# Patient Record
Sex: MALE | Race: OTHER | HISPANIC OR LATINO
[De-identification: names, ages, dates, MRNs, and addresses within clinical notes are randomized per-mention and may not be internally consistent; named-entity substitution may affect disease eponyms.]

---

## 2024-01-30 ENCOUNTER — APPOINTMENT (OUTPATIENT)
Dept: GASTROENTEROLOGY | Facility: CLINIC | Age: 74
End: 2024-01-30
Payer: MEDICARE

## 2024-01-30 VITALS
WEIGHT: 252 LBS | SYSTOLIC BLOOD PRESSURE: 120 MMHG | HEIGHT: 68 IN | RESPIRATION RATE: 14 BRPM | DIASTOLIC BLOOD PRESSURE: 69 MMHG | OXYGEN SATURATION: 94 % | HEART RATE: 74 BPM | BODY MASS INDEX: 38.19 KG/M2 | TEMPERATURE: 97.6 F

## 2024-01-30 DIAGNOSIS — B19.20 UNSPECIFIED VIRAL HEPATITIS C W/OUT HEPATIC COMA: ICD-10-CM

## 2024-01-30 DIAGNOSIS — R74.01 ELEVATION OF LEVELS OF LIVER TRANSAMINASE LEVELS: ICD-10-CM

## 2024-01-30 DIAGNOSIS — F17.200 NICOTINE DEPENDENCE, UNSPECIFIED, UNCOMPLICATED: ICD-10-CM

## 2024-01-30 DIAGNOSIS — Z78.9 OTHER SPECIFIED HEALTH STATUS: ICD-10-CM

## 2024-01-30 DIAGNOSIS — F19.20 OTHER PSYCHOACTIVE SUBSTANCE DEPENDENCE, UNCOMPLICATED: ICD-10-CM

## 2024-01-30 PROBLEM — Z00.00 ENCOUNTER FOR PREVENTIVE HEALTH EXAMINATION: Status: ACTIVE | Noted: 2024-01-30

## 2024-01-30 PROCEDURE — 99204 OFFICE O/P NEW MOD 45 MIN: CPT

## 2024-01-30 RX ORDER — METHADONE HYDROCHLORIDE 5 MG/1
TABLET ORAL
Refills: 0 | Status: ACTIVE | COMMUNITY

## 2024-01-30 NOTE — PHYSICAL EXAM
[General Appearance - Alert] : alert [General Appearance - In No Acute Distress] : in no acute distress [Sclera] : the sclera and conjunctiva were normal [Outer Ear] : the ears and nose were normal in appearance [Oropharynx] : the oropharynx was normal [Neck Appearance] : the appearance of the neck was normal [Edema] : there was no peripheral edema [Bowel Sounds] : normal bowel sounds [Abdomen Soft] : soft [Abdomen Tenderness] : non-tender [Abdomen Mass (___ Cm)] : no abdominal mass palpated [Abnormal Walk] : normal gait [Skin Color & Pigmentation] : normal skin color and pigmentation [Skin Turgor] : normal skin turgor [] : no rash [No Focal Deficits] : no focal deficits [Oriented To Time, Place, And Person] : oriented to person, place, and time [Impaired Insight] : insight and judgment were intact [Affect] : the affect was normal [Scleral Icterus] : No Scleral Icterus [Spider Angioma] : No spider angioma(s) were observed [Abdominal  Ascites] : no ascites [Asterixis] : no asterixis observed [Jaundice] : No jaundice [Palmar Erythema] : no Palmar Erythema [Depression] : no depression [Hallucinations] : ~T no ~M hallucinations

## 2024-01-30 NOTE — HISTORY OF PRESENT ILLNESS
[de-identified] : DAMON CHRISTIANSON is a 73 year old male with a PMH of Hepatitis C tx w/Epclusa completed 11/2023 and Heroine Use (last used 30 yrs ago).   He presents today for evaluation of elevated LFTs and hepatitis C. Pt is poor historian and we do not have records available to review. PCP referral states history of HCV, on Epclusa and Fibrosure F4 results and elevated CEA. 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 He reports completing Epclusa for HCV tx in 11/2023. SVR status unknown. No recent labs or abdominal imaging available to review. CT A/P from 2016 showed a normal liver.    Denies abdominal pain or distension, jaundice, hematemesis, hematochezia, dark urine, confusion or unintentional weight loss. Denies family history of liver disease. Denies significant alcohol consumption.

## 2024-01-30 NOTE — ASSESSMENT
[FreeTextEntry1] : DAMON CHRISTIANSON is a 73 year old male with a PMH of Hepatitis C tx w/Epclusa completed 11/2023 and Heroine Use (last used 30 yrs ago).  Elevated LFTs Labs ordered to rule out competing etiologies of liver disease Ultrasound Abdomen ordered to further evaluate  Hepatitis C etiology likely IVDU, last used 30 yrs ago completed Epclusa 11/2023 SVR labs ordered  Elevated CEA repeat levels ordered abdominal U/S ordered f/u w/GI  Follow up w/hepatology in 4-6 weeks

## 2024-02-28 ENCOUNTER — APPOINTMENT (OUTPATIENT)
Dept: GASTROENTEROLOGY | Facility: CLINIC | Age: 74
End: 2024-02-28

## 2024-03-12 ENCOUNTER — APPOINTMENT (OUTPATIENT)
Dept: GASTROENTEROLOGY | Facility: CLINIC | Age: 74
End: 2024-03-12

## 2024-08-21 ENCOUNTER — OFFICE (OUTPATIENT)
Dept: URBAN - METROPOLITAN AREA CLINIC 102 | Facility: CLINIC | Age: 74
Setting detail: OPHTHALMOLOGY
End: 2024-08-21
Payer: MEDICARE

## 2024-08-21 DIAGNOSIS — H16.223: ICD-10-CM

## 2024-08-21 DIAGNOSIS — H25.13: ICD-10-CM

## 2024-08-21 DIAGNOSIS — H02.834: ICD-10-CM

## 2024-08-21 DIAGNOSIS — H11.152: ICD-10-CM

## 2024-08-21 DIAGNOSIS — H02.831: ICD-10-CM

## 2024-08-21 DIAGNOSIS — H11.041: ICD-10-CM

## 2024-08-21 DIAGNOSIS — H52.4: ICD-10-CM

## 2024-08-21 PROBLEM — H52.7 REFRACTIVE ERROR: Status: ACTIVE | Noted: 2024-08-21

## 2024-08-21 PROCEDURE — 92020 GONIOSCOPY: CPT | Performed by: OPHTHALMOLOGY

## 2024-08-21 PROCEDURE — 92015 DETERMINE REFRACTIVE STATE: CPT | Performed by: OPHTHALMOLOGY

## 2024-08-21 PROCEDURE — 92002 INTRM OPH EXAM NEW PATIENT: CPT | Performed by: OPHTHALMOLOGY

## 2024-08-21 ASSESSMENT — LID POSITION - DERMATOCHALASIS
OS_DERMATOCHALASIS: LUL 3+
OD_DERMATOCHALASIS: RUL 3+

## 2024-08-21 ASSESSMENT — CONFRONTATIONAL VISUAL FIELD TEST (CVF)
OD_FINDINGS: FULL
OS_FINDINGS: FULL

## 2024-09-13 ENCOUNTER — APPOINTMENT (OUTPATIENT)
Dept: PULMONOLOGY | Facility: CLINIC | Age: 74
End: 2024-09-13

## 2024-11-12 ENCOUNTER — APPOINTMENT (OUTPATIENT)
Dept: GASTROENTEROLOGY | Facility: CLINIC | Age: 74
End: 2024-11-12

## 2025-01-01 ENCOUNTER — APPOINTMENT (OUTPATIENT)
Age: 75
End: 2025-01-01

## 2025-01-01 ENCOUNTER — OUTPATIENT (OUTPATIENT)
Dept: OUTPATIENT SERVICES | Facility: HOSPITAL | Age: 75
LOS: 1 days | Discharge: HOME | End: 2025-01-01

## 2025-01-01 ENCOUNTER — APPOINTMENT (OUTPATIENT)
Dept: GASTROENTEROLOGY | Facility: CLINIC | Age: 75
End: 2025-01-01

## 2025-01-01 ENCOUNTER — EMERGENCY (EMERGENCY)
Facility: HOSPITAL | Age: 75
LOS: 1 days | Discharge: HOME | End: 2025-01-01
Attending: STUDENT IN AN ORGANIZED HEALTH CARE EDUCATION/TRAINING PROGRAM
Payer: MEDICARE

## 2025-01-01 ENCOUNTER — APPOINTMENT (OUTPATIENT)
Dept: HEMATOLOGY ONCOLOGY | Facility: CLINIC | Age: 75
End: 2025-01-01

## 2025-01-01 ENCOUNTER — INPATIENT (INPATIENT)
Facility: HOSPITAL | Age: 75
LOS: 9 days | DRG: 443 | End: 2025-02-21
Attending: STUDENT IN AN ORGANIZED HEALTH CARE EDUCATION/TRAINING PROGRAM | Admitting: STUDENT IN AN ORGANIZED HEALTH CARE EDUCATION/TRAINING PROGRAM
Payer: MEDICARE

## 2025-01-01 VITALS — RESPIRATION RATE: 26 BRPM

## 2025-01-01 VITALS
OXYGEN SATURATION: 98 % | DIASTOLIC BLOOD PRESSURE: 76 MMHG | TEMPERATURE: 98 F | HEART RATE: 86 BPM | RESPIRATION RATE: 18 BRPM | SYSTOLIC BLOOD PRESSURE: 132 MMHG

## 2025-01-01 VITALS
OXYGEN SATURATION: 95 % | TEMPERATURE: 98 F | HEART RATE: 82 BPM | DIASTOLIC BLOOD PRESSURE: 71 MMHG | RESPIRATION RATE: 18 BRPM | HEIGHT: 68 IN | SYSTOLIC BLOOD PRESSURE: 145 MMHG | WEIGHT: 240.08 LBS

## 2025-01-01 VITALS
HEART RATE: 105 BPM | OXYGEN SATURATION: 92 % | HEIGHT: 67 IN | TEMPERATURE: 95 F | RESPIRATION RATE: 20 BRPM | SYSTOLIC BLOOD PRESSURE: 95 MMHG | DIASTOLIC BLOOD PRESSURE: 53 MMHG

## 2025-01-01 DIAGNOSIS — F11.90 OPIOID USE, UNSPECIFIED, UNCOMPLICATED: ICD-10-CM

## 2025-01-01 DIAGNOSIS — G93.49 OTHER ENCEPHALOPATHY: ICD-10-CM

## 2025-01-01 DIAGNOSIS — K76.82 HEPATIC ENCEPHALOPATHY: ICD-10-CM

## 2025-01-01 DIAGNOSIS — Z51.5 ENCOUNTER FOR PALLIATIVE CARE: ICD-10-CM

## 2025-01-01 DIAGNOSIS — K74.60 UNSPECIFIED CIRRHOSIS OF LIVER: ICD-10-CM

## 2025-01-01 DIAGNOSIS — N17.9 ACUTE KIDNEY FAILURE, UNSPECIFIED: ICD-10-CM

## 2025-01-01 DIAGNOSIS — R79.9 ABNORMAL FINDING OF BLOOD CHEMISTRY, UNSPECIFIED: ICD-10-CM

## 2025-01-01 LAB
ACANTHOCYTES BLD QL SMEAR: ABNORMAL
ALBUMIN FLD-MCNC: 0.6 G/DL — SIGNIFICANT CHANGE UP
ALBUMIN FLD-MCNC: 0.9 G/DL — SIGNIFICANT CHANGE UP
ALBUMIN SERPL ELPH-MCNC: 2.5 G/DL — LOW (ref 3.3–5.2)
ALBUMIN SERPL ELPH-MCNC: 2.6 G/DL — LOW (ref 3.3–5.2)
ALBUMIN SERPL ELPH-MCNC: 2.6 G/DL — LOW (ref 3.3–5.2)
ALBUMIN SERPL ELPH-MCNC: 2.8 G/DL — LOW (ref 3.3–5.2)
ALBUMIN SERPL ELPH-MCNC: 2.9 G/DL — LOW (ref 3.3–5.2)
ALBUMIN SERPL ELPH-MCNC: 3.1 G/DL — LOW (ref 3.3–5.2)
ALBUMIN SERPL ELPH-MCNC: 3.3 G/DL — SIGNIFICANT CHANGE UP (ref 3.3–5.2)
ALBUMIN SERPL ELPH-MCNC: 3.3 G/DL — SIGNIFICANT CHANGE UP (ref 3.3–5.2)
ALBUMIN SERPL ELPH-MCNC: 3.4 G/DL — SIGNIFICANT CHANGE UP (ref 3.3–5.2)
ALBUMIN SERPL ELPH-MCNC: 3.4 G/DL — SIGNIFICANT CHANGE UP (ref 3.3–5.2)
ALBUMIN SERPL ELPH-MCNC: 3.5 G/DL — SIGNIFICANT CHANGE UP (ref 3.3–5.2)
ALBUMIN SERPL ELPH-MCNC: 3.6 G/DL — SIGNIFICANT CHANGE UP (ref 3.3–5.2)
ALP SERPL-CCNC: 145 U/L — HIGH (ref 40–120)
ALP SERPL-CCNC: 145 U/L — HIGH (ref 40–120)
ALP SERPL-CCNC: 147 U/L — HIGH (ref 40–120)
ALP SERPL-CCNC: 148 U/L — HIGH (ref 40–120)
ALP SERPL-CCNC: 153 U/L — HIGH (ref 40–120)
ALP SERPL-CCNC: 154 U/L — HIGH (ref 40–120)
ALP SERPL-CCNC: 155 U/L — HIGH (ref 40–120)
ALP SERPL-CCNC: 157 U/L — HIGH (ref 40–120)
ALP SERPL-CCNC: 169 U/L — HIGH (ref 40–120)
ALP SERPL-CCNC: 183 U/L — HIGH (ref 40–120)
ALP SERPL-CCNC: 224 U/L — HIGH (ref 40–120)
ALP SERPL-CCNC: 225 U/L — HIGH (ref 40–120)
ALT FLD-CCNC: 23 U/L — SIGNIFICANT CHANGE UP
ALT FLD-CCNC: 35 U/L — SIGNIFICANT CHANGE UP
ALT FLD-CCNC: 35 U/L — SIGNIFICANT CHANGE UP
ALT FLD-CCNC: 36 U/L — SIGNIFICANT CHANGE UP
ALT FLD-CCNC: 37 U/L — SIGNIFICANT CHANGE UP
ALT FLD-CCNC: 37 U/L — SIGNIFICANT CHANGE UP
ALT FLD-CCNC: 40 U/L — SIGNIFICANT CHANGE UP
ALT FLD-CCNC: 42 U/L — HIGH
ALT FLD-CCNC: 46 U/L — HIGH
ALT FLD-CCNC: 48 U/L — HIGH
ALT FLD-CCNC: 58 U/L — HIGH
ALT FLD-CCNC: 61 U/L — HIGH
AMMONIA BLD-MCNC: 103 UMOL/L — HIGH (ref 11–55)
AMMONIA BLD-MCNC: 122 UMOL/L — HIGH (ref 11–55)
AMMONIA BLD-MCNC: 73 UMOL/L — HIGH (ref 11–55)
AMMONIA BLD-MCNC: 93 UMOL/L — HIGH (ref 11–55)
AMPHET UR-MCNC: NEGATIVE — SIGNIFICANT CHANGE UP
ANION GAP SERPL CALC-SCNC: 14 MMOL/L — SIGNIFICANT CHANGE UP (ref 5–17)
ANION GAP SERPL CALC-SCNC: 15 MMOL/L — SIGNIFICANT CHANGE UP (ref 5–17)
ANION GAP SERPL CALC-SCNC: 17 MMOL/L — SIGNIFICANT CHANGE UP (ref 5–17)
ANION GAP SERPL CALC-SCNC: 18 MMOL/L — HIGH (ref 5–17)
ANION GAP SERPL CALC-SCNC: 19 MMOL/L — HIGH (ref 5–17)
ANISOCYTOSIS BLD QL: ABNORMAL
ANISOCYTOSIS BLD QL: SLIGHT — SIGNIFICANT CHANGE UP
ANISOCYTOSIS BLD QL: SLIGHT — SIGNIFICANT CHANGE UP
APPEARANCE UR: CLEAR — SIGNIFICANT CHANGE UP
APTT BLD: 41.7 SEC — HIGH (ref 24.5–35.6)
APTT BLD: 44.3 SEC — HIGH (ref 24.5–35.6)
APTT BLD: 47.5 SEC — HIGH (ref 24.5–35.6)
APTT BLD: 48.7 SEC — HIGH (ref 24.5–35.6)
APTT BLD: 48.9 SEC — HIGH (ref 24.5–35.6)
APTT BLD: 48.9 SEC — HIGH (ref 24.5–35.6)
AST SERPL-CCNC: 100 U/L — HIGH
AST SERPL-CCNC: 115 U/L — HIGH
AST SERPL-CCNC: 117 U/L — HIGH
AST SERPL-CCNC: 133 U/L — HIGH
AST SERPL-CCNC: 142 U/L — HIGH
AST SERPL-CCNC: 79 U/L — HIGH
AST SERPL-CCNC: 93 U/L — HIGH
AST SERPL-CCNC: 95 U/L — HIGH
AST SERPL-CCNC: 98 U/L — HIGH
AST SERPL-CCNC: 98 U/L — HIGH
B PERT IGG+IGM PNL SER: ABNORMAL
BARBITURATES UR SCN-MCNC: NEGATIVE — SIGNIFICANT CHANGE UP
BASE EXCESS BLDA CALC-SCNC: -5 MMOL/L — LOW (ref -2–3)
BASE EXCESS BLDA CALC-SCNC: -8.2 MMOL/L — LOW (ref -2–3)
BASE EXCESS BLDV CALC-SCNC: -5 MMOL/L — LOW (ref -2–3)
BASOPHILS # BLD AUTO: 0 K/UL — SIGNIFICANT CHANGE UP (ref 0–0.2)
BASOPHILS # BLD AUTO: 0.01 K/UL — SIGNIFICANT CHANGE UP (ref 0–0.2)
BASOPHILS # BLD AUTO: 0.04 K/UL — SIGNIFICANT CHANGE UP (ref 0–0.2)
BASOPHILS # BLD MANUAL: 0 K/UL — SIGNIFICANT CHANGE UP (ref 0–0.2)
BASOPHILS # BLD MANUAL: 0.07 K/UL — SIGNIFICANT CHANGE UP (ref 0–0.2)
BASOPHILS NFR BLD AUTO: 0 % — SIGNIFICANT CHANGE UP (ref 0–2)
BASOPHILS NFR BLD AUTO: 0.1 % — SIGNIFICANT CHANGE UP (ref 0–2)
BASOPHILS NFR BLD AUTO: 0.4 % — SIGNIFICANT CHANGE UP (ref 0–2)
BASOPHILS NFR BLD MANUAL: 0 % — SIGNIFICANT CHANGE UP (ref 0–2)
BASOPHILS NFR BLD MANUAL: 0.9 % — SIGNIFICANT CHANGE UP (ref 0–2)
BENZODIAZ UR-MCNC: NEGATIVE — SIGNIFICANT CHANGE UP
BILIRUB DIRECT SERPL-MCNC: 2 MG/DL — HIGH (ref 0–0.3)
BILIRUB DIRECT SERPL-MCNC: 2.2 MG/DL — HIGH (ref 0–0.3)
BILIRUB DIRECT SERPL-MCNC: 2.2 MG/DL — HIGH (ref 0–0.3)
BILIRUB DIRECT SERPL-MCNC: 2.8 MG/DL — HIGH (ref 0–0.3)
BILIRUB DIRECT SERPL-MCNC: 2.8 MG/DL — HIGH (ref 0–0.3)
BILIRUB INDIRECT FLD-MCNC: 0.9 MG/DL — SIGNIFICANT CHANGE UP (ref 0.2–1)
BILIRUB INDIRECT FLD-MCNC: 1.1 MG/DL — HIGH (ref 0.2–1)
BILIRUB INDIRECT FLD-MCNC: 1.2 MG/DL — HIGH (ref 0.2–1)
BILIRUB INDIRECT FLD-MCNC: 1.3 MG/DL — HIGH (ref 0.2–1)
BILIRUB INDIRECT FLD-MCNC: 1.9 MG/DL — HIGH (ref 0.2–1)
BILIRUB SERPL-MCNC: 1.4 MG/DL — SIGNIFICANT CHANGE UP (ref 0.4–2)
BILIRUB SERPL-MCNC: 3.1 MG/DL — HIGH (ref 0.4–2)
BILIRUB SERPL-MCNC: 3.2 MG/DL — HIGH (ref 0.4–2)
BILIRUB SERPL-MCNC: 3.9 MG/DL — HIGH (ref 0.4–2)
BILIRUB SERPL-MCNC: 4.1 MG/DL — HIGH (ref 0.4–2)
BILIRUB SERPL-MCNC: 4.1 MG/DL — HIGH (ref 0.4–2)
BILIRUB SERPL-MCNC: 5 MG/DL — HIGH (ref 0.4–2)
BILIRUB SERPL-MCNC: 5.6 MG/DL — HIGH (ref 0.4–2)
BILIRUB SERPL-MCNC: 5.9 MG/DL — HIGH (ref 0.4–2)
BILIRUB UR-MCNC: ABNORMAL
BLOOD GAS COMMENTS ARTERIAL: SIGNIFICANT CHANGE UP
BLOOD GAS COMMENTS ARTERIAL: SIGNIFICANT CHANGE UP
BUN SERPL-MCNC: 35.1 MG/DL — HIGH (ref 8–20)
BUN SERPL-MCNC: 42.3 MG/DL — HIGH (ref 8–20)
BUN SERPL-MCNC: 44.9 MG/DL — HIGH (ref 8–20)
BUN SERPL-MCNC: 46.8 MG/DL — HIGH (ref 8–20)
BUN SERPL-MCNC: 54 MG/DL — HIGH (ref 8–20)
BUN SERPL-MCNC: 73.2 MG/DL — HIGH (ref 8–20)
BUN SERPL-MCNC: 76.9 MG/DL — HIGH (ref 8–20)
BUN SERPL-MCNC: 78 MG/DL — HIGH (ref 8–20)
BUN SERPL-MCNC: 81.9 MG/DL — HIGH (ref 8–20)
BUN SERPL-MCNC: 85.6 MG/DL — HIGH (ref 8–20)
BUN SERPL-MCNC: 90.9 MG/DL — HIGH (ref 8–20)
BUN SERPL-MCNC: 95.9 MG/DL — HIGH (ref 8–20)
BURR CELLS BLD QL SMEAR: ABNORMAL
BURR CELLS BLD QL SMEAR: SLIGHT — SIGNIFICANT CHANGE UP
BURR CELLS BLD QL SMEAR: SLIGHT — SIGNIFICANT CHANGE UP
CALCIUM SERPL-MCNC: 10 MG/DL — SIGNIFICANT CHANGE UP (ref 8.4–10.5)
CALCIUM SERPL-MCNC: 8.8 MG/DL — SIGNIFICANT CHANGE UP (ref 8.4–10.5)
CALCIUM SERPL-MCNC: 8.9 MG/DL — SIGNIFICANT CHANGE UP (ref 8.4–10.5)
CALCIUM SERPL-MCNC: 9.1 MG/DL — SIGNIFICANT CHANGE UP (ref 8.4–10.5)
CALCIUM SERPL-MCNC: 9.2 MG/DL — SIGNIFICANT CHANGE UP (ref 8.4–10.5)
CALCIUM SERPL-MCNC: 9.5 MG/DL — SIGNIFICANT CHANGE UP (ref 8.4–10.5)
CALCIUM SERPL-MCNC: 9.6 MG/DL — SIGNIFICANT CHANGE UP (ref 8.4–10.5)
CHLORIDE SERPL-SCNC: 100 MMOL/L — SIGNIFICANT CHANGE UP (ref 96–108)
CHLORIDE SERPL-SCNC: 101 MMOL/L — SIGNIFICANT CHANGE UP (ref 96–108)
CHLORIDE SERPL-SCNC: 104 MMOL/L — SIGNIFICANT CHANGE UP (ref 96–108)
CHLORIDE SERPL-SCNC: 104 MMOL/L — SIGNIFICANT CHANGE UP (ref 96–108)
CHLORIDE SERPL-SCNC: 105 MMOL/L — SIGNIFICANT CHANGE UP (ref 96–108)
CHLORIDE SERPL-SCNC: 105 MMOL/L — SIGNIFICANT CHANGE UP (ref 96–108)
CHLORIDE SERPL-SCNC: 106 MMOL/L — SIGNIFICANT CHANGE UP (ref 96–108)
CHLORIDE SERPL-SCNC: 107 MMOL/L — SIGNIFICANT CHANGE UP (ref 96–108)
CHLORIDE SERPL-SCNC: 110 MMOL/L — HIGH (ref 96–108)
CHLORIDE SERPL-SCNC: 94 MMOL/L — LOW (ref 96–108)
CHLORIDE SERPL-SCNC: 96 MMOL/L — SIGNIFICANT CHANGE UP (ref 96–108)
CHLORIDE SERPL-SCNC: 97 MMOL/L — SIGNIFICANT CHANGE UP (ref 96–108)
CO2 SERPL-SCNC: 19 MMOL/L — LOW (ref 22–29)
CO2 SERPL-SCNC: 20 MMOL/L — LOW (ref 22–29)
CO2 SERPL-SCNC: 23 MMOL/L — SIGNIFICANT CHANGE UP (ref 22–29)
CO2 SERPL-SCNC: 23 MMOL/L — SIGNIFICANT CHANGE UP (ref 22–29)
CO2 SERPL-SCNC: 24 MMOL/L — SIGNIFICANT CHANGE UP (ref 22–29)
CO2 SERPL-SCNC: 25 MMOL/L — SIGNIFICANT CHANGE UP (ref 22–29)
COCAINE METAB.OTHER UR-MCNC: NEGATIVE — SIGNIFICANT CHANGE UP
COLOR FLD: ABNORMAL
COLOR SPEC: SIGNIFICANT CHANGE UP
CREAT ?TM UR-MCNC: 222 MG/DL — SIGNIFICANT CHANGE UP
CREAT SERPL-MCNC: 1.28 MG/DL — SIGNIFICANT CHANGE UP (ref 0.5–1.3)
CREAT SERPL-MCNC: 3.36 MG/DL — HIGH (ref 0.5–1.3)
CREAT SERPL-MCNC: 3.54 MG/DL — HIGH (ref 0.5–1.3)
CREAT SERPL-MCNC: 3.98 MG/DL — HIGH (ref 0.5–1.3)
CREAT SERPL-MCNC: 4.01 MG/DL — HIGH (ref 0.5–1.3)
CREAT SERPL-MCNC: 4.61 MG/DL — HIGH (ref 0.5–1.3)
CREAT SERPL-MCNC: 4.89 MG/DL — HIGH (ref 0.5–1.3)
CREAT SERPL-MCNC: 4.92 MG/DL — HIGH (ref 0.5–1.3)
CREAT SERPL-MCNC: 5.03 MG/DL — HIGH (ref 0.5–1.3)
CREAT SERPL-MCNC: 5.3 MG/DL — HIGH (ref 0.5–1.3)
CREAT SERPL-MCNC: 5.33 MG/DL — HIGH (ref 0.5–1.3)
CREAT SERPL-MCNC: 5.76 MG/DL — HIGH (ref 0.5–1.3)
CULTURE RESULTS: SIGNIFICANT CHANGE UP
DACRYOCYTES BLD QL SMEAR: ABNORMAL
DACRYOCYTES BLD QL SMEAR: SLIGHT — SIGNIFICANT CHANGE UP
DIFF PNL FLD: NEGATIVE — SIGNIFICANT CHANGE UP
EGFR: 10 ML/MIN/1.73M2 — LOW
EGFR: 11 ML/MIN/1.73M2 — LOW
EGFR: 12 ML/MIN/1.73M2 — LOW
EGFR: 12 ML/MIN/1.73M2 — LOW
EGFR: 13 ML/MIN/1.73M2 — LOW
EGFR: 15 ML/MIN/1.73M2 — LOW
EGFR: 15 ML/MIN/1.73M2 — LOW
EGFR: 17 ML/MIN/1.73M2 — LOW
EGFR: 18 ML/MIN/1.73M2 — LOW
EGFR: 59 ML/MIN/1.73M2 — LOW
ELLIPTOCYTES BLD QL SMEAR: SLIGHT — SIGNIFICANT CHANGE UP
EOSINOPHIL # BLD AUTO: 0 K/UL — SIGNIFICANT CHANGE UP (ref 0–0.5)
EOSINOPHIL # BLD AUTO: 0.01 K/UL — SIGNIFICANT CHANGE UP (ref 0–0.5)
EOSINOPHIL # BLD AUTO: 0.01 K/UL — SIGNIFICANT CHANGE UP (ref 0–0.5)
EOSINOPHIL # BLD AUTO: 0.02 K/UL — SIGNIFICANT CHANGE UP (ref 0–0.5)
EOSINOPHIL # BLD AUTO: 0.04 K/UL — SIGNIFICANT CHANGE UP (ref 0–0.5)
EOSINOPHIL # BLD AUTO: 0.06 K/UL — SIGNIFICANT CHANGE UP (ref 0–0.5)
EOSINOPHIL # BLD MANUAL: 0 K/UL — SIGNIFICANT CHANGE UP (ref 0–0.5)
EOSINOPHIL # BLD MANUAL: 0.08 K/UL — SIGNIFICANT CHANGE UP (ref 0–0.5)
EOSINOPHIL # FLD: 1 % — SIGNIFICANT CHANGE UP
EOSINOPHIL NFR BLD AUTO: 0 % — SIGNIFICANT CHANGE UP (ref 0–6)
EOSINOPHIL NFR BLD AUTO: 0.1 % — SIGNIFICANT CHANGE UP (ref 0–6)
EOSINOPHIL NFR BLD AUTO: 0.2 % — SIGNIFICANT CHANGE UP (ref 0–6)
EOSINOPHIL NFR BLD AUTO: 0.3 % — SIGNIFICANT CHANGE UP (ref 0–6)
EOSINOPHIL NFR BLD AUTO: 0.3 % — SIGNIFICANT CHANGE UP (ref 0–6)
EOSINOPHIL NFR BLD AUTO: 0.5 % — SIGNIFICANT CHANGE UP (ref 0–6)
EOSINOPHIL NFR BLD AUTO: 0.7 % — SIGNIFICANT CHANGE UP (ref 0–6)
EOSINOPHIL NFR BLD MANUAL: 0 % — SIGNIFICANT CHANGE UP (ref 0–6)
EOSINOPHIL NFR BLD MANUAL: 0.9 % — SIGNIFICANT CHANGE UP (ref 0–6)
FENTANYL UR QL SCN: NEGATIVE — SIGNIFICANT CHANGE UP
FLUAV AG NPH QL: SIGNIFICANT CHANGE UP
FLUBV AG NPH QL: SIGNIFICANT CHANGE UP
GAS PNL BLDA: SIGNIFICANT CHANGE UP
GIANT PLATELETS BLD QL SMEAR: PRESENT
GIANT PLATELETS BLD QL SMEAR: PRESENT
GLUCOSE BLDC GLUCOMTR-MCNC: 101 MG/DL — HIGH (ref 70–99)
GLUCOSE BLDC GLUCOMTR-MCNC: 102 MG/DL — HIGH (ref 70–99)
GLUCOSE BLDC GLUCOMTR-MCNC: 106 MG/DL — HIGH (ref 70–99)
GLUCOSE BLDC GLUCOMTR-MCNC: 109 MG/DL — HIGH (ref 70–99)
GLUCOSE BLDC GLUCOMTR-MCNC: 114 MG/DL — HIGH (ref 70–99)
GLUCOSE BLDC GLUCOMTR-MCNC: 116 MG/DL — HIGH (ref 70–99)
GLUCOSE BLDC GLUCOMTR-MCNC: 121 MG/DL — HIGH (ref 70–99)
GLUCOSE BLDC GLUCOMTR-MCNC: 125 MG/DL — HIGH (ref 70–99)
GLUCOSE BLDC GLUCOMTR-MCNC: 131 MG/DL — HIGH (ref 70–99)
GLUCOSE BLDC GLUCOMTR-MCNC: 142 MG/DL — HIGH (ref 70–99)
GLUCOSE BLDC GLUCOMTR-MCNC: 144 MG/DL — HIGH (ref 70–99)
GLUCOSE BLDC GLUCOMTR-MCNC: 146 MG/DL — HIGH (ref 70–99)
GLUCOSE BLDC GLUCOMTR-MCNC: 195 MG/DL — HIGH (ref 70–99)
GLUCOSE BLDC GLUCOMTR-MCNC: 57 MG/DL — LOW (ref 70–99)
GLUCOSE BLDC GLUCOMTR-MCNC: 64 MG/DL — LOW (ref 70–99)
GLUCOSE BLDC GLUCOMTR-MCNC: 71 MG/DL — SIGNIFICANT CHANGE UP (ref 70–99)
GLUCOSE BLDC GLUCOMTR-MCNC: 73 MG/DL — SIGNIFICANT CHANGE UP (ref 70–99)
GLUCOSE BLDC GLUCOMTR-MCNC: 75 MG/DL — SIGNIFICANT CHANGE UP (ref 70–99)
GLUCOSE BLDC GLUCOMTR-MCNC: 78 MG/DL — SIGNIFICANT CHANGE UP (ref 70–99)
GLUCOSE BLDC GLUCOMTR-MCNC: 85 MG/DL — SIGNIFICANT CHANGE UP (ref 70–99)
GLUCOSE BLDC GLUCOMTR-MCNC: 88 MG/DL — SIGNIFICANT CHANGE UP (ref 70–99)
GLUCOSE BLDC GLUCOMTR-MCNC: 89 MG/DL — SIGNIFICANT CHANGE UP (ref 70–99)
GLUCOSE BLDC GLUCOMTR-MCNC: 92 MG/DL — SIGNIFICANT CHANGE UP (ref 70–99)
GLUCOSE BLDC GLUCOMTR-MCNC: 93 MG/DL — SIGNIFICANT CHANGE UP (ref 70–99)
GLUCOSE BLDC GLUCOMTR-MCNC: 96 MG/DL — SIGNIFICANT CHANGE UP (ref 70–99)
GLUCOSE BLDC GLUCOMTR-MCNC: 97 MG/DL — SIGNIFICANT CHANGE UP (ref 70–99)
GLUCOSE BLDC GLUCOMTR-MCNC: 97 MG/DL — SIGNIFICANT CHANGE UP (ref 70–99)
GLUCOSE BLDC GLUCOMTR-MCNC: 99 MG/DL — SIGNIFICANT CHANGE UP (ref 70–99)
GLUCOSE FLD-MCNC: 83 MG/DL — SIGNIFICANT CHANGE UP
GLUCOSE FLD-MCNC: 85 MG/DL — SIGNIFICANT CHANGE UP
GLUCOSE SERPL-MCNC: 101 MG/DL — HIGH (ref 70–99)
GLUCOSE SERPL-MCNC: 103 MG/DL — HIGH (ref 70–99)
GLUCOSE SERPL-MCNC: 106 MG/DL — HIGH (ref 70–99)
GLUCOSE SERPL-MCNC: 123 MG/DL — HIGH (ref 70–99)
GLUCOSE SERPL-MCNC: 71 MG/DL — SIGNIFICANT CHANGE UP (ref 70–99)
GLUCOSE SERPL-MCNC: 73 MG/DL — SIGNIFICANT CHANGE UP (ref 70–99)
GLUCOSE SERPL-MCNC: 73 MG/DL — SIGNIFICANT CHANGE UP (ref 70–99)
GLUCOSE SERPL-MCNC: 77 MG/DL — SIGNIFICANT CHANGE UP (ref 70–99)
GLUCOSE SERPL-MCNC: 91 MG/DL — SIGNIFICANT CHANGE UP (ref 70–99)
GLUCOSE SERPL-MCNC: 93 MG/DL — SIGNIFICANT CHANGE UP (ref 70–99)
GLUCOSE SERPL-MCNC: 96 MG/DL — SIGNIFICANT CHANGE UP (ref 70–99)
GLUCOSE SERPL-MCNC: 97 MG/DL — SIGNIFICANT CHANGE UP (ref 70–99)
GLUCOSE UR QL: NEGATIVE MG/DL — SIGNIFICANT CHANGE UP
GRAM STN FLD: SIGNIFICANT CHANGE UP
GRAM STN FLD: SIGNIFICANT CHANGE UP
HBV CORE AB SER-ACNC: REACTIVE
HBV SURFACE AB SER-ACNC: REACTIVE — SIGNIFICANT CHANGE UP
HBV SURFACE AG SER-ACNC: SIGNIFICANT CHANGE UP
HCO3 BLDA-SCNC: 20 MMOL/L — LOW (ref 21–28)
HCO3 BLDA-SCNC: 21 MMOL/L — SIGNIFICANT CHANGE UP (ref 21–28)
HCO3 BLDV-SCNC: 21 MMOL/L — LOW (ref 22–29)
HCT VFR BLD CALC: 31.2 % — LOW (ref 39–50)
HCT VFR BLD CALC: 31.5 % — LOW (ref 39–50)
HCT VFR BLD CALC: 33.1 % — LOW (ref 39–50)
HCT VFR BLD CALC: 33.4 % — LOW (ref 39–50)
HCT VFR BLD CALC: 33.6 % — LOW (ref 39–50)
HCT VFR BLD CALC: 35.1 % — LOW (ref 39–50)
HCT VFR BLD CALC: 36.9 % — LOW (ref 39–50)
HCT VFR BLD CALC: 37 % — LOW (ref 39–50)
HCT VFR BLD CALC: 37.2 % — LOW (ref 39–50)
HCT VFR BLD CALC: 37.2 % — LOW (ref 39–50)
HCT VFR BLD CALC: 37.6 % — LOW (ref 39–50)
HCT VFR BLD CALC: 37.7 % — LOW (ref 39–50)
HCT VFR BLD CALC: 39.7 % — SIGNIFICANT CHANGE UP (ref 39–50)
HGB BLD-MCNC: 10.5 G/DL — LOW (ref 13–17)
HGB BLD-MCNC: 10.6 G/DL — LOW (ref 13–17)
HGB BLD-MCNC: 10.9 G/DL — LOW (ref 13–17)
HGB BLD-MCNC: 11 G/DL — LOW (ref 13–17)
HGB BLD-MCNC: 11 G/DL — LOW (ref 13–17)
HGB BLD-MCNC: 11.3 G/DL — LOW (ref 13–17)
HGB BLD-MCNC: 11.6 G/DL — LOW (ref 13–17)
HGB BLD-MCNC: 11.8 G/DL — LOW (ref 13–17)
HGB BLD-MCNC: 11.9 G/DL — LOW (ref 13–17)
HGB BLD-MCNC: 12 G/DL — LOW (ref 13–17)
HGB BLD-MCNC: 12.4 G/DL — LOW (ref 13–17)
HGB BLD-MCNC: 12.4 G/DL — LOW (ref 13–17)
HGB BLD-MCNC: 12.9 G/DL — LOW (ref 13–17)
HIV 1 & 2 AB SERPL IA.RAPID: SIGNIFICANT CHANGE UP
HIV 1 & 2 AB SERPL IA.RAPID: SIGNIFICANT CHANGE UP
HOROWITZ INDEX BLDA+IHG-RTO: 100 — SIGNIFICANT CHANGE UP
HOROWITZ INDEX BLDA+IHG-RTO: 30 — SIGNIFICANT CHANGE UP
HYPOCHROMIA BLD QL: SLIGHT — SIGNIFICANT CHANGE UP
HYPOCHROMIA BLD QL: SLIGHT — SIGNIFICANT CHANGE UP
IMM GRANULOCYTES # BLD AUTO: 0.03 K/UL — SIGNIFICANT CHANGE UP (ref 0–0.07)
IMM GRANULOCYTES # BLD AUTO: 0.03 K/UL — SIGNIFICANT CHANGE UP (ref 0–0.07)
IMM GRANULOCYTES # BLD AUTO: 0.04 K/UL — SIGNIFICANT CHANGE UP (ref 0–0.07)
IMM GRANULOCYTES # BLD AUTO: 0.04 K/UL — SIGNIFICANT CHANGE UP (ref 0–0.07)
IMM GRANULOCYTES # BLD AUTO: 0.05 K/UL — SIGNIFICANT CHANGE UP (ref 0–0.07)
IMM GRANULOCYTES # BLD AUTO: 0.05 K/UL — SIGNIFICANT CHANGE UP (ref 0–0.07)
IMM GRANULOCYTES # BLD AUTO: 0.06 K/UL — SIGNIFICANT CHANGE UP (ref 0–0.07)
IMM GRANULOCYTES # BLD AUTO: 0.07 K/UL — SIGNIFICANT CHANGE UP (ref 0–0.07)
IMM GRANULOCYTES # BLD AUTO: 0.07 K/UL — SIGNIFICANT CHANGE UP (ref 0–0.07)
IMM GRANULOCYTES NFR BLD AUTO: 0.4 % — SIGNIFICANT CHANGE UP (ref 0–0.9)
IMM GRANULOCYTES NFR BLD AUTO: 0.4 % — SIGNIFICANT CHANGE UP (ref 0–0.9)
IMM GRANULOCYTES NFR BLD AUTO: 0.5 % — SIGNIFICANT CHANGE UP (ref 0–0.9)
IMM GRANULOCYTES NFR BLD AUTO: 0.5 % — SIGNIFICANT CHANGE UP (ref 0–0.9)
IMM GRANULOCYTES NFR BLD AUTO: 0.6 % — SIGNIFICANT CHANGE UP (ref 0–0.9)
IMM GRANULOCYTES NFR BLD AUTO: 0.7 % — SIGNIFICANT CHANGE UP (ref 0–0.9)
IMMATURE PLATELET FRACTION #: 1.4 K/UL — LOW (ref 3.9–12.5)
IMMATURE PLATELET FRACTION #: 1.5 K/UL — LOW (ref 3.9–12.5)
IMMATURE PLATELET FRACTION #: 1.5 K/UL — LOW (ref 3.9–12.5)
IMMATURE PLATELET FRACTION #: 1.8 K/UL — LOW (ref 3.9–12.5)
IMMATURE PLATELET FRACTION #: 1.8 K/UL — LOW (ref 3.9–12.5)
IMMATURE PLATELET FRACTION #: 1.9 K/UL — LOW (ref 3.9–12.5)
IMMATURE PLATELET FRACTION #: 2.2 K/UL — LOW (ref 3.9–12.5)
IMMATURE PLATELET FRACTION #: 2.3 K/UL — LOW (ref 3.9–12.5)
IMMATURE PLATELET FRACTION #: 2.4 K/UL — LOW (ref 3.9–12.5)
IMMATURE PLATELET FRACTION #: 2.7 K/UL — LOW (ref 3.9–12.5)
IMMATURE PLATELET FRACTION #: 3 K/UL — LOW (ref 3.9–12.5)
IMMATURE PLATELET FRACTION #: 3.6 K/UL — LOW (ref 3.9–12.5)
IMMATURE PLATELET FRACTION %: 2.4 % — SIGNIFICANT CHANGE UP (ref 1.6–7.1)
IMMATURE PLATELET FRACTION %: 2.8 % — SIGNIFICANT CHANGE UP (ref 1.6–7.1)
IMMATURE PLATELET FRACTION %: 3 % — SIGNIFICANT CHANGE UP (ref 1.6–7.1)
IMMATURE PLATELET FRACTION %: 3.1 % — SIGNIFICANT CHANGE UP (ref 1.6–7.1)
IMMATURE PLATELET FRACTION %: 3.5 % — SIGNIFICANT CHANGE UP (ref 1.6–7.1)
IMMATURE PLATELET FRACTION %: 3.7 % — SIGNIFICANT CHANGE UP (ref 1.6–7.1)
IMMATURE PLATELET FRACTION %: 4 % — SIGNIFICANT CHANGE UP (ref 1.6–7.1)
IMMATURE PLATELET FRACTION %: 4.6 % — SIGNIFICANT CHANGE UP (ref 1.6–7.1)
IMMATURE PLATELET FRACTION %: 4.7 % — SIGNIFICANT CHANGE UP (ref 1.6–7.1)
IMMATURE PLATELET FRACTION %: 4.8 % — SIGNIFICANT CHANGE UP (ref 1.6–7.1)
IMMATURE PLATELET FRACTION %: 6.3 % — SIGNIFICANT CHANGE UP (ref 1.6–7.1)
IMMATURE PLATELET FRACTION %: 6.5 % — SIGNIFICANT CHANGE UP (ref 1.6–7.1)
INR BLD: 1.84 RATIO — HIGH (ref 0.85–1.16)
INR BLD: 1.89 RATIO — HIGH (ref 0.85–1.16)
INR BLD: 1.94 RATIO — HIGH (ref 0.85–1.16)
INR BLD: 1.94 RATIO — HIGH (ref 0.85–1.16)
INR BLD: 1.95 RATIO — HIGH (ref 0.85–1.16)
INR BLD: 2.08 RATIO — HIGH (ref 0.85–1.16)
INR BLD: 2.15 RATIO — HIGH (ref 0.85–1.16)
INR BLD: 2.22 RATIO — HIGH (ref 0.85–1.16)
INR BLD: 2.31 RATIO — HIGH (ref 0.85–1.16)
KETONES UR-MCNC: ABNORMAL MG/DL
LACTATE BLDV-MCNC: 2 MMOL/L — SIGNIFICANT CHANGE UP (ref 0.5–2)
LACTATE BLDV-MCNC: 3.8 MMOL/L — HIGH (ref 0.5–2)
LACTATE SERPL-SCNC: 2.7 MMOL/L — HIGH (ref 0.5–2)
LACTATE SERPL-SCNC: 2.8 MMOL/L — HIGH (ref 0.5–2)
LACTATE SERPL-SCNC: 2.8 MMOL/L — HIGH (ref 0.5–2)
LACTATE SERPL-SCNC: 3.1 MMOL/L — HIGH (ref 0.5–2)
LDH SERPL L TO P-CCNC: 113 U/L — SIGNIFICANT CHANGE UP
LDH SERPL L TO P-CCNC: 115 U/L — SIGNIFICANT CHANGE UP
LEUKOCYTE ESTERASE UR-ACNC: NEGATIVE — SIGNIFICANT CHANGE UP
LIDOCAIN IGE QN: 22 U/L — SIGNIFICANT CHANGE UP (ref 22–51)
LIDOCAIN IGE QN: 23 U/L — SIGNIFICANT CHANGE UP (ref 22–51)
LYMPHOCYTES # BLD AUTO: 0.7 K/UL — LOW (ref 1–3.3)
LYMPHOCYTES # BLD AUTO: 0.78 K/UL — LOW (ref 1–3.3)
LYMPHOCYTES # BLD AUTO: 0.81 K/UL — LOW (ref 1–3.3)
LYMPHOCYTES # BLD AUTO: 0.88 K/UL — LOW (ref 1–3.3)
LYMPHOCYTES # BLD AUTO: 0.91 K/UL — LOW (ref 1–3.3)
LYMPHOCYTES # BLD AUTO: 0.98 K/UL — LOW (ref 1–3.3)
LYMPHOCYTES # BLD AUTO: 1 K/UL — SIGNIFICANT CHANGE UP (ref 1–3.3)
LYMPHOCYTES # BLD AUTO: 1.14 K/UL — SIGNIFICANT CHANGE UP (ref 1–3.3)
LYMPHOCYTES # BLD AUTO: 1.28 K/UL — SIGNIFICANT CHANGE UP (ref 1–3.3)
LYMPHOCYTES # BLD AUTO: 1.3 K/UL — SIGNIFICANT CHANGE UP (ref 1–3.3)
LYMPHOCYTES # BLD AUTO: 14.3 % — SIGNIFICANT CHANGE UP (ref 13–44)
LYMPHOCYTES # BLD MANUAL: 0 K/UL — LOW (ref 1–3.3)
LYMPHOCYTES # BLD MANUAL: 0.11 K/UL — LOW (ref 1–3.3)
LYMPHOCYTES # BLD MANUAL: 0.15 K/UL — LOW (ref 1–3.3)
LYMPHOCYTES # BLD MANUAL: 0.45 K/UL — LOW (ref 1–3.3)
LYMPHOCYTES # BLD MANUAL: 0.47 K/UL — LOW (ref 1–3.3)
LYMPHOCYTES # BLD MANUAL: 0.72 K/UL — LOW (ref 1–3.3)
LYMPHOCYTES # FLD: 49 % — SIGNIFICANT CHANGE UP
LYMPHOCYTES NFR BLD AUTO: 10.8 % — LOW (ref 13–44)
LYMPHOCYTES NFR BLD AUTO: 11 % — LOW (ref 13–44)
LYMPHOCYTES NFR BLD AUTO: 11 % — LOW (ref 13–44)
LYMPHOCYTES NFR BLD AUTO: 11.1 % — LOW (ref 13–44)
LYMPHOCYTES NFR BLD AUTO: 11.4 % — LOW (ref 13–44)
LYMPHOCYTES NFR BLD AUTO: 12.4 % — LOW (ref 13–44)
LYMPHOCYTES NFR BLD AUTO: 12.4 % — LOW (ref 13–44)
LYMPHOCYTES NFR BLD AUTO: 8.3 % — LOW (ref 13–44)
LYMPHOCYTES NFR BLD AUTO: 8.5 % — LOW (ref 13–44)
LYMPHOCYTES NFR BLD MANUAL: 0 % — LOW (ref 13–44)
LYMPHOCYTES NFR BLD MANUAL: 0.9 % — LOW (ref 13–44)
LYMPHOCYTES NFR BLD MANUAL: 1.7 % — LOW (ref 13–44)
LYMPHOCYTES NFR BLD MANUAL: 6 % — LOW (ref 13–44)
LYMPHOCYTES NFR BLD MANUAL: 6.1 % — LOW (ref 13–44)
LYMPHOCYTES NFR BLD MANUAL: 7.1 % — LOW (ref 13–44)
MACROCYTES BLD QL: ABNORMAL
MACROCYTES BLD QL: SLIGHT — SIGNIFICANT CHANGE UP
MACROCYTES BLD QL: SLIGHT — SIGNIFICANT CHANGE UP
MAGNESIUM SERPL-MCNC: 1.9 MG/DL — SIGNIFICANT CHANGE UP (ref 1.6–2.6)
MAGNESIUM SERPL-MCNC: 1.9 MG/DL — SIGNIFICANT CHANGE UP (ref 1.6–2.6)
MAGNESIUM SERPL-MCNC: 2.1 MG/DL — SIGNIFICANT CHANGE UP (ref 1.8–2.6)
MAGNESIUM SERPL-MCNC: 2.3 MG/DL — SIGNIFICANT CHANGE UP (ref 1.8–2.6)
MAGNESIUM SERPL-MCNC: 2.4 MG/DL — SIGNIFICANT CHANGE UP (ref 1.6–2.6)
MAGNESIUM SERPL-MCNC: 2.4 MG/DL — SIGNIFICANT CHANGE UP (ref 1.8–2.6)
MAGNESIUM SERPL-MCNC: 2.7 MG/DL — HIGH (ref 1.6–2.6)
MANUAL NEUTROPHIL BANDS #: 0.17 K/UL — SIGNIFICANT CHANGE UP (ref 0–0.84)
MANUAL NEUTROPHIL BANDS #: 0.41 K/UL — SIGNIFICANT CHANGE UP (ref 0–0.84)
MANUAL NRBC #: 0.08 K/UL — HIGH (ref 0–0)
MANUAL NRBC #: 0.12 K/UL — HIGH (ref 0–0)
MANUAL REACTIVE LYMPHOCYTES #: 0.07 K/UL — SIGNIFICANT CHANGE UP (ref 0–0.63)
MANUAL REACTIVE LYMPHOCYTES #: 0.08 K/UL — SIGNIFICANT CHANGE UP (ref 0–0.63)
MANUAL REACTIVE LYMPHOCYTES #: 0.11 K/UL — SIGNIFICANT CHANGE UP (ref 0–0.63)
MANUAL REACTIVE LYMPHOCYTES #: 0.28 K/UL — SIGNIFICANT CHANGE UP (ref 0–0.63)
MANUAL SMEAR VERIFICATION: SIGNIFICANT CHANGE UP
MCHC RBC-ENTMCNC: 27.8 PG — SIGNIFICANT CHANGE UP (ref 27–34)
MCHC RBC-ENTMCNC: 28.2 PG — SIGNIFICANT CHANGE UP (ref 27–34)
MCHC RBC-ENTMCNC: 28.3 PG — SIGNIFICANT CHANGE UP (ref 27–34)
MCHC RBC-ENTMCNC: 28.5 PG — SIGNIFICANT CHANGE UP (ref 27–34)
MCHC RBC-ENTMCNC: 28.5 PG — SIGNIFICANT CHANGE UP (ref 27–34)
MCHC RBC-ENTMCNC: 28.8 PG — SIGNIFICANT CHANGE UP (ref 27–34)
MCHC RBC-ENTMCNC: 28.8 PG — SIGNIFICANT CHANGE UP (ref 27–34)
MCHC RBC-ENTMCNC: 29 PG — SIGNIFICANT CHANGE UP (ref 27–34)
MCHC RBC-ENTMCNC: 29 PG — SIGNIFICANT CHANGE UP (ref 27–34)
MCHC RBC-ENTMCNC: 29.1 PG — SIGNIFICANT CHANGE UP (ref 27–34)
MCHC RBC-ENTMCNC: 29.5 PG — SIGNIFICANT CHANGE UP (ref 27–34)
MCHC RBC-ENTMCNC: 31.2 G/DL — LOW (ref 32–36)
MCHC RBC-ENTMCNC: 31.2 G/DL — LOW (ref 32–36)
MCHC RBC-ENTMCNC: 32 G/DL — SIGNIFICANT CHANGE UP (ref 32–36)
MCHC RBC-ENTMCNC: 32.2 G/DL — SIGNIFICANT CHANGE UP (ref 32–36)
MCHC RBC-ENTMCNC: 32.2 G/DL — SIGNIFICANT CHANGE UP (ref 32–36)
MCHC RBC-ENTMCNC: 32.3 G/DL — SIGNIFICANT CHANGE UP (ref 32–36)
MCHC RBC-ENTMCNC: 32.6 G/DL — SIGNIFICANT CHANGE UP (ref 32–36)
MCHC RBC-ENTMCNC: 32.7 G/DL — SIGNIFICANT CHANGE UP (ref 32–36)
MCHC RBC-ENTMCNC: 33 G/DL — SIGNIFICANT CHANGE UP (ref 32–36)
MCHC RBC-ENTMCNC: 33.2 G/DL — SIGNIFICANT CHANGE UP (ref 32–36)
MCHC RBC-ENTMCNC: 33.3 G/DL — SIGNIFICANT CHANGE UP (ref 32–36)
MCHC RBC-ENTMCNC: 34 G/DL — SIGNIFICANT CHANGE UP (ref 32–36)
MCHC RBC-ENTMCNC: 34.2 G/DL — SIGNIFICANT CHANGE UP (ref 32–36)
MCV RBC AUTO: 84.9 FL — SIGNIFICANT CHANGE UP (ref 80–100)
MCV RBC AUTO: 85.7 FL — SIGNIFICANT CHANGE UP (ref 80–100)
MCV RBC AUTO: 85.8 FL — SIGNIFICANT CHANGE UP (ref 80–100)
MCV RBC AUTO: 86.6 FL — SIGNIFICANT CHANGE UP (ref 80–100)
MCV RBC AUTO: 87 FL — SIGNIFICANT CHANGE UP (ref 80–100)
MCV RBC AUTO: 87 FL — SIGNIFICANT CHANGE UP (ref 80–100)
MCV RBC AUTO: 88.4 FL — SIGNIFICANT CHANGE UP (ref 80–100)
MCV RBC AUTO: 88.4 FL — SIGNIFICANT CHANGE UP (ref 80–100)
MCV RBC AUTO: 89.2 FL — SIGNIFICANT CHANGE UP (ref 80–100)
MCV RBC AUTO: 90 FL — SIGNIFICANT CHANGE UP (ref 80–100)
MCV RBC AUTO: 90.2 FL — SIGNIFICANT CHANGE UP (ref 80–100)
MCV RBC AUTO: 90.9 FL — SIGNIFICANT CHANGE UP (ref 80–100)
MCV RBC AUTO: 91.4 FL — SIGNIFICANT CHANGE UP (ref 80–100)
MELD SCORE WITH DIALYSIS: 32 POINTS — SIGNIFICANT CHANGE UP
MELD SCORE WITHOUT DIALYSIS: 32 POINTS — SIGNIFICANT CHANGE UP
MESOTHL CELL # FLD: 24 % — SIGNIFICANT CHANGE UP
METHADONE UR-MCNC: POSITIVE
MICROCYTES BLD QL: SLIGHT — SIGNIFICANT CHANGE UP
MICROCYTES BLD QL: SLIGHT — SIGNIFICANT CHANGE UP
MONOCYTES # BLD AUTO: 0.27 K/UL — SIGNIFICANT CHANGE UP (ref 0–0.9)
MONOCYTES # BLD AUTO: 0.4 K/UL — SIGNIFICANT CHANGE UP (ref 0–0.9)
MONOCYTES # BLD AUTO: 0.43 K/UL — SIGNIFICANT CHANGE UP (ref 0–0.9)
MONOCYTES # BLD AUTO: 0.45 K/UL — SIGNIFICANT CHANGE UP (ref 0–0.9)
MONOCYTES # BLD AUTO: 0.48 K/UL — SIGNIFICANT CHANGE UP (ref 0–0.9)
MONOCYTES # BLD AUTO: 0.5 K/UL — SIGNIFICANT CHANGE UP (ref 0–0.9)
MONOCYTES # BLD AUTO: 0.51 K/UL — SIGNIFICANT CHANGE UP (ref 0–0.9)
MONOCYTES # BLD AUTO: 0.53 K/UL — SIGNIFICANT CHANGE UP (ref 0–0.9)
MONOCYTES # BLD AUTO: 0.54 K/UL — SIGNIFICANT CHANGE UP (ref 0–0.9)
MONOCYTES # BLD AUTO: 0.88 K/UL — SIGNIFICANT CHANGE UP (ref 0–0.9)
MONOCYTES # BLD MANUAL: 0.08 K/UL — SIGNIFICANT CHANGE UP (ref 0–0.9)
MONOCYTES # BLD MANUAL: 0.11 K/UL — SIGNIFICANT CHANGE UP (ref 0–0.9)
MONOCYTES # BLD MANUAL: 0.13 K/UL — SIGNIFICANT CHANGE UP (ref 0–0.9)
MONOCYTES # BLD MANUAL: 0.2 K/UL — SIGNIFICANT CHANGE UP (ref 0–0.9)
MONOCYTES # BLD MANUAL: 0.28 K/UL — SIGNIFICANT CHANGE UP (ref 0–0.9)
MONOCYTES # BLD MANUAL: 0.49 K/UL — SIGNIFICANT CHANGE UP (ref 0–0.9)
MONOCYTES NFR BLD AUTO: 2.5 % — SIGNIFICANT CHANGE UP (ref 2–14)
MONOCYTES NFR BLD AUTO: 3.6 % — SIGNIFICANT CHANGE UP (ref 2–14)
MONOCYTES NFR BLD AUTO: 3.7 % — SIGNIFICANT CHANGE UP (ref 2–14)
MONOCYTES NFR BLD AUTO: 5.9 % — SIGNIFICANT CHANGE UP (ref 2–14)
MONOCYTES NFR BLD AUTO: 5.9 % — SIGNIFICANT CHANGE UP (ref 2–14)
MONOCYTES NFR BLD AUTO: 6.2 % — SIGNIFICANT CHANGE UP (ref 2–14)
MONOCYTES NFR BLD AUTO: 6.3 % — SIGNIFICANT CHANGE UP (ref 2–14)
MONOCYTES NFR BLD AUTO: 6.8 % — SIGNIFICANT CHANGE UP (ref 2–14)
MONOCYTES NFR BLD AUTO: 8.1 % — SIGNIFICANT CHANGE UP (ref 2–14)
MONOCYTES NFR BLD AUTO: 9.8 % — SIGNIFICANT CHANGE UP (ref 2–14)
MONOCYTES NFR BLD MANUAL: 0.9 % — LOW (ref 2–14)
MONOCYTES NFR BLD MANUAL: 0.9 % — LOW (ref 2–14)
MONOCYTES NFR BLD MANUAL: 1.7 % — LOW (ref 2–14)
MONOCYTES NFR BLD MANUAL: 1.7 % — LOW (ref 2–14)
MONOCYTES NFR BLD MANUAL: 4.4 % — SIGNIFICANT CHANGE UP (ref 2–14)
MONOCYTES NFR BLD MANUAL: 5.3 % — SIGNIFICANT CHANGE UP (ref 2–14)
MONOS+MACROS # FLD: 15 % — SIGNIFICANT CHANGE UP
MRSA PCR RESULT.: SIGNIFICANT CHANGE UP
NEUTROPHILS # BLD AUTO: 10.23 K/UL — HIGH (ref 1.8–7.4)
NEUTROPHILS # BLD AUTO: 10.32 K/UL — HIGH (ref 1.8–7.4)
NEUTROPHILS # BLD AUTO: 4.96 K/UL — SIGNIFICANT CHANGE UP (ref 1.8–7.4)
NEUTROPHILS # BLD AUTO: 5.21 K/UL — SIGNIFICANT CHANGE UP (ref 1.8–7.4)
NEUTROPHILS # BLD AUTO: 6 K/UL — SIGNIFICANT CHANGE UP (ref 1.8–7.4)
NEUTROPHILS # BLD AUTO: 6.26 K/UL — SIGNIFICANT CHANGE UP (ref 1.8–7.4)
NEUTROPHILS # BLD AUTO: 6.66 K/UL — SIGNIFICANT CHANGE UP (ref 1.8–7.4)
NEUTROPHILS # BLD AUTO: 7.37 K/UL — SIGNIFICANT CHANGE UP (ref 1.8–7.4)
NEUTROPHILS # BLD AUTO: 7.49 K/UL — HIGH (ref 1.8–7.4)
NEUTROPHILS # BLD AUTO: 9.46 K/UL — HIGH (ref 1.8–7.4)
NEUTROPHILS # BLD MANUAL: 11.09 K/UL — HIGH (ref 1.8–7.4)
NEUTROPHILS # BLD MANUAL: 11.14 K/UL — HIGH (ref 1.8–7.4)
NEUTROPHILS # BLD MANUAL: 5.3 K/UL — SIGNIFICANT CHANGE UP (ref 1.8–7.4)
NEUTROPHILS # BLD MANUAL: 6.9 K/UL — SIGNIFICANT CHANGE UP (ref 1.8–7.4)
NEUTROPHILS # BLD MANUAL: 8.57 K/UL — HIGH (ref 1.8–7.4)
NEUTROPHILS # BLD MANUAL: 8.58 K/UL — HIGH (ref 1.8–7.4)
NEUTROPHILS #: 126 CELLS/UL — SIGNIFICANT CHANGE UP
NEUTROPHILS %.: 34 % — HIGH
NEUTROPHILS NFR BLD AUTO: 74.2 % — SIGNIFICANT CHANGE UP (ref 43–77)
NEUTROPHILS NFR BLD AUTO: 78.7 % — HIGH (ref 43–77)
NEUTROPHILS NFR BLD AUTO: 81.1 % — HIGH (ref 43–77)
NEUTROPHILS NFR BLD AUTO: 81.1 % — HIGH (ref 43–77)
NEUTROPHILS NFR BLD AUTO: 81.3 % — HIGH (ref 43–77)
NEUTROPHILS NFR BLD AUTO: 82 % — HIGH (ref 43–77)
NEUTROPHILS NFR BLD AUTO: 82.3 % — HIGH (ref 43–77)
NEUTROPHILS NFR BLD AUTO: 84.6 % — HIGH (ref 43–77)
NEUTROPHILS NFR BLD AUTO: 87.4 % — HIGH (ref 43–77)
NEUTROPHILS NFR BLD AUTO: 88.1 % — HIGH (ref 43–77)
NEUTROPHILS NFR BLD MANUAL: 84.1 % — HIGH (ref 43–77)
NEUTROPHILS NFR BLD MANUAL: 90.4 % — HIGH (ref 43–77)
NEUTROPHILS NFR BLD MANUAL: 92.2 % — HIGH (ref 43–77)
NEUTROPHILS NFR BLD MANUAL: 92.9 % — HIGH (ref 43–77)
NEUTROPHILS NFR BLD MANUAL: 93.9 % — HIGH (ref 43–77)
NEUTROPHILS NFR BLD MANUAL: 95.6 % — HIGH (ref 43–77)
NEUTS BAND # BLD: 1.8 % — SIGNIFICANT CHANGE UP (ref 0–8)
NEUTS BAND # BLD: 3.5 % — SIGNIFICANT CHANGE UP (ref 0–8)
NEUTS BAND NFR BLD: 1.8 % — SIGNIFICANT CHANGE UP (ref 0–8)
NEUTS BAND NFR BLD: 3.5 % — SIGNIFICANT CHANGE UP (ref 0–8)
NITRITE UR-MCNC: NEGATIVE — SIGNIFICANT CHANGE UP
NRBC # BLD AUTO: 0 K/UL — SIGNIFICANT CHANGE UP (ref 0–0)
NRBC # BLD AUTO: 0.02 K/UL — HIGH (ref 0–0)
NRBC # BLD AUTO: 0.02 K/UL — HIGH (ref 0–0)
NRBC # BLD AUTO: 0.03 K/UL — HIGH (ref 0–0)
NRBC # BLD: 1 /100 WBCS — HIGH (ref 0–0)
NRBC # BLD: 1 /100 WBCS — HIGH (ref 0–0)
NRBC # FLD: 0 K/UL — SIGNIFICANT CHANGE UP (ref 0–0)
NRBC # FLD: 0.02 K/UL — HIGH (ref 0–0)
NRBC # FLD: 0.02 K/UL — HIGH (ref 0–0)
NRBC # FLD: 0.03 K/UL — HIGH (ref 0–0)
NRBC BLD AUTO-RTO: 0 /100 WBCS — SIGNIFICANT CHANGE UP (ref 0–0)
NRBC BLD-RTO: 1 /100 WBCS — HIGH (ref 0–0)
NRBC BLD-RTO: 1 /100 WBCS — HIGH (ref 0–0)
OPIATES UR-MCNC: NEGATIVE — SIGNIFICANT CHANGE UP
OSMOLALITY UR: 369 MOSM/KG — SIGNIFICANT CHANGE UP (ref 300–1000)
OVALOCYTES BLD QL SMEAR: ABNORMAL
OVALOCYTES BLD QL SMEAR: ABNORMAL
OVALOCYTES BLD QL SMEAR: SLIGHT — SIGNIFICANT CHANGE UP
OVALOCYTES BLD QL SMEAR: SLIGHT — SIGNIFICANT CHANGE UP
PCO2 BLDA: 34 MMHG — LOW (ref 35–48)
PCO2 BLDA: 65 MMHG — HIGH (ref 35–48)
PCO2 BLDV: 41 MMHG — LOW (ref 42–55)
PCP SPEC-MCNC: SIGNIFICANT CHANGE UP
PCP UR-MCNC: NEGATIVE — SIGNIFICANT CHANGE UP
PH BLDA: 7.12 — CRITICAL LOW (ref 7.35–7.45)
PH BLDA: 7.38 — SIGNIFICANT CHANGE UP (ref 7.35–7.45)
PH BLDV: 7.32 — SIGNIFICANT CHANGE UP (ref 7.32–7.43)
PH UR: 5.5 — SIGNIFICANT CHANGE UP (ref 5–8)
PHOSPHATE SERPL-MCNC: 3.5 MG/DL — SIGNIFICANT CHANGE UP (ref 2.4–4.7)
PHOSPHATE SERPL-MCNC: 4.3 MG/DL — SIGNIFICANT CHANGE UP (ref 2.4–4.7)
PHOSPHATE SERPL-MCNC: 5.4 MG/DL — HIGH (ref 2.4–4.7)
PHOSPHATE SERPL-MCNC: 6.5 MG/DL — HIGH (ref 2.4–4.7)
PHOSPHATE SERPL-MCNC: 7 MG/DL — HIGH (ref 2.4–4.7)
PLAT MORPH BLD: NORMAL — SIGNIFICANT CHANGE UP
PLATELET # BLD AUTO: 223 K/UL — SIGNIFICANT CHANGE UP (ref 150–400)
PLATELET # BLD AUTO: 32 K/UL — LOW (ref 150–400)
PLATELET # BLD AUTO: 38 K/UL — LOW (ref 150–400)
PLATELET # BLD AUTO: 39 K/UL — LOW (ref 150–400)
PLATELET # BLD AUTO: 41 K/UL — LOW (ref 150–400)
PLATELET # BLD AUTO: 45 K/UL — LOW (ref 150–400)
PLATELET # BLD AUTO: 59 K/UL — LOW (ref 150–400)
PLATELET # BLD AUTO: 61 K/UL — LOW (ref 150–400)
PLATELET # BLD AUTO: 62 K/UL — LOW (ref 150–400)
PLATELET # BLD AUTO: 65 K/UL — LOW (ref 150–400)
PLATELET # BLD AUTO: 67 K/UL — LOW (ref 150–400)
PLATELET # BLD AUTO: 76 K/UL — LOW (ref 150–400)
PLATELET # BLD AUTO: 78 K/UL — LOW (ref 150–400)
PLATELET COUNT - ESTIMATE: ABNORMAL
PLATELET COUNT - ESTIMATE: ABNORMAL
PMV BLD: 10.5 FL — SIGNIFICANT CHANGE UP (ref 7–13)
PMV BLD: 10.5 FL — SIGNIFICANT CHANGE UP (ref 7–13)
PMV BLD: 10.8 FL — SIGNIFICANT CHANGE UP (ref 7–13)
PMV BLD: 10.9 FL — SIGNIFICANT CHANGE UP (ref 7–13)
PMV BLD: 10.9 FL — SIGNIFICANT CHANGE UP (ref 7–13)
PMV BLD: 11 FL — SIGNIFICANT CHANGE UP (ref 7–13)
PMV BLD: 11.1 FL — SIGNIFICANT CHANGE UP (ref 7–13)
PMV BLD: 11.3 FL — SIGNIFICANT CHANGE UP (ref 7–13)
PMV BLD: 11.5 FL — SIGNIFICANT CHANGE UP (ref 7–13)
PMV BLD: 9.8 FL — SIGNIFICANT CHANGE UP (ref 7–13)
PMV BLD: SIGNIFICANT CHANGE UP (ref 7–13)
PMV BLD: SIGNIFICANT CHANGE UP (ref 7–13)
PO2 BLDA: 60 MMHG — LOW (ref 83–108)
PO2 BLDA: 88 MMHG — SIGNIFICANT CHANGE UP (ref 83–108)
PO2 BLDV: 72 MMHG — HIGH (ref 25–45)
POIKILOCYTOSIS BLD QL AUTO: ABNORMAL
POIKILOCYTOSIS BLD QL AUTO: SLIGHT — SIGNIFICANT CHANGE UP
POLYCHROMASIA BLD QL SMEAR: ABNORMAL
POLYCHROMASIA BLD QL SMEAR: ABNORMAL
POLYCHROMASIA BLD QL SMEAR: SLIGHT — SIGNIFICANT CHANGE UP
POTASSIUM SERPL-MCNC: 3.1 MMOL/L — LOW (ref 3.5–5.3)
POTASSIUM SERPL-MCNC: 3.4 MMOL/L — LOW (ref 3.5–5.3)
POTASSIUM SERPL-MCNC: 3.4 MMOL/L — LOW (ref 3.5–5.3)
POTASSIUM SERPL-MCNC: 3.6 MMOL/L — SIGNIFICANT CHANGE UP (ref 3.5–5.3)
POTASSIUM SERPL-MCNC: 3.7 MMOL/L — SIGNIFICANT CHANGE UP (ref 3.5–5.3)
POTASSIUM SERPL-MCNC: 3.7 MMOL/L — SIGNIFICANT CHANGE UP (ref 3.5–5.3)
POTASSIUM SERPL-MCNC: 3.8 MMOL/L — SIGNIFICANT CHANGE UP (ref 3.5–5.3)
POTASSIUM SERPL-MCNC: 3.9 MMOL/L — SIGNIFICANT CHANGE UP (ref 3.5–5.3)
POTASSIUM SERPL-MCNC: 4 MMOL/L — SIGNIFICANT CHANGE UP (ref 3.5–5.3)
POTASSIUM SERPL-MCNC: 4 MMOL/L — SIGNIFICANT CHANGE UP (ref 3.5–5.3)
POTASSIUM SERPL-MCNC: 4.3 MMOL/L — SIGNIFICANT CHANGE UP (ref 3.5–5.3)
POTASSIUM SERPL-MCNC: 4.4 MMOL/L — SIGNIFICANT CHANGE UP (ref 3.5–5.3)
POTASSIUM SERPL-SCNC: 3.1 MMOL/L — LOW (ref 3.5–5.3)
POTASSIUM SERPL-SCNC: 3.4 MMOL/L — LOW (ref 3.5–5.3)
POTASSIUM SERPL-SCNC: 3.4 MMOL/L — LOW (ref 3.5–5.3)
POTASSIUM SERPL-SCNC: 3.6 MMOL/L — SIGNIFICANT CHANGE UP (ref 3.5–5.3)
POTASSIUM SERPL-SCNC: 3.7 MMOL/L — SIGNIFICANT CHANGE UP (ref 3.5–5.3)
POTASSIUM SERPL-SCNC: 3.7 MMOL/L — SIGNIFICANT CHANGE UP (ref 3.5–5.3)
POTASSIUM SERPL-SCNC: 3.8 MMOL/L — SIGNIFICANT CHANGE UP (ref 3.5–5.3)
POTASSIUM SERPL-SCNC: 3.9 MMOL/L — SIGNIFICANT CHANGE UP (ref 3.5–5.3)
POTASSIUM SERPL-SCNC: 4 MMOL/L — SIGNIFICANT CHANGE UP (ref 3.5–5.3)
POTASSIUM SERPL-SCNC: 4 MMOL/L — SIGNIFICANT CHANGE UP (ref 3.5–5.3)
POTASSIUM SERPL-SCNC: 4.3 MMOL/L — SIGNIFICANT CHANGE UP (ref 3.5–5.3)
POTASSIUM SERPL-SCNC: 4.4 MMOL/L — SIGNIFICANT CHANGE UP (ref 3.5–5.3)
POTASSIUM UR-SCNC: 19 MMOL/L — SIGNIFICANT CHANGE UP
PROCALCITONIN SERPL-MCNC: 0.91 NG/ML — HIGH (ref 0.02–0.1)
PROT ?TM UR-MCNC: 33 MG/DL — HIGH (ref 0–12)
PROT FLD-MCNC: 1.2 G/DL — SIGNIFICANT CHANGE UP
PROT FLD-MCNC: 1.5 G/DL — SIGNIFICANT CHANGE UP
PROT SERPL-MCNC: 5.9 G/DL — LOW (ref 6.6–8.7)
PROT SERPL-MCNC: 6.1 G/DL — LOW (ref 6.6–8.7)
PROT SERPL-MCNC: 6.2 G/DL — LOW (ref 6.6–8.7)
PROT SERPL-MCNC: 6.3 G/DL — LOW (ref 6.6–8.7)
PROT SERPL-MCNC: 6.5 G/DL — LOW (ref 6.6–8.7)
PROT SERPL-MCNC: 6.5 G/DL — LOW (ref 6.6–8.7)
PROT SERPL-MCNC: 6.6 G/DL — SIGNIFICANT CHANGE UP (ref 6.6–8.7)
PROT SERPL-MCNC: 6.8 G/DL — SIGNIFICANT CHANGE UP (ref 6.6–8.7)
PROT SERPL-MCNC: 6.8 G/DL — SIGNIFICANT CHANGE UP (ref 6.6–8.7)
PROT SERPL-MCNC: 7.7 G/DL — SIGNIFICANT CHANGE UP (ref 6.6–8.7)
PROT UR-MCNC: SIGNIFICANT CHANGE UP MG/DL
PROT/CREAT UR-RTO: 0.1 RATIO — SIGNIFICANT CHANGE UP
PROTHROM AB SERPL-ACNC: 21.2 SEC — HIGH (ref 9.9–13.4)
PROTHROM AB SERPL-ACNC: 21.8 SEC — HIGH (ref 9.9–13.4)
PROTHROM AB SERPL-ACNC: 22.5 SEC — HIGH (ref 9.9–13.4)
PROTHROM AB SERPL-ACNC: 23.3 SEC — HIGH (ref 9.9–13.4)
PROTHROM AB SERPL-ACNC: 24.8 SEC — HIGH (ref 9.9–13.4)
PROTHROM AB SERPL-ACNC: 24.9 SEC — HIGH (ref 9.9–13.4)
PROTHROM AB SERPL-ACNC: 25.9 SEC — HIGH (ref 9.9–13.4)
RAPID RVP RESULT: SIGNIFICANT CHANGE UP
RBC # BLD: 3.62 M/UL — LOW (ref 4.2–5.8)
RBC # BLD: 3.64 M/UL — LOW (ref 4.2–5.8)
RBC # BLD: 3.78 M/UL — LOW (ref 4.2–5.8)
RBC # BLD: 3.82 M/UL — LOW (ref 4.2–5.8)
RBC # BLD: 3.86 M/UL — LOW (ref 4.2–5.8)
RBC # BLD: 3.97 M/UL — LOW (ref 4.2–5.8)
RBC # BLD: 4.06 M/UL — LOW (ref 4.2–5.8)
RBC # BLD: 4.07 M/UL — LOW (ref 4.2–5.8)
RBC # BLD: 4.11 M/UL — LOW (ref 4.2–5.8)
RBC # BLD: 4.17 M/UL — LOW (ref 4.2–5.8)
RBC # BLD: 4.38 M/UL — SIGNIFICANT CHANGE UP (ref 4.2–5.8)
RBC # BLD: 4.4 M/UL — SIGNIFICANT CHANGE UP (ref 4.2–5.8)
RBC # BLD: 4.44 M/UL — SIGNIFICANT CHANGE UP (ref 4.2–5.8)
RBC # FLD: 18.8 % — HIGH (ref 10.3–14.5)
RBC # FLD: 22.8 % — HIGH (ref 10.3–14.5)
RBC # FLD: 23 % — HIGH (ref 10.3–14.5)
RBC # FLD: 23.3 % — HIGH (ref 10.3–14.5)
RBC # FLD: 23.4 % — HIGH (ref 10.3–14.5)
RBC # FLD: 23.4 % — HIGH (ref 10.3–14.5)
RBC # FLD: 23.6 % — HIGH (ref 10.3–14.5)
RBC # FLD: 23.9 % — HIGH (ref 10.3–14.5)
RBC # FLD: 23.9 % — HIGH (ref 10.3–14.5)
RBC # FLD: 24.7 % — HIGH (ref 10.3–14.5)
RBC # FLD: 24.7 % — HIGH (ref 10.3–14.5)
RBC # FLD: 24.9 % — HIGH (ref 10.3–14.5)
RBC # FLD: 25.1 % — HIGH (ref 10.3–14.5)
RBC BLD AUTO: ABNORMAL
RCV VOL RI: 7000 CELLS/UL — HIGH
RSV RNA NPH QL NAA+NON-PROBE: SIGNIFICANT CHANGE UP
S AUREUS DNA NOSE QL NAA+PROBE: SIGNIFICANT CHANGE UP
SAO2 % BLDA: 94.5 % — SIGNIFICANT CHANGE UP (ref 94–98)
SAO2 % BLDA: 94.8 % — SIGNIFICANT CHANGE UP (ref 94–98)
SAO2 % BLDV: 94.7 % — SIGNIFICANT CHANGE UP
SARS-COV-2 RNA SPEC QL NAA+PROBE: SIGNIFICANT CHANGE UP
SARS-COV-2 RNA SPEC QL NAA+PROBE: SIGNIFICANT CHANGE UP
SCHISTOCYTES BLD QL AUTO: SLIGHT — SIGNIFICANT CHANGE UP
SCHISTOCYTES BLD QL AUTO: SLIGHT — SIGNIFICANT CHANGE UP
SMUDGE CELLS # BLD: PRESENT
SMUDGE CELLS # BLD: PRESENT
SODIUM SERPL-SCNC: 131 MMOL/L — LOW (ref 135–145)
SODIUM SERPL-SCNC: 132 MMOL/L — LOW (ref 135–145)
SODIUM SERPL-SCNC: 134 MMOL/L — LOW (ref 135–145)
SODIUM SERPL-SCNC: 138 MMOL/L — SIGNIFICANT CHANGE UP (ref 135–145)
SODIUM SERPL-SCNC: 139 MMOL/L — SIGNIFICANT CHANGE UP (ref 135–145)
SODIUM SERPL-SCNC: 141 MMOL/L — SIGNIFICANT CHANGE UP (ref 135–145)
SODIUM SERPL-SCNC: 144 MMOL/L — SIGNIFICANT CHANGE UP (ref 135–145)
SODIUM SERPL-SCNC: 145 MMOL/L — SIGNIFICANT CHANGE UP (ref 135–145)
SODIUM SERPL-SCNC: 145 MMOL/L — SIGNIFICANT CHANGE UP (ref 135–145)
SODIUM SERPL-SCNC: 147 MMOL/L — HIGH (ref 135–145)
SODIUM SERPL-SCNC: 147 MMOL/L — HIGH (ref 135–145)
SODIUM SERPL-SCNC: 152 MMOL/L — HIGH (ref 135–145)
SODIUM UR-SCNC: <30 MMOL/L — SIGNIFICANT CHANGE UP
SP GR SPEC: >1.03 — HIGH (ref 1–1.03)
SPECIMEN SOURCE: SIGNIFICANT CHANGE UP
SPHEROCYTES BLD QL SMEAR: SLIGHT — SIGNIFICANT CHANGE UP
TARGETS BLD QL SMEAR: ABNORMAL
TARGETS BLD QL SMEAR: SLIGHT — SIGNIFICANT CHANGE UP
THC UR QL: NEGATIVE — SIGNIFICANT CHANGE UP
TOTAL NUCLEATED CELL COUNT: 446 CELLS/UL — SIGNIFICANT CHANGE UP
TROPONIN T, HIGH SENSITIVITY RESULT: 122 NG/L — HIGH (ref 0–51)
TROPONIN T, HIGH SENSITIVITY RESULT: 141 NG/L — HIGH (ref 0–51)
TUBE TYPE: SIGNIFICANT CHANGE UP
UROBILINOGEN FLD QL: 1 MG/DL — SIGNIFICANT CHANGE UP (ref 0.2–1)
UUN UR-MCNC: 480 MG/DL — SIGNIFICANT CHANGE UP
VARIANT LYMPHS # BLD: 0.9 % — SIGNIFICANT CHANGE UP (ref 0–6)
VARIANT LYMPHS # BLD: 4.4 % — SIGNIFICANT CHANGE UP (ref 0–6)
VARIANT LYMPHS NFR BLD MANUAL: 0.9 % — SIGNIFICANT CHANGE UP (ref 0–6)
VARIANT LYMPHS NFR BLD MANUAL: 4.4 % — SIGNIFICANT CHANGE UP (ref 0–6)
WBC # BLD: 10.33 K/UL — SIGNIFICANT CHANGE UP (ref 3.8–10.5)
WBC # BLD: 10.73 K/UL — HIGH (ref 3.8–10.5)
WBC # BLD: 11.81 K/UL — HIGH (ref 3.8–10.5)
WBC # BLD: 12.08 K/UL — HIGH (ref 3.8–10.5)
WBC # BLD: 6.3 K/UL — SIGNIFICANT CHANGE UP (ref 3.8–10.5)
WBC # BLD: 6.35 K/UL — SIGNIFICANT CHANGE UP (ref 3.8–10.5)
WBC # BLD: 6.9 K/UL — SIGNIFICANT CHANGE UP (ref 3.8–10.5)
WBC # BLD: 7.39 K/UL — SIGNIFICANT CHANGE UP (ref 3.8–10.5)
WBC # BLD: 7.56 K/UL — SIGNIFICANT CHANGE UP (ref 3.8–10.5)
WBC # BLD: 7.63 K/UL — SIGNIFICANT CHANGE UP (ref 3.8–10.5)
WBC # BLD: 8.97 K/UL — SIGNIFICANT CHANGE UP (ref 3.8–10.5)
WBC # BLD: 8.97 K/UL — SIGNIFICANT CHANGE UP (ref 3.8–10.5)
WBC # BLD: 9.23 K/UL — SIGNIFICANT CHANGE UP (ref 3.8–10.5)
WBC # FLD AUTO: 10.33 K/UL — SIGNIFICANT CHANGE UP (ref 3.8–10.5)
WBC # FLD AUTO: 10.73 K/UL — HIGH (ref 3.8–10.5)
WBC # FLD AUTO: 11.81 K/UL — HIGH (ref 3.8–10.5)
WBC # FLD AUTO: 12.08 K/UL — HIGH (ref 3.8–10.5)
WBC # FLD AUTO: 6.3 K/UL — SIGNIFICANT CHANGE UP (ref 3.8–10.5)
WBC # FLD AUTO: 6.35 K/UL — SIGNIFICANT CHANGE UP (ref 3.8–10.5)
WBC # FLD AUTO: 6.9 K/UL — SIGNIFICANT CHANGE UP (ref 3.8–10.5)
WBC # FLD AUTO: 7.39 K/UL — SIGNIFICANT CHANGE UP (ref 3.8–10.5)
WBC # FLD AUTO: 7.56 K/UL — SIGNIFICANT CHANGE UP (ref 3.8–10.5)
WBC # FLD AUTO: 7.63 K/UL — SIGNIFICANT CHANGE UP (ref 3.8–10.5)
WBC # FLD AUTO: 8.97 K/UL — SIGNIFICANT CHANGE UP (ref 3.8–10.5)
WBC # FLD AUTO: 8.97 K/UL — SIGNIFICANT CHANGE UP (ref 3.8–10.5)
WBC # FLD AUTO: 9.23 K/UL — SIGNIFICANT CHANGE UP (ref 3.8–10.5)
WBC COUNT.: 371 CELLS/UL — SIGNIFICANT CHANGE UP

## 2025-01-01 PROCEDURE — 87640 STAPH A DNA AMP PROBE: CPT

## 2025-01-01 PROCEDURE — 49083 ABD PARACENTESIS W/IMAGING: CPT

## 2025-01-01 PROCEDURE — 82140 ASSAY OF AMMONIA: CPT

## 2025-01-01 PROCEDURE — 86704 HEP B CORE ANTIBODY TOTAL: CPT

## 2025-01-01 PROCEDURE — 93010 ELECTROCARDIOGRAM REPORT: CPT

## 2025-01-01 PROCEDURE — 99233 SBSQ HOSP IP/OBS HIGH 50: CPT

## 2025-01-01 PROCEDURE — 99232 SBSQ HOSP IP/OBS MODERATE 35: CPT

## 2025-01-01 PROCEDURE — 85025 COMPLETE CBC W/AUTO DIFF WBC: CPT

## 2025-01-01 PROCEDURE — 71045 X-RAY EXAM CHEST 1 VIEW: CPT | Mod: 26

## 2025-01-01 PROCEDURE — 76705 ECHO EXAM OF ABDOMEN: CPT | Mod: 26

## 2025-01-01 PROCEDURE — 99285 EMERGENCY DEPT VISIT HI MDM: CPT

## 2025-01-01 PROCEDURE — 87015 SPECIMEN INFECT AGNT CONCNTJ: CPT

## 2025-01-01 PROCEDURE — 82570 ASSAY OF URINE CREATININE: CPT

## 2025-01-01 PROCEDURE — 87040 BLOOD CULTURE FOR BACTERIA: CPT

## 2025-01-01 PROCEDURE — 74177 CT ABD & PELVIS W/CONTRAST: CPT | Mod: 26

## 2025-01-01 PROCEDURE — 84100 ASSAY OF PHOSPHORUS: CPT

## 2025-01-01 PROCEDURE — 86703 HIV-1/HIV-2 1 RESULT ANTBDY: CPT

## 2025-01-01 PROCEDURE — 85027 COMPLETE CBC AUTOMATED: CPT

## 2025-01-01 PROCEDURE — 84300 ASSAY OF URINE SODIUM: CPT

## 2025-01-01 PROCEDURE — 94640 AIRWAY INHALATION TREATMENT: CPT

## 2025-01-01 PROCEDURE — G0316 PROLONG INPT EVAL ADD15 M: CPT

## 2025-01-01 PROCEDURE — 83935 ASSAY OF URINE OSMOLALITY: CPT

## 2025-01-01 PROCEDURE — 93970 EXTREMITY STUDY: CPT | Mod: 26

## 2025-01-01 PROCEDURE — 83735 ASSAY OF MAGNESIUM: CPT

## 2025-01-01 PROCEDURE — 99223 1ST HOSP IP/OBS HIGH 75: CPT

## 2025-01-01 PROCEDURE — 87075 CULTR BACTERIA EXCEPT BLOOD: CPT

## 2025-01-01 PROCEDURE — 99261: CPT

## 2025-01-01 PROCEDURE — 84133 ASSAY OF URINE POTASSIUM: CPT

## 2025-01-01 PROCEDURE — 87641 MR-STAPH DNA AMP PROBE: CPT

## 2025-01-01 PROCEDURE — 71045 X-RAY EXAM CHEST 1 VIEW: CPT

## 2025-01-01 PROCEDURE — 90935 HEMODIALYSIS ONE EVALUATION: CPT

## 2025-01-01 PROCEDURE — 87086 URINE CULTURE/COLONY COUNT: CPT

## 2025-01-01 PROCEDURE — 81003 URINALYSIS AUTO W/O SCOPE: CPT

## 2025-01-01 PROCEDURE — 85014 HEMATOCRIT: CPT

## 2025-01-01 PROCEDURE — 85730 THROMBOPLASTIN TIME PARTIAL: CPT

## 2025-01-01 PROCEDURE — 86077 PHYS BLOOD BANK SERV XMATCH: CPT

## 2025-01-01 PROCEDURE — 84484 ASSAY OF TROPONIN QUANT: CPT

## 2025-01-01 PROCEDURE — 0225U NFCT DS DNA&RNA 21 SARSCOV2: CPT

## 2025-01-01 PROCEDURE — 85018 HEMOGLOBIN: CPT

## 2025-01-01 PROCEDURE — 74177 CT ABD & PELVIS W/CONTRAST: CPT | Mod: MC

## 2025-01-01 PROCEDURE — 82042 OTHER SOURCE ALBUMIN QUAN EA: CPT

## 2025-01-01 PROCEDURE — 99497 ADVNCD CARE PLAN 30 MIN: CPT | Mod: 25

## 2025-01-01 PROCEDURE — 83690 ASSAY OF LIPASE: CPT

## 2025-01-01 PROCEDURE — 93005 ELECTROCARDIOGRAM TRACING: CPT

## 2025-01-01 PROCEDURE — 80048 BASIC METABOLIC PNL TOTAL CA: CPT

## 2025-01-01 PROCEDURE — 82330 ASSAY OF CALCIUM: CPT

## 2025-01-01 PROCEDURE — 87340 HEPATITIS B SURFACE AG IA: CPT

## 2025-01-01 PROCEDURE — 76705 ECHO EXAM OF ABDOMEN: CPT

## 2025-01-01 PROCEDURE — 87070 CULTURE OTHR SPECIMN AEROBIC: CPT

## 2025-01-01 PROCEDURE — 84145 PROCALCITONIN (PCT): CPT

## 2025-01-01 PROCEDURE — 80053 COMPREHEN METABOLIC PANEL: CPT

## 2025-01-01 PROCEDURE — 92526 ORAL FUNCTION THERAPY: CPT

## 2025-01-01 PROCEDURE — C1729: CPT

## 2025-01-01 PROCEDURE — 82803 BLOOD GASES ANY COMBINATION: CPT

## 2025-01-01 PROCEDURE — 84540 ASSAY OF URINE/UREA-N: CPT

## 2025-01-01 PROCEDURE — 80307 DRUG TEST PRSMV CHEM ANLYZR: CPT

## 2025-01-01 PROCEDURE — 82435 ASSAY OF BLOOD CHLORIDE: CPT

## 2025-01-01 PROCEDURE — 84156 ASSAY OF PROTEIN URINE: CPT

## 2025-01-01 PROCEDURE — 87205 SMEAR GRAM STAIN: CPT

## 2025-01-01 PROCEDURE — 94660 CPAP INITIATION&MGMT: CPT

## 2025-01-01 PROCEDURE — 83605 ASSAY OF LACTIC ACID: CPT

## 2025-01-01 PROCEDURE — 49082 ABD PARACENTESIS: CPT

## 2025-01-01 PROCEDURE — 84295 ASSAY OF SERUM SODIUM: CPT

## 2025-01-01 PROCEDURE — 87637 SARSCOV2&INF A&B&RSV AMP PRB: CPT

## 2025-01-01 PROCEDURE — 86706 HEP B SURFACE ANTIBODY: CPT

## 2025-01-01 PROCEDURE — 82962 GLUCOSE BLOOD TEST: CPT

## 2025-01-01 PROCEDURE — 93970 EXTREMITY STUDY: CPT

## 2025-01-01 PROCEDURE — 80076 HEPATIC FUNCTION PANEL: CPT

## 2025-01-01 PROCEDURE — 90937 HEMODIALYSIS REPEATED EVAL: CPT

## 2025-01-01 PROCEDURE — T1013: CPT

## 2025-01-01 PROCEDURE — 89051 BODY FLUID CELL COUNT: CPT

## 2025-01-01 PROCEDURE — 82947 ASSAY GLUCOSE BLOOD QUANT: CPT

## 2025-01-01 PROCEDURE — 83615 LACTATE (LD) (LDH) ENZYME: CPT

## 2025-01-01 PROCEDURE — 99285 EMERGENCY DEPT VISIT HI MDM: CPT | Mod: 25

## 2025-01-01 PROCEDURE — 82248 BILIRUBIN DIRECT: CPT

## 2025-01-01 PROCEDURE — 94760 N-INVAS EAR/PLS OXIMETRY 1: CPT

## 2025-01-01 PROCEDURE — 84157 ASSAY OF PROTEIN OTHER: CPT

## 2025-01-01 PROCEDURE — 85610 PROTHROMBIN TIME: CPT

## 2025-01-01 PROCEDURE — 36415 COLL VENOUS BLD VENIPUNCTURE: CPT

## 2025-01-01 PROCEDURE — P9047: CPT

## 2025-01-01 PROCEDURE — 84132 ASSAY OF SERUM POTASSIUM: CPT

## 2025-01-01 PROCEDURE — 99231 SBSQ HOSP IP/OBS SF/LOW 25: CPT

## 2025-01-01 PROCEDURE — 82945 GLUCOSE OTHER FLUID: CPT

## 2025-01-01 RX ORDER — LORAZEPAM 4 MG/ML
0.5 VIAL (ML) INJECTION EVERY 6 HOURS
Refills: 0 | Status: DISCONTINUED | OUTPATIENT
Start: 2025-01-01 | End: 2025-01-01

## 2025-01-01 RX ORDER — ACETAMINOPHEN 500 MG/5ML
650 LIQUID (ML) ORAL EVERY 6 HOURS
Refills: 0 | Status: DISCONTINUED | OUTPATIENT
Start: 2025-01-01 | End: 2025-01-01

## 2025-01-01 RX ORDER — LORAZEPAM 4 MG/ML
1 VIAL (ML) INJECTION
Refills: 0 | Status: DISCONTINUED | OUTPATIENT
Start: 2025-01-01 | End: 2025-01-01

## 2025-01-01 RX ORDER — DEXTROSE 50 % IN WATER 50 %
12.5 SYRINGE (ML) INTRAVENOUS ONCE
Refills: 0 | Status: COMPLETED | OUTPATIENT
Start: 2025-01-01 | End: 2025-01-01

## 2025-01-01 RX ORDER — HYDROMORPHONE/SOD CHLOR,ISO/PF 2 MG/10 ML
4 SYRINGE (ML) INJECTION
Refills: 0 | Status: DISCONTINUED | OUTPATIENT
Start: 2025-01-01 | End: 2025-01-01

## 2025-01-01 RX ORDER — ONDANSETRON HCL/PF 4 MG/2 ML
4 VIAL (ML) INJECTION EVERY 8 HOURS
Refills: 0 | Status: DISCONTINUED | OUTPATIENT
Start: 2025-01-01 | End: 2025-01-01

## 2025-01-01 RX ORDER — HYDROMORPHONE/SOD CHLOR,ISO/PF 2 MG/10 ML
1 SYRINGE (ML) INJECTION ONCE
Refills: 0 | Status: DISCONTINUED | OUTPATIENT
Start: 2025-01-01 | End: 2025-01-01

## 2025-01-01 RX ORDER — CEFTRIAXONE 500 MG/1
2000 INJECTION, POWDER, FOR SOLUTION INTRAMUSCULAR; INTRAVENOUS ONCE
Refills: 0 | Status: COMPLETED | OUTPATIENT
Start: 2025-01-01 | End: 2025-01-01

## 2025-01-01 RX ORDER — HYDROMORPHONE/SOD CHLOR,ISO/PF 2 MG/10 ML
2 SYRINGE (ML) INJECTION
Qty: 100 | Refills: 0 | Status: DISCONTINUED | OUTPATIENT
Start: 2025-01-01 | End: 2025-01-01

## 2025-01-01 RX ORDER — IPRATROPIUM BROMIDE AND ALBUTEROL SULFATE .5; 2.5 MG/3ML; MG/3ML
3 SOLUTION RESPIRATORY (INHALATION) EVERY 4 HOURS
Refills: 0 | Status: DISCONTINUED | OUTPATIENT
Start: 2025-01-01 | End: 2025-01-01

## 2025-01-01 RX ORDER — LORAZEPAM 4 MG/ML
1 VIAL (ML) INJECTION EVERY 4 HOURS
Refills: 0 | Status: DISCONTINUED | OUTPATIENT
Start: 2025-01-01 | End: 2025-01-01

## 2025-01-01 RX ORDER — SODIUM CHLORIDE 9 MG/ML
1000 INJECTION, SOLUTION INTRAMUSCULAR; INTRAVENOUS; SUBCUTANEOUS ONCE
Refills: 0 | Status: COMPLETED | OUTPATIENT
Start: 2025-01-01 | End: 2025-01-01

## 2025-01-01 RX ORDER — HYDROMORPHONE/SOD CHLOR,ISO/PF 2 MG/10 ML
4 SYRINGE (ML) INJECTION
Qty: 100 | Refills: 0 | Status: DISCONTINUED | OUTPATIENT
Start: 2025-01-01 | End: 2025-01-01

## 2025-01-01 RX ORDER — HYDROMORPHONE/SOD CHLOR,ISO/PF 2 MG/10 ML
0.2 SYRINGE (ML) INJECTION EVERY 6 HOURS
Refills: 0 | Status: DISCONTINUED | OUTPATIENT
Start: 2025-01-01 | End: 2025-01-01

## 2025-01-01 RX ORDER — DEXTROSE 50 % IN WATER 50 %
25 SYRINGE (ML) INTRAVENOUS ONCE
Refills: 0 | Status: COMPLETED | OUTPATIENT
Start: 2025-01-01 | End: 2025-01-01

## 2025-01-01 RX ORDER — LORAZEPAM 4 MG/ML
2 VIAL (ML) INJECTION ONCE
Refills: 0 | Status: DISCONTINUED | OUTPATIENT
Start: 2025-01-01 | End: 2025-01-01

## 2025-01-01 RX ORDER — ONDANSETRON 4 MG/1
4 TABLET ORAL ONCE
Refills: 0 | Status: ACTIVE | OUTPATIENT
Start: 2025-01-01

## 2025-01-01 RX ORDER — LORAZEPAM 4 MG/ML
2 VIAL (ML) INJECTION
Refills: 0 | Status: DISCONTINUED | OUTPATIENT
Start: 2025-01-01 | End: 2025-01-01

## 2025-01-01 RX ORDER — MORPHINE SULFATE 15 MG
4 TABLET, EXTENDED RELEASE ORAL ONCE
Refills: 0 | Status: DISCONTINUED | OUTPATIENT
Start: 2025-01-01 | End: 2025-01-01

## 2025-01-01 RX ORDER — ALBUMIN (HUMAN) 12.5 G/50ML
50 INJECTION, SOLUTION INTRAVENOUS EVERY 6 HOURS
Refills: 0 | Status: COMPLETED | OUTPATIENT
Start: 2025-01-01 | End: 2025-01-01

## 2025-01-01 RX ORDER — PIPERACILLIN-TAZO-DEXTROSE,ISO 3.375G/5
3.38 IV SOLUTION, PIGGYBACK PREMIX FROZEN(ML) INTRAVENOUS ONCE
Refills: 0 | Status: COMPLETED | OUTPATIENT
Start: 2025-01-01 | End: 2025-01-01

## 2025-01-01 RX ORDER — METHADONE HCL 10 MG
40 TABLET ORAL DAILY
Refills: 0 | Status: DISCONTINUED | OUTPATIENT
Start: 2025-01-01 | End: 2025-01-01

## 2025-01-01 RX ORDER — ALBUMIN (HUMAN) 12.5 G/50ML
100 INJECTION, SOLUTION INTRAVENOUS EVERY 12 HOURS
Refills: 0 | Status: DISCONTINUED | OUTPATIENT
Start: 2025-01-01 | End: 2025-01-01

## 2025-01-01 RX ORDER — OCTREOTIDE ACETATE 500 UG/ML
50 INJECTION, SOLUTION INTRAVENOUS; SUBCUTANEOUS EVERY 8 HOURS
Refills: 0 | Status: DISCONTINUED | OUTPATIENT
Start: 2025-01-01 | End: 2025-01-01

## 2025-01-01 RX ORDER — ALBUMIN (HUMAN) 12.5 G/50ML
50 INJECTION, SOLUTION INTRAVENOUS EVERY 6 HOURS
Refills: 0 | Status: DISCONTINUED | OUTPATIENT
Start: 2025-01-01 | End: 2025-01-01

## 2025-01-01 RX ORDER — LACTULOSE 10 G/15ML
200 SOLUTION ORAL
Refills: 0 | Status: DISCONTINUED | OUTPATIENT
Start: 2025-01-01 | End: 2025-01-01

## 2025-01-01 RX ORDER — BUMETANIDE 1 MG/1
1 TABLET ORAL ONCE
Refills: 0 | Status: COMPLETED | OUTPATIENT
Start: 2025-01-01 | End: 2025-01-01

## 2025-01-01 RX ORDER — HYDROMORPHONE/SOD CHLOR,ISO/PF 2 MG/10 ML
0.5 SYRINGE (ML) INJECTION ONCE
Refills: 0 | Status: DISCONTINUED | OUTPATIENT
Start: 2025-01-01 | End: 2025-01-01

## 2025-01-01 RX ORDER — IPRATROPIUM BROMIDE AND ALBUTEROL SULFATE .5; 2.5 MG/3ML; MG/3ML
3 SOLUTION RESPIRATORY (INHALATION) EVERY 6 HOURS
Refills: 0 | Status: DISCONTINUED | OUTPATIENT
Start: 2025-01-01 | End: 2025-01-01

## 2025-01-01 RX ORDER — GLYCOPYRROLATE 0.2 MG/ML
0.2 INJECTION INTRAMUSCULAR; INTRAVENOUS ONCE
Refills: 0 | Status: COMPLETED | OUTPATIENT
Start: 2025-01-01 | End: 2025-01-01

## 2025-01-01 RX ORDER — CARVEDILOL 3.12 MG/1
3.12 TABLET, FILM COATED ORAL EVERY 12 HOURS
Refills: 0 | Status: DISCONTINUED | OUTPATIENT
Start: 2025-01-01 | End: 2025-01-01

## 2025-01-01 RX ORDER — IPRATROPIUM BROMIDE AND ALBUTEROL SULFATE .5; 2.5 MG/3ML; MG/3ML
3 SOLUTION RESPIRATORY (INHALATION) ONCE
Refills: 0 | Status: COMPLETED | OUTPATIENT
Start: 2025-01-01 | End: 2025-01-01

## 2025-01-01 RX ORDER — B1/B2/B3/B5/B6/B12/VIT C/FOLIC 500-0.5 MG
1 TABLET ORAL DAILY
Refills: 0 | Status: DISCONTINUED | OUTPATIENT
Start: 2025-01-01 | End: 2025-01-01

## 2025-01-01 RX ORDER — LORAZEPAM 4 MG/ML
1 VIAL (ML) INJECTION ONCE
Refills: 0 | Status: DISCONTINUED | OUTPATIENT
Start: 2025-01-01 | End: 2025-01-01

## 2025-01-01 RX ORDER — HYDROMORPHONE/SOD CHLOR,ISO/PF 2 MG/10 ML
0.5 SYRINGE (ML) INJECTION EVERY 8 HOURS
Refills: 0 | Status: DISCONTINUED | OUTPATIENT
Start: 2025-01-01 | End: 2025-01-01

## 2025-01-01 RX ORDER — LACTULOSE 10 G/15ML
20 SOLUTION ORAL THREE TIMES A DAY
Refills: 0 | Status: DISCONTINUED | OUTPATIENT
Start: 2025-01-01 | End: 2025-01-01

## 2025-01-01 RX ORDER — HYDROMORPHONE/SOD CHLOR,ISO/PF 2 MG/10 ML
1 SYRINGE (ML) INJECTION
Refills: 0 | Status: DISCONTINUED | OUTPATIENT
Start: 2025-01-01 | End: 2025-01-01

## 2025-01-01 RX ORDER — HYDROMORPHONE/SOD CHLOR,ISO/PF 2 MG/10 ML
8 SYRINGE (ML) INJECTION
Refills: 0 | Status: DISCONTINUED | OUTPATIENT
Start: 2025-01-01 | End: 2025-01-01

## 2025-01-01 RX ORDER — HYDROMORPHONE/SOD CHLOR,ISO/PF 2 MG/10 ML
5 SYRINGE (ML) INJECTION
Qty: 100 | Refills: 0 | Status: DISCONTINUED | OUTPATIENT
Start: 2025-01-01 | End: 2025-01-01

## 2025-01-01 RX ORDER — CEFTRIAXONE 500 MG/1
2000 INJECTION, POWDER, FOR SOLUTION INTRAMUSCULAR; INTRAVENOUS EVERY 24 HOURS
Refills: 0 | Status: DISCONTINUED | OUTPATIENT
Start: 2025-01-01 | End: 2025-01-01

## 2025-01-01 RX ORDER — MAGNESIUM, ALUMINUM HYDROXIDE 200-200 MG
30 TABLET,CHEWABLE ORAL EVERY 4 HOURS
Refills: 0 | Status: DISCONTINUED | OUTPATIENT
Start: 2025-01-01 | End: 2025-01-01

## 2025-01-01 RX ORDER — LORAZEPAM 4 MG/ML
2 VIAL (ML) INJECTION EVERY 4 HOURS
Refills: 0 | Status: DISCONTINUED | OUTPATIENT
Start: 2025-01-01 | End: 2025-01-01

## 2025-01-01 RX ORDER — NALOXONE HYDROCHLORIDE 0.4 MG/ML
0.4 INJECTION, SOLUTION INTRAMUSCULAR; INTRAVENOUS; SUBCUTANEOUS ONCE
Refills: 0 | Status: DISCONTINUED | OUTPATIENT
Start: 2025-01-01 | End: 2025-01-01

## 2025-01-01 RX ORDER — MIDODRINE HYDROCHLORIDE 5 MG/1
10 TABLET ORAL EVERY 8 HOURS
Refills: 0 | Status: DISCONTINUED | OUTPATIENT
Start: 2025-01-01 | End: 2025-01-01

## 2025-01-01 RX ORDER — HYDROMORPHONE/SOD CHLOR,ISO/PF 2 MG/10 ML
6 SYRINGE (ML) INJECTION
Refills: 0 | Status: DISCONTINUED | OUTPATIENT
Start: 2025-01-01 | End: 2025-01-01

## 2025-01-01 RX ORDER — SODIUM CHLORIDE 9 G/1000ML
1000 INJECTION, SOLUTION INTRAVENOUS
Refills: 0 | Status: COMPLETED | OUTPATIENT
Start: 2025-01-01 | End: 2025-01-01

## 2025-01-01 RX ORDER — BUMETANIDE 1 MG/1
2 TABLET ORAL EVERY 12 HOURS
Refills: 0 | Status: DISCONTINUED | OUTPATIENT
Start: 2025-01-01 | End: 2025-01-01

## 2025-01-01 RX ORDER — MELATONIN 5 MG
3 TABLET ORAL AT BEDTIME
Refills: 0 | Status: DISCONTINUED | OUTPATIENT
Start: 2025-01-01 | End: 2025-01-01

## 2025-01-01 RX ORDER — SODIUM CHLORIDE 9 G/1000ML
1000 INJECTION, SOLUTION INTRAVENOUS ONCE
Refills: 0 | Status: COMPLETED | OUTPATIENT
Start: 2025-01-01 | End: 2025-01-01

## 2025-01-01 RX ORDER — GLYCOPYRROLATE 0.2 MG/ML
0.4 INJECTION INTRAMUSCULAR; INTRAVENOUS EVERY 6 HOURS
Refills: 0 | Status: DISCONTINUED | OUTPATIENT
Start: 2025-01-01 | End: 2025-01-01

## 2025-01-01 RX ORDER — OCTREOTIDE ACETATE 500 UG/ML
100 INJECTION, SOLUTION INTRAVENOUS; SUBCUTANEOUS EVERY 8 HOURS
Refills: 0 | Status: DISCONTINUED | OUTPATIENT
Start: 2025-01-01 | End: 2025-01-01

## 2025-01-01 RX ORDER — FOLIC ACID 1 MG/1
1 TABLET ORAL DAILY
Refills: 0 | Status: DISCONTINUED | OUTPATIENT
Start: 2025-01-01 | End: 2025-01-01

## 2025-01-01 RX ORDER — MIDODRINE HYDROCHLORIDE 5 MG/1
5 TABLET ORAL EVERY 8 HOURS
Refills: 0 | Status: DISCONTINUED | OUTPATIENT
Start: 2025-01-01 | End: 2025-01-01

## 2025-01-01 RX ORDER — HYDROMORPHONE/SOD CHLOR,ISO/PF 2 MG/10 ML
0.5 SYRINGE (ML) INJECTION EVERY 6 HOURS
Refills: 0 | Status: DISCONTINUED | OUTPATIENT
Start: 2025-01-01 | End: 2025-01-01

## 2025-01-01 RX ORDER — HYDROMORPHONE/SOD CHLOR,ISO/PF 2 MG/10 ML
3 SYRINGE (ML) INJECTION
Qty: 100 | Refills: 0 | Status: DISCONTINUED | OUTPATIENT
Start: 2025-01-01 | End: 2025-01-01

## 2025-01-01 RX ORDER — GLYCOPYRROLATE 0.2 MG/ML
0.2 INJECTION INTRAMUSCULAR; INTRAVENOUS EVERY 4 HOURS
Refills: 0 | Status: DISCONTINUED | OUTPATIENT
Start: 2025-01-01 | End: 2025-01-01

## 2025-01-01 RX ORDER — HYDROMORPHONE/SOD CHLOR,ISO/PF 2 MG/10 ML
2 SYRINGE (ML) INJECTION
Refills: 0 | Status: DISCONTINUED | OUTPATIENT
Start: 2025-01-01 | End: 2025-01-01

## 2025-01-01 RX ORDER — HYDROMORPHONE/SOD CHLOR,ISO/PF 2 MG/10 ML
1 SYRINGE (ML) INJECTION
Qty: 100 | Refills: 0 | Status: DISCONTINUED | OUTPATIENT
Start: 2025-01-01 | End: 2025-01-01

## 2025-01-01 RX ORDER — CEFTRIAXONE 500 MG/1
2000 INJECTION, POWDER, FOR SOLUTION INTRAMUSCULAR; INTRAVENOUS EVERY 24 HOURS
Refills: 0 | Status: COMPLETED | OUTPATIENT
Start: 2025-01-01 | End: 2025-01-01

## 2025-01-01 RX ORDER — CEFTRIAXONE 500 MG/1
INJECTION, POWDER, FOR SOLUTION INTRAMUSCULAR; INTRAVENOUS
Refills: 0 | Status: DISCONTINUED | OUTPATIENT
Start: 2025-01-01 | End: 2025-01-01

## 2025-01-01 RX ORDER — LACTULOSE 10 G/15ML
30 SOLUTION ORAL ONCE
Refills: 0 | Status: COMPLETED | OUTPATIENT
Start: 2025-01-01 | End: 2025-01-01

## 2025-01-01 RX ADMIN — OCTREOTIDE ACETATE 100 MICROGRAM(S): 500 INJECTION, SOLUTION INTRAVENOUS; SUBCUTANEOUS at 13:04

## 2025-01-01 RX ADMIN — IPRATROPIUM BROMIDE AND ALBUTEROL SULFATE 3 MILLILITER(S): .5; 2.5 SOLUTION RESPIRATORY (INHALATION) at 08:06

## 2025-01-01 RX ADMIN — CEFTRIAXONE 2000 MILLIGRAM(S): 500 INJECTION, POWDER, FOR SOLUTION INTRAMUSCULAR; INTRAVENOUS at 22:58

## 2025-01-01 RX ADMIN — MIDODRINE HYDROCHLORIDE 5 MILLIGRAM(S): 5 TABLET ORAL at 23:10

## 2025-01-01 RX ADMIN — Medication 4 MILLIGRAM(S): at 12:13

## 2025-01-01 RX ADMIN — ALBUMIN (HUMAN) 50 MILLILITER(S): 12.5 INJECTION, SOLUTION INTRAVENOUS at 06:01

## 2025-01-01 RX ADMIN — Medication 100 MILLIEQUIVALENT(S): at 16:11

## 2025-01-01 RX ADMIN — ALBUMIN (HUMAN) 50 MILLILITER(S): 12.5 INJECTION, SOLUTION INTRAVENOUS at 05:52

## 2025-01-01 RX ADMIN — SODIUM CHLORIDE 1000 MILLILITER(S): 9 INJECTION, SOLUTION INTRAVENOUS at 22:53

## 2025-01-01 RX ADMIN — Medication 2 MG/HR: at 11:19

## 2025-01-01 RX ADMIN — ALBUMIN (HUMAN) 50 MILLILITER(S): 12.5 INJECTION, SOLUTION INTRAVENOUS at 00:41

## 2025-01-01 RX ADMIN — Medication 1 MILLIGRAM(S): at 09:42

## 2025-01-01 RX ADMIN — CEFTRIAXONE 2000 MILLIGRAM(S): 500 INJECTION, POWDER, FOR SOLUTION INTRAMUSCULAR; INTRAVENOUS at 21:18

## 2025-01-01 RX ADMIN — Medication 0.2 MILLIGRAM(S): at 18:26

## 2025-01-01 RX ADMIN — SODIUM CHLORIDE 1000 MILLILITER(S): 9 INJECTION, SOLUTION INTRAVENOUS at 21:53

## 2025-01-01 RX ADMIN — BUMETANIDE 2 MILLIGRAM(S): 1 TABLET ORAL at 18:37

## 2025-01-01 RX ADMIN — Medication 0.2 MILLIGRAM(S): at 18:41

## 2025-01-01 RX ADMIN — Medication 2 MILLIGRAM(S): at 05:00

## 2025-01-01 RX ADMIN — Medication 0.5 MILLIGRAM(S): at 06:49

## 2025-01-01 RX ADMIN — IPRATROPIUM BROMIDE AND ALBUTEROL SULFATE 3 MILLILITER(S): .5; 2.5 SOLUTION RESPIRATORY (INHALATION) at 21:05

## 2025-01-01 RX ADMIN — OCTREOTIDE ACETATE 50 MICROGRAM(S): 500 INJECTION, SOLUTION INTRAVENOUS; SUBCUTANEOUS at 21:18

## 2025-01-01 RX ADMIN — LACTULOSE 200 GRAM(S): 10 SOLUTION ORAL at 18:33

## 2025-01-01 RX ADMIN — CEFTRIAXONE 2000 MILLIGRAM(S): 500 INJECTION, POWDER, FOR SOLUTION INTRAMUSCULAR; INTRAVENOUS at 23:00

## 2025-01-01 RX ADMIN — Medication 1 MILLIGRAM(S): at 23:24

## 2025-01-01 RX ADMIN — CEFTRIAXONE 2000 MILLIGRAM(S): 500 INJECTION, POWDER, FOR SOLUTION INTRAMUSCULAR; INTRAVENOUS at 21:06

## 2025-01-01 RX ADMIN — Medication 100 MILLIGRAM(S): at 14:40

## 2025-01-01 RX ADMIN — Medication 2 MG/HR: at 12:19

## 2025-01-01 RX ADMIN — OCTREOTIDE ACETATE 50 MICROGRAM(S): 500 INJECTION, SOLUTION INTRAVENOUS; SUBCUTANEOUS at 21:41

## 2025-01-01 RX ADMIN — Medication 100 MILLIGRAM(S): at 14:29

## 2025-01-01 RX ADMIN — Medication 1 APPLICATION(S): at 05:25

## 2025-01-01 RX ADMIN — ALBUMIN (HUMAN) 50 MILLILITER(S): 12.5 INJECTION, SOLUTION INTRAVENOUS at 04:12

## 2025-01-01 RX ADMIN — Medication 40 MILLIGRAM(S): at 13:28

## 2025-01-01 RX ADMIN — IPRATROPIUM BROMIDE AND ALBUTEROL SULFATE 3 MILLILITER(S): .5; 2.5 SOLUTION RESPIRATORY (INHALATION) at 03:47

## 2025-01-01 RX ADMIN — Medication 0.5 MILLIGRAM(S): at 15:19

## 2025-01-01 RX ADMIN — BUMETANIDE 2 MILLIGRAM(S): 1 TABLET ORAL at 06:55

## 2025-01-01 RX ADMIN — OCTREOTIDE ACETATE 100 MICROGRAM(S): 500 INJECTION, SOLUTION INTRAVENOUS; SUBCUTANEOUS at 05:24

## 2025-01-01 RX ADMIN — Medication 1 APPLICATION(S): at 05:58

## 2025-01-01 RX ADMIN — Medication 40 MILLIGRAM(S): at 13:09

## 2025-01-01 RX ADMIN — OCTREOTIDE ACETATE 50 MICROGRAM(S): 500 INJECTION, SOLUTION INTRAVENOUS; SUBCUTANEOUS at 05:59

## 2025-01-01 RX ADMIN — Medication 4 MILLIGRAM(S): at 13:53

## 2025-01-01 RX ADMIN — BUMETANIDE 2 MILLIGRAM(S): 1 TABLET ORAL at 05:59

## 2025-01-01 RX ADMIN — OCTREOTIDE ACETATE 50 MICROGRAM(S): 500 INJECTION, SOLUTION INTRAVENOUS; SUBCUTANEOUS at 14:03

## 2025-01-01 RX ADMIN — Medication 1 APPLICATION(S): at 05:19

## 2025-01-01 RX ADMIN — Medication 0.5 MILLIGRAM(S): at 15:51

## 2025-01-01 RX ADMIN — MIDODRINE HYDROCHLORIDE 10 MILLIGRAM(S): 5 TABLET ORAL at 14:29

## 2025-01-01 RX ADMIN — OCTREOTIDE ACETATE 100 MICROGRAM(S): 500 INJECTION, SOLUTION INTRAVENOUS; SUBCUTANEOUS at 14:53

## 2025-01-01 RX ADMIN — Medication 0.5 MILLIGRAM(S): at 23:05

## 2025-01-01 RX ADMIN — OCTREOTIDE ACETATE 100 MICROGRAM(S): 500 INJECTION, SOLUTION INTRAVENOUS; SUBCUTANEOUS at 15:19

## 2025-01-01 RX ADMIN — LACTULOSE 200 GRAM(S): 10 SOLUTION ORAL at 23:25

## 2025-01-01 RX ADMIN — Medication 2 MILLIGRAM(S): at 11:38

## 2025-01-01 RX ADMIN — Medication 3 MILLIGRAM(S): at 21:04

## 2025-01-01 RX ADMIN — Medication 0.5 MILLIGRAM(S): at 08:52

## 2025-01-01 RX ADMIN — Medication 40 MILLIGRAM(S): at 14:03

## 2025-01-01 RX ADMIN — Medication 1 MILLIGRAM(S): at 12:00

## 2025-01-01 RX ADMIN — Medication 0.5 MILLIGRAM(S): at 15:36

## 2025-01-01 RX ADMIN — LACTULOSE 20 GRAM(S): 10 SOLUTION ORAL at 13:45

## 2025-01-01 RX ADMIN — ALBUMIN (HUMAN) 50 MILLILITER(S): 12.5 INJECTION, SOLUTION INTRAVENOUS at 05:24

## 2025-01-01 RX ADMIN — Medication 2 MILLIGRAM(S): at 15:11

## 2025-01-01 RX ADMIN — Medication 2 MILLIGRAM(S): at 13:15

## 2025-01-01 RX ADMIN — Medication 0.2 MILLIGRAM(S): at 10:02

## 2025-01-01 RX ADMIN — Medication 100 MILLIEQUIVALENT(S): at 10:07

## 2025-01-01 RX ADMIN — FOLIC ACID 1 MILLIGRAM(S): 1 TABLET ORAL at 14:40

## 2025-01-01 RX ADMIN — ALBUMIN (HUMAN) 50 MILLILITER(S): 12.5 INJECTION, SOLUTION INTRAVENOUS at 14:17

## 2025-01-01 RX ADMIN — Medication 1 APPLICATION(S): at 06:54

## 2025-01-01 RX ADMIN — Medication 2 MILLIGRAM(S): at 21:41

## 2025-01-01 RX ADMIN — Medication 25 GRAM(S): at 18:47

## 2025-01-01 RX ADMIN — Medication 5 MG/HR: at 10:33

## 2025-01-01 RX ADMIN — Medication 12.5 MILLILITER(S): at 18:26

## 2025-01-01 RX ADMIN — FOLIC ACID 1 MILLIGRAM(S): 1 TABLET ORAL at 14:30

## 2025-01-01 RX ADMIN — BUMETANIDE 2 MILLIGRAM(S): 1 TABLET ORAL at 05:26

## 2025-01-01 RX ADMIN — OCTREOTIDE ACETATE 100 MICROGRAM(S): 500 INJECTION, SOLUTION INTRAVENOUS; SUBCUTANEOUS at 23:30

## 2025-01-01 RX ADMIN — Medication 0.5 MILLIGRAM(S): at 14:53

## 2025-01-01 RX ADMIN — BUMETANIDE 2 MILLIGRAM(S): 1 TABLET ORAL at 17:15

## 2025-01-01 RX ADMIN — Medication 0.5 MILLIGRAM(S): at 22:27

## 2025-01-01 RX ADMIN — Medication 1 MILLIGRAM(S): at 21:10

## 2025-01-01 RX ADMIN — IPRATROPIUM BROMIDE AND ALBUTEROL SULFATE 3 MILLILITER(S): .5; 2.5 SOLUTION RESPIRATORY (INHALATION) at 19:33

## 2025-01-01 RX ADMIN — Medication 1 APPLICATION(S): at 05:23

## 2025-01-01 RX ADMIN — Medication 0.5 MILLIGRAM(S): at 12:57

## 2025-01-01 RX ADMIN — Medication 1 TABLET(S): at 14:40

## 2025-01-01 RX ADMIN — OCTREOTIDE ACETATE 100 MICROGRAM(S): 500 INJECTION, SOLUTION INTRAVENOUS; SUBCUTANEOUS at 23:25

## 2025-01-01 RX ADMIN — OCTREOTIDE ACETATE 100 MICROGRAM(S): 500 INJECTION, SOLUTION INTRAVENOUS; SUBCUTANEOUS at 22:00

## 2025-01-01 RX ADMIN — CEFTRIAXONE 2000 MILLIGRAM(S): 500 INJECTION, POWDER, FOR SOLUTION INTRAMUSCULAR; INTRAVENOUS at 00:13

## 2025-01-01 RX ADMIN — Medication 0.5 MILLIGRAM(S): at 21:42

## 2025-01-01 RX ADMIN — LACTULOSE 30 GRAM(S): 10 SOLUTION ORAL at 22:58

## 2025-01-01 RX ADMIN — Medication 6 MILLIGRAM(S): at 15:25

## 2025-01-01 RX ADMIN — LACTULOSE 200 GRAM(S): 10 SOLUTION ORAL at 23:51

## 2025-01-01 RX ADMIN — OCTREOTIDE ACETATE 50 MICROGRAM(S): 500 INJECTION, SOLUTION INTRAVENOUS; SUBCUTANEOUS at 05:23

## 2025-01-01 RX ADMIN — Medication 40 MILLIGRAM(S): at 12:30

## 2025-01-01 RX ADMIN — Medication 1 TABLET(S): at 14:30

## 2025-01-01 RX ADMIN — IPRATROPIUM BROMIDE AND ALBUTEROL SULFATE 3 MILLILITER(S): .5; 2.5 SOLUTION RESPIRATORY (INHALATION) at 18:02

## 2025-01-01 RX ADMIN — IPRATROPIUM BROMIDE AND ALBUTEROL SULFATE 3 MILLILITER(S): .5; 2.5 SOLUTION RESPIRATORY (INHALATION) at 15:20

## 2025-01-01 RX ADMIN — Medication 100 MILLIEQUIVALENT(S): at 13:08

## 2025-01-01 RX ADMIN — ALBUMIN (HUMAN) 50 MILLILITER(S): 12.5 INJECTION, SOLUTION INTRAVENOUS at 17:37

## 2025-01-01 RX ADMIN — LACTULOSE 200 GRAM(S): 10 SOLUTION ORAL at 13:22

## 2025-01-01 RX ADMIN — MIDODRINE HYDROCHLORIDE 5 MILLIGRAM(S): 5 TABLET ORAL at 19:02

## 2025-01-01 RX ADMIN — Medication 100 MILLIEQUIVALENT(S): at 11:41

## 2025-01-01 RX ADMIN — Medication 0.5 MILLIGRAM(S): at 05:05

## 2025-01-01 RX ADMIN — IPRATROPIUM BROMIDE AND ALBUTEROL SULFATE 3 MILLILITER(S): .5; 2.5 SOLUTION RESPIRATORY (INHALATION) at 00:00

## 2025-01-01 RX ADMIN — Medication 100 MILLIEQUIVALENT(S): at 17:35

## 2025-01-01 RX ADMIN — Medication 0.2 MILLIGRAM(S): at 11:25

## 2025-01-01 RX ADMIN — BUMETANIDE 2 MILLIGRAM(S): 1 TABLET ORAL at 17:36

## 2025-01-01 RX ADMIN — CEFTRIAXONE 2000 MILLIGRAM(S): 500 INJECTION, POWDER, FOR SOLUTION INTRAMUSCULAR; INTRAVENOUS at 16:10

## 2025-01-01 RX ADMIN — LACTULOSE 200 GRAM(S): 10 SOLUTION ORAL at 05:25

## 2025-01-01 RX ADMIN — ALBUMIN (HUMAN) 50 MILLILITER(S): 12.5 INJECTION, SOLUTION INTRAVENOUS at 06:12

## 2025-01-01 RX ADMIN — Medication 1 MILLIGRAM(S): at 23:11

## 2025-01-01 RX ADMIN — Medication 1 MILLIGRAM(S): at 11:00

## 2025-01-01 RX ADMIN — Medication 0.5 MILLIGRAM(S): at 22:05

## 2025-01-01 RX ADMIN — LACTULOSE 200 GRAM(S): 10 SOLUTION ORAL at 23:27

## 2025-01-01 RX ADMIN — Medication 40 MILLIEQUIVALENT(S): at 10:07

## 2025-01-01 RX ADMIN — Medication 4 MILLIGRAM(S): at 17:30

## 2025-01-01 RX ADMIN — Medication 0.5 MILLIGRAM(S): at 10:16

## 2025-01-01 RX ADMIN — Medication 1 MILLIGRAM(S): at 22:11

## 2025-01-01 RX ADMIN — IPRATROPIUM BROMIDE AND ALBUTEROL SULFATE 3 MILLILITER(S): .5; 2.5 SOLUTION RESPIRATORY (INHALATION) at 10:42

## 2025-01-01 RX ADMIN — GLYCOPYRROLATE 0.2 MILLIGRAM(S): 0.2 INJECTION INTRAMUSCULAR; INTRAVENOUS at 08:30

## 2025-01-01 RX ADMIN — Medication 1 MILLIGRAM(S): at 12:45

## 2025-01-01 RX ADMIN — SODIUM CHLORIDE 45 MILLILITER(S): 9 INJECTION, SOLUTION INTRAVENOUS at 23:26

## 2025-01-01 RX ADMIN — Medication 0.5 MILLIGRAM(S): at 22:42

## 2025-01-01 RX ADMIN — Medication 1000 MILLILITER(S): at 14:35

## 2025-01-01 RX ADMIN — MIDODRINE HYDROCHLORIDE 5 MILLIGRAM(S): 5 TABLET ORAL at 23:25

## 2025-01-01 RX ADMIN — CEFTRIAXONE 2000 MILLIGRAM(S): 500 INJECTION, POWDER, FOR SOLUTION INTRAMUSCULAR; INTRAVENOUS at 23:30

## 2025-01-01 RX ADMIN — OCTREOTIDE ACETATE 100 MICROGRAM(S): 500 INJECTION, SOLUTION INTRAVENOUS; SUBCUTANEOUS at 14:40

## 2025-01-01 RX ADMIN — MIDODRINE HYDROCHLORIDE 10 MILLIGRAM(S): 5 TABLET ORAL at 21:04

## 2025-01-01 RX ADMIN — IPRATROPIUM BROMIDE AND ALBUTEROL SULFATE 3 MILLILITER(S): .5; 2.5 SOLUTION RESPIRATORY (INHALATION) at 13:57

## 2025-01-01 RX ADMIN — OCTREOTIDE ACETATE 100 MICROGRAM(S): 500 INJECTION, SOLUTION INTRAVENOUS; SUBCUTANEOUS at 06:11

## 2025-01-01 RX ADMIN — Medication 100 MILLIGRAM(S): at 11:17

## 2025-01-01 RX ADMIN — BUMETANIDE 2 MILLIGRAM(S): 1 TABLET ORAL at 05:23

## 2025-01-01 RX ADMIN — BUMETANIDE 2 MILLIGRAM(S): 1 TABLET ORAL at 17:09

## 2025-01-01 RX ADMIN — Medication 40 MILLIGRAM(S): at 11:17

## 2025-01-01 RX ADMIN — LACTULOSE 200 GRAM(S): 10 SOLUTION ORAL at 05:20

## 2025-01-01 RX ADMIN — ALBUMIN (HUMAN) 50 MILLILITER(S): 12.5 INJECTION, SOLUTION INTRAVENOUS at 14:04

## 2025-01-01 RX ADMIN — CEFTRIAXONE 2000 MILLIGRAM(S): 500 INJECTION, POWDER, FOR SOLUTION INTRAMUSCULAR; INTRAVENOUS at 23:25

## 2025-01-01 RX ADMIN — OCTREOTIDE ACETATE 50 MICROGRAM(S): 500 INJECTION, SOLUTION INTRAVENOUS; SUBCUTANEOUS at 14:16

## 2025-01-01 RX ADMIN — Medication 8 MILLIGRAM(S): at 10:54

## 2025-01-01 RX ADMIN — Medication 0.5 MILLIGRAM(S): at 17:46

## 2025-01-01 RX ADMIN — Medication 0.5 MILLIGRAM(S): at 04:04

## 2025-01-01 RX ADMIN — ALBUMIN (HUMAN) 50 MILLILITER(S): 12.5 INJECTION, SOLUTION INTRAVENOUS at 17:13

## 2025-01-01 RX ADMIN — Medication 1 MILLIGRAM(S): at 22:24

## 2025-01-01 RX ADMIN — OCTREOTIDE ACETATE 50 MICROGRAM(S): 500 INJECTION, SOLUTION INTRAVENOUS; SUBCUTANEOUS at 21:21

## 2025-01-01 RX ADMIN — Medication 4 MILLIGRAM(S): at 14:38

## 2025-01-01 RX ADMIN — OCTREOTIDE ACETATE 50 MICROGRAM(S): 500 INJECTION, SOLUTION INTRAVENOUS; SUBCUTANEOUS at 23:00

## 2025-01-01 RX ADMIN — LACTULOSE 200 GRAM(S): 10 SOLUTION ORAL at 06:11

## 2025-01-01 RX ADMIN — MIDODRINE HYDROCHLORIDE 5 MILLIGRAM(S): 5 TABLET ORAL at 14:40

## 2025-01-01 RX ADMIN — OCTREOTIDE ACETATE 100 MICROGRAM(S): 500 INJECTION, SOLUTION INTRAVENOUS; SUBCUTANEOUS at 22:52

## 2025-01-01 RX ADMIN — Medication 2 MILLIGRAM(S): at 01:36

## 2025-01-01 RX ADMIN — ALBUMIN (HUMAN) 50 MILLILITER(S): 12.5 INJECTION, SOLUTION INTRAVENOUS at 20:31

## 2025-01-01 RX ADMIN — IPRATROPIUM BROMIDE AND ALBUTEROL SULFATE 3 MILLILITER(S): .5; 2.5 SOLUTION RESPIRATORY (INHALATION) at 06:33

## 2025-01-01 RX ADMIN — MIDODRINE HYDROCHLORIDE 5 MILLIGRAM(S): 5 TABLET ORAL at 05:25

## 2025-01-01 RX ADMIN — OCTREOTIDE ACETATE 50 MICROGRAM(S): 500 INJECTION, SOLUTION INTRAVENOUS; SUBCUTANEOUS at 06:55

## 2025-01-01 RX ADMIN — MIDODRINE HYDROCHLORIDE 5 MILLIGRAM(S): 5 TABLET ORAL at 06:11

## 2025-01-01 RX ADMIN — Medication 1 MILLIGRAM(S): at 20:10

## 2025-01-01 RX ADMIN — LACTULOSE 20 GRAM(S): 10 SOLUTION ORAL at 05:55

## 2025-01-01 RX ADMIN — OCTREOTIDE ACETATE 100 MICROGRAM(S): 500 INJECTION, SOLUTION INTRAVENOUS; SUBCUTANEOUS at 05:58

## 2025-01-01 RX ADMIN — Medication 40 MILLIGRAM(S): at 14:40

## 2025-01-01 RX ADMIN — OCTREOTIDE ACETATE 100 MICROGRAM(S): 500 INJECTION, SOLUTION INTRAVENOUS; SUBCUTANEOUS at 06:02

## 2025-01-01 RX ADMIN — SODIUM CHLORIDE 1000 MILLILITER(S): 9 INJECTION, SOLUTION INTRAMUSCULAR; INTRAVENOUS; SUBCUTANEOUS at 13:53

## 2025-01-01 RX ADMIN — ALBUMIN (HUMAN) 50 MILLILITER(S): 12.5 INJECTION, SOLUTION INTRAVENOUS at 17:59

## 2025-01-01 RX ADMIN — Medication 1 MILLIGRAM(S): at 16:05

## 2025-01-01 RX ADMIN — Medication 1 TABLET(S): at 11:17

## 2025-01-01 RX ADMIN — Medication 200 GRAM(S): at 00:21

## 2025-01-01 RX ADMIN — ALBUMIN (HUMAN) 50 MILLILITER(S): 12.5 INJECTION, SOLUTION INTRAVENOUS at 18:01

## 2025-01-01 RX ADMIN — CARVEDILOL 3.12 MILLIGRAM(S): 3.12 TABLET, FILM COATED ORAL at 05:54

## 2025-01-01 RX ADMIN — Medication 40 MILLIGRAM(S): at 13:49

## 2025-01-01 RX ADMIN — Medication 3 MG/HR: at 13:40

## 2025-01-01 RX ADMIN — Medication 5 MG/HR: at 15:25

## 2025-01-01 RX ADMIN — LACTULOSE 200 GRAM(S): 10 SOLUTION ORAL at 05:22

## 2025-01-01 RX ADMIN — Medication 1 MILLIGRAM(S): at 17:05

## 2025-01-01 RX ADMIN — BUMETANIDE 1 MILLIGRAM(S): 1 TABLET ORAL at 00:40

## 2025-01-01 RX ADMIN — Medication 0.5 MILLIGRAM(S): at 09:52

## 2025-01-01 RX ADMIN — Medication 4 MG/HR: at 14:22

## 2025-01-01 RX ADMIN — Medication 0.5 MILLIGRAM(S): at 15:53

## 2025-01-01 RX ADMIN — ALBUMIN (HUMAN) 50 MILLILITER(S): 12.5 INJECTION, SOLUTION INTRAVENOUS at 09:57

## 2025-01-01 RX ADMIN — IPRATROPIUM BROMIDE AND ALBUTEROL SULFATE 3 MILLILITER(S): .5; 2.5 SOLUTION RESPIRATORY (INHALATION) at 00:15

## 2025-01-01 RX ADMIN — LACTULOSE 200 GRAM(S): 10 SOLUTION ORAL at 18:38

## 2025-01-01 RX ADMIN — Medication 40 MILLIGRAM(S): at 13:04

## 2025-01-01 RX ADMIN — Medication 650 MILLIGRAM(S): at 06:43

## 2025-01-01 RX ADMIN — FOLIC ACID 1 MILLIGRAM(S): 1 TABLET ORAL at 11:17

## 2025-01-01 RX ADMIN — Medication 1 MILLIGRAM(S): at 09:54

## 2025-01-01 RX ADMIN — OCTREOTIDE ACETATE 100 MICROGRAM(S): 500 INJECTION, SOLUTION INTRAVENOUS; SUBCUTANEOUS at 13:49

## 2025-01-01 RX ADMIN — MIDODRINE HYDROCHLORIDE 5 MILLIGRAM(S): 5 TABLET ORAL at 22:00

## 2025-01-01 RX ADMIN — ALBUMIN (HUMAN) 50 MILLILITER(S): 12.5 INJECTION, SOLUTION INTRAVENOUS at 23:56

## 2025-01-01 RX ADMIN — Medication 1 MILLIGRAM(S): at 10:54

## 2025-01-01 RX ADMIN — Medication 2 MILLIGRAM(S): at 09:36

## 2025-01-01 RX ADMIN — Medication 2 MILLIGRAM(S): at 12:38

## 2025-01-01 RX ADMIN — CEFTRIAXONE 2000 MILLIGRAM(S): 500 INJECTION, POWDER, FOR SOLUTION INTRAMUSCULAR; INTRAVENOUS at 23:51

## 2025-01-01 RX ADMIN — ALBUMIN (HUMAN) 50 MILLILITER(S): 12.5 INJECTION, SOLUTION INTRAVENOUS at 14:55

## 2025-01-01 RX ADMIN — ALBUMIN (HUMAN) 50 MILLILITER(S): 12.5 INJECTION, SOLUTION INTRAVENOUS at 05:19

## 2025-01-01 RX ADMIN — OCTREOTIDE ACETATE 50 MICROGRAM(S): 500 INJECTION, SOLUTION INTRAVENOUS; SUBCUTANEOUS at 05:20

## 2025-01-01 NOTE — ED ADULT NURSE NOTE - OBJECTIVE STATEMENT
75 YO male [id number] to the Ed with c/o LUQ abdominal pain that started yesterday radiating across his abdomen associated with nausea. Denies fever, chills, vomiting, diarrhea, SOB, chest pain , palpitations, urinary symptoms or dizziness. blood work sent, awaiting CT scan, pain medications given  as per orders.

## 2025-01-01 NOTE — ED PROVIDER NOTE - PATIENT PORTAL LINK FT
You can access the FollowMyHealth Patient Portal offered by [facility] by registering at the following website: [email or web address]. By joining [facility] FollowMyHealth portal, you will also be able to view your health information using other applications (apps) compatible with our system.

## 2025-01-01 NOTE — ED ADULT NURSE NOTE - NSFALLUNIVINTERV_ED_ALL_ED
Bed/Stretcher in lowest position, wheels locked, appropriate side rails in place/Call bell, personal items and telephone in reach/Instruct patient to call for assistance before getting out of bed/chair/stretcher/Non-slip footwear applied when patient is off stretcher/[place] to call system/Physically safe environment - no spills, clutter or unnecessary equipment/Purposeful proactive rounding/Room/bathroom lighting operational, light cord in reach

## 2025-01-01 NOTE — ED PROVIDER NOTE - CLINICAL SUMMARY MEDICAL DECISION MAKING FREE TEXT BOX
labs and imaging reviewed. Pt feeling much better. pt no longer tender. CT with evidence of cirrhosis. Pt does have a hx of HCV and was unaware of any cirrhosis. there is small ascites but no longer tender. no fever. no leukocytosis. lactate mildly elevated but attributable to his cirrhotic liver disease.  Pt informed of results including concern for HCC and cirrhosis. pt instructed to f/up with gi. instructed to return for worsening pain, vomiting, fever, or any other concerns.  Pt given a copy of all results and instructed to f/up with pcp regarding any abnormal results. labs and imaging reviewed. Pt feeling much better. pt no longer tender. CT with evidence of cirrhosis. Pt does have a hx of HCV and was unaware of any cirrhosis. there is small ascites but no longer tender. no fever. no leukocytosis. lactate mildly elevated but attributable to his cirrhotic liver disease.  Pt informed of results including concern for HCC and cirrhosis. pt instructed to f/up with gi. instructed to return for worsening pain, vomiting, fever, or any other concerns.  Pt given a copy of all results and instructed to f/up with pcp regarding any abnormal results.  lactate cleared with one liter NS.

## 2025-01-01 NOTE — ED ADULT NURSE REASSESSMENT NOTE - NS ED NURSE REASSESS COMMENT FT1
priority CT called on pt as per MD Alonzo. Pt in no apparent distress @ this time. Pt made aware of plan of care and verbalized understanding.

## 2025-01-01 NOTE — ED PROVIDER NOTE - NS ED ROS FT
No fever/chills   No photophobia/eye pain/changes in visio,   No ear pain/sore throat/dysphagia   No chest pain/palpitations  No SOB/cough/wheeze/stridor   + abdominal pain, No N/V/D  No dysuria/frequency/discharge   No neck/back pain,   No rash  No changes in neurological status/function. Unknown if ever smoked

## 2025-01-01 NOTE — ED PROVIDER NOTE - PHYSICAL EXAMINATION
Constitutional - well-developed.   Head - NCAT. Airway patent.   Eyes - PERRL.   CV - RRR. no murmur. no edema.   Pulm - CTAB.   Abd - soft, LLQ, LUQ and RUQ ttp. no rebound. no guarding.   Neuro - A&Ox3. strength 5/5 x4. sensation intact x4. normal gait.   Skin - No rash. .  MSK - normal ROM.

## 2025-01-01 NOTE — ED PROVIDER NOTE - OBJECTIVE STATEMENT
Pt is a 73 yo M co abd pain.  Pt states that yesterday he started with L-sided abd pain. PT states that the pain has been constant and radiates across his mid-abdomen. Pt states that the pain was worse today and is an 8/10. no fever/chills. no n/v. no diarrhea. no cp. no sob. no other complaints.

## 2025-01-01 NOTE — ED PROVIDER NOTE - CARE PROVIDER_API CALL
Shaylee Burgess  Transplant Hepatology  [address], [address]  [place] [address]  Phone: [phone]  Fax: [phone]  Follow Up Time: 1-3 Days

## 2025-01-02 LAB
ALLERGY+IMMUNOLOGY DIAG STUDY NOTE: SIGNIFICANT CHANGE UP
HCV AB S/CO SERPL IA: 10.86 S/CO — HIGH (ref 0–0.99)
HCV AB SERPL-IMP: REACTIVE

## 2025-01-02 PROCEDURE — 80053 COMPREHEN METABOLIC PANEL: CPT

## 2025-01-02 PROCEDURE — 86870 RBC ANTIBODY IDENTIFICATION: CPT

## 2025-01-02 PROCEDURE — 83605 ASSAY OF LACTIC ACID: CPT

## 2025-01-02 PROCEDURE — 74177 CT ABD & PELVIS W/CONTRAST: CPT | Mod: MC

## 2025-01-02 PROCEDURE — 86803 HEPATITIS C AB TEST: CPT

## 2025-01-02 PROCEDURE — 86900 BLOOD TYPING SEROLOGIC ABO: CPT

## 2025-01-02 PROCEDURE — 71045 X-RAY EXAM CHEST 1 VIEW: CPT

## 2025-01-02 PROCEDURE — 86703 HIV-1/HIV-2 1 RESULT ANTBDY: CPT

## 2025-01-02 PROCEDURE — 86880 COOMBS TEST DIRECT: CPT

## 2025-01-02 PROCEDURE — 87521 HEPATITIS C PROBE&RVRS TRNSC: CPT

## 2025-01-02 PROCEDURE — 85025 COMPLETE CBC W/AUTO DIFF WBC: CPT

## 2025-01-02 PROCEDURE — 96374 THER/PROPH/DIAG INJ IV PUSH: CPT | Mod: XU

## 2025-01-02 PROCEDURE — 83690 ASSAY OF LIPASE: CPT

## 2025-01-02 PROCEDURE — 86905 BLOOD TYPING RBC ANTIGENS: CPT

## 2025-01-02 PROCEDURE — 86850 RBC ANTIBODY SCREEN: CPT

## 2025-01-02 PROCEDURE — 86860 RBC ANTIBODY ELUTION: CPT

## 2025-01-02 PROCEDURE — 99284 EMERGENCY DEPT VISIT MOD MDM: CPT | Mod: 25

## 2025-01-02 PROCEDURE — 36415 COLL VENOUS BLD VENIPUNCTURE: CPT

## 2025-01-02 PROCEDURE — 86901 BLOOD TYPING SEROLOGIC RH(D): CPT

## 2025-01-11 ENCOUNTER — INPATIENT (INPATIENT)
Facility: HOSPITAL | Age: 75
LOS: 12 days | Discharge: HOME | DRG: 189 | End: 2025-01-24
Attending: STUDENT IN AN ORGANIZED HEALTH CARE EDUCATION/TRAINING PROGRAM | Admitting: INTERNAL MEDICINE
Payer: MEDICARE

## 2025-01-11 VITALS
OXYGEN SATURATION: 87 % | DIASTOLIC BLOOD PRESSURE: 97 MMHG | HEIGHT: 68 IN | TEMPERATURE: 98 F | RESPIRATION RATE: 18 BRPM | SYSTOLIC BLOOD PRESSURE: 175 MMHG | HEART RATE: 90 BPM

## 2025-01-11 DIAGNOSIS — J96.01 ACUTE RESPIRATORY FAILURE WITH HYPOXIA: ICD-10-CM

## 2025-01-11 LAB
ALBUMIN SERPL ELPH-MCNC: 2.6 G/DL — LOW (ref 3.3–5.2)
ALBUMIN SERPL ELPH-MCNC: 2.9 G/DL — LOW (ref 3.3–5.2)
ALP SERPL-CCNC: 153 U/L — HIGH (ref 40–120)
ALP SERPL-CCNC: 158 U/L — HIGH (ref 40–120)
ALT FLD-CCNC: 27 U/L — SIGNIFICANT CHANGE UP
ALT FLD-CCNC: 30 U/L — SIGNIFICANT CHANGE UP
ANION GAP SERPL CALC-SCNC: 13 MMOL/L — SIGNIFICANT CHANGE UP (ref 5–17)
ANION GAP SERPL CALC-SCNC: 16 MMOL/L — SIGNIFICANT CHANGE UP (ref 5–17)
ANISOCYTOSIS BLD QL: SLIGHT — SIGNIFICANT CHANGE UP
APPEARANCE UR: CLEAR — SIGNIFICANT CHANGE UP
APTT BLD: 35 SEC — SIGNIFICANT CHANGE UP (ref 24.5–35.6)
AST SERPL-CCNC: 81 U/L — HIGH
AST SERPL-CCNC: 96 U/L — HIGH
B PERT IGG+IGM PNL SER: ABNORMAL
BACTERIA # UR AUTO: NEGATIVE /HPF — SIGNIFICANT CHANGE UP
BASE EXCESS BLDA CALC-SCNC: -11.2 MMOL/L — LOW (ref -2–3)
BASE EXCESS BLDV CALC-SCNC: 0.7 MMOL/L — SIGNIFICANT CHANGE UP (ref -2–3)
BASOPHILS # BLD AUTO: 0 K/UL — SIGNIFICANT CHANGE UP (ref 0–0.2)
BASOPHILS # BLD AUTO: 0.03 K/UL — SIGNIFICANT CHANGE UP (ref 0–0.2)
BASOPHILS NFR BLD AUTO: 0 % — SIGNIFICANT CHANGE UP (ref 0–2)
BASOPHILS NFR BLD AUTO: 0.2 % — SIGNIFICANT CHANGE UP (ref 0–2)
BILIRUB SERPL-MCNC: 2.1 MG/DL — HIGH (ref 0.4–2)
BILIRUB SERPL-MCNC: 2.2 MG/DL — HIGH (ref 0.4–2)
BILIRUB UR-MCNC: NEGATIVE — SIGNIFICANT CHANGE UP
BLD GP AB SCN SERPL QL: SIGNIFICANT CHANGE UP
BLOOD GAS COMMENTS ARTERIAL: SIGNIFICANT CHANGE UP
BUN SERPL-MCNC: 27.2 MG/DL — HIGH (ref 8–20)
BUN SERPL-MCNC: 27.7 MG/DL — HIGH (ref 8–20)
CALCIUM SERPL-MCNC: 8.5 MG/DL — SIGNIFICANT CHANGE UP (ref 8.4–10.5)
CALCIUM SERPL-MCNC: 8.6 MG/DL — SIGNIFICANT CHANGE UP (ref 8.4–10.5)
CAST: 4 /LPF — SIGNIFICANT CHANGE UP (ref 0–4)
CHLORIDE SERPL-SCNC: 102 MMOL/L — SIGNIFICANT CHANGE UP (ref 96–108)
CHLORIDE SERPL-SCNC: 104 MMOL/L — SIGNIFICANT CHANGE UP (ref 96–108)
CO2 SERPL-SCNC: 21 MMOL/L — LOW (ref 22–29)
CO2 SERPL-SCNC: 23 MMOL/L — SIGNIFICANT CHANGE UP (ref 22–29)
COLOR FLD: ABNORMAL
COLOR SPEC: YELLOW — SIGNIFICANT CHANGE UP
CREAT SERPL-MCNC: 1.1 MG/DL — SIGNIFICANT CHANGE UP (ref 0.5–1.3)
CREAT SERPL-MCNC: 1.36 MG/DL — HIGH (ref 0.5–1.3)
DAT C3-SP REAG RBC QL: SIGNIFICANT CHANGE UP
DAT IGG-SP REAG RBC-IMP: ABNORMAL
DIFF PNL FLD: ABNORMAL
DIR ANTIGLOB POLYSPECIFIC INTERPRETATION: ABNORMAL
EGFR: 55 ML/MIN/1.73M2 — LOW
EGFR: 70 ML/MIN/1.73M2 — SIGNIFICANT CHANGE UP
ELLIPTOCYTES BLD QL SMEAR: SLIGHT — SIGNIFICANT CHANGE UP
EOSINOPHIL # BLD AUTO: 0 K/UL — SIGNIFICANT CHANGE UP (ref 0–0.5)
EOSINOPHIL # BLD AUTO: 0.02 K/UL — SIGNIFICANT CHANGE UP (ref 0–0.5)
EOSINOPHIL NFR BLD AUTO: 0 % — SIGNIFICANT CHANGE UP (ref 0–6)
EOSINOPHIL NFR BLD AUTO: 0.1 % — SIGNIFICANT CHANGE UP (ref 0–6)
FLUID INTAKE SUBSTANCE CLASS: SIGNIFICANT CHANGE UP
GAS PNL BLDV: SIGNIFICANT CHANGE UP
GIANT PLATELETS BLD QL SMEAR: PRESENT — SIGNIFICANT CHANGE UP
GLUCOSE BLDC GLUCOMTR-MCNC: 112 MG/DL — HIGH (ref 70–99)
GLUCOSE SERPL-MCNC: 103 MG/DL — HIGH (ref 70–99)
GLUCOSE SERPL-MCNC: 112 MG/DL — HIGH (ref 70–99)
GLUCOSE UR QL: NEGATIVE MG/DL — SIGNIFICANT CHANGE UP
HCO3 BLDA-SCNC: 17 MMOL/L — LOW (ref 21–28)
HCO3 BLDV-SCNC: 26 MMOL/L — SIGNIFICANT CHANGE UP (ref 22–29)
HCT VFR BLD CALC: 38.7 % — LOW (ref 39–50)
HCT VFR BLD CALC: 40.7 % — SIGNIFICANT CHANGE UP (ref 39–50)
HGB BLD-MCNC: 12.2 G/DL — LOW (ref 13–17)
HGB BLD-MCNC: 12.7 G/DL — LOW (ref 13–17)
HOROWITZ INDEX BLDA+IHG-RTO: SIGNIFICANT CHANGE UP
IMM GRANULOCYTES NFR BLD AUTO: 1 % — HIGH (ref 0–0.9)
INR BLD: 1.67 RATIO — HIGH (ref 0.85–1.16)
KETONES UR-MCNC: NEGATIVE MG/DL — SIGNIFICANT CHANGE UP
LACTATE BLDV-MCNC: 4.9 MMOL/L — CRITICAL HIGH (ref 0.5–2)
LACTATE SERPL-SCNC: 4.6 MMOL/L — CRITICAL HIGH (ref 0.5–2)
LACTATE SERPL-SCNC: 4.8 MMOL/L — CRITICAL HIGH (ref 0.5–2)
LEUKOCYTE ESTERASE UR-ACNC: NEGATIVE — SIGNIFICANT CHANGE UP
LIDOCAIN IGE QN: 19 U/L — LOW (ref 22–51)
LYMPHOCYTES # BLD AUTO: 0.55 K/UL — LOW (ref 1–3.3)
LYMPHOCYTES # BLD AUTO: 1.12 K/UL — SIGNIFICANT CHANGE UP (ref 1–3.3)
LYMPHOCYTES # BLD AUTO: 4.4 % — LOW (ref 13–44)
LYMPHOCYTES # BLD AUTO: 8.1 % — LOW (ref 13–44)
LYMPHOCYTES # FLD: 31 % — SIGNIFICANT CHANGE UP
MACROCYTES BLD QL: SLIGHT — SIGNIFICANT CHANGE UP
MAGNESIUM SERPL-MCNC: 2.2 MG/DL — SIGNIFICANT CHANGE UP (ref 1.8–2.6)
MANUAL SMEAR VERIFICATION: SIGNIFICANT CHANGE UP
MCHC RBC-ENTMCNC: 28.3 PG — SIGNIFICANT CHANGE UP (ref 27–34)
MCHC RBC-ENTMCNC: 28.9 PG — SIGNIFICANT CHANGE UP (ref 27–34)
MCHC RBC-ENTMCNC: 31.2 G/DL — LOW (ref 32–36)
MCHC RBC-ENTMCNC: 31.5 G/DL — LOW (ref 32–36)
MCV RBC AUTO: 90.8 FL — SIGNIFICANT CHANGE UP (ref 80–100)
MCV RBC AUTO: 91.7 FL — SIGNIFICANT CHANGE UP (ref 80–100)
MONOCYTES # BLD AUTO: 0.23 K/UL — SIGNIFICANT CHANGE UP (ref 0–0.9)
MONOCYTES # BLD AUTO: 1.14 K/UL — HIGH (ref 0–0.9)
MONOCYTES NFR BLD AUTO: 1.8 % — LOW (ref 2–14)
MONOCYTES NFR BLD AUTO: 8.2 % — SIGNIFICANT CHANGE UP (ref 2–14)
MONOS+MACROS # FLD: 37 % — SIGNIFICANT CHANGE UP
MRSA PCR RESULT.: SIGNIFICANT CHANGE UP
NEUTROPHILS # BLD AUTO: 11.43 K/UL — HIGH (ref 1.8–7.4)
NEUTROPHILS # BLD AUTO: 11.49 K/UL — HIGH (ref 1.8–7.4)
NEUTROPHILS NFR BLD AUTO: 82.4 % — HIGH (ref 43–77)
NEUTROPHILS NFR BLD AUTO: 90.3 % — HIGH (ref 43–77)
NEUTROPHILS-BODY FLUID: 32 % — SIGNIFICANT CHANGE UP
NEUTS BAND # BLD: 0.9 % — SIGNIFICANT CHANGE UP (ref 0–8)
NEUTS BAND NFR BLD: 0.9 % — SIGNIFICANT CHANGE UP (ref 0–8)
NITRITE UR-MCNC: NEGATIVE — SIGNIFICANT CHANGE UP
NRBC # BLD: 1 /100 WBCS — HIGH (ref 0–0)
NRBC BLD-RTO: 1 /100 WBCS — HIGH (ref 0–0)
OVALOCYTES BLD QL SMEAR: SLIGHT — SIGNIFICANT CHANGE UP
PCO2 BLDA: 43 MMHG — SIGNIFICANT CHANGE UP (ref 35–48)
PCO2 BLDV: 45 MMHG — SIGNIFICANT CHANGE UP (ref 42–55)
PH BLDA: 7.2 — CRITICAL LOW (ref 7.35–7.45)
PH BLDV: 7.37 — SIGNIFICANT CHANGE UP (ref 7.32–7.43)
PH FLD: 8 — SIGNIFICANT CHANGE UP
PH UR: 6 — SIGNIFICANT CHANGE UP (ref 5–8)
PHOSPHATE SERPL-MCNC: 3.7 MG/DL — SIGNIFICANT CHANGE UP (ref 2.4–4.7)
PLAT MORPH BLD: NORMAL — SIGNIFICANT CHANGE UP
PLATELET # BLD AUTO: 167 K/UL — SIGNIFICANT CHANGE UP (ref 150–400)
PLATELET # BLD AUTO: 173 K/UL — SIGNIFICANT CHANGE UP (ref 150–400)
PO2 BLDA: 142 MMHG — HIGH (ref 83–108)
PO2 BLDV: 142 MMHG — HIGH (ref 25–45)
POIKILOCYTOSIS BLD QL AUTO: SLIGHT — SIGNIFICANT CHANGE UP
POLYCHROMASIA BLD QL SMEAR: SLIGHT — SIGNIFICANT CHANGE UP
POTASSIUM SERPL-MCNC: 3.7 MMOL/L — SIGNIFICANT CHANGE UP (ref 3.5–5.3)
POTASSIUM SERPL-MCNC: 3.7 MMOL/L — SIGNIFICANT CHANGE UP (ref 3.5–5.3)
POTASSIUM SERPL-SCNC: 3.7 MMOL/L — SIGNIFICANT CHANGE UP (ref 3.5–5.3)
POTASSIUM SERPL-SCNC: 3.7 MMOL/L — SIGNIFICANT CHANGE UP (ref 3.5–5.3)
PROT SERPL-MCNC: 6.9 G/DL — SIGNIFICANT CHANGE UP (ref 6.6–8.7)
PROT SERPL-MCNC: 7.5 G/DL — SIGNIFICANT CHANGE UP (ref 6.6–8.7)
PROT UR-MCNC: NEGATIVE MG/DL — SIGNIFICANT CHANGE UP
PROTHROM AB SERPL-ACNC: 19.3 SEC — HIGH (ref 9.9–13.4)
RBC # BLD: 4.22 M/UL — SIGNIFICANT CHANGE UP (ref 4.2–5.8)
RBC # BLD: 4.48 M/UL — SIGNIFICANT CHANGE UP (ref 4.2–5.8)
RBC # FLD: 20.1 % — HIGH (ref 10.3–14.5)
RBC # FLD: 20.5 % — HIGH (ref 10.3–14.5)
RBC BLD AUTO: ABNORMAL
RBC CASTS # UR COMP ASSIST: 8 /HPF — HIGH (ref 0–4)
RCV VOL RI: SIGNIFICANT CHANGE UP CELLS/UL
S AUREUS DNA NOSE QL NAA+PROBE: SIGNIFICANT CHANGE UP
SAO2 % BLDA: 99.5 % — HIGH (ref 94–98)
SAO2 % BLDV: 100 % — SIGNIFICANT CHANGE UP
SODIUM SERPL-SCNC: 139 MMOL/L — SIGNIFICANT CHANGE UP (ref 135–145)
SODIUM SERPL-SCNC: 140 MMOL/L — SIGNIFICANT CHANGE UP (ref 135–145)
SP GR SPEC: >1.03 — HIGH (ref 1–1.03)
SQUAMOUS # UR AUTO: 1 /HPF — SIGNIFICANT CHANGE UP (ref 0–5)
TOTAL NUCLEATED CELL COUNT, BODY FLUID: 2601 CELLS/UL — SIGNIFICANT CHANGE UP
TUBE TYPE: SIGNIFICANT CHANGE UP
UROBILINOGEN FLD QL: 1 MG/DL — SIGNIFICANT CHANGE UP (ref 0.2–1)
VARIANT LYMPHS # BLD: 2.6 % — SIGNIFICANT CHANGE UP (ref 0–6)
VARIANT LYMPHS NFR BLD MANUAL: 2.6 % — SIGNIFICANT CHANGE UP (ref 0–6)
WBC # BLD: 12.6 K/UL — HIGH (ref 3.8–10.5)
WBC # BLD: 13.88 K/UL — HIGH (ref 3.8–10.5)
WBC # FLD AUTO: 12.6 K/UL — HIGH (ref 3.8–10.5)
WBC # FLD AUTO: 13.88 K/UL — HIGH (ref 3.8–10.5)
WBC COUNT.: 2256 CELLS/UL — SIGNIFICANT CHANGE UP
WBC UR QL: 6 /HPF — HIGH (ref 0–5)

## 2025-01-11 PROCEDURE — 71045 X-RAY EXAM CHEST 1 VIEW: CPT | Mod: 26

## 2025-01-11 PROCEDURE — 32551 INSERTION OF CHEST TUBE: CPT | Mod: GC,LT

## 2025-01-11 PROCEDURE — 36556 INSERT NON-TUNNEL CV CATH: CPT | Mod: GC

## 2025-01-11 PROCEDURE — 71045 X-RAY EXAM CHEST 1 VIEW: CPT | Mod: 26,77

## 2025-01-11 PROCEDURE — 93010 ELECTROCARDIOGRAM REPORT: CPT | Mod: 76

## 2025-01-11 PROCEDURE — 88341 IMHCHEM/IMCYTCHM EA ADD ANTB: CPT | Mod: 26

## 2025-01-11 PROCEDURE — 74177 CT ABD & PELVIS W/CONTRAST: CPT | Mod: 26

## 2025-01-11 PROCEDURE — 71275 CT ANGIOGRAPHY CHEST: CPT | Mod: 26

## 2025-01-11 PROCEDURE — 88342 IMHCHEM/IMCYTCHM 1ST ANTB: CPT | Mod: 26

## 2025-01-11 PROCEDURE — 31500 INSERT EMERGENCY AIRWAY: CPT

## 2025-01-11 PROCEDURE — 70450 CT HEAD/BRAIN W/O DYE: CPT | Mod: 26

## 2025-01-11 PROCEDURE — 88305 TISSUE EXAM BY PATHOLOGIST: CPT | Mod: 26

## 2025-01-11 PROCEDURE — 99291 CRITICAL CARE FIRST HOUR: CPT | Mod: 25

## 2025-01-11 PROCEDURE — 88112 CYTOPATH CELL ENHANCE TECH: CPT | Mod: 26

## 2025-01-11 PROCEDURE — 99291 CRITICAL CARE FIRST HOUR: CPT | Mod: GC,25

## 2025-01-11 RX ORDER — PANTOPRAZOLE 20 MG/1
40 TABLET, DELAYED RELEASE ORAL DAILY
Refills: 0 | Status: DISCONTINUED | OUTPATIENT
Start: 2025-01-11 | End: 2025-01-24

## 2025-01-11 RX ORDER — POTASSIUM CHLORIDE 750 MG/1
20 TABLET, EXTENDED RELEASE ORAL ONCE
Refills: 0 | Status: COMPLETED | OUTPATIENT
Start: 2025-01-11 | End: 2025-01-11

## 2025-01-11 RX ORDER — PROPOFOL 10 MG/ML
50 INJECTION, EMULSION INTRAVENOUS ONCE
Refills: 0 | Status: COMPLETED | OUTPATIENT
Start: 2025-01-11 | End: 2025-01-11

## 2025-01-11 RX ORDER — ALBUMIN HUMAN 50 G/1000ML
500 SOLUTION INTRAVENOUS ONCE
Refills: 0 | Status: COMPLETED | OUTPATIENT
Start: 2025-01-11 | End: 2025-01-11

## 2025-01-11 RX ORDER — PANTOPRAZOLE 20 MG/1
40 TABLET, DELAYED RELEASE ORAL DAILY
Refills: 0 | Status: DISCONTINUED | OUTPATIENT
Start: 2025-01-11 | End: 2025-01-11

## 2025-01-11 RX ORDER — IPRATROPIUM BROMIDE AND ALBUTEROL SULFATE .5; 2.5 MG/3ML; MG/3ML
3 SOLUTION RESPIRATORY (INHALATION)
Refills: 0 | Status: DISCONTINUED | OUTPATIENT
Start: 2025-01-11 | End: 2025-01-14

## 2025-01-11 RX ORDER — ATORVASTATIN CALCIUM 80 MG/1
40 TABLET, FILM COATED ORAL AT BEDTIME
Refills: 0 | Status: DISCONTINUED | OUTPATIENT
Start: 2025-01-11 | End: 2025-01-11

## 2025-01-11 RX ORDER — ANTISEPTIC SURGICAL SCRUB 0.04 MG/ML
1 SOLUTION TOPICAL
Refills: 0 | Status: DISCONTINUED | OUTPATIENT
Start: 2025-01-11 | End: 2025-01-14

## 2025-01-11 RX ORDER — NOREPINEPHRINE BITARTRATE 1 MG/ML
0.05 INJECTION, SOLUTION, CONCENTRATE INTRAVENOUS
Qty: 8 | Refills: 0 | Status: DISCONTINUED | OUTPATIENT
Start: 2025-01-11 | End: 2025-01-13

## 2025-01-11 RX ORDER — SODIUM CHLORIDE 9 G/ML
1500 INJECTION, SOLUTION INTRAVENOUS ONCE
Refills: 0 | Status: COMPLETED | OUTPATIENT
Start: 2025-01-11 | End: 2025-01-11

## 2025-01-11 RX ORDER — ATORVASTATIN CALCIUM 80 MG/1
40 TABLET, FILM COATED ORAL AT BEDTIME
Refills: 0 | Status: DISCONTINUED | OUTPATIENT
Start: 2025-01-11 | End: 2025-01-14

## 2025-01-11 RX ORDER — SODIUM CHLORIDE 9 G/ML
1000 INJECTION, SOLUTION INTRAVENOUS ONCE
Refills: 0 | Status: DISCONTINUED | OUTPATIENT
Start: 2025-01-11 | End: 2025-01-11

## 2025-01-11 RX ORDER — PROPOFOL 10 MG/ML
30 INJECTION, EMULSION INTRAVENOUS
Qty: 1000 | Refills: 0 | Status: DISCONTINUED | OUTPATIENT
Start: 2025-01-11 | End: 2025-01-13

## 2025-01-11 RX ORDER — ANTISEPTIC SURGICAL SCRUB 0.04 MG/ML
15 SOLUTION TOPICAL EVERY 12 HOURS
Refills: 0 | Status: DISCONTINUED | OUTPATIENT
Start: 2025-01-11 | End: 2025-01-12

## 2025-01-11 RX ORDER — PIPERACILLIN SODIUM AND TAZOBACTAM SODIUM 2; 250 G/50ML; MG/50ML
3.38 INJECTION, POWDER, FOR SOLUTION INTRAVENOUS ONCE
Refills: 0 | Status: COMPLETED | OUTPATIENT
Start: 2025-01-11 | End: 2025-01-11

## 2025-01-11 RX ORDER — PIPERACILLIN SODIUM AND TAZOBACTAM SODIUM 2; 250 G/50ML; MG/50ML
3.38 INJECTION, POWDER, FOR SOLUTION INTRAVENOUS EVERY 8 HOURS
Refills: 0 | Status: COMPLETED | OUTPATIENT
Start: 2025-01-11 | End: 2025-01-18

## 2025-01-11 RX ORDER — FENTANYL CITRATE 50 UG/ML
100 INJECTION INTRAMUSCULAR; INTRAVENOUS ONCE
Refills: 0 | Status: DISCONTINUED | OUTPATIENT
Start: 2025-01-11 | End: 2025-01-11

## 2025-01-11 RX ADMIN — IPRATROPIUM BROMIDE AND ALBUTEROL SULFATE 3 MILLILITER(S): .5; 2.5 SOLUTION RESPIRATORY (INHALATION) at 12:50

## 2025-01-11 RX ADMIN — ATORVASTATIN CALCIUM 40 MILLIGRAM(S): 80 TABLET, FILM COATED ORAL at 21:25

## 2025-01-11 RX ADMIN — PROPOFOL 22.5 MICROGRAM(S)/KG/MIN: 10 INJECTION, EMULSION INTRAVENOUS at 15:10

## 2025-01-11 RX ADMIN — IPRATROPIUM BROMIDE AND ALBUTEROL SULFATE 3 MILLILITER(S): .5; 2.5 SOLUTION RESPIRATORY (INHALATION) at 13:05

## 2025-01-11 RX ADMIN — SODIUM CHLORIDE 1500 MILLILITER(S): 9 INJECTION, SOLUTION INTRAVENOUS at 18:30

## 2025-01-11 RX ADMIN — PIPERACILLIN SODIUM AND TAZOBACTAM SODIUM 25 GRAM(S): 2; 250 INJECTION, POWDER, FOR SOLUTION INTRAVENOUS at 21:26

## 2025-01-11 RX ADMIN — ANTISEPTIC SURGICAL SCRUB 1 APPLICATION(S): 0.04 SOLUTION TOPICAL at 18:52

## 2025-01-11 RX ADMIN — POTASSIUM CHLORIDE 20 MILLIEQUIVALENT(S): 750 TABLET, EXTENDED RELEASE ORAL at 21:25

## 2025-01-11 RX ADMIN — PROPOFOL 50 MILLIGRAM(S): 10 INJECTION, EMULSION INTRAVENOUS at 14:50

## 2025-01-11 RX ADMIN — PROPOFOL 22.5 MICROGRAM(S)/KG/MIN: 10 INJECTION, EMULSION INTRAVENOUS at 21:44

## 2025-01-11 RX ADMIN — PROPOFOL 22.5 MICROGRAM(S)/KG/MIN: 10 INJECTION, EMULSION INTRAVENOUS at 18:57

## 2025-01-11 RX ADMIN — NOREPINEPHRINE BITARTRATE 11.3 MICROGRAM(S)/KG/MIN: 1 INJECTION, SOLUTION, CONCENTRATE INTRAVENOUS at 15:09

## 2025-01-11 RX ADMIN — PANTOPRAZOLE 40 MILLIGRAM(S): 20 TABLET, DELAYED RELEASE ORAL at 21:46

## 2025-01-11 RX ADMIN — ANTISEPTIC SURGICAL SCRUB 15 MILLILITER(S): 0.04 SOLUTION TOPICAL at 20:03

## 2025-01-11 RX ADMIN — Medication 40 MILLIGRAM(S): at 13:15

## 2025-01-11 RX ADMIN — PIPERACILLIN SODIUM AND TAZOBACTAM SODIUM 200 GRAM(S): 2; 250 INJECTION, POWDER, FOR SOLUTION INTRAVENOUS at 18:43

## 2025-01-11 RX ADMIN — ALBUMIN HUMAN 250 MILLILITER(S): 50 SOLUTION INTRAVENOUS at 22:21

## 2025-01-11 RX ADMIN — FENTANYL CITRATE 100 MICROGRAM(S): 50 INJECTION INTRAMUSCULAR; INTRAVENOUS at 14:26

## 2025-01-11 NOTE — H&P ADULT - NSHPLABSRESULTS_GEN_ALL_CORE
12.7   12.60 )-----------( 173      ( 11 Jan 2025 13:00 )             40.7     01-11    140  |  104  |  27.2[H]  ----------------------------<  103[H]  3.7   |  23.0  |  1.10    Ca    8.6      11 Jan 2025 13:00    TPro  7.5  /  Alb  2.9[L]  /  TBili  2.2[H]  /  DBili  x   /  AST  81[H]  /  ALT  27  /  AlkPhos  158[H]  01-11    LIVER FUNCTIONS - ( 11 Jan 2025 13:00 )  Alb: 2.9 g/dL / Pro: 7.5 g/dL / ALK PHOS: 158 U/L / ALT: 27 U/L / AST: 81 U/L / GGT: x           PT/INR - ( 11 Jan 2025 13:00 )   PT: 19.3 sec;   INR: 1.67 ratio         PTT - ( 11 Jan 2025 13:00 )  PTT:35.0 sec

## 2025-01-11 NOTE — PATIENT PROFILE ADULT - VISION (WITH CORRECTIVE LENSES IF THE PATIENT USUALLY WEARS THEM):
EVARISTO pt intubated EVARISTO pt intubated/Partially impaired: cannot see medication labels or newsprint, but can see obstacles in path, and the surrounding layout; can count fingers at arm's length

## 2025-01-11 NOTE — PATIENT PROFILE ADULT - NSPROGENSOURCEINFO_GEN_A_NUR
unable to respond Cimetidine Counseling:  I discussed with the patient the risks of Cimetidine including but not limited to gynecomastia, headache, diarrhea, nausea, drowsiness, arrhythmias, pancreatitis, skin rashes, psychosis, bone marrow suppression and kidney toxicity.

## 2025-01-11 NOTE — ED ADULT NURSE NOTE - NSFALLHARMRISKINTERV_ED_ALL_ED

## 2025-01-11 NOTE — PATIENT PROFILE ADULT - NSPROPTRIGHTNOTIFY_GEN_A_NUR
Patient notified of echo results and Dr. Rosa's recommendation.      information could not be obtained

## 2025-01-11 NOTE — ED PROCEDURE NOTE - PROCEDURE ADDITIONAL DETAILS
saline pushed through subclavian central line, bubbles visible in RV on US. VBG CXR saline pushed through subclavian central line, agitated saline bubbles visible in RV on US. VBG CXR

## 2025-01-11 NOTE — ED PROVIDER NOTE - PROGRESS NOTE DETAILS
I, Viraj Moreland MD, personally saw the patient with the resident, and completed the key components of the history and physical exam. I then discussed the management plan with the resident.  Patient with a past medical history of hepatitis C is presenting with worsening abdominal pain as well as shortness of breath.  Patient states his symptoms have not gotten any better since discharge last week.  Has developed dark stool during this time as well.  No fevers.  No cough.  No reported chest pain.  States that he does smoke cigarettes.  On exam here he has a distended abdomen that he feels is similar to last week though ED RN who had him last time feels that it might be larger.  He is hypoxic here, placed on nasal cannula oxygen.  Abdomen is distended and tender.  He does have bilateral lower extremity edema.  Lung sounds are coarse bilaterally with some wheezing present.  Concern for worsening volume overload state in the setting of his underlying liver disease.  With tachycardia will evaluate for PE.  Possibility of worsening ascites as well contributing to the symptoms.  No fever to suggest SBP.  Will plan on lab work, imaging, medications and reassessment.  ECG per my independent interpretation shows a rate of 77, normal sinus rhythm with a normal axis, no ST segment elevation consistent with ischemia. WW: Pt noted to become unresponsive, hypoxic on monitor, brought to critical care, no palpable pulses felt. CPR initiated. 2 rounds of cpr performed with 2 epi given, successfully intubated during 2nd round of CPR. ROSC obtained, fentanyl and propofol started for sedation as pt was biting tube. MICU consulted, will see pt WW: Pt noted to become unresponsive, hypoxic on monitor, brought to critical care, no palpable pulses felt. CPR initiated. 2 rounds of cpr performed with 1 epi given, successfully intubated during 2nd round of CPR. ROSC obtained, fentanyl and propofol started for sedation as pt was biting tube. MICU consulted, will see pt I went to reassess this patient who appeared to be in respiratory distress, stated that he was having trouble breathing.  Supplemental oxygen was over his chin and he was slumped in the bed.  Patient moved to critical care area immediately afterwards.  Patient found to be in cardiac arrest, ACLS initiated, 1 mg of epinephrine given.  Patient subsequently intubated and then achieve return of spontaneous circulation.  Postintubation x-ray confirmed tube placement.  Patient sedated with propofol as well as given fentanyl on postintubation state.  Prior to initiation of propofol, patient was biting at the tube and attempting to pull away the tube.  Will plan to obtain CT scan imaging and admit to ICU.  Viraj Moreland MD.

## 2025-01-11 NOTE — H&P ADULT - HISTORY OF PRESENT ILLNESS
74-year-old male patient with a history of cirrhosis secondary to hepatitis C, daily smoker presents the ED complaining of abdominal pain, abdominal distention, difficulty breathing.  Seen in the ED 10 days ago secondary to abdominal pain, abdominal distention, melena. 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  Patient was discharged home, states that he was feeling somewhat better until a few days ago when he began to experience worsening abdominal pain, abdominal ascension.  Patient reports increased shortness of breath and dyspnea as well over the past few days.  Denies any prior history of COPD but is a daily smoker.  Patient noted to be hypoxic to 88% on room air in the ED, placed on 4 L nasal cannula with improvement to 94%. On reassessment this patient who appeared to be in respiratory distress, stated that he was having trouble breathing.  Supplemental oxygen was over his chin and he was slumped in the bed.  Patient moved to critical care area immediately afterwards.  Patient found to be in cardiac arrest, ACLS initiated, 1 mg of epinephrine given.  Patient subsequently intubated and then achieve return of spontaneous circulation.  Postintubation x-ray confirmed tube placement.  Patient sedated with propofol as well as given fentanyl on postintubation state.  Prior to initiation of propofol, patient was biting at the tube and attempting to pull away the tube.  Will plan to obtain CT scan imaging and admit to ICU.

## 2025-01-11 NOTE — ED ADULT NURSE REASSESSMENT NOTE - NS ED NURSE REASSESS COMMENT FT1
pt brought to CC from main ED in cardiac arrest, ROSC obtained, see code sheet information. pt intubated, in NAD @ this time. awaiting transport to ICU.

## 2025-01-11 NOTE — ED PROVIDER NOTE - OBJECTIVE STATEMENT
74-year-old male patient with a history of cirrhosis secondary to hepatitis C, daily smoker presents the ED complaining of abdominal pain, abdominal distention, difficulty breathing.  Seen in the ED 10 days ago secondary to abdominal pain, abdominal distention, melena. 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  Patient was discharged home, states that he was feeling somewhat better until a few days ago when he began to experience worsening abdominal pain, abdominal ascension.  Patient reports increased shortness of breath and dyspnea as well over the past few days.  Denies any prior history of COPD but is a daily smoker.  Patient noted to be hypoxic to 88% on room air in the ED, placed on 4 L nasal cannula with improvement to 94%.  No prior need of oxygen

## 2025-01-11 NOTE — H&P ADULT - ATTENDING COMMENTS
73 yo male with hep C, cirrhosis, HCC ?, presented to the ED with dyspnea and a left sided pleural effusion.  While in the ED patient suffered a brief cardiac arrest, possibly due to hypoxemia, ROSC obtained quickly after intubation.  Patient reportedly reaching for ET tube after ROSC achieved.  CT chest shows left sided consolidation/ pneumonia, pleural effusion.  Will treat for pneumonia, will need to drain pleural effusion and also tap ascites, although seems to have minimal ascites on CT abdomen.    Attempted to reach family but no answer. 75 yo male with hep C, cirrhosis, HCC ?, presented to the ED with dyspnea and a left sided pleural effusion.  While in the ED patient suffered a brief cardiac arrest, possibly due to hypoxemia, ROSC obtained quickly after intubation.  Patient reportedly reaching for ET tube after ROSC achieved.  CT chest shows left sided consolidation/ pneumonia, pleural effusion.  Will treat for pneumonia with severe sepsis/septic shock, will need to drain pleural effusion and also tap ascites, although seems to have minimal ascites on CT abdomen.    Attempted to reach family but no answer.

## 2025-01-11 NOTE — PATIENT PROFILE ADULT - FUNCTIONAL SCREEN CURRENT LEVEL: COMMUNICATION, MLM
EVARISTO pt intubated EVARISTO pt intubated/2 = difficulty understanding (not related to language barrier)

## 2025-01-11 NOTE — PROCEDURE NOTE - NSSITEPREP_SKIN_A_CORE
chlorhexidine/povidone iodine (if allergic to chlorhexidine)/povidone-iodine ( under 2 weeks of age or 1500 grams)/Adherence to aseptic technique: hand hygiene prior to donning barriers (gown, gloves), don cap and mask, sterile drape over patient

## 2025-01-11 NOTE — PATIENT PROFILE ADULT - NSPROPTRIGHTSUPPORTPERSON_GEN_A_NUR
Huddled with Reception, they will contact pt and set pt up with appointment    Stephanie Prieto RN   [facility]         information could not be obtained

## 2025-01-11 NOTE — H&P ADULT - NSHPPHYSICALEXAM_GEN_ALL_CORE
General: patient is intubated on mechanical ventilation   HEENT:  dry oral mucous membranes  Eyes:  bilateral scleral icterus  Cardiac: Regular rate and regular rhythm. +S1/S2.   Resp:  tachypneic, speaking in short sentences, coarse breath sounds bilaterally with mild expiratory wheezing  Abd:  abdomen soft, distended, positive fluid wave.  No guarding or rebound  MSK: Spine midline and non-tender. No CVAT.  Skin:  skin is jaundice  Neuro: Unresponsive on sedation and mechanical ventilation

## 2025-01-11 NOTE — ED ADULT NURSE REASSESSMENT NOTE - NS ED NURSE REASSESS COMMENT FT1
Patient A&Ox3 resting in bed, nebulizer treatment ongoing, Furosemide given as per orders. Patient continues to be on $L oxygen via nebulizer mask and on cardia monitor in NSR, awaiting Ct scan. Hand off report given at [id number] to break covering KEN LAWRENCE

## 2025-01-11 NOTE — ED PROVIDER NOTE - CLINICAL SUMMARY MEDICAL DECISION MAKING FREE TEXT BOX
74-year-old male patient presents the ED complaining of abdominal pain, shortness of breath, worsening abdominal distention with melena since being seen in the ED 10 days ago.  Noted to be hypoxic on room air in the ED, placed on 4 L nasal cannula with mild improvement.  On exam, patient ill-appearing, mild respiratory distress, abdomen distended, no evidence of peritonitis.  Bedside ultrasound shows bilateral B-lines, fluid at the base of the lungs, significant ascites.  Difficult to obtain cardiac view secondary to patient's habitus/possible anasarca.  given hypoxia, history of possible liver cancer, CT angio PE study ordered.  Labs pending, breathing treatment given to improve breathing, 40 mg of Lasix for possible fluid retention.  Will reassess, placed on cardiac monitor and pulse oximetry 74-year-old male patient presents the ED complaining of abdominal pain, shortness of breath, worsening abdominal distention with melena since being seen in the ED 10 days ago.  Noted to be hypoxic on room air in the ED, placed on 4 L nasal cannula with mild improvement.  On exam, patient ill-appearing, mild respiratory distress, abdomen distended, no evidence of peritonitis.  Bedside ultrasound shows bilateral B-lines, fluid at the base of the lungs, significant ascites.  Difficult to obtain cardiac view secondary to patient's habitus/possible anasarca.  given hypoxia, history of possible liver cancer, CT angio PE study ordered.  Labs pending, breathing treatment given to improve breathing, 40 mg of Lasix for possible fluid retention.  Will reassess, placed on cardiac monitor and pulse oximetry    WW: See progress note, accepted for admission by MICU for further management.  Patient on ventilator, sedated, hemodynamically stable at this time.  Pending CTA

## 2025-01-11 NOTE — ED ADULT NURSE NOTE - OBJECTIVE STATEMENT
73 YO male presented to the Ed with c/o worsening SOB and abdominal pain. patient was seen in the ED on January 1st for bloody stools and SOB and was discharged. Patient has a known H/o liver cirrhosis and liver masses. Patient has labored breathing placed on 4l oxygen via NC. Abdomen grossly distended and taut.. C/o abdominal pain. Denies nausea, vomiting or urinary symptoms. MD Levy at bedside, patient placed on cardiac monitor in NSR,  nebulisers treatment started, blood work sent.

## 2025-01-11 NOTE — ED ADULT TRIAGE NOTE - CHIEF COMPLAINT QUOTE
C/O LLQ abdominal pain that radiates to RLQ  and black stool since January 1st.  Seen in ED on Jan 1st and discharged.  Pt states he abdomen is distended and his symptoms never resolved. Oxygen saturation 87% on room air.  pt is not oxygen dependent.  Placed on NC at 4lpm.  Oxygen saturation 94%

## 2025-01-11 NOTE — H&P ADULT - ASSESSMENT
74-year-old male patient with a history of cirrhosis secondary to hepatitis C with a hx of HCC presented to the ED with SOB and abdominal distension complicated by likely hypoxic cardiac arrest, ROSC achieved after 2 rounds of CPR.       Neuro:  - unresponsive on sedation and mechanical ventilation   - Sedation: Propofol     CV:  - Hemodynamically unstable requiring vasopressor support   - Maintain MAP Goal >65  - Levo for vasopressor support    Pulm:  - Mechanical ventilation     Heme:  - DVT ppx:   - CBC stable     ID:  - Afebrile, WBC WNL  - Abx: monitor off abx  - Monitor fever and WBC curve     Renal:  - IVF  - Lobo, cont to monitor strict I/Os  - Replete lytes as appropriate     Endo:  - Sliding scale + Accucheck   - Maintaining goal glucose <180 with ISS  - Continue to monitor FS      PT/OT/SLP:  Lines/Drains/Access:    Dispo:    CODE STATUS:   74-year-old male patient with a history of cirrhosis secondary to hepatitis C with a hx of HCC presented to the ED with SOB and abdominal distension complicated by likely hypoxic cardiac arrest, ROSC achieved after 2 rounds of CPR.       Neuro:  - unresponsive on sedation and mechanical ventilation   - Sedation: Propofol   - Q4 neuro check   - Pending CT head     CV:  - Hemodynamically unstable requiring vasopressor support   - Maintain MAP Goal >65  - Levo for vasopressor support  - Atorvastatin 40mg qd     Pulm:  - Mechanical ventilation  - Pending CTA chest     GI:   - Protonix 40mg IV qd  - Pending CT a/p    Heme:  - DVT ppx: SCD's   - Pending CT head for any further AC  - CBC stable     ID:  - Afebrile, WBC WNL  - Abx: monitor off abx  - Monitor fever and WBC curve     Renal:  - Lobo, cont to monitor strict I/Os  - Replete lytes as appropriate     Endo:  - Maintaining goal glucose <180   - Continue to monitor FS      Dispo: acute     CODE STATUS: Full code

## 2025-01-11 NOTE — ED PROVIDER NOTE - PHYSICAL EXAMINATION
Const: Pt is  ill-appearing, nonencephalopathic  HEENT:  dry oral mucous membranes  Eyes:  bilateral scleral icterus  Cardiac: Regular rate and regular rhythm. +S1/S2.   Resp:  tachypneic, speaking in short sentences, coarse breath sounds bilaterally with mild expiratory wheezing  Abd:  abdomen soft, distended, positive fluid wave.  No guarding or rebound  MSK: Spine midline and non-tender. No CVAT.  Skin:  skin is jaundice  Neuro: Moves all extremities symmetrically. No motor or sensory deficits.  no flapping asterixis

## 2025-01-11 NOTE — ED PROCEDURE NOTE - CPROC ED COMPLICATIONS1
no pt was in hypoxic cardiac arrest, O2 sats improved status post intubation but were initially in 40s and came up to 90s once intubated

## 2025-01-12 LAB
A1C WITH ESTIMATED AVERAGE GLUCOSE RESULT: 5.4 % — SIGNIFICANT CHANGE UP (ref 4–5.6)
ALBUMIN FLD-MCNC: 1.1 G/DL — SIGNIFICANT CHANGE UP
ALBUMIN SERPL ELPH-MCNC: 2.7 G/DL — LOW (ref 3.3–5.2)
ALBUMIN SERPL ELPH-MCNC: 2.8 G/DL — LOW (ref 3.3–5.2)
ALP SERPL-CCNC: 127 U/L — HIGH (ref 40–120)
ALP SERPL-CCNC: 131 U/L — HIGH (ref 40–120)
ALT FLD-CCNC: 33 U/L — SIGNIFICANT CHANGE UP
ALT FLD-CCNC: 34 U/L — SIGNIFICANT CHANGE UP
AMMONIA BLD-MCNC: 61 UMOL/L — HIGH (ref 11–55)
ANION GAP SERPL CALC-SCNC: 12 MMOL/L — SIGNIFICANT CHANGE UP (ref 5–17)
ANION GAP SERPL CALC-SCNC: 13 MMOL/L — SIGNIFICANT CHANGE UP (ref 5–17)
AST SERPL-CCNC: 114 U/L — HIGH
AST SERPL-CCNC: 124 U/L — HIGH
BASOPHILS # BLD AUTO: 0.02 K/UL — SIGNIFICANT CHANGE UP (ref 0–0.2)
BASOPHILS # BLD AUTO: 0.03 K/UL — SIGNIFICANT CHANGE UP (ref 0–0.2)
BASOPHILS NFR BLD AUTO: 0.2 % — SIGNIFICANT CHANGE UP (ref 0–2)
BASOPHILS NFR BLD AUTO: 0.3 % — SIGNIFICANT CHANGE UP (ref 0–2)
BILIRUB SERPL-MCNC: 2 MG/DL — SIGNIFICANT CHANGE UP (ref 0.4–2)
BILIRUB SERPL-MCNC: 2.1 MG/DL — HIGH (ref 0.4–2)
BUN SERPL-MCNC: 27.7 MG/DL — HIGH (ref 8–20)
BUN SERPL-MCNC: 28.6 MG/DL — HIGH (ref 8–20)
CALCIUM SERPL-MCNC: 8 MG/DL — LOW (ref 8.4–10.5)
CALCIUM SERPL-MCNC: 8.2 MG/DL — LOW (ref 8.4–10.5)
CHLORIDE SERPL-SCNC: 104 MMOL/L — SIGNIFICANT CHANGE UP (ref 96–108)
CHLORIDE SERPL-SCNC: 104 MMOL/L — SIGNIFICANT CHANGE UP (ref 96–108)
CO2 SERPL-SCNC: 23 MMOL/L — SIGNIFICANT CHANGE UP (ref 22–29)
CO2 SERPL-SCNC: 23 MMOL/L — SIGNIFICANT CHANGE UP (ref 22–29)
CREAT SERPL-MCNC: 1.28 MG/DL — SIGNIFICANT CHANGE UP (ref 0.5–1.3)
CREAT SERPL-MCNC: 1.3 MG/DL — SIGNIFICANT CHANGE UP (ref 0.5–1.3)
EGFR: 58 ML/MIN/1.73M2 — LOW
EGFR: 59 ML/MIN/1.73M2 — LOW
EOSINOPHIL # BLD AUTO: 0.08 K/UL — SIGNIFICANT CHANGE UP (ref 0–0.5)
EOSINOPHIL # BLD AUTO: 0.11 K/UL — SIGNIFICANT CHANGE UP (ref 0–0.5)
EOSINOPHIL NFR BLD AUTO: 0.7 % — SIGNIFICANT CHANGE UP (ref 0–6)
EOSINOPHIL NFR BLD AUTO: 1 % — SIGNIFICANT CHANGE UP (ref 0–6)
ESTIMATED AVERAGE GLUCOSE: 108 MG/DL — SIGNIFICANT CHANGE UP (ref 68–114)
GAS PNL BLDA: SIGNIFICANT CHANGE UP
GLUCOSE BLDC GLUCOMTR-MCNC: 88 MG/DL — SIGNIFICANT CHANGE UP (ref 70–99)
GLUCOSE BLDC GLUCOMTR-MCNC: 93 MG/DL — SIGNIFICANT CHANGE UP (ref 70–99)
GLUCOSE FLD-MCNC: 126 MG/DL — SIGNIFICANT CHANGE UP
GLUCOSE SERPL-MCNC: 106 MG/DL — HIGH (ref 70–99)
GLUCOSE SERPL-MCNC: 90 MG/DL — SIGNIFICANT CHANGE UP (ref 70–99)
GRAM STN FLD: SIGNIFICANT CHANGE UP
HCT VFR BLD CALC: 34.8 % — LOW (ref 39–50)
HCT VFR BLD CALC: 35 % — LOW (ref 39–50)
HGB BLD-MCNC: 10.8 G/DL — LOW (ref 13–17)
HGB BLD-MCNC: 11.1 G/DL — LOW (ref 13–17)
IMM GRANULOCYTES NFR BLD AUTO: 0.5 % — SIGNIFICANT CHANGE UP (ref 0–0.9)
IMM GRANULOCYTES NFR BLD AUTO: 0.5 % — SIGNIFICANT CHANGE UP (ref 0–0.9)
INR BLD: 1.98 RATIO — HIGH (ref 0.85–1.16)
INR BLD: 1.99 RATIO — HIGH (ref 0.85–1.16)
LACTATE SERPL-SCNC: 3.1 MMOL/L — HIGH (ref 0.5–2)
LDH SERPL L TO P-CCNC: 175 U/L — SIGNIFICANT CHANGE UP
LYMPHOCYTES # BLD AUTO: 1.31 K/UL — SIGNIFICANT CHANGE UP (ref 1–3.3)
LYMPHOCYTES # BLD AUTO: 1.37 K/UL — SIGNIFICANT CHANGE UP (ref 1–3.3)
LYMPHOCYTES # BLD AUTO: 11.6 % — LOW (ref 13–44)
LYMPHOCYTES # BLD AUTO: 12.5 % — LOW (ref 13–44)
MAGNESIUM SERPL-MCNC: 2 MG/DL — SIGNIFICANT CHANGE UP (ref 1.6–2.6)
MAGNESIUM SERPL-MCNC: 2 MG/DL — SIGNIFICANT CHANGE UP (ref 1.6–2.6)
MCHC RBC-ENTMCNC: 28.1 PG — SIGNIFICANT CHANGE UP (ref 27–34)
MCHC RBC-ENTMCNC: 28.8 PG — SIGNIFICANT CHANGE UP (ref 27–34)
MCHC RBC-ENTMCNC: 31 G/DL — LOW (ref 32–36)
MCHC RBC-ENTMCNC: 31.7 G/DL — LOW (ref 32–36)
MCV RBC AUTO: 90.4 FL — SIGNIFICANT CHANGE UP (ref 80–100)
MCV RBC AUTO: 90.7 FL — SIGNIFICANT CHANGE UP (ref 80–100)
MELD SCORE WITH DIALYSIS: 30 POINTS — SIGNIFICANT CHANGE UP
MELD SCORE WITHOUT DIALYSIS: 19 POINTS — SIGNIFICANT CHANGE UP
MONOCYTES # BLD AUTO: 0.89 K/UL — SIGNIFICANT CHANGE UP (ref 0–0.9)
MONOCYTES # BLD AUTO: 0.93 K/UL — HIGH (ref 0–0.9)
MONOCYTES NFR BLD AUTO: 7.9 % — SIGNIFICANT CHANGE UP (ref 2–14)
MONOCYTES NFR BLD AUTO: 8.5 % — SIGNIFICANT CHANGE UP (ref 2–14)
NEUTROPHILS # BLD AUTO: 8.49 K/UL — HIGH (ref 1.8–7.4)
NEUTROPHILS # BLD AUTO: 8.91 K/UL — HIGH (ref 1.8–7.4)
NEUTROPHILS NFR BLD AUTO: 77.2 % — HIGH (ref 43–77)
NEUTROPHILS NFR BLD AUTO: 79.1 % — HIGH (ref 43–77)
PHOSPHATE SERPL-MCNC: 3.1 MG/DL — SIGNIFICANT CHANGE UP (ref 2.4–4.7)
PHOSPHATE SERPL-MCNC: 3.1 MG/DL — SIGNIFICANT CHANGE UP (ref 2.4–4.7)
PLATELET # BLD AUTO: 141 K/UL — LOW (ref 150–400)
PLATELET # BLD AUTO: 147 K/UL — LOW (ref 150–400)
POTASSIUM SERPL-MCNC: 3.6 MMOL/L — SIGNIFICANT CHANGE UP (ref 3.5–5.3)
POTASSIUM SERPL-MCNC: 3.7 MMOL/L — SIGNIFICANT CHANGE UP (ref 3.5–5.3)
POTASSIUM SERPL-SCNC: 3.6 MMOL/L — SIGNIFICANT CHANGE UP (ref 3.5–5.3)
POTASSIUM SERPL-SCNC: 3.7 MMOL/L — SIGNIFICANT CHANGE UP (ref 3.5–5.3)
PROT FLD-MCNC: 2.3 G/DL — SIGNIFICANT CHANGE UP
PROT SERPL-MCNC: 6.3 G/DL — LOW (ref 6.6–8.7)
PROT SERPL-MCNC: 6.4 G/DL — LOW (ref 6.6–8.7)
PROTHROM AB SERPL-ACNC: 22.2 SEC — HIGH (ref 9.9–13.4)
PROTHROM AB SERPL-ACNC: 22.3 SEC — HIGH (ref 9.9–13.4)
RBC # BLD: 3.85 M/UL — LOW (ref 4.2–5.8)
RBC # BLD: 3.86 M/UL — LOW (ref 4.2–5.8)
RBC # FLD: 20 % — HIGH (ref 10.3–14.5)
RBC # FLD: 20.3 % — HIGH (ref 10.3–14.5)
SODIUM SERPL-SCNC: 139 MMOL/L — SIGNIFICANT CHANGE UP (ref 135–145)
SODIUM SERPL-SCNC: 139 MMOL/L — SIGNIFICANT CHANGE UP (ref 135–145)
SPECIMEN SOURCE: SIGNIFICANT CHANGE UP
TRIGL SERPL-MCNC: 121 MG/DL — SIGNIFICANT CHANGE UP
WBC # BLD: 10.98 K/UL — HIGH (ref 3.8–10.5)
WBC # BLD: 11.27 K/UL — HIGH (ref 3.8–10.5)
WBC # FLD AUTO: 10.98 K/UL — HIGH (ref 3.8–10.5)
WBC # FLD AUTO: 11.27 K/UL — HIGH (ref 3.8–10.5)

## 2025-01-12 PROCEDURE — 99233 SBSQ HOSP IP/OBS HIGH 50: CPT

## 2025-01-12 PROCEDURE — 93306 TTE W/DOPPLER COMPLETE: CPT | Mod: 26

## 2025-01-12 PROCEDURE — 71045 X-RAY EXAM CHEST 1 VIEW: CPT | Mod: 26

## 2025-01-12 RX ORDER — POTASSIUM CHLORIDE 750 MG/1
40 TABLET, EXTENDED RELEASE ORAL ONCE
Refills: 0 | Status: COMPLETED | OUTPATIENT
Start: 2025-01-12 | End: 2025-01-12

## 2025-01-12 RX ORDER — POTASSIUM CHLORIDE 750 MG/1
40 TABLET, EXTENDED RELEASE ORAL DAILY
Refills: 0 | Status: DISCONTINUED | OUTPATIENT
Start: 2025-01-12 | End: 2025-01-15

## 2025-01-12 RX ORDER — POTASSIUM CHLORIDE 750 MG/1
40 TABLET, EXTENDED RELEASE ORAL DAILY
Refills: 0 | Status: DISCONTINUED | OUTPATIENT
Start: 2025-01-12 | End: 2025-01-12

## 2025-01-12 RX ORDER — LACTULOSE 10 G/15 ML
15 SOLUTION, ORAL ORAL
Refills: 0 | Status: DISCONTINUED | OUTPATIENT
Start: 2025-01-12 | End: 2025-01-18

## 2025-01-12 RX ORDER — HEPARIN SODIUM,PORCINE 10000/ML
5000 VIAL (ML) INJECTION EVERY 8 HOURS
Refills: 0 | Status: DISCONTINUED | OUTPATIENT
Start: 2025-01-12 | End: 2025-01-24

## 2025-01-12 RX ORDER — ANTISEPTIC SURGICAL SCRUB 0.04 MG/ML
15 SOLUTION TOPICAL EVERY 12 HOURS
Refills: 0 | Status: DISCONTINUED | OUTPATIENT
Start: 2025-01-12 | End: 2025-01-13

## 2025-01-12 RX ADMIN — PIPERACILLIN SODIUM AND TAZOBACTAM SODIUM 25 GRAM(S): 2; 250 INJECTION, POWDER, FOR SOLUTION INTRAVENOUS at 11:31

## 2025-01-12 RX ADMIN — Medication 5000 UNIT(S): at 15:21

## 2025-01-12 RX ADMIN — ANTISEPTIC SURGICAL SCRUB 15 MILLILITER(S): 0.04 SOLUTION TOPICAL at 05:46

## 2025-01-12 RX ADMIN — Medication 50 MILLIGRAM(S): at 16:55

## 2025-01-12 RX ADMIN — ANTISEPTIC SURGICAL SCRUB 15 MILLILITER(S): 0.04 SOLUTION TOPICAL at 16:54

## 2025-01-12 RX ADMIN — POTASSIUM CHLORIDE 40 MILLIEQUIVALENT(S): 750 TABLET, EXTENDED RELEASE ORAL at 06:49

## 2025-01-12 RX ADMIN — PIPERACILLIN SODIUM AND TAZOBACTAM SODIUM 25 GRAM(S): 2; 250 INJECTION, POWDER, FOR SOLUTION INTRAVENOUS at 22:55

## 2025-01-12 RX ADMIN — PROPOFOL 22.5 MICROGRAM(S)/KG/MIN: 10 INJECTION, EMULSION INTRAVENOUS at 13:39

## 2025-01-12 RX ADMIN — PROPOFOL 22.5 MICROGRAM(S)/KG/MIN: 10 INJECTION, EMULSION INTRAVENOUS at 02:12

## 2025-01-12 RX ADMIN — POTASSIUM CHLORIDE 40 MILLIEQUIVALENT(S): 750 TABLET, EXTENDED RELEASE ORAL at 11:31

## 2025-01-12 RX ADMIN — PROPOFOL 22.5 MICROGRAM(S)/KG/MIN: 10 INJECTION, EMULSION INTRAVENOUS at 07:35

## 2025-01-12 RX ADMIN — ANTISEPTIC SURGICAL SCRUB 1 APPLICATION(S): 0.04 SOLUTION TOPICAL at 05:46

## 2025-01-12 RX ADMIN — PROPOFOL 22.5 MICROGRAM(S)/KG/MIN: 10 INJECTION, EMULSION INTRAVENOUS at 22:37

## 2025-01-12 RX ADMIN — NOREPINEPHRINE BITARTRATE 11.3 MICROGRAM(S)/KG/MIN: 1 INJECTION, SOLUTION, CONCENTRATE INTRAVENOUS at 06:47

## 2025-01-12 RX ADMIN — ATORVASTATIN CALCIUM 40 MILLIGRAM(S): 80 TABLET, FILM COATED ORAL at 22:55

## 2025-01-12 RX ADMIN — Medication 15 GRAM(S): at 16:54

## 2025-01-12 RX ADMIN — FENTANYL CITRATE 100 MICROGRAM(S): 50 INJECTION INTRAMUSCULAR; INTRAVENOUS at 20:22

## 2025-01-12 RX ADMIN — Medication 60 MILLIGRAM(S): at 11:33

## 2025-01-12 RX ADMIN — PIPERACILLIN SODIUM AND TAZOBACTAM SODIUM 25 GRAM(S): 2; 250 INJECTION, POWDER, FOR SOLUTION INTRAVENOUS at 05:45

## 2025-01-12 RX ADMIN — Medication 15 GRAM(S): at 06:42

## 2025-01-12 RX ADMIN — Medication 5000 UNIT(S): at 22:55

## 2025-01-12 RX ADMIN — PROPOFOL 22.5 MICROGRAM(S)/KG/MIN: 10 INJECTION, EMULSION INTRAVENOUS at 16:55

## 2025-01-12 RX ADMIN — PANTOPRAZOLE 40 MILLIGRAM(S): 20 TABLET, DELAYED RELEASE ORAL at 11:31

## 2025-01-12 NOTE — PROGRESS NOTE ADULT - SUBJECTIVE AND OBJECTIVE BOX
Patient is a 74y old  Male who presents with a chief complaint of Cardiac Arrest (11 Jan 2025 16:38)      BRIEF HOSPITAL COURSE:   74M PMH HCV with cirrhosis, smoker, recently in ED for melena who presented 1/11/25 with hypoxic respiratory failure, had brief cardiac arrest x1 round and intubated, noted on CT with liver mass possible HCC, L portal vein tumor thrombus, L-sided PNA with pleural effusion s/p PTC placement (1/11), course c/b hypotension, sepsis.       PAST MEDICAL & SURGICAL HISTORY:  Hepatitis C virus infection      No significant past surgical history            Medications:  piperacillin/tazobactam IVPB.. 3.375 Gram(s) IV Intermittent every 8 hours  rifAXIMin 550 milliGRAM(s) Oral two times a day    norepinephrine Infusion 0.05 MICROgram(s)/kG/Min IV Continuous <Continuous>    albuterol/ipratropium for Nebulization 3 milliLiter(s) Nebulizer every 20 minutes    propofol Infusion 30 MICROgram(s)/kG/Min IV Continuous <Continuous>        lactulose Syrup 15 Gram(s) Oral two times a day  pantoprazole  Injectable 40 milliGRAM(s) IV Push daily      atorvastatin 40 milliGRAM(s) Oral at bedtime    potassium chloride   Powder 40 milliEquivalent(s) Oral daily      chlorhexidine 0.12% Liquid 15 milliLiter(s) Oral Mucosa every 12 hours  chlorhexidine 2% Cloths 1 Application(s) Topical <User Schedule>        Mode: AC/ CMV (Assist Control/ Continuous Mandatory Ventilation),BV  RR (machine): 26  TV (machine): 450  FiO2: 70  PEEP: 8  ITime: 0.8  MAP: 13  PIP: 22      ICU Vital Signs Last 24 Hrs  T(C): 35.5 (12 Jan 2025 01:00), Max: 36.8 (11 Jan 2025 11:33)  T(F): 95.9 (12 Jan 2025 01:00), Max: 98.3 (11 Jan 2025 11:33)  HR: 59 (12 Jan 2025 01:00) (48 - 109)  BP: 104/64 (12 Jan 2025 01:00) (72/56 - 175/97)  BP(mean): 77 (12 Jan 2025 01:00) (68 - 128)  ABP: --  ABP(mean): --  RR: 26 (12 Jan 2025 01:00) (17 - 26)  SpO2: 94% (12 Jan 2025 01:00) (87% - 99%)    O2 Parameters below as of 12 Jan 2025 00:00  Patient On (Oxygen Delivery Method): ventilator    O2 Concentration (%): 70        ABG - ( 11 Jan 2025 14:32 )  pH, Arterial: 7.200 pH, Blood: x     /  pCO2: 43    /  pO2: 142   / HCO3: 17    / Base Excess: -11.2 /  SaO2: 99.5                I&O's Detail    11 Jan 2025 07:01  -  12 Jan 2025 03:09  --------------------------------------------------------  IN:    Albumin 5%  - 500 mL: 500 mL    Enteral Tube Flush: 50 mL    IV PiggyBack: 350 mL    Lactated Ringers Bolus: 1500 mL    Norepinephrine: 147.3 mL    Propofol: 157.6 mL  Total IN: 2704.9 mL    OUT:    Chest Tube (mL): 880 mL    Indwelling Catheter - Urethral (mL): 500 mL  Total OUT: 1380 mL    Total NET: 1324.9 mL            LABS:                        11.1   11.27 )-----------( 147      ( 12 Jan 2025 00:10 )             35.0     01-12    139  |  104  |  27.7[H]  ----------------------------<  106[H]  3.6   |  23.0  |  1.30    Ca    8.0[L]      12 Jan 2025 00:10  Phos  3.1     01-12  Mg     2.0     01-12    TPro  6.3[L]  /  Alb  2.8[L]  /  TBili  2.0  /  DBili  x   /  AST  114[H]  /  ALT  33  /  AlkPhos  127[H]  01-12          CAPILLARY BLOOD GLUCOSE      POCT Blood Glucose.: 112 mg/dL (11 Jan 2025 18:13)    PT/INR - ( 12 Jan 2025 00:10 )   PT: 22.2 sec;   INR: 1.98 ratio         PTT - ( 11 Jan 2025 13:00 )  PTT:35.0 sec  Urinalysis Basic - ( 12 Jan 2025 00:10 )    Color: x / Appearance: x / SG: x / pH: x  Gluc: 106 mg/dL / Ketone: x  / Bili: x / Urobili: x   Blood: x / Protein: x / Nitrite: x   Leuk Esterase: x / RBC: x / WBC x   Sq Epi: x / Non Sq Epi: x / Bacteria: x      CULTURES:      Physical Examination:  GENERAL: In NAD   HEENT: NC/AT  PULM: B/L chest rise on ventilator  CVS: +S1, S2  NEURO: SEdated    DEVICES:     RADIOLOGY:   < from: CT Angio Chest PE Protocol w/ IV Cont (01.11.25 @ 17:38) >  IMPRESSION:  No pulmonary embolism.    Moderate left pleuraleffusion, increased compared with January 01, 2025.    Airspace opacities in both upper lobes, right side greater than left,   raising the possibility of pneumonia.    Cirrhosis and portal hypertension.    Large ill-defined hepatic mass again noted and suspicious for   infiltrating hepatocellular carcinoma.    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    Moderate ascites, increased in volume.    Malpositioned nasogastric tube, tip in the mid esophagus.    Enlarged portacaval lymph node nodes.        --- End of Report ---            MADISON KRUEGER MD; Attending Radiologist  This document has been electronically signed. Jan 11 2025  7:21PM    < end of copied text >

## 2025-01-12 NOTE — PROGRESS NOTE ADULT - ASSESSMENT
74M PMH HCV with cirrhosis, smoker, recently in ED for melena who presented 1/11/25 with hypoxic respiratory failure, had brief cardiac arrest x1 round and intubated, noted on CT with liver mass possible HCC, L portal vein tumor thrombus, L-sided PNA with pleural effusion s/p PTC placement (1/11), course c/b hypotension, sepsis.     Impression/Plan:    Brief cardiac arrest s/p intubation  Respiratory failure with hypoxia  - Monitor mental status  - Daily SAT/SBT  - Monitor Paw/PPlat  - F/u TTE    L-sided pleural effusion  S/p L-sided PTC placement  - F/u fluid studies  - Fluid sent for cytopathology as well  - Empiric Zosyn  - Monitor for fevers  - Lactate downtrending    Shock state - distributive  - C/w Levophed - wean as tolerated    Liver mass  Possible L portal vein tumor thrombus  - Trend liver enzymes    Diet - Starting tube feeds  PPx - Lovenox 40mg; PPI IV  Lines/Tubes - PIV  Skin - Will monitor for skin breakdown. Serial repositioning as per ICU protocol.  Code Status - Full code  Dispo - C/w care in ICU      Danny Wen M.D.  , Pulmonary & Critical Care Medicine  [facility]  Pulmonary and Sleep Medicine at [place]  [address]  [place], [place] [address]  T: [phone]  F: [phone]

## 2025-01-12 NOTE — CHART NOTE - NSCHARTNOTEFT_GEN_A_CORE
Patient admitted to ICU for :    NEURO/PSYCH:  - A/O x  - Optimize sleep/wake cycle  - Delirium precautions   - lactate downtrending    CARDIOVASCULAR:  - Maintain MAP >65 for adequate organ perfusion.   - wean levo  as tolerated    PULM:  - Maintain Spo2 >92%  - Enforce ISP  - AC 16/450/8/60%  L-sided pleural effusion  S/p L-sided PTC placement  - F/u fluid studies  - Fluid sent for cytopathology as well    GI:  - Bowel Regimen    - c/w TF  Liver mass  Possible L portal vein tumor thrombus  - Trend liver enzymes    HEME:  - DVT prophylaxis: Lovenox transitioned to SQH    ENDO:  - Goal euglycemia     /RENAL:  - trend BUN/Cr, electrolytes, acid-base balance, monitor hourly UOP  - Maintain K >4, Phos >3, Mag > 2  - initiated Lasix 60mg daily   - initiated aldactone 50mg daily    ID:  - Empiric Zosyn  - Monitor for fevers  - Lactate downtrending    DISPO:    Plan discussed with MICU team/attending

## 2025-01-12 NOTE — PROVIDER CONTACT NOTE (CRITICAL VALUE NOTIFICATION) - NS PROVIDER READ BACK
Duane Romero was seen 10/10/2023 for an [place] follow-up. She brings her mini john with her today to have paired with her devices. The mini john needed to be charged therefore we plugged it in and it had enough battery to pair it successfully to her original processors at the end of the visit. She did well with practice. She has been wearing her back up processors and would like to go back to her original processors. Her left backup processor is making FB intermittently. She is not able to pin point what is causing it, but we both agree it must be environmental. At this time, we decided not to make adjustments for this since she is wanting to wear her other original processors anyway. We pulled her processors off of her cell phone and had to remove [employer] Smart radha and add it back in order to get the processors to pair to the radha.     She noticed on the way out that the original processor's volume is lower than her back ups and may [facility].   
yes

## 2025-01-13 DIAGNOSIS — J90 PLEURAL EFFUSION, NOT ELSEWHERE CLASSIFIED: ICD-10-CM

## 2025-01-13 LAB
ALBUMIN SERPL ELPH-MCNC: 2.6 G/DL — LOW (ref 3.3–5.2)
ALBUMIN SERPL ELPH-MCNC: 2.7 G/DL — LOW (ref 3.3–5.2)
ALBUMIN SERPL ELPH-MCNC: 2.7 G/DL — LOW (ref 3.3–5.2)
ALP SERPL-CCNC: 129 U/L — HIGH (ref 40–120)
ALP SERPL-CCNC: 146 U/L — HIGH (ref 40–120)
ALP SERPL-CCNC: 146 U/L — HIGH (ref 40–120)
ALT FLD-CCNC: 59 U/L — HIGH
ALT FLD-CCNC: 62 U/L — HIGH
ALT FLD-CCNC: 62 U/L — HIGH
AMMONIA BLD-MCNC: 63 UMOL/L — HIGH (ref 11–55)
ANION GAP SERPL CALC-SCNC: 12 MMOL/L — SIGNIFICANT CHANGE UP (ref 5–17)
ANION GAP SERPL CALC-SCNC: 12 MMOL/L — SIGNIFICANT CHANGE UP (ref 5–17)
ANION GAP SERPL CALC-SCNC: 13 MMOL/L — SIGNIFICANT CHANGE UP (ref 5–17)
APTT BLD: 35.7 SEC — HIGH (ref 24.5–35.6)
AST SERPL-CCNC: 183 U/L — HIGH
AST SERPL-CCNC: 211 U/L — HIGH
AST SERPL-CCNC: 228 U/L — HIGH
BASOPHILS # BLD AUTO: 0.01 K/UL — SIGNIFICANT CHANGE UP (ref 0–0.2)
BASOPHILS # BLD AUTO: 0.02 K/UL — SIGNIFICANT CHANGE UP (ref 0–0.2)
BASOPHILS # BLD AUTO: 0.04 K/UL — SIGNIFICANT CHANGE UP (ref 0–0.2)
BASOPHILS NFR BLD AUTO: 0.1 % — SIGNIFICANT CHANGE UP (ref 0–2)
BASOPHILS NFR BLD AUTO: 0.2 % — SIGNIFICANT CHANGE UP (ref 0–2)
BASOPHILS NFR BLD AUTO: 0.3 % — SIGNIFICANT CHANGE UP (ref 0–2)
BILIRUB SERPL-MCNC: 1.8 MG/DL — SIGNIFICANT CHANGE UP (ref 0.4–2)
BILIRUB SERPL-MCNC: 2 MG/DL — SIGNIFICANT CHANGE UP (ref 0.4–2)
BILIRUB SERPL-MCNC: 2.1 MG/DL — HIGH (ref 0.4–2)
BUN SERPL-MCNC: 33.3 MG/DL — HIGH (ref 8–20)
BUN SERPL-MCNC: 36.3 MG/DL — HIGH (ref 8–20)
BUN SERPL-MCNC: 42.5 MG/DL — HIGH (ref 8–20)
CALCIUM SERPL-MCNC: 8.1 MG/DL — LOW (ref 8.4–10.5)
CALCIUM SERPL-MCNC: 8.3 MG/DL — LOW (ref 8.4–10.5)
CALCIUM SERPL-MCNC: 8.5 MG/DL — SIGNIFICANT CHANGE UP (ref 8.4–10.5)
CHLORIDE SERPL-SCNC: 107 MMOL/L — SIGNIFICANT CHANGE UP (ref 96–108)
CHLORIDE SERPL-SCNC: 108 MMOL/L — SIGNIFICANT CHANGE UP (ref 96–108)
CHLORIDE SERPL-SCNC: 110 MMOL/L — HIGH (ref 96–108)
CO2 SERPL-SCNC: 22 MMOL/L — SIGNIFICANT CHANGE UP (ref 22–29)
CO2 SERPL-SCNC: 23 MMOL/L — SIGNIFICANT CHANGE UP (ref 22–29)
CO2 SERPL-SCNC: 24 MMOL/L — SIGNIFICANT CHANGE UP (ref 22–29)
CREAT SERPL-MCNC: 1.62 MG/DL — HIGH (ref 0.5–1.3)
CREAT SERPL-MCNC: 1.86 MG/DL — HIGH (ref 0.5–1.3)
CREAT SERPL-MCNC: 1.87 MG/DL — HIGH (ref 0.5–1.3)
EGFR: 37 ML/MIN/1.73M2 — LOW
EGFR: 38 ML/MIN/1.73M2 — LOW
EGFR: 44 ML/MIN/1.73M2 — LOW
EOSINOPHIL # BLD AUTO: 0 K/UL — SIGNIFICANT CHANGE UP (ref 0–0.5)
EOSINOPHIL # BLD AUTO: 0 K/UL — SIGNIFICANT CHANGE UP (ref 0–0.5)
EOSINOPHIL # BLD AUTO: 0.09 K/UL — SIGNIFICANT CHANGE UP (ref 0–0.5)
EOSINOPHIL NFR BLD AUTO: 0 % — SIGNIFICANT CHANGE UP (ref 0–6)
EOSINOPHIL NFR BLD AUTO: 0 % — SIGNIFICANT CHANGE UP (ref 0–6)
EOSINOPHIL NFR BLD AUTO: 0.8 % — SIGNIFICANT CHANGE UP (ref 0–6)
GAS PNL BLDA: SIGNIFICANT CHANGE UP
GLUCOSE SERPL-MCNC: 110 MG/DL — HIGH (ref 70–99)
GLUCOSE SERPL-MCNC: 117 MG/DL — HIGH (ref 70–99)
GLUCOSE SERPL-MCNC: 128 MG/DL — HIGH (ref 70–99)
HCT VFR BLD CALC: 35.1 % — LOW (ref 39–50)
HCT VFR BLD CALC: 35.8 % — LOW (ref 39–50)
HCT VFR BLD CALC: 37.2 % — LOW (ref 39–50)
HGB BLD-MCNC: 10.9 G/DL — LOW (ref 13–17)
HGB BLD-MCNC: 11.2 G/DL — LOW (ref 13–17)
HGB BLD-MCNC: 11.4 G/DL — LOW (ref 13–17)
IMM GRANULOCYTES NFR BLD AUTO: 0.6 % — SIGNIFICANT CHANGE UP (ref 0–0.9)
IMM GRANULOCYTES NFR BLD AUTO: 0.6 % — SIGNIFICANT CHANGE UP (ref 0–0.9)
IMM GRANULOCYTES NFR BLD AUTO: 0.8 % — SIGNIFICANT CHANGE UP (ref 0–0.9)
INR BLD: 1.61 RATIO — HIGH (ref 0.85–1.16)
LYMPHOCYTES # BLD AUTO: 0.58 K/UL — LOW (ref 1–3.3)
LYMPHOCYTES # BLD AUTO: 0.68 K/UL — LOW (ref 1–3.3)
LYMPHOCYTES # BLD AUTO: 1.25 K/UL — SIGNIFICANT CHANGE UP (ref 1–3.3)
LYMPHOCYTES # BLD AUTO: 10.5 % — LOW (ref 13–44)
LYMPHOCYTES # BLD AUTO: 5.1 % — LOW (ref 13–44)
LYMPHOCYTES # BLD AUTO: 6.7 % — LOW (ref 13–44)
MAGNESIUM SERPL-MCNC: 2.2 MG/DL — SIGNIFICANT CHANGE UP (ref 1.6–2.6)
MAGNESIUM SERPL-MCNC: 2.2 MG/DL — SIGNIFICANT CHANGE UP (ref 1.6–2.6)
MAGNESIUM SERPL-MCNC: 2.5 MG/DL — SIGNIFICANT CHANGE UP (ref 1.6–2.6)
MCHC RBC-ENTMCNC: 27.5 PG — SIGNIFICANT CHANGE UP (ref 27–34)
MCHC RBC-ENTMCNC: 28.1 PG — SIGNIFICANT CHANGE UP (ref 27–34)
MCHC RBC-ENTMCNC: 28.3 PG — SIGNIFICANT CHANGE UP (ref 27–34)
MCHC RBC-ENTMCNC: 30.6 G/DL — LOW (ref 32–36)
MCHC RBC-ENTMCNC: 31.1 G/DL — LOW (ref 32–36)
MCHC RBC-ENTMCNC: 31.3 G/DL — LOW (ref 32–36)
MCV RBC AUTO: 89.6 FL — SIGNIFICANT CHANGE UP (ref 80–100)
MCV RBC AUTO: 89.7 FL — SIGNIFICANT CHANGE UP (ref 80–100)
MCV RBC AUTO: 91.2 FL — SIGNIFICANT CHANGE UP (ref 80–100)
MONOCYTES # BLD AUTO: 0.38 K/UL — SIGNIFICANT CHANGE UP (ref 0–0.9)
MONOCYTES # BLD AUTO: 0.39 K/UL — SIGNIFICANT CHANGE UP (ref 0–0.9)
MONOCYTES # BLD AUTO: 0.95 K/UL — HIGH (ref 0–0.9)
MONOCYTES NFR BLD AUTO: 3.3 % — SIGNIFICANT CHANGE UP (ref 2–14)
MONOCYTES NFR BLD AUTO: 3.8 % — SIGNIFICANT CHANGE UP (ref 2–14)
MONOCYTES NFR BLD AUTO: 8 % — SIGNIFICANT CHANGE UP (ref 2–14)
NEUTROPHILS # BLD AUTO: 10.36 K/UL — HIGH (ref 1.8–7.4)
NEUTROPHILS # BLD AUTO: 9.08 K/UL — HIGH (ref 1.8–7.4)
NEUTROPHILS # BLD AUTO: 9.45 K/UL — HIGH (ref 1.8–7.4)
NEUTROPHILS NFR BLD AUTO: 79.6 % — HIGH (ref 43–77)
NEUTROPHILS NFR BLD AUTO: 88.8 % — HIGH (ref 43–77)
NEUTROPHILS NFR BLD AUTO: 90.8 % — HIGH (ref 43–77)
PHOSPHATE SERPL-MCNC: 3 MG/DL — SIGNIFICANT CHANGE UP (ref 2.4–4.7)
PHOSPHATE SERPL-MCNC: 4 MG/DL — SIGNIFICANT CHANGE UP (ref 2.4–4.7)
PHOSPHATE SERPL-MCNC: 4.4 MG/DL — SIGNIFICANT CHANGE UP (ref 2.4–4.7)
PLATELET # BLD AUTO: 103 K/UL — LOW (ref 150–400)
PLATELET # BLD AUTO: 114 K/UL — LOW (ref 150–400)
PLATELET # BLD AUTO: 135 K/UL — LOW (ref 150–400)
POTASSIUM SERPL-MCNC: 4.1 MMOL/L — SIGNIFICANT CHANGE UP (ref 3.5–5.3)
POTASSIUM SERPL-MCNC: 4.4 MMOL/L — SIGNIFICANT CHANGE UP (ref 3.5–5.3)
POTASSIUM SERPL-MCNC: 4.9 MMOL/L — SIGNIFICANT CHANGE UP (ref 3.5–5.3)
POTASSIUM SERPL-SCNC: 4.1 MMOL/L — SIGNIFICANT CHANGE UP (ref 3.5–5.3)
POTASSIUM SERPL-SCNC: 4.4 MMOL/L — SIGNIFICANT CHANGE UP (ref 3.5–5.3)
POTASSIUM SERPL-SCNC: 4.9 MMOL/L — SIGNIFICANT CHANGE UP (ref 3.5–5.3)
PROT SERPL-MCNC: 6.2 G/DL — LOW (ref 6.6–8.7)
PROT SERPL-MCNC: 6.3 G/DL — LOW (ref 6.6–8.7)
PROT SERPL-MCNC: 6.4 G/DL — LOW (ref 6.6–8.7)
PROTHROM AB SERPL-ACNC: 18.6 SEC — HIGH (ref 9.9–13.4)
RBC # BLD: 3.85 M/UL — LOW (ref 4.2–5.8)
RBC # BLD: 3.99 M/UL — LOW (ref 4.2–5.8)
RBC # BLD: 4.15 M/UL — LOW (ref 4.2–5.8)
RBC # FLD: 20.7 % — HIGH (ref 10.3–14.5)
RBC # FLD: 20.8 % — HIGH (ref 10.3–14.5)
RBC # FLD: 20.8 % — HIGH (ref 10.3–14.5)
SODIUM SERPL-SCNC: 142 MMOL/L — SIGNIFICANT CHANGE UP (ref 135–145)
SODIUM SERPL-SCNC: 143 MMOL/L — SIGNIFICANT CHANGE UP (ref 135–145)
SODIUM SERPL-SCNC: 146 MMOL/L — HIGH (ref 135–145)
WBC # BLD: 10.22 K/UL — SIGNIFICANT CHANGE UP (ref 3.8–10.5)
WBC # BLD: 11.41 K/UL — HIGH (ref 3.8–10.5)
WBC # BLD: 11.87 K/UL — HIGH (ref 3.8–10.5)
WBC # FLD AUTO: 10.22 K/UL — SIGNIFICANT CHANGE UP (ref 3.8–10.5)
WBC # FLD AUTO: 11.41 K/UL — HIGH (ref 3.8–10.5)
WBC # FLD AUTO: 11.87 K/UL — HIGH (ref 3.8–10.5)

## 2025-01-13 PROCEDURE — 99291 CRITICAL CARE FIRST HOUR: CPT | Mod: GC

## 2025-01-13 PROCEDURE — 99221 1ST HOSP IP/OBS SF/LOW 40: CPT

## 2025-01-13 RX ORDER — ALBUMIN HUMAN 50 G/1000ML
50 SOLUTION INTRAVENOUS EVERY 8 HOURS
Refills: 0 | Status: COMPLETED | OUTPATIENT
Start: 2025-01-13 | End: 2025-01-13

## 2025-01-13 RX ORDER — DEXAMETHASONE SODIUM PHOSPHATE 4 MG/ML
4 INJECTION, SOLUTION INTRA-ARTICULAR; INTRALESIONAL; INTRAMUSCULAR; INTRAVENOUS; SOFT TISSUE EVERY 6 HOURS
Refills: 0 | Status: DISCONTINUED | OUTPATIENT
Start: 2025-01-13 | End: 2025-01-14

## 2025-01-13 RX ORDER — BUMETANIDE 2 MG/1
1 TABLET ORAL EVERY 8 HOURS
Refills: 0 | Status: DISCONTINUED | OUTPATIENT
Start: 2025-01-13 | End: 2025-01-14

## 2025-01-13 RX ORDER — ACETYLCYSTEINE 200 MG/ML
4 VIAL (ML) MISCELLANEOUS EVERY 6 HOURS
Refills: 0 | Status: DISCONTINUED | OUTPATIENT
Start: 2025-01-13 | End: 2025-01-15

## 2025-01-13 RX ORDER — ALBUMIN HUMAN 50 G/1000ML
50 SOLUTION INTRAVENOUS EVERY 8 HOURS
Refills: 0 | Status: DISCONTINUED | OUTPATIENT
Start: 2025-01-13 | End: 2025-01-13

## 2025-01-13 RX ORDER — DEXMEDETOMIDINE HYDROCHLORIDE 4 UG/ML
0.2 INJECTION, SOLUTION INTRAVENOUS
Qty: 400 | Refills: 0 | Status: DISCONTINUED | OUTPATIENT
Start: 2025-01-13 | End: 2025-01-14

## 2025-01-13 RX ORDER — IPRATROPIUM BROMIDE AND ALBUTEROL SULFATE .5; 2.5 MG/3ML; MG/3ML
3 SOLUTION RESPIRATORY (INHALATION) EVERY 6 HOURS
Refills: 0 | Status: DISCONTINUED | OUTPATIENT
Start: 2025-01-13 | End: 2025-01-24

## 2025-01-13 RX ADMIN — ALBUMIN HUMAN 50 MILLILITER(S): 50 SOLUTION INTRAVENOUS at 14:14

## 2025-01-13 RX ADMIN — DEXAMETHASONE SODIUM PHOSPHATE 4 MILLIGRAM(S): 4 INJECTION, SOLUTION INTRA-ARTICULAR; INTRALESIONAL; INTRAMUSCULAR; INTRAVENOUS; SOFT TISSUE at 23:01

## 2025-01-13 RX ADMIN — POTASSIUM CHLORIDE 40 MILLIEQUIVALENT(S): 750 TABLET, EXTENDED RELEASE ORAL at 11:32

## 2025-01-13 RX ADMIN — ANTISEPTIC SURGICAL SCRUB 15 MILLILITER(S): 0.04 SOLUTION TOPICAL at 05:09

## 2025-01-13 RX ADMIN — DEXMEDETOMIDINE HYDROCHLORIDE 5.47 MICROGRAM(S)/KG/HR: 4 INJECTION, SOLUTION INTRAVENOUS at 07:09

## 2025-01-13 RX ADMIN — DEXMEDETOMIDINE HYDROCHLORIDE 5.47 MICROGRAM(S)/KG/HR: 4 INJECTION, SOLUTION INTRAVENOUS at 11:33

## 2025-01-13 RX ADMIN — ANTISEPTIC SURGICAL SCRUB 1 APPLICATION(S): 0.04 SOLUTION TOPICAL at 05:10

## 2025-01-13 RX ADMIN — Medication 4 MILLILITER(S): at 15:28

## 2025-01-13 RX ADMIN — Medication 0.5 MILLILITER(S): at 07:10

## 2025-01-13 RX ADMIN — PROPOFOL 22.5 MICROGRAM(S)/KG/MIN: 10 INJECTION, EMULSION INTRAVENOUS at 03:51

## 2025-01-13 RX ADMIN — BUMETANIDE 1 MILLIGRAM(S): 2 TABLET ORAL at 22:36

## 2025-01-13 RX ADMIN — Medication 15 GRAM(S): at 17:41

## 2025-01-13 RX ADMIN — ALBUMIN HUMAN 50 MILLILITER(S): 50 SOLUTION INTRAVENOUS at 11:33

## 2025-01-13 RX ADMIN — PIPERACILLIN SODIUM AND TAZOBACTAM SODIUM 25 GRAM(S): 2; 250 INJECTION, POWDER, FOR SOLUTION INTRAVENOUS at 05:09

## 2025-01-13 RX ADMIN — PIPERACILLIN SODIUM AND TAZOBACTAM SODIUM 25 GRAM(S): 2; 250 INJECTION, POWDER, FOR SOLUTION INTRAVENOUS at 22:36

## 2025-01-13 RX ADMIN — IPRATROPIUM BROMIDE AND ALBUTEROL SULFATE 3 MILLILITER(S): .5; 2.5 SOLUTION RESPIRATORY (INHALATION) at 15:28

## 2025-01-13 RX ADMIN — Medication 60 MILLIGRAM(S): at 05:08

## 2025-01-13 RX ADMIN — BUMETANIDE 1 MILLIGRAM(S): 2 TABLET ORAL at 14:13

## 2025-01-13 RX ADMIN — ATORVASTATIN CALCIUM 40 MILLIGRAM(S): 80 TABLET, FILM COATED ORAL at 22:36

## 2025-01-13 RX ADMIN — DEXAMETHASONE SODIUM PHOSPHATE 4 MILLIGRAM(S): 4 INJECTION, SOLUTION INTRA-ARTICULAR; INTRALESIONAL; INTRAMUSCULAR; INTRAVENOUS; SOFT TISSUE at 07:10

## 2025-01-13 RX ADMIN — Medication 50 MILLIGRAM(S): at 05:10

## 2025-01-13 RX ADMIN — Medication 15 GRAM(S): at 05:09

## 2025-01-13 RX ADMIN — Medication 5000 UNIT(S): at 22:37

## 2025-01-13 RX ADMIN — ALBUMIN HUMAN 50 MILLILITER(S): 50 SOLUTION INTRAVENOUS at 22:37

## 2025-01-13 RX ADMIN — IPRATROPIUM BROMIDE AND ALBUTEROL SULFATE 3 MILLILITER(S): .5; 2.5 SOLUTION RESPIRATORY (INHALATION) at 21:33

## 2025-01-13 RX ADMIN — PIPERACILLIN SODIUM AND TAZOBACTAM SODIUM 25 GRAM(S): 2; 250 INJECTION, POWDER, FOR SOLUTION INTRAVENOUS at 14:12

## 2025-01-13 RX ADMIN — Medication 5000 UNIT(S): at 14:12

## 2025-01-13 RX ADMIN — Medication 4 MILLILITER(S): at 21:33

## 2025-01-13 RX ADMIN — Medication 5000 UNIT(S): at 05:10

## 2025-01-13 RX ADMIN — DEXAMETHASONE SODIUM PHOSPHATE 4 MILLIGRAM(S): 4 INJECTION, SOLUTION INTRA-ARTICULAR; INTRALESIONAL; INTRAMUSCULAR; INTRAVENOUS; SOFT TISSUE at 11:40

## 2025-01-13 RX ADMIN — PANTOPRAZOLE 40 MILLIGRAM(S): 20 TABLET, DELAYED RELEASE ORAL at 11:32

## 2025-01-13 RX ADMIN — DEXAMETHASONE SODIUM PHOSPHATE 4 MILLIGRAM(S): 4 INJECTION, SOLUTION INTRA-ARTICULAR; INTRALESIONAL; INTRAMUSCULAR; INTRAVENOUS; SOFT TISSUE at 17:42

## 2025-01-13 NOTE — AIRWAY REMOVAL NOTE  ADULT & PEDS - ARTIFICAL AIRWAY REMOVAL COMMENTS
Patient tolerated SBT, did well with a follow up gas confirming acid base adequate status and hypoxia resolved Pa02 69 on 40% peep of 6 mental status good. Patient extubated bilateral breath sounds, stridor did develop racemic epi given right away NP Palmira estes aware of patients declining respiratory status. Orders given for Hiflow and racemic epi

## 2025-01-13 NOTE — PROGRESS NOTE ADULT - ASSESSMENT
74y Male w/ PMH of - being managed in MICU for    #    NEURO/PSYCH  -A&Ox  -Current sedation  -Q4 neuro checks    CARDIOVASCULAR  -Hemodynamics  -Vasopressors  -Continue to monitor hemodynamics and titrate vasoactive support to MAP goal > 65    PULM  -Pt currently intubated  -Vent settings  -ABG    GI  -Diet  -PPI ppx    HEME  -DVT ppx  -CBC counts    ENDO  -Insulin    RENAL  -Received - for repletion today  -Replete electrolytes PRN    ID  -ABx  -Cx  -Fevers  -WBC    CODE STATUS: Full Code.  MOBILITY:  NUTRITION: 74y Male w/ PMH of - being managed in MICU for    # Brief episode of cardiac arrest in ED, intubated. CT with liver mass possible HCC,  L-sided PNA with pleural effusion s/p PTC placement by MICU. Extubated 1/13 morning.     NEURO/PSYCH  -A&Ox  -Current sedation  -Q4 neuro checks    CARDIOVASCULAR  -Hemodynamics  -Vasopressors  -Continue to monitor hemodynamics and titrate vasoactive support to MAP goal > 65    PULM  -Pt currently intubated  -Vent settings  -ABG    GI  -Diet  -PPI ppx    HEME  -DVT ppx  -CBC counts    ENDO  -Insulin    RENAL  -Received - for repletion today  -Replete electrolytes PRN    ID  -ABx  -Cx  -Fevers  -WBC    CODE STATUS: Full Code.  MOBILITY:  NUTRITION: 74y Male w/ PMH of - being managed in MICU for    # Brief episode of cardiac arrest in ED, intubated. CT with liver mass possible HCC,  L-sided PNA with pleural effusion s/p PTC placement by MICU. Extubated 1/13 morning.       Brief cardiac arrest s/p Extubation today  Respiratory failure with hypoxia  - Monitor mental status  - Chest PT   - Duoneb Q6  - Mucomyst Q6  - Bumex 1 mg Q8    L-sided pleural effusion  S/p L-sided PTC placement  - F/u fluid studies  - Zosyn  - Monitor for fevers  - CT surg consulted    Shock state - distributive  - Wean Levo    Liver mass  Possible L portal vein tumor thrombus  - Trend liver enzymes    Diet - Starting tube feeds  PPx - Lovenox 40mg; PPI IV  Lines/Tubes - PIV  Skin - Will monitor for skin breakdown. Serial repositioning as per ICU protocol.  Code Status - Full code  Dispo - C/w care in ICU

## 2025-01-13 NOTE — DIETITIAN INITIAL EVALUATION ADULT - PERTINENT MEDS FT
MEDICATIONS  (STANDING):  acetylcysteine 10%  Inhalation 4 milliLiter(s) Inhalation every 6 hours  albumin human 25% IVPB 50 milliLiter(s) IV Intermittent every 8 hours  albuterol/ipratropium for Nebulization 3 milliLiter(s) Nebulizer every 6 hours  atorvastatin 40 milliGRAM(s) Oral at bedtime  buMETAnide Injectable 1 milliGRAM(s) IV Push every 8 hours  chlorhexidine 2% Cloths 1 Application(s) Topical <User Schedule>  dexMEDEtomidine Infusion 0.2 MICROgram(s)/kG/Hr (5.47 mL/Hr) IV Continuous <Continuous>  heparin   Injectable 5000 Unit(s) SubCutaneous every 8 hours  lactulose Syrup 15 Gram(s) Oral two times a day  pantoprazole  Injectable 40 milliGRAM(s) IV Push daily  piperacillin/tazobactam IVPB.. 3.375 Gram(s) IV Intermittent every 8 hours  potassium chloride   Powder 40 milliEquivalent(s) Oral daily  rifAXIMin 550 milliGRAM(s) Oral two times a day  spironolactone 50 milliGRAM(s) Oral daily

## 2025-01-13 NOTE — DIETITIAN INITIAL EVALUATION ADULT - PERTINENT LABORATORY DATA
01-13    142  |  107  |  33.3[H]  ----------------------------<  128[H]  4.1   |  23.0  |  1.62[H]    Ca    8.1[L]      13 Jan 2025 04:59  Phos  3.0     01-13  Mg     2.2     01-13    TPro  6.2[L]  /  Alb  2.6[L]  /  TBili  1.8  /  DBili  x   /  AST  228[H]  /  ALT  62[H]  /  AlkPhos  146[H]  01-13  POCT Blood Glucose.: 93 mg/dL (01-12-25 @ 16:53)  A1C with Estimated Average Glucose Result: 5.4 % (01-12-25 @ 03:05)

## 2025-01-13 NOTE — PROGRESS NOTE ADULT - SUBJECTIVE AND OBJECTIVE BOX
INTERVAL HPI/OVERNIGHT EVENTS:     PHYSICAL EXAM:          RADIOLOGY personally reviewed:   GOALS OF CARE DISCUSSION WITH PATIENT/FAMILY/PROXY:   CRITICAL CARE TIME SPENT:   --------------------------------------------------------------------------------------------------------------------------------------------------------------  On Admission  01-11-25 (2d)  HPI:  74-year-old male patient with a history of cirrhosis secondary to hepatitis C, daily smoker presents the ED complaining of abdominal pain, abdominal distention, difficulty breathing.  Seen in the ED 10 days ago secondary to abdominal pain, abdominal distention, melena. 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  Patient was discharged home, states that he was feeling somewhat better until a few days ago when he began to experience worsening abdominal pain, abdominal ascension.  Patient reports increased shortness of breath and dyspnea as well over the past few days.  Denies any prior history of COPD but is a daily smoker.  Patient noted to be hypoxic to 88% on room air in the ED, placed on 4 L nasal cannula with improvement to 94%. On reassessment this patient who appeared to be in respiratory distress, stated that he was having trouble breathing.  Supplemental oxygen was over his chin and he was slumped in the bed.  Patient moved to critical care area immediately afterwards.  Patient found to be in cardiac arrest, ACLS initiated, 1 mg of epinephrine given.  Patient subsequently intubated and then achieve return of spontaneous circulation.  Postintubation x-ray confirmed tube placement.  Patient sedated with propofol as well as given fentanyl on postintubation state.  Prior to initiation of propofol, patient was biting at the tube and attempting to pull away the tube.  Will plan to obtain CT scan imaging and admit to ICU. (11 Jan 2025 16:38)    PAST MEDICAL & SURGICAL HISTORY:  Hepatitis C virus infection      No significant past surgical history          Antimicrobial:  piperacillin/tazobactam IVPB.. 3.375 Gram(s) IV Intermittent every 8 hours  rifAXIMin 550 milliGRAM(s) Oral two times a day    Cardiovascular:  furosemide   Injectable 60 milliGRAM(s) IV Push daily  spironolactone 50 milliGRAM(s) Oral daily    Pulmonary:  albuterol/ipratropium for Nebulization 3 milliLiter(s) Nebulizer every 20 minutes    Hematalogic:  heparin   Injectable 5000 Unit(s) SubCutaneous every 8 hours    Other:  atorvastatin 40 milliGRAM(s) Oral at bedtime  chlorhexidine 2% Cloths 1 Application(s) Topical <User Schedule>  dexAMETHasone  Injectable 4 milliGRAM(s) IV Push every 6 hours  dexMEDEtomidine Infusion 0.2 MICROgram(s)/kG/Hr IV Continuous <Continuous>  lactulose Syrup 15 Gram(s) Oral two times a day  pantoprazole  Injectable 40 milliGRAM(s) IV Push daily  potassium chloride   Powder 40 milliEquivalent(s) Oral daily      Drug Dosing Weight  Height (cm): 170.2 (11 Jan 2025 17:55)  Weight (kg): 109.3 (11 Jan 2025 17:55)  BMI (kg/m2): 37.7 (11 Jan 2025 17:55)  BSA (m2): 2.19 (11 Jan 2025 17:55)    T(C): 37 (01-13-25 @ 07:35), Max: 37 (01-13-25 @ 07:30)  HR: 100 (01-13-25 @ 07:35)  BP: 154/84 (01-13-25 @ 07:35)  BP(mean): 103 (01-13-25 @ 07:35)  ABP: --  ABP(mean): --  RR: 22 (01-13-25 @ 07:35)  SpO2: 91% (01-13-25 @ 07:35)    ABG - ( 13 Jan 2025 04:00 )  pH, Arterial: 7.390 pH, Blood: x     /  pCO2: 40    /  pO2: 69    / HCO3: 24    / Base Excess: -0.8  /  SaO2: 95.1                  01-12 @ 07:01  -  01-13 @ 07:00  --------------------------------------------------------  IN: 840.9 mL / OUT: 495 mL / NET: 345.9 mL        Mode: PS (Pressure Support)/ Spontaneous  FiO2: 40  PEEP: 6  PS: 12  MAP: 10  PIP: 16        LABS:  CBC Full  -  ( 13 Jan 2025 04:59 )  WBC Count : 11.87 K/uL  RBC Count : 4.15 M/uL  Hemoglobin : 11.4 g/dL  Hematocrit : 37.2 %  Platelet Count - Automated : 135 K/uL  Mean Cell Volume : 89.6 fl  Mean Cell Hemoglobin : 27.5 pg  Mean Cell Hemoglobin Concentration : 30.6 g/dL  Auto Neutrophil # : 9.45 K/uL  Auto Lymphocyte # : 1.25 K/uL  Auto Monocyte # : 0.95 K/uL  Auto Eosinophil # : 0.09 K/uL  Auto Basophil # : 0.04 K/uL  Auto Neutrophil % : 79.6 %  Auto Lymphocyte % : 10.5 %  Auto Monocyte % : 8.0 %  Auto Eosinophil % : 0.8 %  Auto Basophil % : 0.3 %    01-13    142  |  107  |  33.3[H]  ----------------------------<  128[H]  4.1   |  23.0  |  1.62[H]    Ca    8.1[L]      13 Jan 2025 04:59  Phos  3.0     01-13  Mg     2.2     01-13    TPro  6.2[L]  /  Alb  2.6[L]  /  TBili  1.8  /  DBili  x   /  AST  228[H]  /  ALT  62[H]  /  AlkPhos  146[H]  01-13    PT/INR - ( 12 Jan 2025 03:05 )   PT: 22.3 sec;   INR: 1.99 ratio         PTT - ( 11 Jan 2025 13:00 )  PTT:35.0 sec  Urinalysis Basic - ( 13 Jan 2025 04:59 )    Color: x / Appearance: x / SG: x / pH: x  Gluc: 128 mg/dL / Ketone: x  / Bili: x / Urobili: x   Blood: x / Protein: x / Nitrite: x   Leuk Esterase: x / RBC: x / WBC x   Sq Epi: x / Non Sq Epi: x / Bacteria: x      Culture Results:   No growth to date. (01-11 @ 21:05)  Culture Results:   No growth at 24 hours (01-11 @ 18:25)  Culture Results:   No growth at 24 hours (01-11 @ 18:21)   INTERVAL HPI/OVERNIGHT EVENTS:     PHYSICAL EXAM:    HEENT: NC/AT. Moist mucous membranes.  Eyes: No scleral icterus. EOMI.  Neck:. Soft and supple. Full ROM without pain.  Cardiac: Regular rate and rhythm. +S1/S2. No murmurs. Peripheral pulses 2+ and symmetric. No LE edema.  Resp: +Wheeze bilaterally, no stridor  Abd: Soft, non-tender, +distended. Normal bowel sounds in all 4 quadrants. No guarding or rebound.  Back: Spine midline and non-tender. No CVAT.  Neuro: no gross deficits, moving all extremities, normal speech  Skin: No rashes, abrasions or lacerations.        RADIOLOGY personally reviewed:   GOALS OF CARE DISCUSSION WITH PATIENT/FAMILY/PROXY:   CRITICAL CARE TIME SPENT:   --------------------------------------------------------------------------------------------------------------------------------------------------------------  On Admission  01-11-25 (2d)  HPI:  74-year-old male patient with a history of cirrhosis secondary to hepatitis C, daily smoker presents the ED complaining of abdominal pain, abdominal distention, difficulty breathing.  Seen in the ED 10 days ago secondary to abdominal pain, abdominal distention, melena. 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  Patient was discharged home, states that he was feeling somewhat better until a few days ago when he began to experience worsening abdominal pain, abdominal ascension.  Patient reports increased shortness of breath and dyspnea as well over the past few days.  Denies any prior history of COPD but is a daily smoker.  Patient noted to be hypoxic to 88% on room air in the ED, placed on 4 L nasal cannula with improvement to 94%. On reassessment this patient who appeared to be in respiratory distress, stated that he was having trouble breathing.  Supplemental oxygen was over his chin and he was slumped in the bed.  Patient moved to critical care area immediately afterwards.  Patient found to be in cardiac arrest, ACLS initiated, 1 mg of epinephrine given.  Patient subsequently intubated and then achieve return of spontaneous circulation.  Postintubation x-ray confirmed tube placement.  Patient sedated with propofol as well as given fentanyl on postintubation state.  Prior to initiation of propofol, patient was biting at the tube and attempting to pull away the tube.  Will plan to obtain CT scan imaging and admit to ICU. (11 Jan 2025 16:38)    PAST MEDICAL & SURGICAL HISTORY:  Hepatitis C virus infection      No significant past surgical history          Antimicrobial:  piperacillin/tazobactam IVPB.. 3.375 Gram(s) IV Intermittent every 8 hours  rifAXIMin 550 milliGRAM(s) Oral two times a day    Cardiovascular:  furosemide   Injectable 60 milliGRAM(s) IV Push daily  spironolactone 50 milliGRAM(s) Oral daily    Pulmonary:  albuterol/ipratropium for Nebulization 3 milliLiter(s) Nebulizer every 20 minutes    Hematalogic:  heparin   Injectable 5000 Unit(s) SubCutaneous every 8 hours    Other:  atorvastatin 40 milliGRAM(s) Oral at bedtime  chlorhexidine 2% Cloths 1 Application(s) Topical <User Schedule>  dexAMETHasone  Injectable 4 milliGRAM(s) IV Push every 6 hours  dexMEDEtomidine Infusion 0.2 MICROgram(s)/kG/Hr IV Continuous <Continuous>  lactulose Syrup 15 Gram(s) Oral two times a day  pantoprazole  Injectable 40 milliGRAM(s) IV Push daily  potassium chloride   Powder 40 milliEquivalent(s) Oral daily      Drug Dosing Weight  Height (cm): 170.2 (11 Jan 2025 17:55)  Weight (kg): 109.3 (11 Jan 2025 17:55)  BMI (kg/m2): 37.7 (11 Jan 2025 17:55)  BSA (m2): 2.19 (11 Jan 2025 17:55)    T(C): 37 (01-13-25 @ 07:35), Max: 37 (01-13-25 @ 07:30)  HR: 100 (01-13-25 @ 07:35)  BP: 154/84 (01-13-25 @ 07:35)  BP(mean): 103 (01-13-25 @ 07:35)  ABP: --  ABP(mean): --  RR: 22 (01-13-25 @ 07:35)  SpO2: 91% (01-13-25 @ 07:35)    ABG - ( 13 Jan 2025 04:00 )  pH, Arterial: 7.390 pH, Blood: x     /  pCO2: 40    /  pO2: 69    / HCO3: 24    / Base Excess: -0.8  /  SaO2: 95.1                  01-12 @ 07:01  -  01-13 @ 07:00  --------------------------------------------------------  IN: 840.9 mL / OUT: 495 mL / NET: 345.9 mL        Mode: PS (Pressure Support)/ Spontaneous  FiO2: 40  PEEP: 6  PS: 12  MAP: 10  PIP: 16        LABS:  CBC Full  -  ( 13 Jan 2025 04:59 )  WBC Count : 11.87 K/uL  RBC Count : 4.15 M/uL  Hemoglobin : 11.4 g/dL  Hematocrit : 37.2 %  Platelet Count - Automated : 135 K/uL  Mean Cell Volume : 89.6 fl  Mean Cell Hemoglobin : 27.5 pg  Mean Cell Hemoglobin Concentration : 30.6 g/dL  Auto Neutrophil # : 9.45 K/uL  Auto Lymphocyte # : 1.25 K/uL  Auto Monocyte # : 0.95 K/uL  Auto Eosinophil # : 0.09 K/uL  Auto Basophil # : 0.04 K/uL  Auto Neutrophil % : 79.6 %  Auto Lymphocyte % : 10.5 %  Auto Monocyte % : 8.0 %  Auto Eosinophil % : 0.8 %  Auto Basophil % : 0.3 %    01-13    142  |  107  |  33.3[H]  ----------------------------<  128[H]  4.1   |  23.0  |  1.62[H]    Ca    8.1[L]      13 Jan 2025 04:59  Phos  3.0     01-13  Mg     2.2     01-13    TPro  6.2[L]  /  Alb  2.6[L]  /  TBili  1.8  /  DBili  x   /  AST  228[H]  /  ALT  62[H]  /  AlkPhos  146[H]  01-13    PT/INR - ( 12 Jan 2025 03:05 )   PT: 22.3 sec;   INR: 1.99 ratio         PTT - ( 11 Jan 2025 13:00 )  PTT:35.0 sec  Urinalysis Basic - ( 13 Jan 2025 04:59 )    Color: x / Appearance: x / SG: x / pH: x  Gluc: 128 mg/dL / Ketone: x  / Bili: x / Urobili: x   Blood: x / Protein: x / Nitrite: x   Leuk Esterase: x / RBC: x / WBC x   Sq Epi: x / Non Sq Epi: x / Bacteria: x      Culture Results:   No growth to date. (01-11 @ 21:05)  Culture Results:   No growth at 24 hours (01-11 @ 18:25)  Culture Results:   No growth at 24 hours (01-11 @ 18:21)

## 2025-01-13 NOTE — CONSULT NOTE ADULT - ASSESSMENT
74yMale with h/o cirrhosis 2/2 HCV, daily smoker, who presented to [facility] c/o abdominal pain/distention, sob, previously seen for similar.  Pt hypoxic to 80s.  Episode of cardiac arrest in ED, intubated, ROSC achieved.  CT with liver mass possible HCC, L portal vein tumor thrombus, L-sided PNA with pleural effusion s/p PTC placement (1/11), course c/b hypotension, sepsis.  Pt extubated 1/13.  Thoracic Surgery consulted for chest tube management.

## 2025-01-13 NOTE — DIETITIAN INITIAL EVALUATION ADULT - ORAL INTAKE PTA/DIET HISTORY
Pt is s/p extubation this morning, remains lethargic on 02 (unable to interview at this time). Enteral feeds on hold. NPO status maintained at this time.

## 2025-01-13 NOTE — DIETITIAN INITIAL EVALUATION ADULT - OTHER INFO
Pt is a 74 year old Male with PMH HCV with cirrhosis, smoker, recently in ED for melena who presented 1/11/25 with hypoxic respiratory failure, had brief cardiac arrest x1, intubated, noted on CT with liver mass possible HCC, L portal vein tumor thrombus, L-sided PNA with pleural effusion s/p PTC placement (1/11), course c/b hypotension, sepsis.

## 2025-01-13 NOTE — DIETITIAN INITIAL EVALUATION ADULT - NSFNSGIIOFT_GEN_A_CORE
01-12-25 @ 07:01  -  01-13-25 @ 07:00  --------------------------------------------------------  OUT:  Total OUT: 0 mL    Total NET: 400 mL

## 2025-01-14 LAB
AFP-TM SERPL-MCNC: HIGH NG/ML
ALBUMIN SERPL ELPH-MCNC: 2.8 G/DL — LOW (ref 3.3–5.2)
ALP SERPL-CCNC: 127 U/L — HIGH (ref 40–120)
ALT FLD-CCNC: 58 U/L — HIGH
ANION GAP SERPL CALC-SCNC: 12 MMOL/L — SIGNIFICANT CHANGE UP (ref 5–17)
AST SERPL-CCNC: 167 U/L — HIGH
BASOPHILS # BLD AUTO: 0.01 K/UL — SIGNIFICANT CHANGE UP (ref 0–0.2)
BASOPHILS NFR BLD AUTO: 0.1 % — SIGNIFICANT CHANGE UP (ref 0–2)
BILIRUB SERPL-MCNC: 2 MG/DL — SIGNIFICANT CHANGE UP (ref 0.4–2)
BUN SERPL-MCNC: 45.7 MG/DL — HIGH (ref 8–20)
CALCIUM SERPL-MCNC: 8.6 MG/DL — SIGNIFICANT CHANGE UP (ref 8.4–10.5)
CHLORIDE SERPL-SCNC: 109 MMOL/L — HIGH (ref 96–108)
CO2 SERPL-SCNC: 24 MMOL/L — SIGNIFICANT CHANGE UP (ref 22–29)
CREAT SERPL-MCNC: 1.91 MG/DL — HIGH (ref 0.5–1.3)
EGFR: 36 ML/MIN/1.73M2 — LOW
EOSINOPHIL # BLD AUTO: 0 K/UL — SIGNIFICANT CHANGE UP (ref 0–0.5)
EOSINOPHIL NFR BLD AUTO: 0 % — SIGNIFICANT CHANGE UP (ref 0–6)
GLUCOSE SERPL-MCNC: 107 MG/DL — HIGH (ref 70–99)
HCT VFR BLD CALC: 33.7 % — LOW (ref 39–50)
HGB BLD-MCNC: 10.9 G/DL — LOW (ref 13–17)
IMM GRANULOCYTES NFR BLD AUTO: 1.6 % — HIGH (ref 0–0.9)
LYMPHOCYTES # BLD AUTO: 0.78 K/UL — LOW (ref 1–3.3)
LYMPHOCYTES # BLD AUTO: 6.7 % — LOW (ref 13–44)
MAGNESIUM SERPL-MCNC: 2.7 MG/DL — HIGH (ref 1.6–2.6)
MCHC RBC-ENTMCNC: 28.5 PG — SIGNIFICANT CHANGE UP (ref 27–34)
MCHC RBC-ENTMCNC: 32.3 G/DL — SIGNIFICANT CHANGE UP (ref 32–36)
MCV RBC AUTO: 88 FL — SIGNIFICANT CHANGE UP (ref 80–100)
MONOCYTES # BLD AUTO: 0.6 K/UL — SIGNIFICANT CHANGE UP (ref 0–0.9)
MONOCYTES NFR BLD AUTO: 5.1 % — SIGNIFICANT CHANGE UP (ref 2–14)
NEUTROPHILS # BLD AUTO: 10.09 K/UL — HIGH (ref 1.8–7.4)
NEUTROPHILS NFR BLD AUTO: 86.5 % — HIGH (ref 43–77)
PHOSPHATE SERPL-MCNC: 3.8 MG/DL — SIGNIFICANT CHANGE UP (ref 2.4–4.7)
PLATELET # BLD AUTO: 112 K/UL — LOW (ref 150–400)
POTASSIUM SERPL-MCNC: 3.9 MMOL/L — SIGNIFICANT CHANGE UP (ref 3.5–5.3)
POTASSIUM SERPL-SCNC: 3.9 MMOL/L — SIGNIFICANT CHANGE UP (ref 3.5–5.3)
PROT SERPL-MCNC: 6.4 G/DL — LOW (ref 6.6–8.7)
RBC # BLD: 3.83 M/UL — LOW (ref 4.2–5.8)
RBC # FLD: 20.4 % — HIGH (ref 10.3–14.5)
SODIUM SERPL-SCNC: 145 MMOL/L — SIGNIFICANT CHANGE UP (ref 135–145)
WBC # BLD: 11.67 K/UL — HIGH (ref 3.8–10.5)
WBC # FLD AUTO: 11.67 K/UL — HIGH (ref 3.8–10.5)

## 2025-01-14 PROCEDURE — 99231 SBSQ HOSP IP/OBS SF/LOW 25: CPT

## 2025-01-14 PROCEDURE — 99233 SBSQ HOSP IP/OBS HIGH 50: CPT

## 2025-01-14 PROCEDURE — 71045 X-RAY EXAM CHEST 1 VIEW: CPT | Mod: 26

## 2025-01-14 RX ORDER — ATORVASTATIN CALCIUM 80 MG/1
40 TABLET, FILM COATED ORAL AT BEDTIME
Refills: 0 | Status: DISCONTINUED | OUTPATIENT
Start: 2025-01-14 | End: 2025-01-24

## 2025-01-14 RX ADMIN — IPRATROPIUM BROMIDE AND ALBUTEROL SULFATE 3 MILLILITER(S): .5; 2.5 SOLUTION RESPIRATORY (INHALATION) at 15:25

## 2025-01-14 RX ADMIN — POTASSIUM CHLORIDE 40 MILLIEQUIVALENT(S): 750 TABLET, EXTENDED RELEASE ORAL at 12:25

## 2025-01-14 RX ADMIN — IPRATROPIUM BROMIDE AND ALBUTEROL SULFATE 3 MILLILITER(S): .5; 2.5 SOLUTION RESPIRATORY (INHALATION) at 20:05

## 2025-01-14 RX ADMIN — Medication 4 MILLILITER(S): at 08:31

## 2025-01-14 RX ADMIN — ATORVASTATIN CALCIUM 40 MILLIGRAM(S): 80 TABLET, FILM COATED ORAL at 21:46

## 2025-01-14 RX ADMIN — IPRATROPIUM BROMIDE AND ALBUTEROL SULFATE 3 MILLILITER(S): .5; 2.5 SOLUTION RESPIRATORY (INHALATION) at 05:31

## 2025-01-14 RX ADMIN — ANTISEPTIC SURGICAL SCRUB 1 APPLICATION(S): 0.04 SOLUTION TOPICAL at 06:00

## 2025-01-14 RX ADMIN — Medication 50 MILLIGRAM(S): at 06:06

## 2025-01-14 RX ADMIN — Medication 4 MILLILITER(S): at 05:31

## 2025-01-14 RX ADMIN — Medication 4 MILLILITER(S): at 15:25

## 2025-01-14 RX ADMIN — Medication 4 MILLILITER(S): at 20:05

## 2025-01-14 RX ADMIN — Medication 15 GRAM(S): at 17:07

## 2025-01-14 RX ADMIN — PIPERACILLIN SODIUM AND TAZOBACTAM SODIUM 25 GRAM(S): 2; 250 INJECTION, POWDER, FOR SOLUTION INTRAVENOUS at 05:59

## 2025-01-14 RX ADMIN — PIPERACILLIN SODIUM AND TAZOBACTAM SODIUM 25 GRAM(S): 2; 250 INJECTION, POWDER, FOR SOLUTION INTRAVENOUS at 17:06

## 2025-01-14 RX ADMIN — IPRATROPIUM BROMIDE AND ALBUTEROL SULFATE 3 MILLILITER(S): .5; 2.5 SOLUTION RESPIRATORY (INHALATION) at 08:31

## 2025-01-14 RX ADMIN — BUMETANIDE 1 MILLIGRAM(S): 2 TABLET ORAL at 06:00

## 2025-01-14 RX ADMIN — Medication 5000 UNIT(S): at 06:00

## 2025-01-14 RX ADMIN — Medication 5000 UNIT(S): at 21:46

## 2025-01-14 RX ADMIN — Medication 15 GRAM(S): at 05:59

## 2025-01-14 RX ADMIN — DEXAMETHASONE SODIUM PHOSPHATE 4 MILLIGRAM(S): 4 INJECTION, SOLUTION INTRA-ARTICULAR; INTRALESIONAL; INTRAMUSCULAR; INTRAVENOUS; SOFT TISSUE at 05:59

## 2025-01-14 RX ADMIN — Medication 5000 UNIT(S): at 16:14

## 2025-01-14 RX ADMIN — PANTOPRAZOLE 40 MILLIGRAM(S): 20 TABLET, DELAYED RELEASE ORAL at 12:25

## 2025-01-14 NOTE — PROGRESS NOTE ADULT - SUBJECTIVE AND OBJECTIVE BOX
DAMON TODDZQUEZ  ----------------------------------------  The patient was seen earlier at bedside. Patient with cardiac arrest and respiratory failure. Reported feeling better, denied chest pain or palpitations.        Vital Signs Last 24 Hrs  T(C): 37.1 (14 Jan 2025 11:40), Max: 37.1 (14 Jan 2025 11:40)  T(F): 98.8 (14 Jan 2025 11:40), Max: 98.8 (14 Jan 2025 11:40)  HR: 84 (14 Jan 2025 11:00) (58 - 86)  BP: 114/73 (14 Jan 2025 11:00) (77/62 - 123/68)  BP(mean): 84 (14 Jan 2025 11:00) (69 - 95)  RR: 19 (14 Jan 2025 11:00) (9 - 26)  SpO2: 91% (14 Jan 2025 11:00) (84% - 97%)    Parameters below as of 14 Jan 2025 08:33  Patient On (Oxygen Delivery Method): nasal cannula w/ humidification        CAPILLARY BLOOD GLUCOSE        PHYSICAL EXAMINATION:  ----------------------------------------      LABORATORY STUDIES:  ----------------------------------------                        10.9   11.67 )-----------( 112      ( 14 Jan 2025 04:50 )             33.7     01-14    145  |  109[H]  |  45.7[H]  ----------------------------<  107[H]  3.9   |  24.0  |  1.91[H]    Ca    8.6      14 Jan 2025 04:50  Phos  3.8     01-14  Mg     2.7     01-14    TPro  6.4[L]  /  Alb  2.8[L]  /  TBili  2.0  /  DBili  x   /  AST  167[H]  /  ALT  58[H]  /  AlkPhos  127[H]  01-14    LIVER FUNCTIONS - ( 14 Jan 2025 04:50 )  Alb: 2.8 g/dL / Pro: 6.4 g/dL / ALK PHOS: 127 U/L / ALT: 58 U/L / AST: 167 U/L / GGT: x           PT/INR - ( 13 Jan 2025 14:14 )   PT: 18.6 sec;   INR: 1.61 ratio         PTT - ( 13 Jan 2025 14:14 )  PTT:35.7 sec          Urinalysis Basic - ( 14 Jan 2025 04:50 )    Color: x / Appearance: x / SG: x / pH: x  Gluc: 107 mg/dL / Ketone: x  / Bili: x / Urobili: x   Blood: x / Protein: x / Nitrite: x   Leuk Esterase: x / RBC: x / WBC x   Sq Epi: x / Non Sq Epi: x / Bacteria: x        Culture - Body Fluid with Gram Stain (collected 11 Jan 2025 21:05)  Source: Pleural Fl  Gram Stain (12 Jan 2025 02:37):    polymorphonuclear leukocytes seen    No organisms seen    by cytocentrifuge  Preliminary Report (12 Jan 2025 21:44):    No growth to date.    Culture - Blood (collected 11 Jan 2025 18:25)  Source: .Blood BLOOD  Preliminary Report (14 Jan 2025 06:01):    No growth at 48 Hours    Culture - Blood (collected 11 Jan 2025 18:21)  Source: .Blood BLOOD  Preliminary Report (14 Jan 2025 06:01):    No growth at 48 Hours        MEDICATIONS  (STANDING):  acetylcysteine 10%  Inhalation 4 milliLiter(s) Inhalation every 6 hours  albuterol/ipratropium for Nebulization 3 milliLiter(s) Nebulizer every 6 hours  atorvastatin 40 milliGRAM(s) Oral at bedtime  buMETAnide Injectable 1 milliGRAM(s) IV Push every 8 hours  heparin   Injectable 5000 Unit(s) SubCutaneous every 8 hours  lactulose Syrup 15 Gram(s) Oral two times a day  pantoprazole  Injectable 40 milliGRAM(s) IV Push daily  piperacillin/tazobactam IVPB.. 3.375 Gram(s) IV Intermittent every 12 hours  potassium chloride   Powder 40 milliEquivalent(s) Oral daily  rifAXIMin 550 milliGRAM(s) Oral two times a day  spironolactone 50 milliGRAM(s) Oral daily    MEDICATIONS  (PRN):      ASSESSMENT / PLAN:  ----------------------------------------  74-year-old  male with history of hepatitis C virus with cirrhosis tobacco use disorder recent admission to ED with abd pain and melena which was transient. Presented on January 11 with difficulty breathing admitted to MICU with Acute hypoxic hypercapnic respiratory failure. Patient found to be in cardiac arrest, ACLS initiated, 1 mg of epinephrine given.  Patient subsequently intubated and then achieve return of spontaneous circulation. CT with liver mass possible HCC, L portal vein tumor thrombus, L-sided PNA with pleural effusion s/p PTC placement (1/11), course c/b hypotension, sepsis.  Pt extubated 1/13 with severe respiratory distress with stridor postextubation, improved s/p racemic epi DuoNeb and decadron. Currently on HF, with NG tube.     #In-hospital brief periintubation cardiac arrest  #Acute hypoxic hypercapnic respiratory failure  #Bilateral upper lobe aspiration pneumonia  #Left-sided pleural effusion  #Encephalopathy: Multifactorial hyperactive #delirium/hypoxic/possible component of hepatic  #Hepatic mass: Needs further evaluation  #Acute kidney injury with metabolic acidosis  #Transaminitis  #MARY    ====================== NEUROLOGY=====================  dexMEDEtomidine Infusion 0.2 MICROgram(s)/kG/Hr (5.47 mL/Hr) IV Continuous <Continuous>  - A&Ox3  - Dc precedex   - Optimize sleep/wake cycle  - Delirium precautions   - PT consult placed    ==================== RESPIRATORY======================  acetylcysteine 10%  Inhalation 4 milliLiter(s) Inhalation every 6 hours  albuterol/ipratropium for Nebulization 3 milliLiter(s) Nebulizer every 6 hours  albuterol/ipratropium for Nebulization 3 milliLiter(s) Nebulizer every 20 minutes  - On HF  - S/p L chest tube by CT surgery  - Chest PT   - Duoneb Q6  - Mucomyst Q6  - Bumex 1 mg Q8  - C/w Zosyn     ====================CARDIOVASCULAR==================  buMETAnide Injectable 1 milliGRAM(s) IV Push every 8 hours  spironolactone 50 milliGRAM(s) Oral daily  - Not on pressors     ===================HEMATOLOGIC/ONC ===================  heparin   Injectable 5000 Unit(s) SubCutaneous every 8 hours  - Possible HCC and L portal vein tumor thrombus  - Trend liver enzyme    ===================== RENAL ==========================  - MARY  - Lobo with good urine output  Continue monitoring urine output  Avoid Nephrotoxic agents   Adjusting medications for renal  function   Replacing electrolytes as needed with Goal K> 4, PO> 3, Mg> 2    ==================== GASTROINTESTINAL===================  lactulose Syrup 15 Gram(s) Oral two times a day  pantoprazole  Injectable 40 milliGRAM(s) IV Push daily  potassium chloride   Powder 40 milliEquivalent(s) Oral daily  - Hx of cirrhosis with distended abd  - C/w rifaximin and lactulose    Diet: NPO with tube feed  =======================    ENDOCRINE  =====================  atorvastatin 40 milliGRAM(s) Oral at bedtime  dexAMETHasone  Injectable 4 milliGRAM(s) IV Push every 6 hours    ========================INFECTIOUS DISEASE================  piperacillin/tazobactam IVPB.. 3.375 Gram(s) IV Intermittent every 8 hours  rifAXIMin 550 milliGRAM(s) Oral two times a day  - F/u chest tube cytopathology   - Zosyn as above    ____________________________________________      Patient stable for downgrade. Case discussed with Dr. Lenny Otto.  DAMON TODDZQUEZ  ----------------------------------------  The patient was seen earlier at bedside. Patient with cardiac arrest and respiratory failure. Reported feeling better, denied chest pain or palpitations.    Vital Signs Last 24 Hrs  T(C): 37.1 (14 Jan 2025 11:40), Max: 37.1 (14 Jan 2025 11:40)  T(F): 98.8 (14 Jan 2025 11:40), Max: 98.8 (14 Jan 2025 11:40)  HR: 84 (14 Jan 2025 11:00) (58 - 86)  BP: 114/73 (14 Jan 2025 11:00) (77/62 - 123/68)  BP(mean): 84 (14 Jan 2025 11:00) (69 - 95)  RR: 19 (14 Jan 2025 11:00) (9 - 26)  SpO2: 91% (14 Jan 2025 11:00) (84% - 97%)    Parameters below as of 14 Jan 2025 08:33  Patient On (Oxygen Delivery Method): nasal cannula w/ humidification    PHYSICAL EXAMINATION:  ----------------------------------------  General appearance: No acute distress, Awake, Alert  HEENT: Normocephalic, Atraumatic, Conjunctiva clear, EOMI  Neck: Supple, No JVD, No tenderness  Lungs: Breath sound equal bilaterally, No wheezes, No rales, Left sided chest tube  Cardiovascular: S1S2, Regular rhythm  Abdomen: Soft, Nontender, Nondistended, No guarding/rebound, Positive bowel sounds  Extremities: No clubbing, No cyanosis, No edema, No calf tenderness, Stasis changes  Neuro: Strength equal bilaterally, No tremors  Psychiatric: Appropriate mood, Normal affect    LABORATORY STUDIES:  ----------------------------------------             10.9   11.67 )-----------( 112      ( 14 Jan 2025 04:50 )             33.7     01-14    145  |  109[H]  |  45.7[H]  ----------------------------<  107[H]  3.9   |  24.0  |  1.91[H]    Ca    8.6      14 Jan 2025 04:50  Phos  3.8     01-14  Mg     2.7     01-14    TPro  6.4[L]  /  Alb  2.8[L]  /  TBili  2.0  /  DBili  x   /  AST  167[H]  /  ALT  58[H]  /  AlkPhos  127[H]  01-14    LIVER FUNCTIONS - ( 14 Jan 2025 04:50 )  Alb: 2.8 g/dL / Pro: 6.4 g/dL / ALK PHOS: 127 U/L / ALT: 58 U/L / AST: 167 U/L / GGT: x           PT/INR - ( 13 Jan 2025 14:14 )   PT: 18.6 sec;   INR: 1.61 ratio    PTT - ( 13 Jan 2025 14:14 )  PTT:35.7 sec    Urinalysis Basic - ( 14 Jan 2025 04:50 )  Color: x / Appearance: x / SG: x / pH: x  Gluc: 107 mg/dL / Ketone: x  / Bili: x / Urobili: x   Blood: x / Protein: x / Nitrite: x   Leuk Esterase: x / RBC: x / WBC x   Sq Epi: x / Non Sq Epi: x / Bacteria: x    Culture - Body Fluid with Gram Stain (collected 11 Jan 2025 21:05)  Source: Pleural Fl  Gram Stain (12 Jan 2025 02:37):    polymorphonuclear leukocytes seen    No organisms seen    by cytocentrifuge  Preliminary Report (12 Jan 2025 21:44):    No growth to date.    Culture - Blood (collected 11 Jan 2025 18:25)  Source: .Blood BLOOD  Preliminary Report (14 Jan 2025 06:01):    No growth at 48 Hours    Culture - Blood (collected 11 Jan 2025 18:21)  Source: .Blood BLOOD  Preliminary Report (14 Jan 2025 06:01):    No growth at 48 Hours    MEDICATIONS  (STANDING):  acetylcysteine 10%  Inhalation 4 milliLiter(s) Inhalation every 6 hours  albuterol/ipratropium for Nebulization 3 milliLiter(s) Nebulizer every 6 hours  atorvastatin 40 milliGRAM(s) Oral at bedtime  buMETAnide Injectable 1 milliGRAM(s) IV Push every 8 hours  heparin   Injectable 5000 Unit(s) SubCutaneous every 8 hours  lactulose Syrup 15 Gram(s) Oral two times a day  pantoprazole  Injectable 40 milliGRAM(s) IV Push daily  piperacillin/tazobactam IVPB.. 3.375 Gram(s) IV Intermittent every 12 hours  potassium chloride   Powder 40 milliEquivalent(s) Oral daily  rifAXIMin 550 milliGRAM(s) Oral two times a day  spironolactone 50 milliGRAM(s) Oral daily    MEDICATIONS  (PRN):      ASSESSMENT / PLAN:  ----------------------------------------  74M with a history of hepatitis C and cirrhosis who initially presented with abdominal discomfort and dyspnea found to have hypoxia requiring supplemental oxygen. He was previously evaluated in the emergency department ten days prior and noted to have a hepatic mass. The patient developed respiratory distress and was noted to have cardiac arrest. Epinephrine was administered with return of spontaneous circulation and he was intubated. The patient was admitted to the intensive care unit for management. Antibiotics were initiated for pneumonia and a left sided chest tube was insert for a pleural effusion. Vasopressor support was weaned and the patient was extubated.     Pneumonia / Acute hypoxic respiratory failure  - CT chest noted a moderate loculated left pleural effusion with associated passive atelectasis, airspace disease in the right upper lobe, and to a lesser degree the anterior left upper lobe, no pulmonary embolism  - Extubated to high flow nasal oxygen  - To taper as tolerated  - On piperacillin/tazobactam  - Blood cultures were without growth    Pleural effusion  - Chest tube inserted (1/11) with initial drainage of 1500ml of fluid  - To continue monitoring output  - Thoracic Surgery evaluation noted    Cardiac arrest  - Return of spontaneous circulation after administration of epinephrine  - Thought to be secondary to hypoxia  - Echocardiogram noted normal left ventricular systolic function with an ejection fraction of 56%  - Telemetry monitoring    Cirrhosis / Liver mass  - CT abdomen noted a large mass with ill-defined borders again, left portal vein thrombosis, likely tumor thrombus, moderate ascites, large paraesophageal varices   and gastrorenal shunt  LYMPH NODES: A few enlarged periportal lymph nodes, for example measuring   2.7 x 1.5 cm in the portacaval space (3; 53).  ABDOMINAL WALL: Ventral abdominal wall mesh in place.  BONES: No lytic or blastic bony lesion. Severe T7 wedge compression   deformity and fusion of the T6, T7 and T8 vertebral bodies. There is also   bilateral fusion of the costovertebral joints at the T6 and T7 levels.    IMPRESSION:  No pulmonary embolism.    Moderate left pleural effusion, increased compared with January 01, 2025.    Airspace opacities in both upper lobes, right side greater than left,   raising the possibility of pneumonia.    Cirrhosis and portal hypertension.    Large ill-defined hepatic mass again noted and suspicious for   infiltrating hepatocellular carcinoma.    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    Moderate ascites, increased in volume.    Malpositioned nasogastric tube, tip in the mid esophagus.    Enlarged portacaval lymph node nodes.      #In-hospital brief periintubation cardiac arrest  #Acute hypoxic hypercapnic respiratory failure  #Bilateral upper lobe aspiration pneumonia  #Left-sided pleural effusion  #Encephalopathy: Multifactorial hyperactive #delirium/hypoxic/possible component of hepatic  #Hepatic mass: Needs further evaluation  #Acute kidney injury with metabolic acidosis  #Transaminitis  #MARY    ====================== NEUROLOGY=====================  dexMEDEtomidine Infusion 0.2 MICROgram(s)/kG/Hr (5.47 mL/Hr) IV Continuous <Continuous>  - A&Ox3  - Dc precedex   - Optimize sleep/wake cycle  - Delirium precautions   - PT consult placed    ==================== RESPIRATORY======================  acetylcysteine 10%  Inhalation 4 milliLiter(s) Inhalation every 6 hours  albuterol/ipratropium for Nebulization 3 milliLiter(s) Nebulizer every 6 hours  albuterol/ipratropium for Nebulization 3 milliLiter(s) Nebulizer every 20 minutes  - On HF  - S/p L chest tube by CT surgery  - Chest PT   - Duoneb Q6  - Mucomyst Q6  - Bumex 1 mg Q8  - C/w Zosyn     ====================CARDIOVASCULAR==================  buMETAnide Injectable 1 milliGRAM(s) IV Push every 8 hours  spironolactone 50 milliGRAM(s) Oral daily  - Not on pressors     ===================HEMATOLOGIC/ONC ===================  heparin   Injectable 5000 Unit(s) SubCutaneous every 8 hours  - Possible HCC and L portal vein tumor thrombus  - Trend liver enzyme    ===================== RENAL ==========================  - MARY  - Lobo with good urine output  Continue monitoring urine output  Avoid Nephrotoxic agents   Adjusting medications for renal  function   Replacing electrolytes as needed with Goal K> 4, PO> 3, Mg> 2    ==================== GASTROINTESTINAL===================  lactulose Syrup 15 Gram(s) Oral two times a day  pantoprazole  Injectable 40 milliGRAM(s) IV Push daily  potassium chloride   Powder 40 milliEquivalent(s) Oral daily  - Hx of cirrhosis with distended abd  - C/w rifaximin and lactulose    Diet: NPO with tube feed  =======================    ENDOCRINE  =====================  atorvastatin 40 milliGRAM(s) Oral at bedtime  dexAMETHasone  Injectable 4 milliGRAM(s) IV Push every 6 hours    ========================INFECTIOUS DISEASE================  piperacillin/tazobactam IVPB.. 3.375 Gram(s) IV Intermittent every 8 hours  rifAXIMin 550 milliGRAM(s) Oral two times a day  - F/u chest tube cytopathology   - Zosyn as above    ____________________________________________      Patient stable for downgrade. Case discussed with Dr. Lenny Otto.  DAMON TODDZQUEZ  ----------------------------------------  The patient was seen earlier at bedside. Patient with cardiac arrest and respiratory failure. Reported feeling better, denied chest pain or palpitations.    Vital Signs Last 24 Hrs  T(C): 37.1 (14 Jan 2025 11:40), Max: 37.1 (14 Jan 2025 11:40)  T(F): 98.8 (14 Jan 2025 11:40), Max: 98.8 (14 Jan 2025 11:40)  HR: 84 (14 Jan 2025 11:00) (58 - 86)  BP: 114/73 (14 Jan 2025 11:00) (77/62 - 123/68)  BP(mean): 84 (14 Jan 2025 11:00) (69 - 95)  RR: 19 (14 Jan 2025 11:00) (9 - 26)  SpO2: 91% (14 Jan 2025 11:00) (84% - 97%)    Parameters below as of 14 Jan 2025 08:33  Patient On (Oxygen Delivery Method): nasal cannula w/ humidification    PHYSICAL EXAMINATION:  ----------------------------------------  General appearance: No acute distress, Awake, Alert  HEENT: Normocephalic, Atraumatic, Conjunctiva clear, EOMI  Neck: Supple, No JVD, No tenderness  Lungs: Breath sound equal bilaterally, No wheezes, No rales, Left sided chest tube  Cardiovascular: S1S2, Regular rhythm  Abdomen: Soft, Nontender, Nondistended, No guarding/rebound, Positive bowel sounds  Extremities: No clubbing, No cyanosis, No edema, No calf tenderness, Stasis changes  Neuro: Strength equal bilaterally, No tremors  Psychiatric: Appropriate mood, Normal affect    LABORATORY STUDIES:  ----------------------------------------             10.9   11.67 )-----------( 112      ( 14 Jan 2025 04:50 )             33.7     01-14    145  |  109[H]  |  45.7[H]  ----------------------------<  107[H]  3.9   |  24.0  |  1.91[H]    Ca    8.6      14 Jan 2025 04:50  Phos  3.8     01-14  Mg     2.7     01-14    TPro  6.4[L]  /  Alb  2.8[L]  /  TBili  2.0  /  DBili  x   /  AST  167[H]  /  ALT  58[H]  /  AlkPhos  127[H]  01-14    LIVER FUNCTIONS - ( 14 Jan 2025 04:50 )  Alb: 2.8 g/dL / Pro: 6.4 g/dL / ALK PHOS: 127 U/L / ALT: 58 U/L / AST: 167 U/L / GGT: x           PT/INR - ( 13 Jan 2025 14:14 )   PT: 18.6 sec;   INR: 1.61 ratio    PTT - ( 13 Jan 2025 14:14 )  PTT:35.7 sec    Urinalysis Basic - ( 14 Jan 2025 04:50 )  Color: x / Appearance: x / SG: x / pH: x  Gluc: 107 mg/dL / Ketone: x  / Bili: x / Urobili: x   Blood: x / Protein: x / Nitrite: x   Leuk Esterase: x / RBC: x / WBC x   Sq Epi: x / Non Sq Epi: x / Bacteria: x    Culture - Body Fluid with Gram Stain (collected 11 Jan 2025 21:05)  Source: Pleural Fl  Gram Stain (12 Jan 2025 02:37):    polymorphonuclear leukocytes seen    No organisms seen    by cytocentrifuge  Preliminary Report (12 Jan 2025 21:44):    No growth to date.    Culture - Blood (collected 11 Jan 2025 18:25)  Source: .Blood BLOOD  Preliminary Report (14 Jan 2025 06:01):    No growth at 48 Hours    Culture - Blood (collected 11 Jan 2025 18:21)  Source: .Blood BLOOD  Preliminary Report (14 Jan 2025 06:01):    No growth at 48 Hours    MEDICATIONS  (STANDING):  acetylcysteine 10%  Inhalation 4 milliLiter(s) Inhalation every 6 hours  albuterol/ipratropium for Nebulization 3 milliLiter(s) Nebulizer every 6 hours  atorvastatin 40 milliGRAM(s) Oral at bedtime  buMETAnide Injectable 1 milliGRAM(s) IV Push every 8 hours  heparin   Injectable 5000 Unit(s) SubCutaneous every 8 hours  lactulose Syrup 15 Gram(s) Oral two times a day  pantoprazole  Injectable 40 milliGRAM(s) IV Push daily  piperacillin/tazobactam IVPB.. 3.375 Gram(s) IV Intermittent every 12 hours  potassium chloride   Powder 40 milliEquivalent(s) Oral daily  rifAXIMin 550 milliGRAM(s) Oral two times a day  spironolactone 50 milliGRAM(s) Oral daily      ASSESSMENT / PLAN:  ----------------------------------------  74M with a history of hepatitis C and cirrhosis who initially presented with abdominal discomfort and dyspnea found to have hypoxia requiring supplemental oxygen. He was previously evaluated in the emergency department ten days prior and noted to have a hepatic mass. The patient developed respiratory distress and was noted to have cardiac arrest. Epinephrine was administered with return of spontaneous circulation and he was intubated. The patient was admitted to the intensive care unit for management. Antibiotics were initiated for pneumonia and a left sided chest tube was insert for a pleural effusion. Vasopressor support was weaned and the patient was extubated.     Pneumonia / Acute hypoxic respiratory failure  - CT chest noted a moderate loculated left pleural effusion with associated passive atelectasis, airspace disease in the right upper lobe, and to a lesser degree the anterior left upper lobe, no pulmonary embolism  - Extubated to high flow nasal oxygen  - To taper as tolerated  - On piperacillin/tazobactam  - Blood cultures were without growth    Pleural effusion  - Chest tube inserted (1/11) with initial drainage of 1500ml of fluid  - To continue monitoring output  - Thoracic Surgery evaluation noted    Cardiac arrest  - Return of spontaneous circulation after administration of epinephrine  - Thought to be secondary to hypoxia  - Echocardiogram noted normal left ventricular systolic function with an ejection fraction of 56%  - Telemetry monitoring    Cirrhosis / Ascites / Liver mass  - CT abdomen noted a large mass with ill-defined borders again, left portal vein thrombosis, likely tumor thrombus, moderate ascites, large paraesophageal varices   and gastrorenal shunt  - Suspicion of malignancy with elevated alpha fetoprotein level  - Patient reported to have been previously treated for hepatitis C with Epclusa (2023)  - On furosemide and spironolactone  - Ammonia level mildly elevated    Acute kidney injury  - For discontinuation of bumetanide, transition to furosemide  - To monitor renal function and urine output        Dispo: Anticipated eventual discharge home pending improvement in symptoms

## 2025-01-14 NOTE — PROGRESS NOTE ADULT - ASSESSMENT
74-year-old  male with history of hepatitis C virus with cirrhosis tobacco use disorder recent admission to ED with abd pain and melena which was transient. Presented on January 11 with difficulty breathing admitted to MICU with Acute hypoxic hypercapnic respiratory failure. Patient found to be in cardiac arrest, ACLS initiated, 1 mg of epinephrine given.  Patient subsequently intubated and then achieve return of spontaneous circulation.    #In-hospital brief periintubation cardiac arrest  #Acute hypoxic hypercapnic respiratory failure  #Bilateral upper lobe aspiration pneumonia  #Left-sided pleural effusion  #Encephalopathy: Multifactorial hyperactive #delirium/hypoxic/possible component of hepatic  #Hepatic mass: Needs further evaluation  #Acute kidney injury with metabolic acidosis  #Transaminitis      ====================== NEUROLOGY=====================  dexMEDEtomidine Infusion 0.2 MICROgram(s)/kG/Hr (5.47 mL/Hr) IV Continuous <Continuous>    ==================== RESPIRATORY======================  Mechanical Ventilation:    acetylcysteine 10%  Inhalation 4 milliLiter(s) Inhalation every 6 hours  albuterol/ipratropium for Nebulization 3 milliLiter(s) Nebulizer every 6 hours  albuterol/ipratropium for Nebulization 3 milliLiter(s) Nebulizer every 20 minutes    ====================CARDIOVASCULAR==================  buMETAnide Injectable 1 milliGRAM(s) IV Push every 8 hours  spironolactone 50 milliGRAM(s) Oral daily    ===================HEMATOLOGIC/ONC ===================  heparin   Injectable 5000 Unit(s) SubCutaneous every 8 hours    ===================== RENAL ==========================    Continue monitoring urine output  Avoid Nephrotoxic agents   Adjusting medications for renal  function   Replacing electrolytes as needed with Goal K> 4, PO> 3, Mg> 2  ==================== GASTROINTESTINAL===================  lactulose Syrup 15 Gram(s) Oral two times a day  pantoprazole  Injectable 40 milliGRAM(s) IV Push daily  potassium chloride   Powder 40 milliEquivalent(s) Oral daily    Diet:  =======================    ENDOCRINE  =====================  atorvastatin 40 milliGRAM(s) Oral at bedtime  dexAMETHasone  Injectable 4 milliGRAM(s) IV Push every 6 hours    ========================INFECTIOUS DISEASE================  piperacillin/tazobactam IVPB.. 3.375 Gram(s) IV Intermittent every 8 hours  rifAXIMin 550 milliGRAM(s) Oral two times a day      ____________________________________________      Patient requires continuous monitoring  Care plan discussed with ICU care team, family and consultants  Patient remained critical; I have spent 50 minutes providing non-routine care, revaluated multiple times during the day.   74-year-old  male with history of hepatitis C virus with cirrhosis tobacco use disorder recent admission to ED with abd pain and melena which was transient. Presented on January 11 with difficulty breathing admitted to MICU with Acute hypoxic hypercapnic respiratory failure. Patient found to be in cardiac arrest, ACLS initiated, 1 mg of epinephrine given.  Patient subsequently intubated and then achieve return of spontaneous circulation. CT with liver mass possible HCC, L portal vein tumor thrombus, L-sided PNA with pleural effusion s/p PTC placement (1/11), course c/b hypotension, sepsis.  Pt extubated 1/13 with severe respiratory distress with stridor postextubation, improved s/p racemic epi DuoNeb and decadron. Currently on HF, with NG tube.     #In-hospital brief periintubation cardiac arrest  #Acute hypoxic hypercapnic respiratory failure  #Bilateral upper lobe aspiration pneumonia  #Left-sided pleural effusion  #Encephalopathy: Multifactorial hyperactive #delirium/hypoxic/possible component of hepatic  #Hepatic mass: Needs further evaluation  #Acute kidney injury with metabolic acidosis  #Transaminitis  #MARY    ====================== NEUROLOGY=====================  dexMEDEtomidine Infusion 0.2 MICROgram(s)/kG/Hr (5.47 mL/Hr) IV Continuous <Continuous>  - A&Ox3  - Dc precedex   - Optimize sleep/wake cycle  - Delirium precautions     ==================== RESPIRATORY======================  acetylcysteine 10%  Inhalation 4 milliLiter(s) Inhalation every 6 hours  albuterol/ipratropium for Nebulization 3 milliLiter(s) Nebulizer every 6 hours  albuterol/ipratropium for Nebulization 3 milliLiter(s) Nebulizer every 20 minutes  - On HF  - S/p L chest tube by CT surgery  - Chest PT   - Duoneb Q6  - Mucomyst Q6  - Bumex 1 mg Q8  - C/w Zosyn     ====================CARDIOVASCULAR==================  buMETAnide Injectable 1 milliGRAM(s) IV Push every 8 hours  spironolactone 50 milliGRAM(s) Oral daily  - Not on pressors     ===================HEMATOLOGIC/ONC ===================  heparin   Injectable 5000 Unit(s) SubCutaneous every 8 hours  - Possible HCC and L portal vein tumor thrombus  - Trend liver enzyme    ===================== RENAL ==========================  - MARY  - Lobo with good urine output  Continue monitoring urine output  Avoid Nephrotoxic agents   Adjusting medications for renal  function   Replacing electrolytes as needed with Goal K> 4, PO> 3, Mg> 2  ==================== GASTROINTESTINAL===================  lactulose Syrup 15 Gram(s) Oral two times a day  pantoprazole  Injectable 40 milliGRAM(s) IV Push daily  potassium chloride   Powder 40 milliEquivalent(s) Oral daily  - Hx of cirrhosis with distended abd  - C/w rifaximin and lactulose    Diet: NPO with tube feed  =======================    ENDOCRINE  =====================  atorvastatin 40 milliGRAM(s) Oral at bedtime  dexAMETHasone  Injectable 4 milliGRAM(s) IV Push every 6 hours    ========================INFECTIOUS DISEASE================  piperacillin/tazobactam IVPB.. 3.375 Gram(s) IV Intermittent every 8 hours  rifAXIMin 550 milliGRAM(s) Oral two times a day  - F/u chest tube cytopathology   - Zosyn as above    ____________________________________________      Patient stable for downgrade. Case discussed with Dr. Lenny Otto.  74-year-old  male with history of hepatitis C virus with cirrhosis tobacco use disorder recent admission to ED with abd pain and melena which was transient. Presented on January 11 with difficulty breathing admitted to MICU with Acute hypoxic hypercapnic respiratory failure. Patient found to be in cardiac arrest, ACLS initiated, 1 mg of epinephrine given.  Patient subsequently intubated and then achieve return of spontaneous circulation. CT with liver mass possible HCC, L portal vein tumor thrombus, L-sided PNA with pleural effusion s/p PTC placement (1/11), course c/b hypotension, sepsis.  Pt extubated 1/13 with severe respiratory distress with stridor postextubation, improved s/p racemic epi DuoNeb and decadron. Currently on HF, with NG tube.     #In-hospital brief periintubation cardiac arrest  #Acute hypoxic hypercapnic respiratory failure  #Bilateral upper lobe aspiration pneumonia  #Left-sided pleural effusion  #Encephalopathy: Multifactorial hyperactive #delirium/hypoxic/possible component of hepatic  #Hepatic mass: Needs further evaluation  #Acute kidney injury with metabolic acidosis  #Transaminitis  #MARY    ====================== NEUROLOGY=====================  dexMEDEtomidine Infusion 0.2 MICROgram(s)/kG/Hr (5.47 mL/Hr) IV Continuous <Continuous>  - A&Ox3  - Dc precedex   - Optimize sleep/wake cycle  - Delirium precautions   - PT consult placed    ==================== RESPIRATORY======================  acetylcysteine 10%  Inhalation 4 milliLiter(s) Inhalation every 6 hours  albuterol/ipratropium for Nebulization 3 milliLiter(s) Nebulizer every 6 hours  albuterol/ipratropium for Nebulization 3 milliLiter(s) Nebulizer every 20 minutes  - On HF  - S/p L chest tube by CT surgery  - Chest PT   - Duoneb Q6  - Mucomyst Q6  - Bumex 1 mg Q8  - C/w Zosyn     ====================CARDIOVASCULAR==================  buMETAnide Injectable 1 milliGRAM(s) IV Push every 8 hours  spironolactone 50 milliGRAM(s) Oral daily  - Not on pressors     ===================HEMATOLOGIC/ONC ===================  heparin   Injectable 5000 Unit(s) SubCutaneous every 8 hours  - Possible HCC and L portal vein tumor thrombus  - Trend liver enzyme    ===================== RENAL ==========================  - MARY  - Lobo with good urine output  Continue monitoring urine output  Avoid Nephrotoxic agents   Adjusting medications for renal  function   Replacing electrolytes as needed with Goal K> 4, PO> 3, Mg> 2    ==================== GASTROINTESTINAL===================  lactulose Syrup 15 Gram(s) Oral two times a day  pantoprazole  Injectable 40 milliGRAM(s) IV Push daily  potassium chloride   Powder 40 milliEquivalent(s) Oral daily  - Hx of cirrhosis with distended abd  - C/w rifaximin and lactulose    Diet: NPO with tube feed  =======================    ENDOCRINE  =====================  atorvastatin 40 milliGRAM(s) Oral at bedtime  dexAMETHasone  Injectable 4 milliGRAM(s) IV Push every 6 hours    ========================INFECTIOUS DISEASE================  piperacillin/tazobactam IVPB.. 3.375 Gram(s) IV Intermittent every 8 hours  rifAXIMin 550 milliGRAM(s) Oral two times a day  - F/u chest tube cytopathology   - Zosyn as above    ____________________________________________      Patient stable for downgrade. Case discussed with Dr. Lenny Otto.

## 2025-01-14 NOTE — SWALLOW BEDSIDE ASSESSMENT ADULT - SLP GENERAL OBSERVATIONS
Pt recd a&ox3, cooperative, HFNC in place, SpO2 @ 93%, cough @ baseline, NAD, 0/10 pain scale pre/post, pt declining  and communicating in English.

## 2025-01-14 NOTE — SWALLOW BEDSIDE ASSESSMENT ADULT - SWALLOW EVAL: PATIENT/FAMILY GOALS STATEMENT
Chief Complaint   Patient presents with     Urgent Care     URI     Per patient symptoms started last evening, symptoms sore throat, body aches, fever, headaches, and post nasal drip. Patient has taken home covid test it was negative and advil        ASSESSMENT/PLAN:  Ave was seen today for urgent care and uri.    Diagnoses and all orders for this visit:    Strep throat  -     cephALEXin (KEFLEX) 500 MG capsule; Take 1 capsule (500 mg) by mouth 2 times daily for 10 days    Throat pain  -     Streptococcus A Rapid Screen w/Reflex to PCR - Clinic Collect    Rapid strep positive.  Start Keflex above due to penicillin allergy.  Tylenol and ibuprofen as needed    Frankie Soria PA-C      SUBJECTIVE:  Ave is a 58 year old female who presents to urgent care with sudden onset of sore throat, body aches, fever and postnasal drainage that started yesterday.  COVID test negative at home.    ROS: Pertinent ROS neg other than the symptoms noted above in the HPI.     OBJECTIVE:  BP (!) 147/90   Pulse 87   Temp (!) 100.6  F (38.1  C) (Tympanic)   Resp 18   Wt 93 kg (205 lb)   LMP 06/24/2012   SpO2 99%   BMI 36.90 kg/m     GENERAL: healthy, alert and no distress  EYES: Eyes grossly normal to inspection, PERRL and conjunctivae and sclerae normal  HENT: ear canals and TM's normal, nose and mouth without ulcers or lesions, oropharynx erythema, tonsils 1+ bilaterally  RESP: lungs clear to auscultation - no rales, rhonchi or wheezes  CV: regular rate and rhythm, normal S1 S2, no S3 or S4, no murmur, click or rub    DIAGNOSTICS    Results for orders placed or performed in visit on 06/13/23   Streptococcus A Rapid Screen w/Reflex to PCR - Clinic Collect     Status: Abnormal    Specimen: Throat; Swab   Result Value Ref Range    Group A Strep antigen Positive (A) Negative        Current Outpatient Medications   Medication     famotidine (PEPCID) 20 MG tablet     levothyroxine (SYNTHROID/LEVOTHROID) 75 MCG tablet      Multiple Vitamin (MULTIVITAMIN ADULT PO)     Vitamin D (Cholecalciferol) 25 MCG (1000 UT) CAPS     No current facility-administered medications for this visit.      Patient Active Problem List   Diagnosis     Lumbar foraminal stenosis     Family history of colon cancer     Hypothyroidism due to acquired atrophy of thyroid     History of colonic polyps      Past Medical History:   Diagnosis Date     Family history of colon cancer     due 2013 for      History of colonic polyps 6/6/2023     Hypothyroid      Hypothyroidism due to acquired atrophy of thyroid 10/15/2015     Lumbar foraminal stenosis 7/17/2012     Sciatic nerve disease      Past Surgical History:   Procedure Laterality Date     APPENDECTOMY  4/11/2011    Dr Lona Duncan     COLONOSCOPY  9-29-08     COLONOSCOPY N/A 8/9/2018    Procedure: COMBINED COLONOSCOPY, SINGLE OR MULTIPLE BIOPSY/POLYPECTOMY BY BIOPSY;;  Surgeon: Lc Cervantes MD;  Location: [facility]     COLONOSCOPY WITH CO2 INSUFFLATION N/A 8/9/2018    Procedure: COLONOSCOPY WITH CO2 INSUFFLATION;  COLON SCREEN/ PATTIE;  Surgeon: Lc Cervantes MD;  Location: [facility]     ENDOSCOPY UPPER, COLONOSCOPY, COMBINED  7/1/2013    Procedure: COMBINED ENDOSCOPY UPPER, COLONOSCOPY;  COLONOSCOPY SCREEN-FAMILY HX OF COLON CANCER/ EGD-UPPER ENDOSCOPY-UPPER GI SYMPTOMS/ PATTIE;  Surgeon: Lc Cervantes MD;  Location: [facility]     ESOPHAGOSCOPY, GASTROSCOPY, DUODENOSCOPY (EGD), COMBINED  7/1/2013    Procedure: COMBINED ESOPHAGOSCOPY, GASTROSCOPY, DUODENOSCOPY (EGD), BIOPSY SINGLE OR MULTIPLE;;  Surgeon: Lc Cervantes MD;  Location: [facility]     Family History   Problem Relation Age of Onset     Breast Cancer Mother      Thyroid Disease Mother      Cancer - colorectal Father      Cancer Father         brain     Diabetes Maternal Grandmother      Cardiovascular Paternal Grandmother      Thyroid Disease Sister      Social History     Tobacco Use     Smoking status: Never      Smokeless tobacco: Never   Vaping Use     Vaping status: Never Used   Substance Use Topics     Alcohol use: Yes     Comment: 5 drinks a week              The plan of care was discussed with the patient. They understand and agree with the course of treatment prescribed. A printed summary was given including instructions and medications.  The use of Dragon/Emida dictation services may have been used to construct the content in this note; any grammatical or spelling errors are non-intentional. Please contact the author of this note directly if you are in need of any clarification.      "I'm so hungry!"

## 2025-01-14 NOTE — PROGRESS NOTE ADULT - SUBJECTIVE AND OBJECTIVE BOX
INTERVAL HPI/OVERNIGHT EVENTS:    SUBJECTIVE: Patient seen and examined at bedside.     ROS: All negative except as listed above.    VITAL SIGNS:  ICU Vital Signs Last 24 Hrs  T(C): 36.7 (14 Jan 2025 06:00), Max: 37.4 (13 Jan 2025 09:30)  T(F): 98.1 (14 Jan 2025 06:00), Max: 99.3 (13 Jan 2025 09:30)  HR: 81 (14 Jan 2025 06:00) (58 - 104)  BP: 114/70 (14 Jan 2025 06:00) (77/62 - 146/87)  BP(mean): 82 (14 Jan 2025 06:00) (69 - 103)  ABP: --  ABP(mean): --  RR: 14 (14 Jan 2025 06:00) (9 - 26)  SpO2: 95% (14 Jan 2025 06:00) (87% - 97%)    O2 Parameters below as of 14 Jan 2025 05:35  Patient On (Oxygen Delivery Method): nasal cannula, high flow, 40% at 40 lpm             Plateau pressure:   P/F ratio:     01-13 @ 07:01  -  01-14 @ 07:00  --------------------------------------------------------  IN: 848.5 mL / OUT: 1360 mL / NET: -511.5 mL      CAPILLARY BLOOD GLUCOSE      POCT Blood Glucose.: 93 mg/dL (12 Jan 2025 16:53)      ECG: reviewed.    PHYSICAL EXAM:    GENERAL: NAD, lying in bed comfortably  HEAD:  Atraumatic, normocephalic  EYES: EOMI, PERRLA, conjunctiva and sclera clear  NECK: Supple, trachea midline, no JVD  HEART: Regular rate and rhythm, no murmurs, rubs, or gallops  LUNGS: Unlabored respirations.  Clear to auscultation bilaterally, no crackles, wheezing, or rhonchi  ABDOMEN: Soft, nontender, nondistended, +BS  EXTREMITIES: 2+ peripheral pulses bilaterally, cap refill<2 secs. No clubbing, cyanosis, or edema  NERVOUS SYSTEM:  A&Ox3, following commands, moving all extremities, no focal deficits   SKIN: No rashes or lesions    MEDICATIONS:  MEDICATIONS  (STANDING):  acetylcysteine 10%  Inhalation 4 milliLiter(s) Inhalation every 6 hours  albuterol/ipratropium for Nebulization 3 milliLiter(s) Nebulizer every 6 hours  albuterol/ipratropium for Nebulization 3 milliLiter(s) Nebulizer every 20 minutes  atorvastatin 40 milliGRAM(s) Oral at bedtime  buMETAnide Injectable 1 milliGRAM(s) IV Push every 8 hours  chlorhexidine 2% Cloths 1 Application(s) Topical <User Schedule>  dexAMETHasone  Injectable 4 milliGRAM(s) IV Push every 6 hours  dexMEDEtomidine Infusion 0.2 MICROgram(s)/kG/Hr (5.47 mL/Hr) IV Continuous <Continuous>  heparin   Injectable 5000 Unit(s) SubCutaneous every 8 hours  lactulose Syrup 15 Gram(s) Oral two times a day  pantoprazole  Injectable 40 milliGRAM(s) IV Push daily  piperacillin/tazobactam IVPB.. 3.375 Gram(s) IV Intermittent every 8 hours  potassium chloride   Powder 40 milliEquivalent(s) Oral daily  rifAXIMin 550 milliGRAM(s) Oral two times a day  spironolactone 50 milliGRAM(s) Oral daily    MEDICATIONS  (PRN):      ALLERGIES:  Allergies    No Known Allergies    Intolerances        LABS:                        10.9   11.67 )-----------( 112      ( 14 Jan 2025 04:50 )             33.7     01-14    145  |  109[H]  |  45.7[H]  ----------------------------<  107[H]  3.9   |  24.0  |  1.91[H]    Ca    8.6      14 Jan 2025 04:50  Phos  3.8     01-14  Mg     2.7     01-14    TPro  6.4[L]  /  Alb  2.8[L]  /  TBili  2.0  /  DBili  x   /  AST  167[H]  /  ALT  58[H]  /  AlkPhos  127[H]  01-14    PT/INR - ( 13 Jan 2025 14:14 )   PT: 18.6 sec;   INR: 1.61 ratio         PTT - ( 13 Jan 2025 14:14 )  PTT:35.7 sec  Urinalysis Basic - ( 14 Jan 2025 04:50 )    Color: x / Appearance: x / SG: x / pH: x  Gluc: 107 mg/dL / Ketone: x  / Bili: x / Urobili: x   Blood: x / Protein: x / Nitrite: x   Leuk Esterase: x / RBC: x / WBC x   Sq Epi: x / Non Sq Epi: x / Bacteria: x      ABG:  pO2, Arterial: 123 mmHg (01-13-25 @ 14:00)  pH, Arterial: 7.390 (01-13-25 @ 14:00)  pCO2, Arterial: 42 mmHg (01-13-25 @ 14:00)      vBG:    Micro:    Culture - Blood (collected 01-11-25 @ 18:25)  Source: .Blood BLOOD  Preliminary Report (01-14-25 @ 06:01):    No growth at 48 Hours    Culture - Blood (collected 01-11-25 @ 18:21)  Source: .Blood BLOOD  Preliminary Report (01-14-25 @ 06:01):    No growth at 48 Hours          RADIOLOGY & ADDITIONAL TESTS: Reviewed.   INTERVAL HPI/OVERNIGHT EVENTS:  - Extubated    SUBJECTIVE: Patient seen and examined at bedside.     ROS: All negative except as listed above.    VITAL SIGNS:  ICU Vital Signs Last 24 Hrs  T(C): 36.7 (14 Jan 2025 06:00), Max: 37.4 (13 Jan 2025 09:30)  T(F): 98.1 (14 Jan 2025 06:00), Max: 99.3 (13 Jan 2025 09:30)  HR: 81 (14 Jan 2025 06:00) (58 - 104)  BP: 114/70 (14 Jan 2025 06:00) (77/62 - 146/87)  BP(mean): 82 (14 Jan 2025 06:00) (69 - 103)  ABP: --  ABP(mean): --  RR: 14 (14 Jan 2025 06:00) (9 - 26)  SpO2: 95% (14 Jan 2025 06:00) (87% - 97%)    O2 Parameters below as of 14 Jan 2025 05:35  Patient On (Oxygen Delivery Method): nasal cannula, high flow, 40% at 40 lpm             Plateau pressure:   P/F ratio:     01-13 @ 07:01  -  01-14 @ 07:00  --------------------------------------------------------  IN: 848.5 mL / OUT: 1360 mL / NET: -511.5 mL      CAPILLARY BLOOD GLUCOSE      POCT Blood Glucose.: 93 mg/dL (12 Jan 2025 16:53)      ECG: reviewed.    PHYSICAL EXAM:    GENERAL: NAD, lying in bed comfortably  HEAD:  Atraumatic, normocephalic  EYES: EOMI, PERRLA, conjunctiva and sclera clear  NECK: Supple, trachea midline, no JVD  HEART: Regular rate and rhythm, no murmurs, rubs, or gallops  LUNGS: Unlabored respirations.  Clear to auscultation bilaterally, no crackles, wheezing, or rhonchi  ABDOMEN: Soft, nontender, nondistended, +BS  EXTREMITIES: 2+ peripheral pulses bilaterally, cap refill<2 secs. No clubbing, cyanosis, or edema  NERVOUS SYSTEM:  A&Ox3, following commands, moving all extremities, no focal deficits   SKIN: No rashes or lesions    MEDICATIONS:  MEDICATIONS  (STANDING):  acetylcysteine 10%  Inhalation 4 milliLiter(s) Inhalation every 6 hours  albuterol/ipratropium for Nebulization 3 milliLiter(s) Nebulizer every 6 hours  albuterol/ipratropium for Nebulization 3 milliLiter(s) Nebulizer every 20 minutes  atorvastatin 40 milliGRAM(s) Oral at bedtime  buMETAnide Injectable 1 milliGRAM(s) IV Push every 8 hours  chlorhexidine 2% Cloths 1 Application(s) Topical <User Schedule>  dexAMETHasone  Injectable 4 milliGRAM(s) IV Push every 6 hours  dexMEDEtomidine Infusion 0.2 MICROgram(s)/kG/Hr (5.47 mL/Hr) IV Continuous <Continuous>  heparin   Injectable 5000 Unit(s) SubCutaneous every 8 hours  lactulose Syrup 15 Gram(s) Oral two times a day  pantoprazole  Injectable 40 milliGRAM(s) IV Push daily  piperacillin/tazobactam IVPB.. 3.375 Gram(s) IV Intermittent every 8 hours  potassium chloride   Powder 40 milliEquivalent(s) Oral daily  rifAXIMin 550 milliGRAM(s) Oral two times a day  spironolactone 50 milliGRAM(s) Oral daily    MEDICATIONS  (PRN):      ALLERGIES:  Allergies    No Known Allergies    Intolerances        LABS:                        10.9   11.67 )-----------( 112      ( 14 Jan 2025 04:50 )             33.7     01-14    145  |  109[H]  |  45.7[H]  ----------------------------<  107[H]  3.9   |  24.0  |  1.91[H]    Ca    8.6      14 Jan 2025 04:50  Phos  3.8     01-14  Mg     2.7     01-14    TPro  6.4[L]  /  Alb  2.8[L]  /  TBili  2.0  /  DBili  x   /  AST  167[H]  /  ALT  58[H]  /  AlkPhos  127[H]  01-14    PT/INR - ( 13 Jan 2025 14:14 )   PT: 18.6 sec;   INR: 1.61 ratio         PTT - ( 13 Jan 2025 14:14 )  PTT:35.7 sec  Urinalysis Basic - ( 14 Jan 2025 04:50 )    Color: x / Appearance: x / SG: x / pH: x  Gluc: 107 mg/dL / Ketone: x  / Bili: x / Urobili: x   Blood: x / Protein: x / Nitrite: x   Leuk Esterase: x / RBC: x / WBC x   Sq Epi: x / Non Sq Epi: x / Bacteria: x      ABG:  pO2, Arterial: 123 mmHg (01-13-25 @ 14:00)  pH, Arterial: 7.390 (01-13-25 @ 14:00)  pCO2, Arterial: 42 mmHg (01-13-25 @ 14:00)      vBG:    Micro:    Culture - Blood (collected 01-11-25 @ 18:25)  Source: .Blood BLOOD  Preliminary Report (01-14-25 @ 06:01):    No growth at 48 Hours    Culture - Blood (collected 01-11-25 @ 18:21)  Source: .Blood BLOOD  Preliminary Report (01-14-25 @ 06:01):    No growth at 48 Hours          RADIOLOGY & ADDITIONAL TESTS: Reviewed.   INTERVAL HPI/OVERNIGHT EVENTS:  - Extubated yesterday, stridor improved s/p racemic epi and decadron  - Continue on HF  - Decadron and precedex dc  - Downgrade to step down     SUBJECTIVE: Patient seen and examined at bedside.     ROS: All negative except as listed above.    VITAL SIGNS:  ICU Vital Signs Last 24 Hrs  T(C): 36.7 (14 Jan 2025 06:00), Max: 37.4 (13 Jan 2025 09:30)  T(F): 98.1 (14 Jan 2025 06:00), Max: 99.3 (13 Jan 2025 09:30)  HR: 81 (14 Jan 2025 06:00) (58 - 104)  BP: 114/70 (14 Jan 2025 06:00) (77/62 - 146/87)  BP(mean): 82 (14 Jan 2025 06:00) (69 - 103)  ABP: --  ABP(mean): --  RR: 14 (14 Jan 2025 06:00) (9 - 26)  SpO2: 95% (14 Jan 2025 06:00) (87% - 97%)    O2 Parameters below as of 14 Jan 2025 05:35  Patient On (Oxygen Delivery Method): nasal cannula, high flow, 40% at 40 lpm             Plateau pressure:   P/F ratio:     01-13 @ 07:01  -  01-14 @ 07:00  --------------------------------------------------------  IN: 848.5 mL / OUT: 1360 mL / NET: -511.5 mL      CAPILLARY BLOOD GLUCOSE      POCT Blood Glucose.: 93 mg/dL (12 Jan 2025 16:53)      ECG: reviewed.    PHYSICAL EXAM:    HEENT: NC/AT. Moist mucous membranes.  Eyes: No scleral icterus. EOMI.  Neck:. Soft and supple. Full ROM without pain.  Cardiac: Regular rate and rhythm. +S1/S2. No murmurs. Peripheral pulses 2+ and symmetric. No LE edema.  Resp: +Wheeze bilaterally, no stridor  Abd: Soft, non-tender, +distended. Normal bowel sounds in all 4 quadrants. No guarding or rebound.  Back: Spine midline and non-tender. No CVAT.  Neuro: no gross deficits, moving all extremities, normal speech  Skin: No rashes, abrasions or lacerations.      MEDICATIONS:  MEDICATIONS  (STANDING):  acetylcysteine 10%  Inhalation 4 milliLiter(s) Inhalation every 6 hours  albuterol/ipratropium for Nebulization 3 milliLiter(s) Nebulizer every 6 hours  albuterol/ipratropium for Nebulization 3 milliLiter(s) Nebulizer every 20 minutes  atorvastatin 40 milliGRAM(s) Oral at bedtime  buMETAnide Injectable 1 milliGRAM(s) IV Push every 8 hours  chlorhexidine 2% Cloths 1 Application(s) Topical <User Schedule>  dexAMETHasone  Injectable 4 milliGRAM(s) IV Push every 6 hours  dexMEDEtomidine Infusion 0.2 MICROgram(s)/kG/Hr (5.47 mL/Hr) IV Continuous <Continuous>  heparin   Injectable 5000 Unit(s) SubCutaneous every 8 hours  lactulose Syrup 15 Gram(s) Oral two times a day  pantoprazole  Injectable 40 milliGRAM(s) IV Push daily  piperacillin/tazobactam IVPB.. 3.375 Gram(s) IV Intermittent every 8 hours  potassium chloride   Powder 40 milliEquivalent(s) Oral daily  rifAXIMin 550 milliGRAM(s) Oral two times a day  spironolactone 50 milliGRAM(s) Oral daily    MEDICATIONS  (PRN):      ALLERGIES:  Allergies    No Known Allergies    Intolerances        LABS:                        10.9   11.67 )-----------( 112      ( 14 Jan 2025 04:50 )             33.7     01-14    145  |  109[H]  |  45.7[H]  ----------------------------<  107[H]  3.9   |  24.0  |  1.91[H]    Ca    8.6      14 Jan 2025 04:50  Phos  3.8     01-14  Mg     2.7     01-14    TPro  6.4[L]  /  Alb  2.8[L]  /  TBili  2.0  /  DBili  x   /  AST  167[H]  /  ALT  58[H]  /  AlkPhos  127[H]  01-14    PT/INR - ( 13 Jan 2025 14:14 )   PT: 18.6 sec;   INR: 1.61 ratio         PTT - ( 13 Jan 2025 14:14 )  PTT:35.7 sec  Urinalysis Basic - ( 14 Jan 2025 04:50 )    Color: x / Appearance: x / SG: x / pH: x  Gluc: 107 mg/dL / Ketone: x  / Bili: x / Urobili: x   Blood: x / Protein: x / Nitrite: x   Leuk Esterase: x / RBC: x / WBC x   Sq Epi: x / Non Sq Epi: x / Bacteria: x      ABG:  pO2, Arterial: 123 mmHg (01-13-25 @ 14:00)  pH, Arterial: 7.390 (01-13-25 @ 14:00)  pCO2, Arterial: 42 mmHg (01-13-25 @ 14:00)      vBG:    Micro:    Culture - Blood (collected 01-11-25 @ 18:25)  Source: .Blood BLOOD  Preliminary Report (01-14-25 @ 06:01):    No growth at 48 Hours    Culture - Blood (collected 01-11-25 @ 18:21)  Source: .Blood BLOOD  Preliminary Report (01-14-25 @ 06:01):    No growth at 48 Hours          RADIOLOGY & ADDITIONAL TESTS: Reviewed.

## 2025-01-14 NOTE — SWALLOW BEDSIDE ASSESSMENT ADULT - NS ASR SWALLOW FINDINGS DISCUS
07/26/2024 08:13 AM    BUN 22 07/26/2024 08:13 AM    CREATININE 0.8 07/26/2024 08:13 AM    CALCIUM 9.8 07/26/2024 08:13 AM    LABGLOM 86 07/26/2024 08:13 AM    LABGLOM >60 03/23/2024 06:23 AM    GFRAA >60 11/18/2021 10:43 AM      Lab Results   Component Value Date/Time    TSH 1.310 11/28/2022 05:30 PM    T4FREE 1.1 04/15/2021 12:00 AM     Lab Results   Component Value Date/Time    LABA1C 9.2 11/18/2024 08:35 AM    GLUCOSE 339 07/26/2024 08:13 AM    MALBCR Can not be calculated 12/05/2023 09:49 AM    MALBCR  06/23/2023 09:28 AM     Lab Results   Component Value Date/Time    LABA1C 9.2 11/18/2024 08:35 AM    LABA1C 8.9 07/29/2024 08:05 AM    LABA1C 9.3 04/24/2024 10:06 AM     Lab Results   Component Value Date/Time    TRIG 68 05/30/2024 09:22 AM     05/30/2024 09:22 AM    CHOL 247 05/30/2024 09:22 AM     Lab Results   Component Value Date/Time    VITD25 41.6 12/05/2023 09:51 AM    VITD25 53 11/28/2022 05:30 PM       ASSESSMENT & RECOMMENDATIONS   Bel BARKER McFaizaas, a 67 y.o.-old female seen in for the following issues       Assessment:      Diagnosis Orders   1. Type 1 diabetes mellitus with hyperglycemia (HCC)  POCT glycosylated hemoglobin (Hb A1C)    Insulin Disposable Pump (OMNIPOD 5 [id number] INTRO GEN 5) KIT    Insulin Disposable Pump (OMNIPOD 5 [id number] PODS GEN 5) MISC    Continuous Glucose Sensor (DEXCOM G7 SENSOR) MISC    insulin aspart (NOVOLOG) 100 UNIT/ML injection vial    GLUCOSE MONITOR, 72 HOUR, PHYS INTERP      2. Insulin pump in place        3. Vitamin D deficiency        4. Multinodular goiter        5. Pure hypercholesterolemia        6. Type 1 diabetes mellitus with complication (HCC)  insulin aspart (NOVOLOG) 100 UNIT/ML injection vial                Plan:     1. Type 1 diabetes mellitus with complication (HCC)   Patient's diabetes is uncontrolled and is very brittle   Hemoglobin A1c 9.2%  will slightly adjust insulin pump settings to Basal rate 12 AM 0.1, 7 AM 0.55,2pm 0.3, 9 PM 0.1, 
Nursing/Patient

## 2025-01-14 NOTE — SWALLOW BEDSIDE ASSESSMENT ADULT - SLP PERTINENT HISTORY OF CURRENT PROBLEM
As per MD note: "74M with a history of hepatitis C and cirrhosis who initially presented with abdominal discomfort and dyspnea found to have hypoxia requiring supplemental oxygen. He was previously evaluated in the emergency department ten days prior and noted to have a hepatic mass. The patient developed respiratory distress and was noted to have cardiac arrest. Epinephrine was administered with return of spontaneous circulation and he was intubated. The patient was admitted to the intensive care unit for management. Antibiotics were initiated for pneumonia and a left sided chest tube was insert for a pleural effusion. Vasopressor support was weaned and the patient was extubated."

## 2025-01-14 NOTE — PROGRESS NOTE ADULT - ASSESSMENT
74yMale with h/o cirrhosis 2/2 HCV, daily smoker, who presented to [facility] c/o abdominal pain/distention, sob, previously seen for similar.  Pt hypoxic to 80s.  Episode of cardiac arrest in ED, intubated, ROSC achieved.  CT with liver mass possible HCC, L portal vein tumor thrombus, L-sided PNA with pleural effusion s/p PTC placement (1/11), course c/b hypotension, sepsis.  Pt extubated 1/13.  Thoracic Surgery consulted for chest tube management. 1/14 Patient downgraded from MICU.

## 2025-01-14 NOTE — PROGRESS NOTE ADULT - SUBJECTIVE AND OBJECTIVE BOX
Subjective: Patient saturating 94% on nasal cannula today. Restrained. C/O ABD pain. Chest tube still with significant drainage.     Vital Signs:  Vital Signs Last 24 Hrs  T(C): 36.7 (01-14-25 @ 15:42), Max: 37.1 (01-14-25 @ 11:40)  T(F): 98 (01-14-25 @ 15:42), Max: 98.8 (01-14-25 @ 11:40)  HR: 75 (01-14-25 @ 17:00) (58 - 86)  BP: 133/94 (01-14-25 @ 17:00) (88/62 - 133/94)  RR: 14 (01-14-25 @ 17:00) (9 - 22)  SpO2: 95% (01-14-25 @ 17:00) (84% - 98%) on (O2)  I&O's Detail    13 Jan 2025 07:01  -  14 Jan 2025 07:00  --------------------------------------------------------  IN:    Albumin 5%  - 500 mL: 200 mL    Dexmedetomidine: 68.5 mL    Enteral Tube Flush: 200 mL    IV PiggyBack: 200 mL    Vital1.5: 180 mL  Total IN: 848.5 mL    OUT:    Chest Tube (mL): 530 mL    Indwelling Catheter - Urethral (mL): 830 mL  Total OUT: 1360 mL    Total NET: -511.5 mL            General: NAD  Neurology: Awake  Eyes: EOMI, Gross vision intact  ENT: Gross hearing intact, grossly patent pharynx, no stridor  Neck: Neck supple, trachea midline, No JVD  Respiratory: B/L BS  CV: S1S2, no murmurs, rubs or gallops  Abdominal: Softly distended  Tubes: L chest tube with 530cc out to water seal no air leak     Relevant labs, radiology and Medications reviewed                        10.9   11.67 )-----------( 112      ( 14 Jan 2025 04:50 )             33.7     01-14    145  |  109[H]  |  45.7[H]  ----------------------------<  107[H]  3.9   |  24.0  |  1.91[H]    Ca    8.6      14 Jan 2025 04:50  Phos  3.8     01-14  Mg     2.7     01-14    TPro  6.4[L]  /  Alb  2.8[L]  /  TBili  2.0  /  DBili  x   /  AST  167[H]  /  ALT  58[H]  /  AlkPhos  127[H]  01-14    PT/INR - ( 13 Jan 2025 14:14 )   PT: 18.6 sec;   INR: 1.61 ratio         PTT - ( 13 Jan 2025 14:14 )  PTT:35.7 sec  MEDICATIONS  (STANDING):  acetylcysteine 10%  Inhalation 4 milliLiter(s) Inhalation every 6 hours  albuterol/ipratropium for Nebulization 3 milliLiter(s) Nebulizer every 6 hours  atorvastatin 40 milliGRAM(s) Oral at bedtime  heparin   Injectable 5000 Unit(s) SubCutaneous every 8 hours  lactulose Syrup 15 Gram(s) Oral two times a day  pantoprazole  Injectable 40 milliGRAM(s) IV Push daily  piperacillin/tazobactam IVPB.. 3.375 Gram(s) IV Intermittent every 12 hours  potassium chloride   Powder 40 milliEquivalent(s) Oral daily  rifAXIMin 550 milliGRAM(s) Oral two times a day  spironolactone 50 milliGRAM(s) Oral daily    MEDICATIONS  (PRN):        Assessment  74y Male  w/ PAST MEDICAL & SURGICAL HISTORY:  Hepatitis C virus infection      No significant past surgical history      admitted with complaints of Patient is a 74y old  Male who presents with a chief complaint of Cardiac Arrest (14 Jan 2025 12:24)

## 2025-01-15 LAB
ALBUMIN SERPL ELPH-MCNC: 3 G/DL — LOW (ref 3.3–5.2)
ALP SERPL-CCNC: 132 U/L — HIGH (ref 40–120)
ALT FLD-CCNC: 60 U/L — HIGH
ANION GAP SERPL CALC-SCNC: 15 MMOL/L — SIGNIFICANT CHANGE UP (ref 5–17)
AST SERPL-CCNC: 139 U/L — HIGH
B PERT IGG+IGM PNL SER: ABNORMAL
BILIRUB SERPL-MCNC: 1.6 MG/DL — SIGNIFICANT CHANGE UP (ref 0.4–2)
BUN SERPL-MCNC: 57.2 MG/DL — HIGH (ref 8–20)
CALCIUM SERPL-MCNC: 8.7 MG/DL — SIGNIFICANT CHANGE UP (ref 8.4–10.5)
CHLORIDE SERPL-SCNC: 108 MMOL/L — SIGNIFICANT CHANGE UP (ref 96–108)
CK MB CFR SERPL CALC: 3.7 NG/ML — SIGNIFICANT CHANGE UP (ref 0–6.7)
CK SERPL-CCNC: 232 U/L — HIGH (ref 30–200)
CO2 SERPL-SCNC: 22 MMOL/L — SIGNIFICANT CHANGE UP (ref 22–29)
COLOR FLD: ABNORMAL
CREAT SERPL-MCNC: 1.95 MG/DL — HIGH (ref 0.5–1.3)
EGFR: 35 ML/MIN/1.73M2 — LOW
GLUCOSE SERPL-MCNC: 112 MG/DL — HIGH (ref 70–99)
HCT VFR BLD CALC: 41.1 % — SIGNIFICANT CHANGE UP (ref 39–50)
HGB BLD-MCNC: 12.6 G/DL — LOW (ref 13–17)
MCHC RBC-ENTMCNC: 28 PG — SIGNIFICANT CHANGE UP (ref 27–34)
MCHC RBC-ENTMCNC: 30.7 G/DL — LOW (ref 32–36)
MCV RBC AUTO: 91.3 FL — SIGNIFICANT CHANGE UP (ref 80–100)
PLATELET # BLD AUTO: 150 K/UL — SIGNIFICANT CHANGE UP (ref 150–400)
POTASSIUM SERPL-MCNC: 4.4 MMOL/L — SIGNIFICANT CHANGE UP (ref 3.5–5.3)
POTASSIUM SERPL-SCNC: 4.4 MMOL/L — SIGNIFICANT CHANGE UP (ref 3.5–5.3)
PROT SERPL-MCNC: 7.2 G/DL — SIGNIFICANT CHANGE UP (ref 6.6–8.7)
RBC # BLD: 4.5 M/UL — SIGNIFICANT CHANGE UP (ref 4.2–5.8)
RBC # FLD: 21.2 % — HIGH (ref 10.3–14.5)
RCV VOL RI: 9000 CELLS/UL — HIGH
SODIUM SERPL-SCNC: 144 MMOL/L — SIGNIFICANT CHANGE UP (ref 135–145)
TOTAL NUCLEATED CELL COUNT: 958 CELLS/UL — SIGNIFICANT CHANGE UP
TROPONIN T, HIGH SENSITIVITY RESULT: 34 NG/L — SIGNIFICANT CHANGE UP (ref 0–51)
TUBE TYPE: SIGNIFICANT CHANGE UP
WBC # BLD: 14.95 K/UL — HIGH (ref 3.8–10.5)
WBC # FLD AUTO: 14.95 K/UL — HIGH (ref 3.8–10.5)
WBC COUNT.: 868 CELLS/UL — HIGH

## 2025-01-15 PROCEDURE — 74176 CT ABD & PELVIS W/O CONTRAST: CPT | Mod: 26

## 2025-01-15 PROCEDURE — 76705 ECHO EXAM OF ABDOMEN: CPT | Mod: 26

## 2025-01-15 PROCEDURE — 99233 SBSQ HOSP IP/OBS HIGH 50: CPT

## 2025-01-15 PROCEDURE — 71250 CT THORAX DX C-: CPT | Mod: 26

## 2025-01-15 PROCEDURE — 99223 1ST HOSP IP/OBS HIGH 75: CPT

## 2025-01-15 PROCEDURE — 93010 ELECTROCARDIOGRAM REPORT: CPT

## 2025-01-15 PROCEDURE — 49083 ABD PARACENTESIS W/IMAGING: CPT

## 2025-01-15 PROCEDURE — 99232 SBSQ HOSP IP/OBS MODERATE 35: CPT

## 2025-01-15 PROCEDURE — 71045 X-RAY EXAM CHEST 1 VIEW: CPT | Mod: 26

## 2025-01-15 PROCEDURE — 71045 X-RAY EXAM CHEST 1 VIEW: CPT | Mod: 26,77

## 2025-01-15 RX ORDER — HYDROMORPHONE HYDROCHLORIDE 4 MG/ML
0.5 INJECTION, SOLUTION INTRAMUSCULAR; INTRAVENOUS; SUBCUTANEOUS EVERY 4 HOURS
Refills: 0 | Status: DISCONTINUED | OUTPATIENT
Start: 2025-01-15 | End: 2025-01-16

## 2025-01-15 RX ORDER — TRAMADOL HYDROCHLORIDE 100 MG/1
25 TABLET, EXTENDED RELEASE ORAL EVERY 8 HOURS
Refills: 0 | Status: DISCONTINUED | OUTPATIENT
Start: 2025-01-15 | End: 2025-01-16

## 2025-01-15 RX ORDER — METHOCARBAMOL 500 MG
500 TABLET ORAL
Refills: 0 | Status: DISCONTINUED | OUTPATIENT
Start: 2025-01-15 | End: 2025-01-16

## 2025-01-15 RX ORDER — TRAMADOL HYDROCHLORIDE 100 MG/1
50 TABLET, EXTENDED RELEASE ORAL EVERY 8 HOURS
Refills: 0 | Status: DISCONTINUED | OUTPATIENT
Start: 2025-01-15 | End: 2025-01-16

## 2025-01-15 RX ORDER — HYDRALAZINE HCL 100 MG
5 TABLET ORAL EVERY 6 HOURS
Refills: 0 | Status: DISCONTINUED | OUTPATIENT
Start: 2025-01-15 | End: 2025-01-24

## 2025-01-15 RX ORDER — ACETYLCYSTEINE 200 MG/ML
4 VIAL (ML) MISCELLANEOUS EVERY 6 HOURS
Refills: 0 | Status: COMPLETED | OUTPATIENT
Start: 2025-01-15 | End: 2025-01-17

## 2025-01-15 RX ORDER — ALBUMIN HUMAN 50 G/1000ML
50 SOLUTION INTRAVENOUS EVERY 6 HOURS
Refills: 0 | Status: COMPLETED | OUTPATIENT
Start: 2025-01-15 | End: 2025-01-16

## 2025-01-15 RX ORDER — OXYCODONE HYDROCHLORIDE 30 MG/1
2.5 TABLET ORAL ONCE
Refills: 0 | Status: DISCONTINUED | OUTPATIENT
Start: 2025-01-15 | End: 2025-01-15

## 2025-01-15 RX ORDER — ONDANSETRON 4 MG/1
4 TABLET, ORALLY DISINTEGRATING ORAL EVERY 6 HOURS
Refills: 0 | Status: DISCONTINUED | OUTPATIENT
Start: 2025-01-15 | End: 2025-01-24

## 2025-01-15 RX ORDER — ACETAMINOPHEN 160 MG/5ML
650 SUSPENSION ORAL EVERY 6 HOURS
Refills: 0 | Status: DISCONTINUED | OUTPATIENT
Start: 2025-01-15 | End: 2025-01-24

## 2025-01-15 RX ORDER — ANTISEPTIC SURGICAL SCRUB 0.04 MG/ML
1 SOLUTION TOPICAL DAILY
Refills: 0 | Status: DISCONTINUED | OUTPATIENT
Start: 2025-01-15 | End: 2025-01-24

## 2025-01-15 RX ORDER — LIDOCAINE HYDROCHLORIDE 30 MG/G
1 CREAM TOPICAL DAILY
Refills: 0 | Status: DISCONTINUED | OUTPATIENT
Start: 2025-01-15 | End: 2025-01-24

## 2025-01-15 RX ADMIN — ALBUMIN HUMAN 50 MILLILITER(S): 50 SOLUTION INTRAVENOUS at 14:46

## 2025-01-15 RX ADMIN — IPRATROPIUM BROMIDE AND ALBUTEROL SULFATE 3 MILLILITER(S): .5; 2.5 SOLUTION RESPIRATORY (INHALATION) at 08:41

## 2025-01-15 RX ADMIN — OXYCODONE HYDROCHLORIDE 2.5 MILLIGRAM(S): 30 TABLET ORAL at 04:26

## 2025-01-15 RX ADMIN — TRAMADOL HYDROCHLORIDE 50 MILLIGRAM(S): 100 TABLET, EXTENDED RELEASE ORAL at 18:08

## 2025-01-15 RX ADMIN — IPRATROPIUM BROMIDE AND ALBUTEROL SULFATE 3 MILLILITER(S): .5; 2.5 SOLUTION RESPIRATORY (INHALATION) at 13:17

## 2025-01-15 RX ADMIN — Medication 4 MILLILITER(S): at 08:41

## 2025-01-15 RX ADMIN — PIPERACILLIN SODIUM AND TAZOBACTAM SODIUM 25 GRAM(S): 2; 250 INJECTION, POWDER, FOR SOLUTION INTRAVENOUS at 16:45

## 2025-01-15 RX ADMIN — Medication 15 GRAM(S): at 16:45

## 2025-01-15 RX ADMIN — ALBUMIN HUMAN 50 MILLILITER(S): 50 SOLUTION INTRAVENOUS at 17:01

## 2025-01-15 RX ADMIN — PIPERACILLIN SODIUM AND TAZOBACTAM SODIUM 25 GRAM(S): 2; 250 INJECTION, POWDER, FOR SOLUTION INTRAVENOUS at 05:07

## 2025-01-15 RX ADMIN — PANTOPRAZOLE 40 MILLIGRAM(S): 20 TABLET, DELAYED RELEASE ORAL at 12:13

## 2025-01-15 RX ADMIN — ATORVASTATIN CALCIUM 40 MILLIGRAM(S): 80 TABLET, FILM COATED ORAL at 21:37

## 2025-01-15 RX ADMIN — Medication 4 MILLILITER(S): at 03:55

## 2025-01-15 RX ADMIN — Medication 5000 UNIT(S): at 05:07

## 2025-01-15 RX ADMIN — Medication 500 MILLIGRAM(S): at 16:44

## 2025-01-15 RX ADMIN — Medication 5000 UNIT(S): at 21:39

## 2025-01-15 RX ADMIN — TRAMADOL HYDROCHLORIDE 50 MILLIGRAM(S): 100 TABLET, EXTENDED RELEASE ORAL at 19:17

## 2025-01-15 RX ADMIN — Medication 15 GRAM(S): at 05:06

## 2025-01-15 RX ADMIN — Medication 40 MILLIGRAM(S): at 15:42

## 2025-01-15 RX ADMIN — Medication 50 MILLIGRAM(S): at 05:07

## 2025-01-15 RX ADMIN — IPRATROPIUM BROMIDE AND ALBUTEROL SULFATE 3 MILLILITER(S): .5; 2.5 SOLUTION RESPIRATORY (INHALATION) at 03:54

## 2025-01-15 RX ADMIN — IPRATROPIUM BROMIDE AND ALBUTEROL SULFATE 3 MILLILITER(S): .5; 2.5 SOLUTION RESPIRATORY (INHALATION) at 20:56

## 2025-01-15 RX ADMIN — OXYCODONE HYDROCHLORIDE 2.5 MILLIGRAM(S): 30 TABLET ORAL at 05:00

## 2025-01-15 RX ADMIN — Medication 4 MILLILITER(S): at 20:58

## 2025-01-15 RX ADMIN — Medication 40 MILLIGRAM(S): at 05:07

## 2025-01-15 RX ADMIN — POTASSIUM CHLORIDE 40 MILLIEQUIVALENT(S): 750 TABLET, EXTENDED RELEASE ORAL at 12:12

## 2025-01-15 RX ADMIN — Medication 5000 UNIT(S): at 12:12

## 2025-01-15 NOTE — CHART NOTE - NSCHARTNOTEFT_GEN_A_CORE
CC: Cardiac Arrest     INTERVAL  EVENTS: Notified by Radiology PA, patient c/o left sided chest pain after paracentesis performed. Returned to floor. States pain is worse with each breath. Chest xray performed. Discussed with Radiologist, question migration of of chest tube. Urgent chest CT ordered.     Vital Signs Last 24 Hrs  T(C): 36.6 (15 Carlo 2025 14:38), Max: 36.9 (14 Jan 2025 19:54)  T(F): 97.9 (15 Carlo 2025 14:38), Max: 98.4 (14 Jan 2025 19:54)  HR: 75 (15 Carlo 2025 14:30) (67 - 85)  BP: 104/81 (15 Carlo 2025 14:30) (104/81 - 155/80)  BP(mean): 90 (15 Carlo 2025 14:30) (87 - 110)  RR: 18 (15 Carlo 2025 14:30) (12 - 20)  SpO2: 99% (15 Carlo 2025 14:30) (93% - 100%)    Parameters below as of 15 Carlo 2025 14:30  Patient On (Oxygen Delivery Method): nasal cannula  O2 Flow (L/min): 6    PE  General: Elderly male, in bed, mild distress due to pain  HEENT: EOMI. Clear conjunctivae. Edentulous  Neck: Supple.   Chest: Decrease BS, + wheeze, +Chest tube on left side.   Heart: Normal S1 & S2. RRR.   Abdomen: Distended.  Non-tender. + BS  Ext: 1+ pedal edema. No calf tenderness   Neuro: Awake and alert. No focal deficit. Speech clear.     I&O's Detail    14 Jan 2025 07:01  -  15 Carlo 2025 07:00  --------------------------------------------------------  IN:    IV PiggyBack: 200 mL  Total IN: 200 mL    OUT:    Chest Tube (mL): 370 mL    Indwelling Catheter - Urethral (mL): 120 mL    Voided (mL): 400 mL  Total OUT: 890 mL    Total NET: -690 mL      15 Carlo 2025 07:01  -  15 Carlo 2025 15:09  --------------------------------------------------------  IN:    Oral Fluid: 350 mL  Total IN: 350 mL    OUT:    Chest Tube (mL): 250 mL    Voided (mL): 400 mL  Total OUT: 650 mL    Total NET: -300 mL              RADIOLOGY & ADDITIONAL TESTS:  chest xray: pending official read.      A/P  Chest pain/possible L pigtail migration?  -Thoracic surgery notified  - Urgent CT chest- going for scan now  - Stat EKG, CE's  - Stat neb

## 2025-01-15 NOTE — CHART NOTE - NSCHARTNOTEFT_GEN_A_CORE
Called by primary team as pt c/o chest pain now s/p paracentesis. Chest tube noted to be in new position from previous imaging.  Noncontrast CT CAP with improvement of left pleural effusion, trace left PTX.  As chest tube still draining, would recommend pain control prn, chest PT, incentive spirometry.  If pain intolerable, may need to consider chest tube removal. However, high likelihood of reaccumulation of pleural effusion 2/2 cirrhosis.  Continue to monitor chest tube output, for air leak, .  May call thoracic ACP for any questions or concerns at [phone].      < from: CT Chest No Cont (01.15.25 @ 15:28) >    FINDINGS:  CHEST:  LUNGS AND LARGE AIRWAYS: Patent central airways. There has been interval   improvement in patchy and coarse groundglass opacities with underlying   cystic changes in the upper lobes. Mild linear and dependent atelectasis   in the right lower lobe.  PLEURA: There has been complete resolution of the left pleural effusion   with a left posterior basilar pleural catheter entering between the   eighth and ninth ribs laterally. There is a trace left pneumothorax.   There is a trace layering right pleural effusion.  VESSELS: Within normal limits.  HEART: Heart size is normal. No pericardial effusion.  MEDIASTINUM AND OCTAVIO: No lymphadenopathy.  CHEST WALL AND LOWER NECK: Mild subcutaneous emphysema of the left   lateral chest wall.    ABDOMEN AND PELVIS:  LIVER: Again is noted a shrunken nodular liver with prominence of the   left lobe laterally and a subtle infiltrating lesion in the right lobe   anteriorly, compatible with cirrhosis and likely infiltrating hepatoma.   Again is noted thrombosis in the left portal vein. Stable prominent moni   hepatis, portacaval, upper abdominal, and gastrohepatic ligament lymph   nodes.  BILE DUCTS: Normal caliber.  GALLBLADDER: Within normal limits.  SPLEEN: The spleen remains enlarged to 14.0 cm. Containing a splenorenal   shunt.  PANCREAS: Within normal limits.  ADRENALS: Within normal limits.  KIDNEYS/URETERS: Within normal limits.    BLADDER: Within normal limits.  REPRODUCTIVE ORGANS: The prostate is not enlarged.    BOWEL: No bowel obstruction. Appendix is normal.  PERITONEUM/RETROPERITONEUM: Mild ascites.  VESSELS: Within normal limits.  LYMPH NODES: No lymphadenopathy.  ABDOMINAL WALL: Left abdominal wall hernia repair with mesh.  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    IMPRESSION: Resolved left pleural effusion with trace left pneumothorax.   Improved bilateral coarse groundglass opacities.      < end of copied text >

## 2025-01-15 NOTE — PROGRESS NOTE ADULT - ASSESSMENT
INCOMPLETE 74M with a history of hepatitis C and cirrhosis who initially presented with abdominal discomfort and dyspnea found to have hypoxia requiring supplemental oxygen. He was previously evaluated in the emergency department ten days prior and noted to have a hepatic mass. The patient developed respiratory distress and was noted to have cardiac arrest. Epinephrine was administered with return of spontaneous circulation and he was intubated. The patient was admitted to the intensive care unit for management. Antibiotics were initiated for pneumonia and a left sided chest tube was inserted for a pleural effusion. Vasopressor support was weaned and the patient was extubated. He was downgraded to Step Down on 1/14/25.    Pneumonia (suspected gram negative rods) / Acute hypoxic respiratory failure  - CT chest noted a moderate loculated left pleural effusion with associated passive atelectasis, airspace disease in the right upper lobe, and to a lesser degree the anterior left upper lobe, no pulmonary embolism  - Blood cultures without growth so far   - Extubated to high flow nasal cannula to 4 L nasal cannula   - On piperacillin/tazobactam  - DuoNebs   - Thoracic Surgery following     Left Pleural effusion  - Chest tube inserted (1/11) with initial drainage of 1500ml of fluid  - To continue monitoring output  - Thoracic Surgery following     Cardiac arrest  - Return of spontaneous circulation after administration of epinephrine  - Thought to be secondary to hypoxia  - Echocardiogram noted normal left ventricular systolic function with an ejection fraction of 56%  - Telemetry monitoring    Cirrhosis / Ascites / Liver mass  - CT abdomen noted a large mass with ill-defined borders again, left portal vein thrombosis, likely tumor thrombus, moderate ascites, large paraesophageal varices and gastrorenal shunt  - elevated alpha fetoprotein level  - Ammonia level mildly elevated  - Patient reported to have been previously treated for hepatitis C with Epclusa (2023)  - holding furosemide and spironolactone due to renal insufficiency   - Continue Lactulose and Rifaximin   - IR consulted for paracentesis   - Onc / GI Consults     Acute kidney injury  - Hold diuretics   - To monitor renal function and urine output  - Avoid nephrotoxic medications     HLD  - Continue Lipitor   - Monitor LFTs, if worsens then hold Lipitor     DVT Prophylaxis -- Heparin SQ    Dispo: Unclear at this time. Currently active with Pleural Effusion and Ascites.

## 2025-01-15 NOTE — PROGRESS NOTE ADULT - SUBJECTIVE AND OBJECTIVE BOX
Acute respiratory failure with hypoxia    HPI:  74-year-old male patient with a history of cirrhosis secondary to hepatitis C, daily smoker presents the ED complaining of abdominal pain, abdominal distention, difficulty breathing.  Seen in the ED 10 days ago secondary to abdominal pain, abdominal distention, melena. 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  Patient was discharged home, states that he was feeling somewhat better until a few days ago when he began to experience worsening abdominal pain, abdominal ascension.  Patient reports increased shortness of breath and dyspnea as well over the past few days.  Denies any prior history of COPD but is a daily smoker.  Patient noted to be hypoxic to 88% on room air in the ED, placed on 4 L nasal cannula with improvement to 94%. On reassessment this patient who appeared to be in respiratory distress, stated that he was having trouble breathing.  Supplemental oxygen was over his chin and he was slumped in the bed.  Patient moved to critical care area immediately afterwards.  Patient found to be in cardiac arrest, ACLS initiated, 1 mg of epinephrine given.  Patient subsequently intubated and then achieve return of spontaneous circulation.  Postintubation x-ray confirmed tube placement.  Patient sedated with propofol as well as given fentanyl on postintubation state.  Prior to initiation of propofol, patient was biting at the tube and attempting to pull away the tube.  Will plan to obtain CT scan imaging and admit to ICU. (11 Jan 2025 16:38)    Interval History:  Patient was seen and examined at bedside around 11:15 am.  Complaining of abdominal distention and pain.   Denies nausea or vomiting.  Denies chest pain, palpitations or shortness of breath.    ROS:  As per interval history otherwise unremarkable.    PHYSICAL EXAM:  Vital Signs   T(C): 36.7 (15 Carlo 2025 07:40), Max: 36.9 (14 Jan 2025 19:54)  T(F): 98 (15 Carlo 2025 07:40), Max: 98.4 (14 Jan 2025 19:54)  HR: 83 (15 Carlo 2025 12:00) (67 - 85)  BP: 133/71 (15 Carlo 2025 12:00) (106/50 - 155/80)  BP(mean): 90 (15 Carlo 2025 12:00) (62 - 110)  RR: 13 (15 Carlo 2025 12:00) (12 - 20)  SpO2: 93% (15 Carlo 2025 12:00) (93% - 100%)  Parameters below as of 15 Carlo 2025 12:00  Patient On (Oxygen Delivery Method): nasal cannula  O2 Flow (L/min): 4  General: Elderly male sitting in recliner with moderate dicomfort.   HEENT: EOMI. Clear conjunctivae. Moist mucus membrane  Neck: Supple.   Chest: Good air entry. Bibasilar rales (R>L). No wheezing or rhonchi. Chest tube on left side.   Heart: Normal S1 & S2. RRR.   Abdomen: Distended. Firm. Non-tender. + BS  Ext: 1+ pedal edema. No calf tenderness   Neuro: Awake and alert. No focal deficit. Speech clear.   Skin: Warm and Dry  Psychiatry: Normal mood and affect    I&O's Summary    14 Jan 2025 07:01  -  15 Carlo 2025 07:00  --------------------------------------------------------  IN: 200 mL / OUT: 890 mL / NET: -690 mL    15 Carlo 2025 07:01  -  15 Carlo 2025 14:12  --------------------------------------------------------  IN: 0 mL / OUT: 250 mL / NET: -250 mL    LABS:                      12.6   14.95 )-----------( 150      ( 15 Carlo 2025 06:00 )             41.1     01-15    144  |  108  |  57.2[H]  ----------------------------<  112[H]  4.4   |  22.0  |  1.95[H]    Ca    8.7      15 Carlo 2025 06:00  Phos  3.8     01-14  Mg     2.7     01-14    TPro  7.2  /  Alb  3.0[L]  /  TBili  1.6  /  DBili  x   /  AST  139[H]  /  ALT  60[H]  /  AlkPhos  132[H]  01-15    PT/INR - ( 13 Jan 2025 14:14 )   PT: 18.6 sec;   INR: 1.61 ratio       PTT - ( 13 Jan 2025 14:14 )  PTT:35.7 sec  Urinalysis Basic - ( 15 Carlo 2025 06:00 )    Color: x / Appearance: x / SG: x / pH: x  Gluc: 112 mg/dL / Ketone: x  / Bili: x / Urobili: x   Blood: x / Protein: x / Nitrite: x   Leuk Esterase: x / RBC: x / WBC x   Sq Epi: x / Non Sq Epi: x / Bacteria: x    RADIOLOGY & ADDITIONAL STUDIES:  Reviewed     MEDICATIONS  (STANDING):  acetylcysteine 10%  Inhalation 4 milliLiter(s) Inhalation every 6 hours  albuterol/ipratropium for Nebulization 3 milliLiter(s) Nebulizer every 6 hours  atorvastatin 40 milliGRAM(s) Oral at bedtime  heparin   Injectable 5000 Unit(s) SubCutaneous every 8 hours  lactulose Syrup 15 Gram(s) Oral two times a day  pantoprazole  Injectable 40 milliGRAM(s) IV Push daily  piperacillin/tazobactam IVPB.. 3.375 Gram(s) IV Intermittent every 12 hours  potassium chloride   Powder 40 milliEquivalent(s) Oral daily  rifAXIMin 550 milliGRAM(s) Oral two times a day

## 2025-01-15 NOTE — PROGRESS NOTE ADULT - ASSESSMENT
74yMale with h/o cirrhosis 2/2 HCV, daily smoker, who presented to [facility] c/o abdominal pain/distention, sob, previously seen for similar.  Pt hypoxic to 80s.  Episode of cardiac arrest in ED, intubated, ROSC achieved.  CT with liver mass possible HCC, L portal vein tumor thrombus, L-sided PNA with pleural effusion s/p PTC placement (1/11), course c/b hypotension, sepsis.  Pt extubated 1/13.  Thoracic Surgery consulted for chest tube management. 1/14 Patient downgraded from MICU.

## 2025-01-15 NOTE — PROGRESS NOTE ADULT - SUBJECTIVE AND OBJECTIVE BOX
Brief summary:  74yMale seen and assessed at bedside.  Pt sitting comfortably in hospital chair in NAD, on 4L NC.  States no current physical complaints at this time.  Denies f/c, n/v, chest pain, sob, abdominal pain, d/c, urinary symptoms.  ROS negative x 10.      Overnight events: No acute events overnight.  Hospital course progressing as expected.      PAST MEDICAL & SURGICAL HISTORY:  Hepatitis C virus infection    No significant past surgical history        Medications:  acetylcysteine 10%  Inhalation 4 milliLiter(s) Inhalation every 6 hours  albuterol/ipratropium for Nebulization 3 milliLiter(s) Nebulizer every 6 hours  atorvastatin 40 milliGRAM(s) Oral at bedtime  heparin   Injectable 5000 Unit(s) SubCutaneous every 8 hours  lactulose Syrup 15 Gram(s) Oral two times a day  pantoprazole  Injectable 40 milliGRAM(s) IV Push daily  piperacillin/tazobactam IVPB.. 3.375 Gram(s) IV Intermittent every 12 hours  potassium chloride   Powder 40 milliEquivalent(s) Oral daily  rifAXIMin 550 milliGRAM(s) Oral two times a day      MEDICATIONS  (PRN):        Daily Review:    ABG - ( 13 Jan 2025 14:00 )  pH, Arterial: 7.390 pH, Blood: x     /  pCO2: 42    /  pO2: 123   / HCO3: 25    / Base Excess: 0.4   /  SaO2: 100.0                12.6   14.95 )-----------( 150      ( 15 Carlo 2025 06:00 )             41.1   01-15    144  |  108  |  57.2[H]  ----------------------------<  112[H]  4.4   |  22.0  |  1.95[H]    Ca    8.7      15 Carlo 2025 06:00  Phos  3.8     01-14  Mg     2.7     01-14    TPro  7.2  /  Alb  3.0[L]  /  TBili  1.6  /  DBili  x   /  AST  139[H]  /  ALT  60[H]  /  AlkPhos  132[H]  01-15    PT/INR - ( 13 Jan 2025 14:14 )   PT: 18.6 sec;   INR: 1.61 ratio      PTT - ( 13 Jan 2025 14:14 )  PTT:35.7 sec      T(C): 36.7 (01-15-25 @ 07:40), Max: 36.9 (01-14-25 @ 19:54)  HR: 83 (01-15-25 @ 12:00) (67 - 85)  BP: 133/71 (01-15-25 @ 12:00) (106/50 - 155/80)  RR: 13 (01-15-25 @ 12:00) (12 - 20)  SpO2: 93% (01-15-25 @ 12:00) (87% - 100%)  Wt(kg): --      I&O's Summary    14 Jan 2025 07:01  -  15 Carlo 2025 07:00  --------------------------------------------------------  IN: 200 mL / OUT: 890 mL / NET: -690 mL        Physical Exam  Neuro: A+O x 3, non-focal, speech clear and intact  Pulm: coarse breath sounds bilaterally, no wheezing or rales, on 4L NC  CV: RRR, +S1S2  Abd: soft, NT, moderate distention, normoactive bowel sounds  Ext: +DP Pulses b/l, +PT pulses, no edema  Chest tubes: left chest tube to WS, no air leak, no SQ air        CXR 1/14/25    < from: Xray Chest 1 View- PORTABLE-Routine (Xray Chest 1 View- PORTABLE-Routine in AM.) (01.14.25 @ 06:22) >  CLINICAL INFORMATION: LEFT chest tube..    FINDINGS:  CATHETERS AND TUBES: LEFT multi-sidehole pigtail catheter overlies LEFT   lower hemithorax.  LEFT subclavian catheter tip in SVC.  NG tube tip beyond GE junction.    PULMONARY:  LEFT lung clear.  Mild RIGHT pleural effusion in the posterior basilar airspace disease.  Upper zones clear...  No pneumothorax.    HEART/VASCULAR: The  heart is enlarged in transverse diameter.   BONES: The visualized osseous thorax is intact.    IMPRESSION:  LEFT PTC in place.  No pneumothorax.  Mild RIGHT effusion and/or airspace disease.  Cardiomegaly.  Catheters and tubes in place.    --- End of Report ---    < end of copied text >

## 2025-01-15 NOTE — CONSULT NOTE ADULT - ASSESSMENT
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 GI consulted for Cirrhosis.     Cirrhosis  - Etiology: HCV ( s/p treatment)  - Severity: MELD 20   CP B  - HE: controlled, continue lactulose /rifaxamin, monitor stools ( 2-3/day)  - EV: No prior bleed, outpatient EGD for screening  - Ascites/volume: tense ascites- paracentesis planned for today (therapeautic/diagnostic). Elevated CR- hold diuretics today  - PVT: tumor thrombus  - HCC: Would get CT 4 phase for Liver mass, radiology has not reported a Li-Rads in any prior imaging. AFP elevated, very likely HCC.  - OLT: Large mass, likely HCC outside of mehdi criteria  - Immuniz: TDAP q 10 years,  Influenza, yearly, COVID, pneumococcal (23), Hepatitis A/ B  - Health Maintenance: Counselled to avoid oysters/shellfish, abstain from alcohol and NSAIDs, to limit Tylenol to <2 gm/day if necessary and to minimize salt intake (2gm/day preferred).

## 2025-01-15 NOTE — CONSULT NOTE ADULT - ASSESSMENT
74-year-old  male with history of hepatitis C virus with cirrhosis tobacco who presented with abd pain and melena, Acute hypoxic hypercapnic respiratory failure/ cardiac arrest, Intubated and admitted to the MICU     CT abd :  -  Large mass with ill-defined borders measuring approximately 14.3 cm.   - Left portal vein thrombosis, likely tumor thrombus, again noted.   - Cirrhosis and portal hypertension.  - Enlarged portacaval lymph node nodes.  -  No pulmonary embolism.  - Moderate left pleural effusion, increased compared with January 01, 2025.  - Airspace opacities in both upper lobes, right side greater than left,  raising the possibility of pneumonia.    # Liver lesions HCC?   # Cirrhosis   # Pleural effusion     - [id number]   - s/p PTC placement on 1/11/25, pending cytology   - Child Uribe  - Dedicated liver imaging with triple phase CT vs MRi abd   - Gi consult     INPROGRESS 74-year-old  male with history of hepatitis C virus with cirrhosis tobacco who presented with abd pain and melena, Acute hypoxic hypercapnic respiratory failure/ cardiac arrest, Intubated and admitted to the MICU     CT abd :  -  Large mass with ill-defined borders measuring approximately 14.3 cm.   - Left portal vein thrombosis, likely tumor thrombus, again noted.   - Cirrhosis and portal hypertension.  - Enlarged portacaval lymph node nodes.  -  No pulmonary embolism.  - Moderate left pleural effusion, increased compared with January 01, 2025.  - Airspace opacities in both upper lobes, right side greater than left,  raising the possibility of pneumonia.    # Liver lesions HCC?   # Cirrhosis   # Pleural effusion     - [id number]   - s/p PTC placement on 1/11/25, pending cytology   - Child Uribe (9-10) B /C, continue to evaluate   - Dedicated liver imaging with triple phase CT   - Gi consult   - Patient will f/u yu pizarro

## 2025-01-16 LAB
ALBUMIN FLD-MCNC: 0.9 G/DL — SIGNIFICANT CHANGE UP
ALBUMIN SERPL ELPH-MCNC: 3.4 G/DL — SIGNIFICANT CHANGE UP (ref 3.3–5.2)
ALP SERPL-CCNC: 114 U/L — SIGNIFICANT CHANGE UP (ref 40–120)
ALT FLD-CCNC: 52 U/L — HIGH
AMPHET UR-MCNC: NEGATIVE — SIGNIFICANT CHANGE UP
ANION GAP SERPL CALC-SCNC: 13 MMOL/L — SIGNIFICANT CHANGE UP (ref 5–17)
AST SERPL-CCNC: 106 U/L — HIGH
BARBITURATES UR SCN-MCNC: NEGATIVE — SIGNIFICANT CHANGE UP
BASOPHILS # BLD AUTO: 0.02 K/UL — SIGNIFICANT CHANGE UP (ref 0–0.2)
BASOPHILS NFR BLD AUTO: 0.2 % — SIGNIFICANT CHANGE UP (ref 0–2)
BENZODIAZ UR-MCNC: NEGATIVE — SIGNIFICANT CHANGE UP
BILIRUB SERPL-MCNC: 1.8 MG/DL — SIGNIFICANT CHANGE UP (ref 0.4–2)
BUN SERPL-MCNC: 59.6 MG/DL — HIGH (ref 8–20)
CALCIUM SERPL-MCNC: 8.9 MG/DL — SIGNIFICANT CHANGE UP (ref 8.4–10.5)
CHLORIDE SERPL-SCNC: 108 MMOL/L — SIGNIFICANT CHANGE UP (ref 96–108)
CO2 SERPL-SCNC: 26 MMOL/L — SIGNIFICANT CHANGE UP (ref 22–29)
COCAINE METAB.OTHER UR-MCNC: NEGATIVE — SIGNIFICANT CHANGE UP
CREAT SERPL-MCNC: 2.01 MG/DL — HIGH (ref 0.5–1.3)
CULTURE RESULTS: SIGNIFICANT CHANGE UP
EGFR: 34 ML/MIN/1.73M2 — LOW
EOSINOPHIL # BLD AUTO: 0 K/UL — SIGNIFICANT CHANGE UP (ref 0–0.5)
EOSINOPHIL NFR BLD AUTO: 0 % — SIGNIFICANT CHANGE UP (ref 0–6)
FENTANYL UR QL SCN: NEGATIVE — SIGNIFICANT CHANGE UP
GLUCOSE SERPL-MCNC: 108 MG/DL — HIGH (ref 70–99)
GRAM STN FLD: SIGNIFICANT CHANGE UP
HCT VFR BLD CALC: 38.9 % — LOW (ref 39–50)
HGB BLD-MCNC: 11.7 G/DL — LOW (ref 13–17)
IMM GRANULOCYTES NFR BLD AUTO: 0.5 % — SIGNIFICANT CHANGE UP (ref 0–0.9)
LDH SERPL L TO P-CCNC: 170 U/L — SIGNIFICANT CHANGE UP
LYMPHOCYTES # BLD AUTO: 0.78 K/UL — LOW (ref 1–3.3)
LYMPHOCYTES # BLD AUTO: 7.5 % — LOW (ref 13–44)
LYMPHOCYTES # FLD: 51 % — SIGNIFICANT CHANGE UP
MAGNESIUM SERPL-MCNC: 2.6 MG/DL — SIGNIFICANT CHANGE UP (ref 1.6–2.6)
MCHC RBC-ENTMCNC: 28.1 PG — SIGNIFICANT CHANGE UP (ref 27–34)
MCHC RBC-ENTMCNC: 30.1 G/DL — LOW (ref 32–36)
MCV RBC AUTO: 93.3 FL — SIGNIFICANT CHANGE UP (ref 80–100)
MESOTHL CELL # FLD: 35 % — SIGNIFICANT CHANGE UP
METHADONE UR-MCNC: POSITIVE
MONOCYTES # BLD AUTO: 0.91 K/UL — HIGH (ref 0–0.9)
MONOCYTES NFR BLD AUTO: 8.7 % — SIGNIFICANT CHANGE UP (ref 2–14)
MONOS+MACROS # FLD: 8 % — SIGNIFICANT CHANGE UP
NEUTROPHILS # BLD AUTO: 8.66 K/UL — HIGH (ref 1.8–7.4)
NEUTROPHILS #: 356 CELLS/UL
NEUTROPHILS %.: 41 % — HIGH
NEUTROPHILS NFR BLD AUTO: 83.1 % — HIGH (ref 43–77)
OPIATES UR-MCNC: NEGATIVE — SIGNIFICANT CHANGE UP
PCP SPEC-MCNC: SIGNIFICANT CHANGE UP
PCP UR-MCNC: NEGATIVE — SIGNIFICANT CHANGE UP
PLATELET # BLD AUTO: 126 K/UL — LOW (ref 150–400)
POTASSIUM SERPL-MCNC: 4 MMOL/L — SIGNIFICANT CHANGE UP (ref 3.5–5.3)
POTASSIUM SERPL-SCNC: 4 MMOL/L — SIGNIFICANT CHANGE UP (ref 3.5–5.3)
PROT FLD-MCNC: 1.8 G/DL — SIGNIFICANT CHANGE UP
PROT SERPL-MCNC: 7.2 G/DL — SIGNIFICANT CHANGE UP (ref 6.6–8.7)
RBC # BLD: 4.17 M/UL — LOW (ref 4.2–5.8)
RBC # FLD: 21.2 % — HIGH (ref 10.3–14.5)
SODIUM SERPL-SCNC: 147 MMOL/L — HIGH (ref 135–145)
SPECIMEN SOURCE: SIGNIFICANT CHANGE UP
SPECIMEN SOURCE: SIGNIFICANT CHANGE UP
THC UR QL: NEGATIVE — SIGNIFICANT CHANGE UP
WBC # BLD: 10.42 K/UL — SIGNIFICANT CHANGE UP (ref 3.8–10.5)
WBC # FLD AUTO: 10.42 K/UL — SIGNIFICANT CHANGE UP (ref 3.8–10.5)

## 2025-01-16 PROCEDURE — 99232 SBSQ HOSP IP/OBS MODERATE 35: CPT

## 2025-01-16 PROCEDURE — 71045 X-RAY EXAM CHEST 1 VIEW: CPT | Mod: 26

## 2025-01-16 PROCEDURE — 99233 SBSQ HOSP IP/OBS HIGH 50: CPT

## 2025-01-16 RX ORDER — CARVEDILOL 6.25 MG
3.12 TABLET ORAL EVERY 12 HOURS
Refills: 0 | Status: DISCONTINUED | OUTPATIENT
Start: 2025-01-16 | End: 2025-01-24

## 2025-01-16 RX ORDER — METHADONE HYDROCHLORIDE 5 MG/5ML
40 SOLUTION ORAL DAILY
Refills: 0 | Status: DISCONTINUED | OUTPATIENT
Start: 2025-01-16 | End: 2025-01-23

## 2025-01-16 RX ORDER — FOLIC ACID 1 MG
1 TABLET ORAL DAILY
Refills: 0 | Status: DISCONTINUED | OUTPATIENT
Start: 2025-01-16 | End: 2025-01-24

## 2025-01-16 RX ORDER — THIAMINE HCL 100 MG
100 TABLET ORAL DAILY
Refills: 0 | Status: DISCONTINUED | OUTPATIENT
Start: 2025-01-16 | End: 2025-01-24

## 2025-01-16 RX ORDER — MECOBAL/LEVOMEFOLAT CA/B6 PHOS 2-3-35 MG
1 TABLET ORAL DAILY
Refills: 0 | Status: DISCONTINUED | OUTPATIENT
Start: 2025-01-16 | End: 2025-01-24

## 2025-01-16 RX ADMIN — Medication 500 MILLIGRAM(S): at 05:09

## 2025-01-16 RX ADMIN — Medication 100 MILLIGRAM(S): at 22:22

## 2025-01-16 RX ADMIN — ALBUMIN HUMAN 50 MILLILITER(S): 50 SOLUTION INTRAVENOUS at 00:15

## 2025-01-16 RX ADMIN — LIDOCAINE HYDROCHLORIDE 1 PATCH: 30 CREAM TOPICAL at 22:15

## 2025-01-16 RX ADMIN — Medication 5000 UNIT(S): at 11:29

## 2025-01-16 RX ADMIN — LIDOCAINE HYDROCHLORIDE 1 PATCH: 30 CREAM TOPICAL at 23:00

## 2025-01-16 RX ADMIN — PANTOPRAZOLE 40 MILLIGRAM(S): 20 TABLET, DELAYED RELEASE ORAL at 11:29

## 2025-01-16 RX ADMIN — Medication 3.12 MILLIGRAM(S): at 22:21

## 2025-01-16 RX ADMIN — IPRATROPIUM BROMIDE AND ALBUTEROL SULFATE 3 MILLILITER(S): .5; 2.5 SOLUTION RESPIRATORY (INHALATION) at 04:07

## 2025-01-16 RX ADMIN — IPRATROPIUM BROMIDE AND ALBUTEROL SULFATE 3 MILLILITER(S): .5; 2.5 SOLUTION RESPIRATORY (INHALATION) at 14:37

## 2025-01-16 RX ADMIN — Medication 5000 UNIT(S): at 22:21

## 2025-01-16 RX ADMIN — Medication 15 GRAM(S): at 05:09

## 2025-01-16 RX ADMIN — IPRATROPIUM BROMIDE AND ALBUTEROL SULFATE 3 MILLILITER(S): .5; 2.5 SOLUTION RESPIRATORY (INHALATION) at 08:58

## 2025-01-16 RX ADMIN — Medication 1 TABLET(S): at 22:22

## 2025-01-16 RX ADMIN — Medication 4 MILLILITER(S): at 04:06

## 2025-01-16 RX ADMIN — ANTISEPTIC SURGICAL SCRUB 1 APPLICATION(S): 0.04 SOLUTION TOPICAL at 11:29

## 2025-01-16 RX ADMIN — Medication 5000 UNIT(S): at 05:09

## 2025-01-16 RX ADMIN — Medication 4 MILLILITER(S): at 14:38

## 2025-01-16 RX ADMIN — Medication 1 MILLIGRAM(S): at 22:21

## 2025-01-16 RX ADMIN — Medication 3.12 MILLIGRAM(S): at 09:05

## 2025-01-16 RX ADMIN — LIDOCAINE HYDROCHLORIDE 1 PATCH: 30 CREAM TOPICAL at 11:30

## 2025-01-16 RX ADMIN — PIPERACILLIN SODIUM AND TAZOBACTAM SODIUM 25 GRAM(S): 2; 250 INJECTION, POWDER, FOR SOLUTION INTRAVENOUS at 05:09

## 2025-01-16 RX ADMIN — Medication 4 MILLILITER(S): at 08:58

## 2025-01-16 RX ADMIN — Medication 15 GRAM(S): at 18:37

## 2025-01-16 RX ADMIN — ATORVASTATIN CALCIUM 40 MILLIGRAM(S): 80 TABLET, FILM COATED ORAL at 22:21

## 2025-01-16 RX ADMIN — PIPERACILLIN SODIUM AND TAZOBACTAM SODIUM 25 GRAM(S): 2; 250 INJECTION, POWDER, FOR SOLUTION INTRAVENOUS at 18:37

## 2025-01-16 RX ADMIN — ALBUMIN HUMAN 50 MILLILITER(S): 50 SOLUTION INTRAVENOUS at 05:10

## 2025-01-16 RX ADMIN — METHADONE HYDROCHLORIDE 40 MILLIGRAM(S): 5 SOLUTION ORAL at 22:25

## 2025-01-16 NOTE — PROGRESS NOTE ADULT - SUBJECTIVE AND OBJECTIVE BOX
Subjective - patient seen and evaluated bedside. Sitting comfortably in bed. Denies CP, SOB, HA, dizziness, n/v/d    Review of Systems: negative x 10 systems except as noted above    Brief summary:  74yMale with LT pleural effusion s/p PTC placement    Significant/[id number] events: no acute events      PAST MEDICAL & SURGICAL HISTORY:  Hepatitis C virus infection      No significant past surgical history      No significant past surgical history            acetaminophen     Tablet .. 650 milliGRAM(s) Oral every 6 hours PRN  acetylcysteine 10%  Inhalation 4 milliLiter(s) Inhalation every 6 hours  albuterol/ipratropium for Nebulization 3 milliLiter(s) Nebulizer every 6 hours  atorvastatin 40 milliGRAM(s) Oral at bedtime  carvedilol 3.125 milliGRAM(s) Oral every 12 hours  chlorhexidine 2% Cloths 1 Application(s) Topical daily  heparin   Injectable 5000 Unit(s) SubCutaneous every 8 hours  hydrALAZINE Injectable 5 milliGRAM(s) IV Push every 6 hours PRN  HYDROmorphone  Injectable 0.5 milliGRAM(s) IV Push every 4 hours PRN  lactulose Syrup 15 Gram(s) Oral two times a day  lidocaine   4% Patch 1 Patch Transdermal daily  methocarbamol 500 milliGRAM(s) Oral two times a day  ondansetron Injectable 4 milliGRAM(s) IV Push every 6 hours PRN  pantoprazole  Injectable 40 milliGRAM(s) IV Push daily  piperacillin/tazobactam IVPB.. 3.375 Gram(s) IV Intermittent every 12 hours  rifAXIMin 550 milliGRAM(s) Oral two times a day  traMADol 25 milliGRAM(s) Oral every 8 hours PRN  traMADol 50 milliGRAM(s) Oral every 8 hours PRN  MEDICATIONS  (PRN):  acetaminophen     Tablet .. 650 milliGRAM(s) Oral every 6 hours PRN Temp greater or equal to 38C (100.4F), Mild Pain (1 - 3)  hydrALAZINE Injectable 5 milliGRAM(s) IV Push every 6 hours PRN SBP > 160  HYDROmorphone  Injectable 0.5 milliGRAM(s) IV Push every 4 hours PRN Breakthrough Pain  ondansetron Injectable 4 milliGRAM(s) IV Push every 6 hours PRN Nausea and/or Vomiting  traMADol 25 milliGRAM(s) Oral every 8 hours PRN Moderate Pain (4 - 6)  traMADol 50 milliGRAM(s) Oral every 8 hours PRN Severe Pain (7 - 10)      Daily     Daily                               11.7   10.42 )-----------( 126      ( 16 Jan 2025 06:00 )             38.9   01-16    147[H]  |  108  |  59.6[H]  ----------------------------<  108[H]  4.0   |  26.0  |  2.01[H]    Ca    8.9      16 Jan 2025 06:00  Mg     2.6     01-16    TPro  7.2  /  Alb  3.4  /  TBili  1.8  /  DBili  x   /  AST  106[H]  /  ALT  52[H]  /  AlkPhos  114  01-16    CARDIAC MARKERS ( 15 Carlo 2025 17:10 )  x     / x     / x     / x     / 3.7 ng/mL          Objective:  T(C): 36.4 (01-16-25 @ 07:47), Max: 36.6 (01-15-25 @ 14:38)  HR: 75 (01-16-25 @ 09:00) (69 - 89)  BP: 124/77 (01-16-25 @ 09:00) (104/81 - 161/85)  RR: 19 (01-16-25 @ 09:00) (10 - 21)  SpO2: 92% (01-16-25 @ 09:00) (90% - 99%)  Wt(kg): --CAPILLARY BLOOD GLUCOSE      I&O's Summary    15 Carlo 2025 07:01  -  16 Jan 2025 07:00  --------------------------------------------------------  IN: 1025 mL / OUT: 2352 mL / NET: -1327 mL        Physical Exam  General: NAD  Neuro: follows commands [email or web address]  Pulm: CTA, equal bilaterally  CV: RRR, +S1S2  Abd: soft, NT, ND, +BS  Ext: +DP Pulses b/l, no edema  Skin: Warm, dry, intact  Chest tubes: LT CT to WS draining appropriately no AL        Imaging:  < from: Xray Chest 1 View- PORTABLE-Urgent (Xray Chest 1 View- PORTABLE-Urgent .) (01.15.25 @ 14:58) >    IMPRESSION:    Left chest tube withthe tip projected over the left hemidiaphragm, no   pneumothorax or pneumoperitoneum, likely in the posterior pleural space   inferiorly. Follow-up CT demonstrates the chest tube in the posterior   pleural space inferiorly    < end of copied text >

## 2025-01-16 NOTE — PROGRESS NOTE ADULT - ASSESSMENT
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 GI consulted for Cirrhosis.    #HCV cirrhosis   US abdomen (01.15.25) Diffuse mild volume all- quadrant  abdominal ascites  CT A/P (01.15.25) Again is noted a shrunken nodular liver with prominence of the left lobe laterally and a subtle infiltrating lesion in the right lobe anteriorly, compatible with cirrhosis and likely infiltrating hepatoma. Again is noted thrombosis in the left portal vein.  S/p paracentesis (01.15.25) -600ml fluid   AFP: [id number]    - Trend renal function, LFTs, electrolytes, coags daily  - Lactulose titrated to 2-3 soft bowel movements per day, avoid diarrhea  - Rifaximin 550mg BID   - Avoid NSAIDs, hepatotoxic drugs  - MVI, thiamine and folic acid. Optimize nutrition, ensure adequate protein intake, low sodium, avoid raw shellfish. Alcohol cessation counseling. [facility] protocol.   - Tylenol 2g max per day  - Varices: EGD, can consider outpatient once medically optimized   _________________________________________________________________  Assessment and recommendations are final when note is signed by the attending physician.

## 2025-01-16 NOTE — PHYSICAL THERAPY INITIAL EVALUATION ADULT - GAIT DISTANCE, PT EVAL
at bedside - distance limited as O2 decreased to ~85%, instructed pt to sit on bed and take deep breaths, O2 increased to >90%/10 feet

## 2025-01-16 NOTE — PHYSICAL THERAPY INITIAL EVALUATION ADULT - GENERAL OBSERVATIONS, REHAB EVAL
Pt received in bed with bed alarm, chest tube, 5.5 L O2 NC, cardiac monitor, , bilateral SCD, NAD. Pt agreeable to PT, denied pain, 0/10, throughout. Vitals monitored throughout session.

## 2025-01-16 NOTE — PROGRESS NOTE ADULT - TIME BILLING
reviewing labs, notes, orders, radiographic studies, as well as counseling and coordinating care with the relevant multidisciplinary team, including with the primary and consulting providers.
Review of cahrt, discussion with patient and team

## 2025-01-16 NOTE — PHYSICAL THERAPY INITIAL EVALUATION ADULT - ADDITIONAL COMMENTS
as per pt, lives alone on 2nd floor of condo, rents out first floor, full flight of stairs inside, independent prior with no DME, does not use home O2, does not drive

## 2025-01-16 NOTE — PROGRESS NOTE ADULT - ASSESSMENT
74M with a history of hepatitis C, liver cirrhosis and opioid use (currently on Methadone) who initially presented with abdominal discomfort and dyspnea found to have hypoxia requiring supplemental oxygen. He was previously evaluated in the emergency department ten days prior and noted to have a hepatic mass. The patient developed respiratory distress and was noted to have cardiac arrest. Epinephrine was administered with return of spontaneous circulation and he was intubated. The patient was admitted to the intensive care unit for management. Antibiotics were initiated for pneumonia and a left sided chest tube was inserted for a pleural effusion. Vasopressor support was weaned and the patient was extubated. He was downgraded to Step Down on 1/14/25.    Pneumonia (suspected gram negative rods) / Acute hypoxic respiratory failure  - CT chest noted a moderate loculated left pleural effusion with associated passive atelectasis, airspace disease in the right upper lobe, and to a lesser degree the anterior left upper lobe, no pulmonary embolism  - Blood cultures without growth so far   - Extubated to high flow nasal cannula to 4 L nasal cannula   - On piperacillin/tazobactam  - DuoNebs   - Thoracic Surgery following     Left Pleural effusion  - Chest tube inserted (1/11) with initial drainage of 1500ml of fluid  - To continue monitoring output  - Thoracic Surgery following     Cardiac arrest  - Return of spontaneous circulation after administration of epinephrine  - Thought to be secondary to hypoxia  - Echocardiogram noted normal left ventricular systolic function with an ejection fraction of 56%  - Telemetry monitoring    Cirrhosis / Ascites / Liver mass  - CT abdomen noted a large mass with ill-defined borders again, left portal vein thrombosis, likely tumor thrombus, moderate ascites, large paraesophageal varices and gastrorenal shunt  - elevated alpha fetoprotein level  - Ammonia level mildly elevated  - Patient reported to have been previously treated for hepatitis C with Epclusa (2023)  - holding furosemide and spironolactone due to renal insufficiency   - Continue Lactulose and Rifaximin   - Add Coreg   - s/p IR guided paracentesis (600 ml) on 1/15   - Onc / GI Consults appreciated     Acute kidney injury  - Hold diuretics   - To monitor renal function and urine output  - Avoid nephrotoxic medications     HLD  - Continue Lipitor   - Monitor LFTs, if worsens then hold Lipitor     Opioid Use  - Currently in Methadone Program at [facility] ([phone]). His dose was decreased to 134 mg on 1/11/25. Discussed with Dr. Moody at [facility], due to liver cirrhosis and prolonged QTc, starting Methadone 40 mg daily here. Will discuss again with Dr. Moody tomorrow for further recommendations.   - D/C PRN Tramadol and Hydromorphone tomorrow if patient is not requiring any of those as he is being started on Methadone today    DVT Prophylaxis -- Heparin SQ    Dispo: TINA once stable. Currently active with Left Pleural Effusion/  74M with a history of hepatitis C, liver cirrhosis and opioid use (currently on Methadone) who initially presented with abdominal discomfort and dyspnea found to have hypoxia requiring supplemental oxygen. He was previously evaluated in the emergency department ten days prior and noted to have a hepatic mass. The patient developed respiratory distress and was noted to have cardiac arrest. Epinephrine was administered with return of spontaneous circulation and he was intubated. The patient was admitted to the intensive care unit for management. Antibiotics were initiated for pneumonia and a left sided chest tube was inserted for a pleural effusion. Vasopressor support was weaned and the patient was extubated. He was downgraded to Step Down on 1/14/25.    Pneumonia (suspected gram negative rods) / Acute hypoxic respiratory failure  - CT chest noted a moderate loculated left pleural effusion with associated passive atelectasis, airspace disease in the right upper lobe, and to a lesser degree the anterior left upper lobe, no pulmonary embolism  - Blood cultures without growth so far   - Extubated to high flow nasal cannula to 4 L nasal cannula   - On piperacillin/tazobactam  - DuoNebs   - Thoracic Surgery following     Left Pleural effusion  - Chest tube inserted (1/11) with initial drainage of 1500ml of fluid  - To continue monitoring output  - Thoracic Surgery following     Cardiac arrest  - Return of spontaneous circulation after administration of epinephrine  - Thought to be secondary to hypoxia  - Echocardiogram noted normal left ventricular systolic function with an ejection fraction of 56%  - Telemetry monitoring    Cirrhosis / Ascites / Liver mass  - CT abdomen noted a large mass with ill-defined borders again, left portal vein thrombosis, likely tumor thrombus, moderate ascites, large paraesophageal varices and gastrorenal shunt  - elevated alpha fetoprotein level  - Ammonia level mildly elevated  - Patient reported to have been previously treated for hepatitis C with Epclusa (2023)  - holding furosemide and spironolactone due to renal insufficiency   - Continue Lactulose and Rifaximin   - Add Coreg   - s/p IR guided paracentesis (600 ml) on 1/15   - Onc / GI Consults appreciated     Acute kidney injury  - Hold diuretics   - To monitor renal function and urine output  - Avoid nephrotoxic medications     HLD  - Continue Lipitor   - Monitor LFTs, if worsens then hold Lipitor     Opioid Use  - Currently in Methadone Program at [facility] ([phone]). His dose was decreased to 134 mg on 1/11/25. Discussed with Dr. Moody at [facility], due to liver cirrhosis and prolonged QTc, starting Methadone 40 mg daily here. Will discuss again with Dr. Moody tomorrow for further recommendations.   - D/C PRN Tramadol and Hydromorphone     DVT Prophylaxis -- Heparin SQ    Dispo: TINA once stable. Currently active with Left Pleural Effusion/

## 2025-01-16 NOTE — PHYSICAL THERAPY INITIAL EVALUATION ADULT - PERTINENT HX OF CURRENT PROBLEM, REHAB EVAL
74yMale with h/o cirrhosis 2/2 HCV, daily smoker, who presented to [facility] c/o abdominal pain/distention, sob, previously seen for similar.  Pt hypoxic to 80s.  Episode of cardiac arrest in ED, intubated, ROSC achieved.  CT with liver mass possible HCC, L portal vein tumor thrombus, L-sided PNA with pleural effusion s/p PTC placement (1/11), course c/b hypotension, sepsis.  Pt extubated 1/13.  Thoracic Surgery consulted for chest tube management. 1/14

## 2025-01-16 NOTE — PROGRESS NOTE ADULT - SUBJECTIVE AND OBJECTIVE BOX
Acute respiratory failure with hypoxia    HPI:  74-year-old male patient with a history of cirrhosis secondary to hepatitis C, daily smoker presents the ED complaining of abdominal pain, abdominal distention, difficulty breathing.  Seen in the ED 10 days ago secondary to abdominal pain, abdominal distention, melena. 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  Patient was discharged home, states that he was feeling somewhat better until a few days ago when he began to experience worsening abdominal pain, abdominal ascension.  Patient reports increased shortness of breath and dyspnea as well over the past few days.  Denies any prior history of COPD but is a daily smoker.  Patient noted to be hypoxic to 88% on room air in the ED, placed on 4 L nasal cannula with improvement to 94%. On reassessment this patient who appeared to be in respiratory distress, stated that he was having trouble breathing.  Supplemental oxygen was over his chin and he was slumped in the bed.  Patient moved to critical care area immediately afterwards.  Patient found to be in cardiac arrest, ACLS initiated, 1 mg of epinephrine given.  Patient subsequently intubated and then achieve return of spontaneous circulation.  Postintubation x-ray confirmed tube placement.  Patient sedated with propofol as well as given fentanyl on postintubation state.  Prior to initiation of propofol, patient was biting at the tube and attempting to pull away the tube.  Will plan to obtain CT scan imaging and admit to ICU. (11 Jan 2025 16:38)    Interval History:  Patient was seen and examined at bedside around 10 am.  Feels better today.  Abdominal pain and breathing are improving.   Denies nausea or vomiting.  Denies chest pain or palpitations.    ROS:  As per interval history otherwise unremarkable.    PHYSICAL EXAM:  Vital Signs   T(C): 36.3 (16 Jan 2025 13:16), Max: 36.6 (15 Carlo 2025 15:36)  T(F): 97.4 (16 Jan 2025 13:16), Max: 97.8 (15 Carlo 2025 15:36)  HR: 70 (16 Jan 2025 14:37) (64 - 89)  BP: 147/73 (16 Jan 2025 13:16) (114/76 - 161/85)  BP(mean): 84 (16 Jan 2025 12:00) (84 - 106)  RR: 18 (16 Jan 2025 13:16) (10 - 21)  SpO2: 98% (16 Jan 2025 13:16) (90% - 98%)  Parameters below as of 16 Jan 2025 14:37  Patient On (Oxygen Delivery Method): nasal cannula, 5L  General: Elderly male sitting in bed comfortably. No acute distress.   HEENT: EOMI. Clear conjunctivae. Moist mucus membrane  Neck: Supple.   Chest: Good air entry. Bibasilar rales. No wheezing or rhonchi. Chest tube on left side.   Heart: Normal S1 & S2. RRR.   Abdomen: Distended. Soft. Tender in RUQ. + BS  Ext: 1+ pedal edema. No calf tenderness   Neuro: Awake and alert. No focal deficit. Speech clear.   Skin: Warm and Dry  Psychiatry: Normal mood and affect    I&O's Summary    15 Carlo 2025 07:01  -  16 Jan 2025 07:00  --------------------------------------------------------  IN: 1025 mL / OUT: 2352 mL / NET: -1327 mL    16 Jan 2025 07:01  -  16 Jan 2025 15:13  --------------------------------------------------------  IN: 0 mL / OUT: 40 mL / NET: -40 mL    LABS:                        11.7   10.42 )-----------( 126      ( 16 Jan 2025 06:00 )             38.9     01-16    147[H]  |  108  |  59.6[H]  ----------------------------<  108[H]  4.0   |  26.0  |  2.01[H]    Ca    8.9      16 Jan 2025 06:00  Mg     2.6     01-16    TPro  7.2  /  Alb  3.4  /  TBili  1.8  /  DBili  x   /  AST  106[H]  /  ALT  52[H]  /  AlkPhos  114  01-16      CARDIAC MARKERS ( 15 Carlo 2025 17:10 )  x     / x     / x     / x     / 3.7 ng/mL    Blood Culture: 01-15 @ 16:05  Organism --  Gram Stain Blood -- Gram Stain   polymorphonuclear leukocytes seen  No organisms seen  by cytocentrifuge  Specimen Source Peritoneal  Culture-Blood --    01-11 @ 21:05  Organism --  Gram Stain Blood -- Gram Stain   polymorphonuclear leukocytes seen  No organisms seen  by cytocentrifuge  Specimen Source Pleural Fl  Culture-Blood --    01-11 @ 18:25  Organism --  Gram Stain Blood -- Gram Stain --  Specimen Source .Blood BLOOD  Culture-Blood --    01-11 @ 18:21  Organism --  Gram Stain Blood -- Gram Stain --  Specimen Source .Blood BLOOD  Culture-Blood --    RADIOLOGY & ADDITIONAL STUDIES:  Reviewed     MEDICATIONS  (STANDING):  acetylcysteine 10%  Inhalation 4 milliLiter(s) Inhalation every 6 hours  albuterol/ipratropium for Nebulization 3 milliLiter(s) Nebulizer every 6 hours  atorvastatin 40 milliGRAM(s) Oral at bedtime  carvedilol 3.125 milliGRAM(s) Oral every 12 hours  chlorhexidine 2% Cloths 1 Application(s) Topical daily  folic acid 1 milliGRAM(s) Oral daily  heparin   Injectable 5000 Unit(s) SubCutaneous every 8 hours  lactulose Syrup 15 Gram(s) Oral two times a day  lidocaine   4% Patch 1 Patch Transdermal daily  methadone    Tablet 40 milliGRAM(s) Oral daily  multivitamin 1 Tablet(s) Oral daily  pantoprazole  Injectable 40 milliGRAM(s) IV Push daily  piperacillin/tazobactam IVPB.. 3.375 Gram(s) IV Intermittent every 12 hours  rifAXIMin 550 milliGRAM(s) Oral two times a day  thiamine 100 milliGRAM(s) Oral daily    MEDICATIONS  (PRN):  acetaminophen     Tablet .. 650 milliGRAM(s) Oral every 6 hours PRN Temp greater or equal to 38C (100.4F), Mild Pain (1 - 3)  hydrALAZINE Injectable 5 milliGRAM(s) IV Push every 6 hours PRN SBP > 160  HYDROmorphone  Injectable 0.5 milliGRAM(s) IV Push every 4 hours PRN Breakthrough Pain  ondansetron Injectable 4 milliGRAM(s) IV Push every 6 hours PRN Nausea and/or Vomiting  traMADol 25 milliGRAM(s) Oral every 8 hours PRN Moderate Pain (4 - 6)  traMADol 50 milliGRAM(s) Oral every 8 hours PRN Severe Pain (7 - 10)

## 2025-01-16 NOTE — PROGRESS NOTE ADULT - ASSESSMENT
74yMale with h/o cirrhosis 2/2 HCV, daily smoker, who presented to [facility] c/o abdominal pain/distention, sob, previously seen for similar.  Pt hypoxic to 80s.  Episode of cardiac arrest in ED, intubated, ROSC achieved.  CT with liver mass possible HCC, L portal vein tumor thrombus, L-sided PNA with pleural effusion s/p PTC placement (1/11), course c/b hypotension, sepsis.  Pt extubated 1/13.  Thoracic Surgery consulted for chest tube management. 1/14 Patient downgraded from MICU.

## 2025-01-16 NOTE — PROGRESS NOTE ADULT - SUBJECTIVE AND OBJECTIVE BOX
Chief Complaint:  Patient is a 74y old  Male who presents with a chief complaint of Cardiac Arrest (16 Jan 2025 09:21)      HPI/ 24 hr events: Patient seen and examined at bedside. Patient reports feeling okay this morning. Tolerating diet without nausea, vomiting. Denies nausea, vomiting, abdominal pain. Vitals are overall stable.      REVIEW OF SYSTEMS:   General: Negative  HEENT: Negative  CV: Negative  Respiratory: Negative  GI: See HPI  : Negative  MSK: Negative  Hematologic: Negative  Skin: Negative    MEDICATIONS:   MEDICATIONS  (STANDING):  acetylcysteine 10%  Inhalation 4 milliLiter(s) Inhalation every 6 hours  albuterol/ipratropium for Nebulization 3 milliLiter(s) Nebulizer every 6 hours  atorvastatin 40 milliGRAM(s) Oral at bedtime  carvedilol 3.125 milliGRAM(s) Oral every 12 hours  chlorhexidine 2% Cloths 1 Application(s) Topical daily  heparin   Injectable 5000 Unit(s) SubCutaneous every 8 hours  lactulose Syrup 15 Gram(s) Oral two times a day  lidocaine   4% Patch 1 Patch Transdermal daily  methocarbamol 500 milliGRAM(s) Oral two times a day  pantoprazole  Injectable 40 milliGRAM(s) IV Push daily  piperacillin/tazobactam IVPB.. 3.375 Gram(s) IV Intermittent every 12 hours  rifAXIMin 550 milliGRAM(s) Oral two times a day    MEDICATIONS  (PRN):  acetaminophen     Tablet .. 650 milliGRAM(s) Oral every 6 hours PRN Temp greater or equal to 38C (100.4F), Mild Pain (1 - 3)  hydrALAZINE Injectable 5 milliGRAM(s) IV Push every 6 hours PRN SBP > 160  HYDROmorphone  Injectable 0.5 milliGRAM(s) IV Push every 4 hours PRN Breakthrough Pain  ondansetron Injectable 4 milliGRAM(s) IV Push every 6 hours PRN Nausea and/or Vomiting  traMADol 25 milliGRAM(s) Oral every 8 hours PRN Moderate Pain (4 - 6)  traMADol 50 milliGRAM(s) Oral every 8 hours PRN Severe Pain (7 - 10)            DIET:  Diet, Pureed:   Mildly Thick Liquids (MILDTHICKLIQS) (01-14-25 @ 14:28) [Active]          ALLERGIES:   Allergies    No Known Allergies    Intolerances        VITAL SIGNS:   Vital Signs Last 24 Hrs  T(C): 36.4 (16 Jan 2025 07:47), Max: 36.6 (15 Carlo 2025 14:38)  T(F): 97.6 (16 Jan 2025 07:47), Max: 97.9 (15 Carlo 2025 14:38)  HR: 64 (16 Jan 2025 12:00) (64 - 89)  BP: 123/67 (16 Jan 2025 12:00) (104/81 - 161/85)  BP(mean): 84 (16 Jan 2025 12:00) (84 - 106)  RR: 12 (16 Jan 2025 12:00) (10 - 21)  SpO2: 92% (16 Jan 2025 12:00) (90% - 99%)    Parameters below as of 16 Jan 2025 12:00  Patient On (Oxygen Delivery Method): nasal cannula  O2 Flow (L/min): 6    I&O's Summary    15 Carlo 2025 07:01  -  16 Jan 2025 07:00  --------------------------------------------------------  IN: 1025 mL / OUT: 2352 mL / NET: -1327 mL        PHYSICAL EXAM:   GENERAL:  No acute distress  HEENT:  NC/AT, conjunctiva clear, sclera anicteric  CHEST:  supplemental oxygen, let chest tube in place   HEART:  Regular rate  ABDOMEN:  Soft, non-tender, non-distended, normoactive bowel sounds, no rebound or guarding  EXTREMITIES: No edema  SKIN:  Warm, dry, discoloration to B/L LE extremities   NEURO:  Calm, cooperative    LABS:                        11.7   10.42 )-----------( 126      ( 16 Jan 2025 06:00 )             38.9     Hemoglobin: 11.7 g/dL (01-16-25 @ 06:00)  Hemoglobin: 12.6 g/dL (01-15-25 @ 06:00)  Hemoglobin: 10.9 g/dL (01-14-25 @ 04:50)  Hemoglobin: 10.9 g/dL (01-13-25 @ 22:55)  Hemoglobin: 11.2 g/dL (01-13-25 @ 14:14)    01-16    147[H]  |  108  |  59.6[H]  ----------------------------<  108[H]  4.0   |  26.0  |  2.01[H]    Ca    8.9      16 Jan 2025 06:00  Mg     2.6     01-16    TPro  7.2  /  Alb  3.4  /  TBili  1.8  /  DBili  x   /  AST  106[H]  /  ALT  52[H]  /  AlkPhos  114  01-16    LIVER FUNCTIONS - ( 16 Jan 2025 06:00 )  Alb: 3.4 g/dL / Pro: 7.2 g/dL / ALK PHOS: 114 U/L / ALT: 52 U/L / AST: 106 U/L / GGT: x           Culture - Body Fluid with Gram Stain (collected 15 Carlo 2025 16:05)  Source: Peritoneal  Gram Stain (16 Jan 2025 01:25):    polymorphonuclear leukocytes seen    No organisms seen    by cytocentrifuge  Albumin, Fluid: 0.9 g/dL (01-15-25 @ 16:05)  Lactate Dehydrogenase, Fluid: 170 U/L (01-15-25 @ 16:05)  Protein Total, Fluid: 1.8 g/dL (01-15-25 @ 16:05)    Body Fluid Appearance: Cloudy (01-15-25 @ 16:05)  Color - Body Fluid: Brown (01-15-25 @ 16:05)  Total RBC Count: 9000 cells/uL (01-15-25 @ 16:05)          Culture - Body Fluid with Gram Stain (collected 01-15-25 @ 16:05)  Source: Peritoneal  Gram Stain (01-16-25 @ 01:25):    polymorphonuclear leukocytes seen    No organisms seen    by cytocentrifuge          Culture - Body Fluid with Gram Stain (collected 01-15-25 @ 16:05)  Source: Peritoneal  Gram Stain (01-16-25 @ 01:25):    polymorphonuclear leukocytes seen    No organisms seen    by cytocentrifuge                  RADIOLOGY & ADDITIONAL STUDIES:      ACC: [id number] EXAM:  CT CHEST   ORDERED BY: RADHA MOORE     ACC: [id number] EXAM:  CT ABDOMEN AND PELVIS   ORDERED BY: KESHA BELLE     PROCEDURE DATE:  01/15/2025          INTERPRETATION:  CLINICAL INFORMATION: Chest pain. Left pleural effusion   with chest tube.    COMPARISON: CT pulmonary angiogram and contrast-enhanced CT of the   abdomen and pelvis January 11, 2025.    CONTRAST/COMPLICATIONS:  IV Contrast: NONE  Oral Contrast: NONE  .    PROCEDURE:  CT of the Chest, Abdomen and Pelvis was performed.  Sagittal and coronal reformats were performed.    FINDINGS:  CHEST:  LUNGS AND LARGE AIRWAYS: Patent central airways. There has been interval   improvement in patchy and coarse groundglass opacities with underlying   cystic changes in the upper lobes. Mild linear and dependent atelectasis   in the right lower lobe.  PLEURA: There has been complete resolution of the left pleural effusion   with a left posterior basilar pleural catheter entering between the   eighth and ninth ribs laterally. There is a trace left pneumothorax.   There is a trace layering right pleural effusion.  VESSELS: Within normal limits.  HEART: Heart size is normal. No pericardial effusion.  MEDIASTINUM AND OCTAVIO: No lymphadenopathy.  CHEST WALL AND LOWER NECK: Mild subcutaneous emphysema of the left   lateral chest wall.    ABDOMEN AND PELVIS:  LIVER: Again is noted a shrunken nodular liver with prominence of the   left lobe laterally and a subtle infiltrating lesion in the right lobe   anteriorly, compatible with cirrhosis and likely infiltrating hepatoma.   Again is noted thrombosis in the left portal vein. Stable prominent moni   hepatis, portacaval, upper abdominal, and gastrohepatic ligament lymph   nodes.  BILE DUCTS: Normal caliber.  GALLBLADDER: Within normal limits.  SPLEEN: The spleen remains enlarged to 14.0 cm. Containing a splenorenal   shunt.  PANCREAS: Within normal limits.  ADRENALS: Within normal limits.  KIDNEYS/URETERS: Within normal limits.    BLADDER: Within normal limits.  REPRODUCTIVE ORGANS: The prostate is not enlarged.    BOWEL: No bowel obstruction. Appendix is normal.  PERITONEUM/RETROPERITONEUM: Mild ascites.  VESSELS: Within normal limits.  LYMPH NODES: No lymphadenopathy.  ABDOMINAL WALL: Left abdominal wall hernia repair with mesh.  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    IMPRESSION: Resolved left pleural effusion with trace left pneumothorax.   Improved bilateral coarse groundglass opacities.    --- End of Report ---            VICKIE HILL MD; Attending Radiologist  This document has been electronically signed. Carlo 15 2025  3:42PM  01-15-25 @ 15:29    -- --   ACC: [id number] EXAM:  US ABDOMEN LIMITED   ORDERED BY: ALEXANDRA ROSA     PROCEDURE DATE:  01/15/2025          INTERPRETATION:  CLINICAL INFORMATION: Abdominal ascites    COMPARISON: CT 1/11/2025  TECHNIQUE: Limited four-quadrant abdominal ultrasoundperformed to assess   for ascites.    FINDINGS/  IMPRESSION:  1.  Diffuse mild volume all- quadrant  abdominal ascites, not   significantly changed from 1/11/2025.  2.  Deepest pocket : RLQ -  approx 6 cm    --- End of Report ---            MORA PISANO MD; Attending Radiologist  This document has been electronically signed. Carlo 15 2025  1:58PM --

## 2025-01-17 DIAGNOSIS — K65.2 SPONTANEOUS BACTERIAL PERITONITIS: ICD-10-CM

## 2025-01-17 LAB
ALBUMIN SERPL ELPH-MCNC: 2.9 G/DL — LOW (ref 3.3–5.2)
ALP SERPL-CCNC: 108 U/L — SIGNIFICANT CHANGE UP (ref 40–120)
ALT FLD-CCNC: 48 U/L — HIGH
ANION GAP SERPL CALC-SCNC: 13 MMOL/L — SIGNIFICANT CHANGE UP (ref 5–17)
AST SERPL-CCNC: 92 U/L — HIGH
BASOPHILS # BLD AUTO: 0.01 K/UL — SIGNIFICANT CHANGE UP (ref 0–0.2)
BASOPHILS NFR BLD AUTO: 0.1 % — SIGNIFICANT CHANGE UP (ref 0–2)
BILIRUB SERPL-MCNC: 1.8 MG/DL — SIGNIFICANT CHANGE UP (ref 0.4–2)
BUN SERPL-MCNC: 55.8 MG/DL — HIGH (ref 8–20)
CALCIUM SERPL-MCNC: 8.8 MG/DL — SIGNIFICANT CHANGE UP (ref 8.4–10.5)
CHLORIDE SERPL-SCNC: 113 MMOL/L — HIGH (ref 96–108)
CO2 SERPL-SCNC: 25 MMOL/L — SIGNIFICANT CHANGE UP (ref 22–29)
CREAT SERPL-MCNC: 1.47 MG/DL — HIGH (ref 0.5–1.3)
CULTURE RESULTS: SIGNIFICANT CHANGE UP
CULTURE RESULTS: SIGNIFICANT CHANGE UP
EGFR: 50 ML/MIN/1.73M2 — LOW
EOSINOPHIL # BLD AUTO: 0.04 K/UL — SIGNIFICANT CHANGE UP (ref 0–0.5)
EOSINOPHIL NFR BLD AUTO: 0.5 % — SIGNIFICANT CHANGE UP (ref 0–6)
GLUCOSE SERPL-MCNC: 94 MG/DL — SIGNIFICANT CHANGE UP (ref 70–99)
HCT VFR BLD CALC: 37.4 % — LOW (ref 39–50)
HGB BLD-MCNC: 11.5 G/DL — LOW (ref 13–17)
IMM GRANULOCYTES NFR BLD AUTO: 0.4 % — SIGNIFICANT CHANGE UP (ref 0–0.9)
LYMPHOCYTES # BLD AUTO: 0.77 K/UL — LOW (ref 1–3.3)
LYMPHOCYTES # BLD AUTO: 9.9 % — LOW (ref 13–44)
MAGNESIUM SERPL-MCNC: 2.6 MG/DL — SIGNIFICANT CHANGE UP (ref 1.6–2.6)
MCHC RBC-ENTMCNC: 28.2 PG — SIGNIFICANT CHANGE UP (ref 27–34)
MCHC RBC-ENTMCNC: 30.7 G/DL — LOW (ref 32–36)
MCV RBC AUTO: 91.7 FL — SIGNIFICANT CHANGE UP (ref 80–100)
MONOCYTES # BLD AUTO: 0.64 K/UL — SIGNIFICANT CHANGE UP (ref 0–0.9)
MONOCYTES NFR BLD AUTO: 8.2 % — SIGNIFICANT CHANGE UP (ref 2–14)
NEUTROPHILS # BLD AUTO: 6.28 K/UL — SIGNIFICANT CHANGE UP (ref 1.8–7.4)
NEUTROPHILS NFR BLD AUTO: 80.9 % — HIGH (ref 43–77)
PLATELET # BLD AUTO: 92 K/UL — LOW (ref 150–400)
POTASSIUM SERPL-MCNC: 4.4 MMOL/L — SIGNIFICANT CHANGE UP (ref 3.5–5.3)
POTASSIUM SERPL-SCNC: 4.4 MMOL/L — SIGNIFICANT CHANGE UP (ref 3.5–5.3)
PROT SERPL-MCNC: 6.6 G/DL — SIGNIFICANT CHANGE UP (ref 6.6–8.7)
RBC # BLD: 4.08 M/UL — LOW (ref 4.2–5.8)
RBC # FLD: 20.8 % — HIGH (ref 10.3–14.5)
SODIUM SERPL-SCNC: 150 MMOL/L — HIGH (ref 135–145)
SPECIMEN SOURCE: SIGNIFICANT CHANGE UP
SPECIMEN SOURCE: SIGNIFICANT CHANGE UP
WBC # BLD: 7.77 K/UL — SIGNIFICANT CHANGE UP (ref 3.8–10.5)
WBC # FLD AUTO: 7.77 K/UL — SIGNIFICANT CHANGE UP (ref 3.8–10.5)

## 2025-01-17 PROCEDURE — 99233 SBSQ HOSP IP/OBS HIGH 50: CPT | Mod: GC

## 2025-01-17 PROCEDURE — 71045 X-RAY EXAM CHEST 1 VIEW: CPT | Mod: 26

## 2025-01-17 PROCEDURE — 99232 SBSQ HOSP IP/OBS MODERATE 35: CPT

## 2025-01-17 PROCEDURE — 71045 X-RAY EXAM CHEST 1 VIEW: CPT | Mod: 26,77

## 2025-01-17 PROCEDURE — 99231 SBSQ HOSP IP/OBS SF/LOW 25: CPT

## 2025-01-17 PROCEDURE — 93010 ELECTROCARDIOGRAM REPORT: CPT

## 2025-01-17 RX ORDER — ALBUMIN HUMAN 50 G/1000ML
50 SOLUTION INTRAVENOUS EVERY 8 HOURS
Refills: 0 | Status: COMPLETED | OUTPATIENT
Start: 2025-01-17 | End: 2025-01-18

## 2025-01-17 RX ORDER — ALBUMIN HUMAN 50 G/1000ML
50 SOLUTION INTRAVENOUS EVERY 8 HOURS
Refills: 0 | Status: DISCONTINUED | OUTPATIENT
Start: 2025-01-17 | End: 2025-01-17

## 2025-01-17 RX ORDER — ALBUMIN HUMAN 50 G/1000ML
50 SOLUTION INTRAVENOUS ONCE
Refills: 0 | Status: COMPLETED | OUTPATIENT
Start: 2025-01-17 | End: 2025-01-17

## 2025-01-17 RX ADMIN — Medication 3.12 MILLIGRAM(S): at 21:52

## 2025-01-17 RX ADMIN — ANTISEPTIC SURGICAL SCRUB 1 APPLICATION(S): 0.04 SOLUTION TOPICAL at 13:20

## 2025-01-17 RX ADMIN — LIDOCAINE HYDROCHLORIDE 1 PATCH: 30 CREAM TOPICAL at 19:00

## 2025-01-17 RX ADMIN — Medication 4 MILLILITER(S): at 09:44

## 2025-01-17 RX ADMIN — ACETAMINOPHEN 650 MILLIGRAM(S): 160 SUSPENSION ORAL at 22:57

## 2025-01-17 RX ADMIN — LIDOCAINE HYDROCHLORIDE 1 PATCH: 30 CREAM TOPICAL at 12:57

## 2025-01-17 RX ADMIN — ALBUMIN HUMAN 50 MILLILITER(S): 50 SOLUTION INTRAVENOUS at 16:53

## 2025-01-17 RX ADMIN — PANTOPRAZOLE 40 MILLIGRAM(S): 20 TABLET, DELAYED RELEASE ORAL at 12:57

## 2025-01-17 RX ADMIN — Medication 15 GRAM(S): at 16:52

## 2025-01-17 RX ADMIN — ACETAMINOPHEN 650 MILLIGRAM(S): 160 SUSPENSION ORAL at 21:57

## 2025-01-17 RX ADMIN — Medication 4 MILLILITER(S): at 04:15

## 2025-01-17 RX ADMIN — IPRATROPIUM BROMIDE AND ALBUTEROL SULFATE 3 MILLILITER(S): .5; 2.5 SOLUTION RESPIRATORY (INHALATION) at 04:15

## 2025-01-17 RX ADMIN — Medication 3.12 MILLIGRAM(S): at 08:41

## 2025-01-17 RX ADMIN — IPRATROPIUM BROMIDE AND ALBUTEROL SULFATE 3 MILLILITER(S): .5; 2.5 SOLUTION RESPIRATORY (INHALATION) at 20:40

## 2025-01-17 RX ADMIN — METHADONE HYDROCHLORIDE 40 MILLIGRAM(S): 5 SOLUTION ORAL at 12:56

## 2025-01-17 RX ADMIN — PIPERACILLIN SODIUM AND TAZOBACTAM SODIUM 25 GRAM(S): 2; 250 INJECTION, POWDER, FOR SOLUTION INTRAVENOUS at 05:19

## 2025-01-17 RX ADMIN — Medication 5000 UNIT(S): at 05:18

## 2025-01-17 RX ADMIN — Medication 5000 UNIT(S): at 12:57

## 2025-01-17 RX ADMIN — PIPERACILLIN SODIUM AND TAZOBACTAM SODIUM 25 GRAM(S): 2; 250 INJECTION, POWDER, FOR SOLUTION INTRAVENOUS at 16:53

## 2025-01-17 RX ADMIN — Medication 1 TABLET(S): at 12:57

## 2025-01-17 RX ADMIN — Medication 15 GRAM(S): at 05:18

## 2025-01-17 RX ADMIN — ALBUMIN HUMAN 50 MILLILITER(S): 50 SOLUTION INTRAVENOUS at 10:17

## 2025-01-17 RX ADMIN — IPRATROPIUM BROMIDE AND ALBUTEROL SULFATE 3 MILLILITER(S): .5; 2.5 SOLUTION RESPIRATORY (INHALATION) at 15:47

## 2025-01-17 RX ADMIN — IPRATROPIUM BROMIDE AND ALBUTEROL SULFATE 3 MILLILITER(S): .5; 2.5 SOLUTION RESPIRATORY (INHALATION) at 09:44

## 2025-01-17 RX ADMIN — Medication 5000 UNIT(S): at 21:52

## 2025-01-17 RX ADMIN — Medication 100 MILLIGRAM(S): at 12:57

## 2025-01-17 RX ADMIN — ATORVASTATIN CALCIUM 40 MILLIGRAM(S): 80 TABLET, FILM COATED ORAL at 21:52

## 2025-01-17 RX ADMIN — Medication 1 MILLIGRAM(S): at 12:56

## 2025-01-17 NOTE — PROGRESS NOTE ADULT - SUBJECTIVE AND OBJECTIVE BOX
HPI  Patient is a 74y Male with PMHx cirrhosis 2/2 Hep C admitted for . Seen in ED 10 days PTA with similar complaints with CT showing ascites, potential HCC with left portal vein tumor thrombus.     INTERVAL EVENTS    HOSPITAL COURSE  Pt presented to ED on 1/11 with LLQ abdominal pain radiating to RLQ + black stool, /97, HR 90, RR 18, O2 87% on room air. Given 40mg Lasix for fluid retention and admitted to MICU for intubation following cardiac arrest and ACLS with 2 rounds of CPR in ED. 1mg epi given during code and was started on propofol + fentanyl post ROSC. Received 1L IVF for lactate> 4. Noted to have L PNA with pleural effusions/p PTC per CT Sx and started on Zosyn End Date 1/18. Extubated 1/13 to [facility]. Downgraded to Gen Med 1/14. 1/15, Heme Onc consulted, rec dedicated liver imaging with triple phase CT + GI consult. Paracentesis with 600cc removed. Gi rec lactulose + rifaximin + o/p EGD.     REVIEW OF SYSTEMS   General: No fatigue, decreased apatite, weight loss  HEENT: No cough, throat pain, rhinorrhea hemoptysis    Chest: No shortness of breath, chest pain  Abdomen: No abdominal pain, hematemesis   Genitourinary: No dysuria, No hematuria   Extremities: No joint pain, no change in range of motion  Skin: No rashes, ecchymoses purpura, nodules       MEDICATIONS  MEDICATIONS  (STANDING):  acetylcysteine 10%  Inhalation 4 milliLiter(s) Inhalation every 6 hours  albuterol/ipratropium for Nebulization 3 milliLiter(s) Nebulizer every 6 hours  atorvastatin 40 milliGRAM(s) Oral at bedtime  carvedilol 3.125 milliGRAM(s) Oral every 12 hours  chlorhexidine 2% Cloths 1 Application(s) Topical daily  folic acid 1 milliGRAM(s) Oral daily  heparin   Injectable 5000 Unit(s) SubCutaneous every 8 hours  lactulose Syrup 15 Gram(s) Oral two times a day  lidocaine   4% Patch 1 Patch Transdermal daily  methadone    Tablet 40 milliGRAM(s) Oral daily  multivitamin 1 Tablet(s) Oral daily  pantoprazole  Injectable 40 milliGRAM(s) IV Push daily  piperacillin/tazobactam IVPB.. 3.375 Gram(s) IV Intermittent every 12 hours  rifAXIMin 550 milliGRAM(s) Oral two times a day  thiamine 100 milliGRAM(s) Oral daily    MEDICATIONS  (PRN):  acetaminophen     Tablet .. 650 milliGRAM(s) Oral every 6 hours PRN Temp greater or equal to 38C (100.4F), Mild Pain (1 - 3)  hydrALAZINE Injectable 5 milliGRAM(s) IV Push every 6 hours PRN SBP > 160  ondansetron Injectable 4 milliGRAM(s) IV Push every 6 hours PRN Nausea and/or Vomiting      ALLERGIES  Allergies    No Known Allergies    Intolerances        PHYSICAL EXAM   Vital Signs Last 24 Hrs  T(C): 36.3 (17 Jan 2025 04:00), Max: 36.5 (17 Jan 2025 00:00)  T(F): 97.4 (17 Jan 2025 04:00), Max: 97.7 (17 Jan 2025 00:00)  HR: 60 (17 Jan 2025 04:00) (60 - 78)  BP: 120/69 (17 Jan 2025 04:00) (114/76 - 147/73)  BP(mean): 84 (16 Jan 2025 12:00) (84 - 91)  RR: 18 (17 Jan 2025 04:00) (10 - 19)  SpO2: 98% (17 Jan 2025 04:00) (92% - 98%)    Parameters below as of 17 Jan 2025 04:00  Patient On (Oxygen Delivery Method): nasal cannula  O2 Flow (L/min): 5      T(C): 36.3 (01-17-25 @ 04:00), Max: 36.5 (01-17-25 @ 00:00)  HR: 60 (01-17-25 @ 04:00) (60 - 78)  BP: 120/69 (01-17-25 @ 04:00) (114/76 - 147/73)  RR: 18 (01-17-25 @ 04:00) (10 - 19)  SpO2: 98% (01-17-25 @ 04:00) (92% - 98%)    CONSTITUTIONAL: Well groomed, no apparent distress  EYES: PERRLA and symmetric, EOMI, No conjunctival or scleral injection, non-icteric  ENMT: Oral mucosa with moist membranes. Normal dentition; no pharyngeal injection or exudates             NECK: Supple, symmetric and without tracheal deviation   RESP: No respiratory distress, no use of accessory muscles; CTA b/l, no WRR  CV: RRR, +S1S2, no MRG; no JVD; no peripheral edema  GI: Soft, NT, ND, no rebound, no guarding; no palpable masses; no hepatosplenomegaly; no hernia palpated  LYMPH: No cervical LAD or tenderness; no axillary LAD or tenderness; no inguinal LAD or tenderness  MSK: Normal gait; No digital clubbing or cyanosis; examination of the (head/neck/spine/ribs/pelvis, RUE, LUE, RLE, LLE) without misalignment,            Normal ROM without pain, no spinal tenderness, normal muscle strength/tone  SKIN: No rashes or ulcers noted; no subcutaneous nodules or induration palpable  NEURO: CN II-XII intact; normal reflexes in upper and lower extremities, sensation intact in upper and lower extremities b/l to light touch   PSYCH: Appropriate insight/judgment; A+O x 3, mood and affect appropriate, recent/remote memory intact      LABS                        11.7   10.42 )-----------( 126      ( 16 Jan 2025 06:00 )             38.9       CBC Full  -  ( 16 Jan 2025 06:00 )  WBC Count : 10.42 K/uL  RBC Count : 4.17 M/uL  Hemoglobin : 11.7 g/dL  Hematocrit : 38.9 %  Platelet Count - Automated : 126 K/uL  Mean Cell Volume : 93.3 fl  Mean Cell Hemoglobin : 28.1 pg  Mean Cell Hemoglobin Concentration : 30.1 g/dL  Auto Neutrophil # : 8.66 K/uL  Auto Lymphocyte # : 0.78 K/uL  Auto Monocyte # : 0.91 K/uL  Auto Eosinophil # : 0.00 K/uL  Auto Basophil # : 0.02 K/uL  Auto Neutrophil % : 83.1 %  Auto Lymphocyte % : 7.5 %  Auto Monocyte % : 8.7 %  Auto Eosinophil % : 0.0 %  Auto Basophil % : 0.2 %      01-16    147[H]  |  108  |  59.6[H]  ----------------------------<  108[H]  4.0   |  26.0  |  2.01[H]    Ca    8.9      16 Jan 2025 06:00  Mg     2.6     01-16    TPro  7.2  /  Alb  3.4  /  TBili  1.8  /  DBili  x   /  AST  106[H]  /  ALT  52[H]  /  AlkPhos  114  01-16      CAPILLARY BLOOD GLUCOSE              Urinalysis Basic - ( 16 Jan 2025 06:00 )    Color: x / Appearance: x / SG: x / pH: x  Gluc: 108 mg/dL / Ketone: x  / Bili: x / Urobili: x   Blood: x / Protein: x / Nitrite: x   Leuk Esterase: x / RBC: x / WBC x   Sq Epi: x / Non Sq Epi: x / Bacteria: x        IMAGING          HPI  Patient is a 74y Male with PMHx cirrhosis 2/2 Hep C admitted for . Seen in ED 10 days PTA with similar complaints with CT showing ascites, potential HCC with left portal vein tumor thrombus.     INTERVAL EVENTS  GI rec holding of diuretics for elevated Cr + albumin x 72 hours + rec nephrology consult.     HOSPITAL COURSE  Pt presented to ED on 1/11 with LLQ abdominal pain radiating to RLQ + black stool, /97, HR 90, RR 18, O2 87% on room air. Given 40mg Lasix for fluid retention and admitted to MICU for intubation following cardiac arrest and ACLS with 2 rounds of CPR in ED. 1mg epi given during code and was started on propofol + fentanyl post ROSC. Received 1L IVF for lactate> 4. Noted to have L PNA with pleural effusions/p PTC per CT Sx and started on Zosyn End Date 1/18. Extubated 1/13 to [facility]. Downgraded to Gen Med 1/14. 1/15, Heme Onc consulted, rec dedicated liver imaging with triple phase CT + GI consult. Paracentesis with 600cc removed. Gi rec lactulose + rifaximin + o/p EGD.     REVIEW OF SYSTEMS   General: No fatigue, decreased apatite, weight loss  HEENT: No cough, throat pain, rhinorrhea hemoptysis    Chest: No shortness of breath, chest pain  Abdomen: No abdominal pain, hematemesis   Genitourinary: No dysuria, No hematuria   Extremities: No joint pain, no change in range of motion  Skin: No rashes, ecchymoses purpura, nodules       MEDICATIONS  MEDICATIONS  (STANDING):  acetylcysteine 10%  Inhalation 4 milliLiter(s) Inhalation every 6 hours  albuterol/ipratropium for Nebulization 3 milliLiter(s) Nebulizer every 6 hours  atorvastatin 40 milliGRAM(s) Oral at bedtime  carvedilol 3.125 milliGRAM(s) Oral every 12 hours  chlorhexidine 2% Cloths 1 Application(s) Topical daily  folic acid 1 milliGRAM(s) Oral daily  heparin   Injectable 5000 Unit(s) SubCutaneous every 8 hours  lactulose Syrup 15 Gram(s) Oral two times a day  lidocaine   4% Patch 1 Patch Transdermal daily  methadone    Tablet 40 milliGRAM(s) Oral daily  multivitamin 1 Tablet(s) Oral daily  pantoprazole  Injectable 40 milliGRAM(s) IV Push daily  piperacillin/tazobactam IVPB.. 3.375 Gram(s) IV Intermittent every 12 hours  rifAXIMin 550 milliGRAM(s) Oral two times a day  thiamine 100 milliGRAM(s) Oral daily    MEDICATIONS  (PRN):  acetaminophen     Tablet .. 650 milliGRAM(s) Oral every 6 hours PRN Temp greater or equal to 38C (100.4F), Mild Pain (1 - 3)  hydrALAZINE Injectable 5 milliGRAM(s) IV Push every 6 hours PRN SBP > 160  ondansetron Injectable 4 milliGRAM(s) IV Push every 6 hours PRN Nausea and/or Vomiting      ALLERGIES  Allergies    No Known Allergies    Intolerances        PHYSICAL EXAM   Vital Signs Last 24 Hrs  T(C): 36.3 (17 Jan 2025 04:00), Max: 36.5 (17 Jan 2025 00:00)  T(F): 97.4 (17 Jan 2025 04:00), Max: 97.7 (17 Jan 2025 00:00)  HR: 60 (17 Jan 2025 04:00) (60 - 78)  BP: 120/69 (17 Jan 2025 04:00) (114/76 - 147/73)  BP(mean): 84 (16 Jan 2025 12:00) (84 - 91)  RR: 18 (17 Jan 2025 04:00) (10 - 19)  SpO2: 98% (17 Jan 2025 04:00) (92% - 98%)    Parameters below as of 17 Jan 2025 04:00  Patient On (Oxygen Delivery Method): nasal cannula  O2 Flow (L/min): 5      T(C): 36.3 (01-17-25 @ 04:00), Max: 36.5 (01-17-25 @ 00:00)  HR: 60 (01-17-25 @ 04:00) (60 - 78)  BP: 120/69 (01-17-25 @ 04:00) (114/76 - 147/73)  RR: 18 (01-17-25 @ 04:00) (10 - 19)  SpO2: 98% (01-17-25 @ 04:00) (92% - 98%)    CONSTITUTIONAL: Well groomed, no apparent distress  EYES: PERRLA and symmetric, EOMI, No conjunctival or scleral injection, non-icteric  ENMT: Oral mucosa with moist membranes. Normal dentition; no pharyngeal injection or exudates             NECK: Supple, symmetric and without tracheal deviation   RESP: No respiratory distress, no use of accessory muscles; CTA b/l, no WRR  CV: RRR, +S1S2, no MRG; no JVD; no peripheral edema  GI: Soft, NT, ND, no rebound, no guarding; no palpable masses; no hepatosplenomegaly; no hernia palpated  LYMPH: No cervical LAD or tenderness; no axillary LAD or tenderness; no inguinal LAD or tenderness  MSK: Normal gait; No digital clubbing or cyanosis; examination of the (head/neck/spine/ribs/pelvis, RUE, LUE, RLE, LLE) without misalignment,            Normal ROM without pain, no spinal tenderness, normal muscle strength/tone  SKIN: No rashes or ulcers noted; no subcutaneous nodules or induration palpable  NEURO: CN II-XII intact; normal reflexes in upper and lower extremities, sensation intact in upper and lower extremities b/l to light touch   PSYCH: Appropriate insight/judgment; A+O x 3, mood and affect appropriate, recent/remote memory intact      LABS                        11.7   10.42 )-----------( 126      ( 16 Jan 2025 06:00 )             38.9       CBC Full  -  ( 16 Jan 2025 06:00 )  WBC Count : 10.42 K/uL  RBC Count : 4.17 M/uL  Hemoglobin : 11.7 g/dL  Hematocrit : 38.9 %  Platelet Count - Automated : 126 K/uL  Mean Cell Volume : 93.3 fl  Mean Cell Hemoglobin : 28.1 pg  Mean Cell Hemoglobin Concentration : 30.1 g/dL  Auto Neutrophil # : 8.66 K/uL  Auto Lymphocyte # : 0.78 K/uL  Auto Monocyte # : 0.91 K/uL  Auto Eosinophil # : 0.00 K/uL  Auto Basophil # : 0.02 K/uL  Auto Neutrophil % : 83.1 %  Auto Lymphocyte % : 7.5 %  Auto Monocyte % : 8.7 %  Auto Eosinophil % : 0.0 %  Auto Basophil % : 0.2 %      01-16    147[H]  |  108  |  59.6[H]  ----------------------------<  108[H]  4.0   |  26.0  |  2.01[H]    Ca    8.9      16 Jan 2025 06:00  Mg     2.6     01-16    TPro  7.2  /  Alb  3.4  /  TBili  1.8  /  DBili  x   /  AST  106[H]  /  ALT  52[H]  /  AlkPhos  114  01-16      CAPILLARY BLOOD GLUCOSE              Urinalysis Basic - ( 16 Jan 2025 06:00 )    Color: x / Appearance: x / SG: x / pH: x  Gluc: 108 mg/dL / Ketone: x  / Bili: x / Urobili: x   Blood: x / Protein: x / Nitrite: x   Leuk Esterase: x / RBC: x / WBC x   Sq Epi: x / Non Sq Epi: x / Bacteria: x        IMAGING          HPI  Patient is a 74y Male with PMHx cirrhosis 2/2 Hep C admitted for cardiac arrest + hepatic hydrothorax in s/o potential HCC. Seen in ED 10 days PTA with similar complaints with CT showing ascites, potential HCC with left portal vein tumor thrombus.     INTERVAL EVENTS  GI rec holding of diuretics for elevated Cr + albumin x 72 hours + rec nephrology consult. CTSx removed chest tube for minimal output with follow up CXR stable.     HOSPITAL COURSE  Pt presented to ED on 1/11 with LLQ abdominal pain radiating to RLQ + black stool, /97, HR 90, RR 18, O2 87% on room air. Given 40mg Lasix for fluid retention and admitted to MICU for intubation following cardiac arrest and ACLS with 2 rounds of CPR in ED. 1mg epi given during code and was started on propofol + fentanyl post ROSC. Received 1L IVF for lactate> 4. Noted to have L PNA with pleural effusions/p PTC per CT Sx and started on Zosyn End Date 1/18. Extubated 1/13 to [facility]. Downgraded to Gen Med 1/14. 1/15, Heme Onc consulted, rec dedicated liver imaging with triple phase CT + GI consult. Paracentesis with 600cc removed. Gi rec lactulose + rifaximin + o/p EGD.     REVIEW OF SYSTEMS Patient endorses constipation despite lactulose + periumbilical abdominal pain. He denies chest pain, SOB.   General: No fatigue, decreased apatite, weight loss  HEENT: No cough, throat pain, rhinorrhea hemoptysis    Chest: No shortness of breath, chest pain  Abdomen: Keshia-umbilical abdominal pain, no hematemesis   Genitourinary: No dysuria, No hematuria   Extremities: No joint pain, no change in range of motion  Skin: No rashes, ecchymoses purpura, nodules       MEDICATIONS  MEDICATIONS  (STANDING):  acetylcysteine 10%  Inhalation 4 milliLiter(s) Inhalation every 6 hours  albuterol/ipratropium for Nebulization 3 milliLiter(s) Nebulizer every 6 hours  atorvastatin 40 milliGRAM(s) Oral at bedtime  carvedilol 3.125 milliGRAM(s) Oral every 12 hours  chlorhexidine 2% Cloths 1 Application(s) Topical daily  folic acid 1 milliGRAM(s) Oral daily  heparin   Injectable 5000 Unit(s) SubCutaneous every 8 hours  lactulose Syrup 15 Gram(s) Oral two times a day  lidocaine   4% Patch 1 Patch Transdermal daily  methadone    Tablet 40 milliGRAM(s) Oral daily  multivitamin 1 Tablet(s) Oral daily  pantoprazole  Injectable 40 milliGRAM(s) IV Push daily  piperacillin/tazobactam IVPB.. 3.375 Gram(s) IV Intermittent every 12 hours  rifAXIMin 550 milliGRAM(s) Oral two times a day  thiamine 100 milliGRAM(s) Oral daily    MEDICATIONS  (PRN):  acetaminophen     Tablet .. 650 milliGRAM(s) Oral every 6 hours PRN Temp greater or equal to 38C (100.4F), Mild Pain (1 - 3)  hydrALAZINE Injectable 5 milliGRAM(s) IV Push every 6 hours PRN SBP > 160  ondansetron Injectable 4 milliGRAM(s) IV Push every 6 hours PRN Nausea and/or Vomiting      ALLERGIES  Allergies    No Known Allergies    Intolerances        PHYSICAL EXAM   Vital Signs Last 24 Hrs  T(C): 36.3 (17 Jan 2025 04:00), Max: 36.5 (17 Jan 2025 00:00)  T(F): 97.4 (17 Jan 2025 04:00), Max: 97.7 (17 Jan 2025 00:00)  HR: 60 (17 Jan 2025 04:00) (60 - 78)  BP: 120/69 (17 Jan 2025 04:00) (114/76 - 147/73)  BP(mean): 84 (16 Jan 2025 12:00) (84 - 91)  RR: 18 (17 Jan 2025 04:00) (10 - 19)  SpO2: 98% (17 Jan 2025 04:00) (92% - 98%)    Parameters below as of 17 Jan 2025 04:00  Patient On (Oxygen Delivery Method): nasal cannula  O2 Flow (L/min): 5      T(C): 36.3 (01-17-25 @ 04:00), Max: 36.5 (01-17-25 @ 00:00)  HR: 60 (01-17-25 @ 04:00) (60 - 78)  BP: 120/69 (01-17-25 @ 04:00) (114/76 - 147/73)  RR: 18 (01-17-25 @ 04:00) (10 - 19)  SpO2: 98% (01-17-25 @ 04:00) (92% - 98%)    CONSTITUTIONAL: Well groomed, no apparent distress  EYES: PERRLA and symmetric, EOMI, No conjunctival or scleral injection, non-icteric  ENMT: Oral mucosa with moist membranes. Normal dentition; no pharyngeal injection or exudates             NECK: Supple, symmetric and without tracheal deviation   RESP: No respiratory distress, no use of accessory muscles; CTA b/l, no WRR  CV: RRR, +S1S2, no MRG; no JVD; no peripheral edema  GI: Soft, NT, ND, no rebound, no guarding; no palpable masses; no hepatosplenomegaly; no hernia palpated  LYMPH: No cervical LAD or tenderness; no axillary LAD or tenderness; no inguinal LAD or tenderness  MSK: Normal gait; No digital clubbing or cyanosis; examination of the (head/neck/spine/ribs/pelvis, RUE, LUE, RLE, LLE) without misalignment,            Normal ROM without pain, no spinal tenderness, normal muscle strength/tone  SKIN: No rashes or ulcers noted; no subcutaneous nodules or induration palpable  NEURO: CN II-XII intact; normal reflexes in upper and lower extremities, sensation intact in upper and lower extremities b/l to light touch   PSYCH: Appropriate insight/judgment; A+O x 3, mood and affect appropriate, recent/remote memory intact      LABS                        11.7   10.42 )-----------( 126      ( 16 Jan 2025 06:00 )             38.9       CBC Full  -  ( 16 Jan 2025 06:00 )  WBC Count : 10.42 K/uL  RBC Count : 4.17 M/uL  Hemoglobin : 11.7 g/dL  Hematocrit : 38.9 %  Platelet Count - Automated : 126 K/uL  Mean Cell Volume : 93.3 fl  Mean Cell Hemoglobin : 28.1 pg  Mean Cell Hemoglobin Concentration : 30.1 g/dL  Auto Neutrophil # : 8.66 K/uL  Auto Lymphocyte # : 0.78 K/uL  Auto Monocyte # : 0.91 K/uL  Auto Eosinophil # : 0.00 K/uL  Auto Basophil # : 0.02 K/uL  Auto Neutrophil % : 83.1 %  Auto Lymphocyte % : 7.5 %  Auto Monocyte % : 8.7 %  Auto Eosinophil % : 0.0 %  Auto Basophil % : 0.2 %      01-16    147[H]  |  108  |  59.6[H]  ----------------------------<  108[H]  4.0   |  26.0  |  2.01[H]    Ca    8.9      16 Jan 2025 06:00  Mg     2.6     01-16    TPro  7.2  /  Alb  3.4  /  TBili  1.8  /  DBili  x   /  AST  106[H]  /  ALT  52[H]  /  AlkPhos  114  01-16      CAPILLARY BLOOD GLUCOSE              Urinalysis Basic - ( 16 Jan 2025 06:00 )    Color: x / Appearance: x / SG: x / pH: x  Gluc: 108 mg/dL / Ketone: x  / Bili: x / Urobili: x   Blood: x / Protein: x / Nitrite: x   Leuk Esterase: x / RBC: x / WBC x   Sq Epi: x / Non Sq Epi: x / Bacteria: x        IMAGING          HPI  Patient is a 74y Male with PMHx cirrhosis 2/2 Hep C admitted for cardiac arrest + hepatic hydrothorax in s/o potential HCC. Seen in ED 10 days PTA with similar complaints with CT showing ascites, potential HCC with left portal vein tumor thrombus.     INTERVAL EVENTS  GI rec holding of diuretics for elevated Cr + albumin x 72 hours + rec nephrology consult. CTSx removed chest tube for minimal output with follow up CXR stable.     HOSPITAL COURSE  Pt presented to ED on 1/11 with LLQ abdominal pain radiating to RLQ + black stool, /97, HR 90, RR 18, O2 87% on room air. Given 40mg Lasix for fluid retention and admitted to MICU for intubation following cardiac arrest and ACLS with 2 rounds of CPR in ED. 1mg epi given during code and was started on propofol + fentanyl post ROSC. Received 1L IVF for lactate> 4. Noted to have L PNA with pleural effusions/p PTC per CT Sx and started on Zosyn End Date 1/18. Extubated 1/13 to [facility]. Downgraded to Gen Med 1/14. 1/15, Heme Onc consulted, rec dedicated liver imaging with triple phase CT + GI consult. Paracentesis with 600cc removed. Gi rec lactulose + rifaximin + o/p EGD.     REVIEW OF SYSTEMS Patient endorses constipation despite lactulose + periumbilical abdominal pain. He denies chest pain, SOB.   General: No fatigue, decreased apatite, weight loss  HEENT: No cough, throat pain, rhinorrhea hemoptysis    Chest: No shortness of breath, chest pain  Abdomen: Keshia-umbilical abdominal pain, no hematemesis   Genitourinary: No dysuria, No hematuria   Extremities: No joint pain, no change in range of motion  Skin: No rashes, ecchymoses purpura, nodules       MEDICATIONS  MEDICATIONS  (STANDING):  acetylcysteine 10%  Inhalation 4 milliLiter(s) Inhalation every 6 hours  albuterol/ipratropium for Nebulization 3 milliLiter(s) Nebulizer every 6 hours  atorvastatin 40 milliGRAM(s) Oral at bedtime  carvedilol 3.125 milliGRAM(s) Oral every 12 hours  chlorhexidine 2% Cloths 1 Application(s) Topical daily  folic acid 1 milliGRAM(s) Oral daily  heparin   Injectable 5000 Unit(s) SubCutaneous every 8 hours  lactulose Syrup 15 Gram(s) Oral two times a day  lidocaine   4% Patch 1 Patch Transdermal daily  methadone    Tablet 40 milliGRAM(s) Oral daily  multivitamin 1 Tablet(s) Oral daily  pantoprazole  Injectable 40 milliGRAM(s) IV Push daily  piperacillin/tazobactam IVPB.. 3.375 Gram(s) IV Intermittent every 12 hours  rifAXIMin 550 milliGRAM(s) Oral two times a day  thiamine 100 milliGRAM(s) Oral daily    MEDICATIONS  (PRN):  acetaminophen     Tablet .. 650 milliGRAM(s) Oral every 6 hours PRN Temp greater or equal to 38C (100.4F), Mild Pain (1 - 3)  hydrALAZINE Injectable 5 milliGRAM(s) IV Push every 6 hours PRN SBP > 160  ondansetron Injectable 4 milliGRAM(s) IV Push every 6 hours PRN Nausea and/or Vomiting      ALLERGIES  Allergies    No Known Allergies    Intolerances        PHYSICAL EXAM   Vital Signs Last 24 Hrs  T(C): 36.3 (17 Jan 2025 04:00), Max: 36.5 (17 Jan 2025 00:00)  T(F): 97.4 (17 Jan 2025 04:00), Max: 97.7 (17 Jan 2025 00:00)  HR: 60 (17 Jan 2025 04:00) (60 - 78)  BP: 120/69 (17 Jan 2025 04:00) (114/76 - 147/73)  BP(mean): 84 (16 Jan 2025 12:00) (84 - 91)  RR: 18 (17 Jan 2025 04:00) (10 - 19)  SpO2: 98% (17 Jan 2025 04:00) (92% - 98%)    Parameters below as of 17 Jan 2025 04:00  Patient On (Oxygen Delivery Method): nasal cannula  O2 Flow (L/min): 5      T(C): 36.3 (01-17-25 @ 04:00), Max: 36.5 (01-17-25 @ 00:00)  HR: 60 (01-17-25 @ 04:00) (60 - 78)  BP: 120/69 (01-17-25 @ 04:00) (114/76 - 147/73)  RR: 18 (01-17-25 @ 04:00) (10 - 19)  SpO2: 98% (01-17-25 @ 04:00) (92% - 98%)    CONSTITUTIONAL: Well groomed, no apparent distress, sitting in bed comfortably, unclear if skin is jaundiced   EYES: symmetric, EOMI, No conjunctival or scleral injection  ENMT: Oral mucosa with moist membranes. Lower teeth removed.   NECK: symmetric and without tracheal deviation   RESP: No respiratory distress, no use of accessory muscles; CTA b/l, no WRR. Satting 91-92 on 3LNC.   CV: RRR, +S1S2, no MRG; no JVD; no peripheral edema  GI: Protuberant, tender in all regions, no rebound, no guarding; no hernia palpated. Right flank ecchymoses with L periumbilical ecchymoses noted.    MSK: Normal strength in bilateral LE + UE   SKIN: No rashes or ulcers noted. Multiple areas of ecchymoses noted on the upper arms. Chronic venous stasis changes of the RLE.   NEURO: sensation intact in upper and lower extremities b/l to light touch   PSYCH: Appropriate insight/judgment; A+O x 4, mood and affect appropriate, recent/remote memory intact      LABS                        11.7   10.42 )-----------( 126      ( 16 Jan 2025 06:00 )             38.9       CBC Full  -  ( 16 Jan 2025 06:00 )  WBC Count : 10.42 K/uL  RBC Count : 4.17 M/uL  Hemoglobin : 11.7 g/dL  Hematocrit : 38.9 %  Platelet Count - Automated : 126 K/uL  Mean Cell Volume : 93.3 fl  Mean Cell Hemoglobin : 28.1 pg  Mean Cell Hemoglobin Concentration : 30.1 g/dL  Auto Neutrophil # : 8.66 K/uL  Auto Lymphocyte # : 0.78 K/uL  Auto Monocyte # : 0.91 K/uL  Auto Eosinophil # : 0.00 K/uL  Auto Basophil # : 0.02 K/uL  Auto Neutrophil % : 83.1 %  Auto Lymphocyte % : 7.5 %  Auto Monocyte % : 8.7 %  Auto Eosinophil % : 0.0 %  Auto Basophil % : 0.2 %      01-16    147[H]  |  108  |  59.6[H]  ----------------------------<  108[H]  4.0   |  26.0  |  2.01[H]    Ca    8.9      16 Jan 2025 06:00  Mg     2.6     01-16    TPro  7.2  /  Alb  3.4  /  TBili  1.8  /  DBili  x   /  AST  106[H]  /  ALT  52[H]  /  AlkPhos  114  01-16      CAPILLARY BLOOD GLUCOSE              Urinalysis Basic - ( 16 Jan 2025 06:00 )    Color: x / Appearance: x / SG: x / pH: x  Gluc: 108 mg/dL / Ketone: x  / Bili: x / Urobili: x   Blood: x / Protein: x / Nitrite: x   Leuk Esterase: x / RBC: x / WBC x   Sq Epi: x / Non Sq Epi: x / Bacteria: x        IMAGING     < from: Xray Chest 1 View- PORTABLE-Routine (Xray Chest 1 View- PORTABLE-Routine in AM.) (01.16.25 @ 04:52) >  ACC: [id number] EXAM:  XR CHEST PORTABLE ROUTINE 1V     PROCEDURE DATE:  01/16/2025          INTERPRETATION:  Portable AP chest radiograph    COMPARISON: 1/15/2025 chest x-ray.    CLINICAL INFORMATION: LEFT chest tube. Follow-up.    FINDINGS:  CATHETERS AND TUBES: LEFT multi-sidehole pigtail catheter overlies LEFT   lower hemithorax.    PULMONARY:  Mild bilateral interstitial lung disease.. No pneumothorax.  RIGHT lower zone of airspace disease and/or small effusion.  Apices clear.  HEART/VASCULAR: The  heart is mildly enlarged in transverse diameter. No   hilar mass.    BONES: The visualized osseous thorax is intact.    IMPRESSION:  LEFT multi-sidehole pigtail catheter overlies LEFT lower hemithorax.  RIGHT lower zone airspace disease and/or mild effusion.    --- End of Report ---    < end of copied text >  < from: CT Abdomen and Pelvis No Cont (01.15.25 @ 15:29) >  ACC: [id number] EXAM:  CT ABDOMEN AND PELVIS    PROCEDURE DATE:  01/15/2025          INTERPRETATION:  CLINICAL INFORMATION: Chest pain. Left pleural effusion   with chest tube.    COMPARISON: CT pulmonary angiogram and contrast-enhanced CT of the   abdomen and pelvis January 11, 2025.    CONTRAST/COMPLICATIONS:  IV Contrast: NONE  Oral Contrast: NONE  .    PROCEDURE:  CT of the Chest, Abdomen and Pelvis was performed.  Sagittal and coronal reformats were performed.    FINDINGS:  CHEST:  LUNGS AND LARGE AIRWAYS: Patent central airways. There has been interval   improvement in patchy and coarse groundglass opacities with underlying   cystic changes in the upper lobes. Mild linear and dependent atelectasis   in the right lower lobe.  PLEURA: There has been complete resolution of the left pleural effusion   with a left posterior basilar pleural catheter entering between the   eighth and ninth ribs laterally. There is a trace left pneumothorax.   There is a trace layering right pleural effusion.  VESSELS: Within normal limits.  HEART: Heart size is normal. No pericardial effusion.  MEDIASTINUM AND OCTAVIO: No lymphadenopathy.  CHEST WALL AND LOWER NECK: Mild subcutaneous emphysema of the left   lateral chest wall.    ABDOMEN AND PELVIS:  LIVER: Again is noted a shrunken nodular liver with prominence of the   left lobe laterally and a subtle infiltrating lesion in the right lobe   anteriorly, compatible with cirrhosis and likely infiltrating hepatoma.   Again is noted thrombosis in the left portal vein. Stable prominent moni   hepatis, portacaval, upper abdominal, and gastrohepatic ligament lymph   nodes.  BILE DUCTS: Normal caliber.  GALLBLADDER: Within normal limits.  SPLEEN: The spleen remains enlarged to 14.0 cm. Containing a splenorenal   shunt.  PANCREAS: Within normal limits.  ADRENALS: Within normal limits.  KIDNEYS/URETERS: Within normal limits.    BLADDER: Within normal limits.  REPRODUCTIVE ORGANS: The prostate is not enlarged.    BOWEL: No bowel obstruction. Appendix is normal.  PERITONEUM/RETROPERITONEUM: Mild ascites.  VESSELS: Within normal limits.  LYMPH NODES: No lymphadenopathy.  ABDOMINAL WALL: Left abdominal wall hernia repair with mesh.  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    IMPRESSION: Resolved left pleural effusion with trace left pneumothorax.   Improved bilateral coarse groundglass opacities.    --- End of Report ---      < end of copied text >

## 2025-01-17 NOTE — PROGRESS NOTE ADULT - SUBJECTIVE AND OBJECTIVE BOX
Chief Complaint:  Patient is a 74y old  Male who presents with a chief complaint of Cardiac Arrest (17 Jan 2025 07:13)      HPI/ 24 hr events: Patient seen and examined at bedside. Pt feeling well this morning. Tolerating diet. Denies nausea, vomiting, diarrhea, abdominal pain, hematemesis, hematochezia, melena. Vitals are overall stable, no leukocytosis.       REVIEW OF SYSTEMS:   General: Negative  HEENT: Negative  CV: Negative  Respiratory: Negative  GI: See HPI  : Negative  MSK: Negative  Hematologic: Negative  Skin: Negative    MEDICATIONS:   MEDICATIONS  (STANDING):  albumin human 25% IVPB 50 milliLiter(s) IV Intermittent every 8 hours  albuterol/ipratropium for Nebulization 3 milliLiter(s) Nebulizer every 6 hours  atorvastatin 40 milliGRAM(s) Oral at bedtime  carvedilol 3.125 milliGRAM(s) Oral every 12 hours  chlorhexidine 2% Cloths 1 Application(s) Topical daily  folic acid 1 milliGRAM(s) Oral daily  heparin   Injectable 5000 Unit(s) SubCutaneous every 8 hours  lactulose Syrup 15 Gram(s) Oral two times a day  lidocaine   4% Patch 1 Patch Transdermal daily  methadone    Tablet 40 milliGRAM(s) Oral daily  multivitamin 1 Tablet(s) Oral daily  pantoprazole  Injectable 40 milliGRAM(s) IV Push daily  piperacillin/tazobactam IVPB.. 3.375 Gram(s) IV Intermittent every 12 hours  rifAXIMin 550 milliGRAM(s) Oral two times a day  thiamine 100 milliGRAM(s) Oral daily    MEDICATIONS  (PRN):  acetaminophen     Tablet .. 650 milliGRAM(s) Oral every 6 hours PRN Temp greater or equal to 38C (100.4F), Mild Pain (1 - 3)  hydrALAZINE Injectable 5 milliGRAM(s) IV Push every 6 hours PRN SBP > 160  ondansetron Injectable 4 milliGRAM(s) IV Push every 6 hours PRN Nausea and/or Vomiting      ALLERGIES:   Allergies    No Known Allergies    Intolerances        VITAL SIGNS:   Vital Signs Last 24 Hrs  T(C): 36.3 (17 Jan 2025 08:32), Max: 36.5 (17 Jan 2025 00:00)  T(F): 97.3 (17 Jan 2025 08:32), Max: 97.7 (17 Jan 2025 00:00)  HR: 60 (17 Jan 2025 08:32) (60 - 74)  BP: 127/85 (17 Jan 2025 08:32) (120/69 - 147/73)  BP(mean): 84 (16 Jan 2025 12:00) (84 - 84)  RR: 18 (17 Jan 2025 08:32) (12 - 19)  SpO2: 93% (17 Jan 2025 08:32) (92% - 98%)    Parameters below as of 17 Jan 2025 08:32  Patient On (Oxygen Delivery Method): nasal cannula  O2 Flow (L/min): 3    I&O's Summary    16 Jan 2025 07:01  -  17 Jan 2025 07:00  --------------------------------------------------------  IN: 120 mL / OUT: 40 mL / NET: 80 mL    17 Jan 2025 07:01  -  17 Jan 2025 10:50  --------------------------------------------------------  IN: 0 mL / OUT: 50 mL / NET: -50 mL        PHYSICAL EXAM:   GENERAL:  No acute distress  HEENT:  NC/AT, conjunctiva clear, sclera anicteric  CHEST:  supplemental oxygen, let chest tube in place   HEART:  Regular rate  ABDOMEN:  Soft, non-tender, non-distended, normoactive bowel sounds, no rebound or guarding  EXTREMITIES: No edema  SKIN:  Warm, dry, discoloration to B/L LE extremities   NEURO:  Calm, cooperative    LABS:                        11.5   7.77  )-----------( 92       ( 17 Jan 2025 06:33 )             37.4     01-17    150[H]  |  113[H]  |  55.8[H]  ----------------------------<  94  4.4   |  25.0  |  1.47[H]    Ca    8.8      17 Jan 2025 06:33  Mg     2.6     01-17    TPro  6.6  /  Alb  2.9[L]  /  TBili  1.8  /  DBili  x   /  AST  92[H]  /  ALT  48[H]  /  AlkPhos  108  01-17    LIVER FUNCTIONS - ( 17 Jan 2025 06:33 )  Alb: 2.9 g/dL / Pro: 6.6 g/dL / ALK PHOS: 108 U/L / ALT: 48 U/L / AST: 92 U/L / GGT: x               Culture - Body Fluid with Gram Stain (collected 15 Carlo 2025 16:05)  Source: Peritoneal  Gram Stain (16 Jan 2025 01:25):    polymorphonuclear leukocytes seen    No organisms seen    by cytocentrifuge  Preliminary Report (16 Jan 2025 18:58):    No growth                Albumin, Fluid: 0.9 g/dL (01-15-25 @ 16:05)  Lactate Dehydrogenase, Fluid: 170 U/L (01-15-25 @ 16:05)  Protein Total, Fluid: 1.8 g/dL (01-15-25 @ 16:05)    Body Fluid Appearance: Cloudy (01-15-25 @ 16:05)  Color - Body Fluid: Brown (01-15-25 @ 16:05)  Total RBC Count: 9000 cells/uL (01-15-25 @ 16:05)  BF Lymphocytes: 51 % (01-15-25 @ 16:05)  Monocyte/Macrophage Count - Body Fluid: 8 % (01-15-25 @ 16:05)  Mesothelial Cells - Body Fluid: 35 % (01-15-25 @ 16:05)                    RADIOLOGY & ADDITIONAL STUDIES:       Chief Complaint:  Patient is a 74y old  Male who presents with a chief complaint of Cardiac Arrest (17 Jan 2025 07:13)      HPI/ 24 hr events: Patient seen and examined at bedside. Pt feeling well this morning. Tolerating diet. Denies nausea, vomiting, diarrhea, abdominal pain, hematemesis, hematochezia, melena. Vitals are overall stable, no leukocytosis.  NO fever       REVIEW OF SYSTEMS:   General: Negative  HEENT: Negative  CV: Negative  Respiratory: Negative  GI: See HPI  : Negative  MSK: Negative  Hematologic: Negative  Skin: Negative    MEDICATIONS:   MEDICATIONS  (STANDING):  albumin human 25% IVPB 50 milliLiter(s) IV Intermittent every 8 hours  albuterol/ipratropium for Nebulization 3 milliLiter(s) Nebulizer every 6 hours  atorvastatin 40 milliGRAM(s) Oral at bedtime  carvedilol 3.125 milliGRAM(s) Oral every 12 hours  chlorhexidine 2% Cloths 1 Application(s) Topical daily  folic acid 1 milliGRAM(s) Oral daily  heparin   Injectable 5000 Unit(s) SubCutaneous every 8 hours  lactulose Syrup 15 Gram(s) Oral two times a day  lidocaine   4% Patch 1 Patch Transdermal daily  methadone    Tablet 40 milliGRAM(s) Oral daily  multivitamin 1 Tablet(s) Oral daily  pantoprazole  Injectable 40 milliGRAM(s) IV Push daily  piperacillin/tazobactam IVPB.. 3.375 Gram(s) IV Intermittent every 12 hours  rifAXIMin 550 milliGRAM(s) Oral two times a day  thiamine 100 milliGRAM(s) Oral daily    MEDICATIONS  (PRN):  acetaminophen     Tablet .. 650 milliGRAM(s) Oral every 6 hours PRN Temp greater or equal to 38C (100.4F), Mild Pain (1 - 3)  hydrALAZINE Injectable 5 milliGRAM(s) IV Push every 6 hours PRN SBP > 160  ondansetron Injectable 4 milliGRAM(s) IV Push every 6 hours PRN Nausea and/or Vomiting      ALLERGIES:   Allergies    No Known Allergies    Intolerances        VITAL SIGNS:   Vital Signs Last 24 Hrs  T(C): 36.3 (17 Jan 2025 08:32), Max: 36.5 (17 Jan 2025 00:00)  T(F): 97.3 (17 Jan 2025 08:32), Max: 97.7 (17 Jan 2025 00:00)  HR: 60 (17 Jan 2025 08:32) (60 - 74)  BP: 127/85 (17 Jan 2025 08:32) (120/69 - 147/73)  BP(mean): 84 (16 Jan 2025 12:00) (84 - 84)  RR: 18 (17 Jan 2025 08:32) (12 - 19)  SpO2: 93% (17 Jan 2025 08:32) (92% - 98%)    Parameters below as of 17 Jan 2025 08:32  Patient On (Oxygen Delivery Method): nasal cannula  O2 Flow (L/min): 3    I&O's Summary    16 Jan 2025 07:01  -  17 Jan 2025 07:00  --------------------------------------------------------  IN: 120 mL / OUT: 40 mL / NET: 80 mL    17 Jan 2025 07:01  -  17 Jan 2025 10:50  --------------------------------------------------------  IN: 0 mL / OUT: 50 mL / NET: -50 mL        PHYSICAL EXAM:   GENERAL:  No acute distress  HEENT:  NC/AT, conjunctiva clear, sclera anicteric  CHEST:  supplemental oxygen, let chest tube in place   HEART:  Regular rate  ABDOMEN:  Soft, non-tender, non-distended, normoactive bowel sounds, no rebound or guarding  EXTREMITIES: No edema  SKIN:  Warm, dry, discoloration to B/L LE extremities   NEURO:  Calm, cooperative    LABS:                        11.5   7.77  )-----------( 92       ( 17 Jan 2025 06:33 )             37.4     01-17    150[H]  |  113[H]  |  55.8[H]  ----------------------------<  94  4.4   |  25.0  |  1.47[H]    Ca    8.8      17 Jan 2025 06:33  Mg     2.6     01-17    TPro  6.6  /  Alb  2.9[L]  /  TBili  1.8  /  DBili  x   /  AST  92[H]  /  ALT  48[H]  /  AlkPhos  108  01-17    LIVER FUNCTIONS - ( 17 Jan 2025 06:33 )  Alb: 2.9 g/dL / Pro: 6.6 g/dL / ALK PHOS: 108 U/L / ALT: 48 U/L / AST: 92 U/L / GGT: x               Culture - Body Fluid with Gram Stain (collected 15 Carlo 2025 16:05)  Source: Peritoneal  Gram Stain (16 Jan 2025 01:25):    polymorphonuclear leukocytes seen    No organisms seen    by cytocentrifuge  Preliminary Report (16 Jan 2025 18:58):    No growth                Albumin, Fluid: 0.9 g/dL (01-15-25 @ 16:05)  Lactate Dehydrogenase, Fluid: 170 U/L (01-15-25 @ 16:05)  Protein Total, Fluid: 1.8 g/dL (01-15-25 @ 16:05)    Body Fluid Appearance: Cloudy (01-15-25 @ 16:05)  Color - Body Fluid: Brown (01-15-25 @ 16:05)  Total RBC Count: 9000 cells/uL (01-15-25 @ 16:05)  BF Lymphocytes: 51 % (01-15-25 @ 16:05)  Monocyte/Macrophage Count - Body Fluid: 8 % (01-15-25 @ 16:05)  Mesothelial Cells - Body Fluid: 35 % (01-15-25 @ 16:05)                    RADIOLOGY & ADDITIONAL STUDIES:

## 2025-01-17 NOTE — CONSULT NOTE ADULT - ASSESSMENT
Mr. José Box is 74y Male with PMHx cirrhosis secondary to hepatitis C, daily smoker presented with abdominal pain with distention and difficulty breathing. CT showed ascites, cirrhosis, possible hepatocellular carcinoma/liver mass with left portal vein tumor thrombus.  In ED found had cardiac arrest ROSC acheived with 2 rounds of CPR subsequently intubated and admitted in MICU and treated with zosyn for L-sided PNA with pleural effusion s/p PTC placement (1/11), course c/b hypotension, sepsis.  Pt extubated 1/13. Also pt had paracentesis with 600cc removed, s/p albumin infusion + lactulose + rifaximin. Nephrology was consulted for MARY.      MARY likely ADAN vs Ischemic ATN due hemodynamic instability 2/2 cardiac arrest and hypotension   Reviewed labs   Initial on admission BUN/Cr: 27.2/1.10   Noted creatinine trended up following the 2 days of contrast exposure 1.6 > 1.8 > 2 > 1.4   Current BUN/Cr: 55.8/1.47   Reviewed UA on admission   Please repeat UA and send urine lytes   Obtain Renal sonogram   Start gentle IV fluid LR at 75 ml/hr   Avoid diuretics - clinical examination finding are suggestive of dehydration  Avoid IV contrast and other nephrotoxins    Optimize renal hemodynamics by avoiding ACE/ARB, NSAIDs   Monitor strictly I’s&O’s and document.   Renal dose all meds and renal diet.    Nephrology will follow up

## 2025-01-17 NOTE — CONSULT NOTE ADULT - SUBJECTIVE AND OBJECTIVE BOX
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  Pt hypoxic to 80s.  Episode of cardiac arrest in ED, intubated, ROSC achieved.  CT with liver mass possible HCC, L portal vein tumor thrombus, L-sided PNA with pleural effusion s/p PTC placement (1/11), course c/b hypotension, sepsis.  Pt extubated 1/13.  Thoracic Surgery consulted for chest tube management.    Pt seen and assessed at bedside.  Pt laying comfortably in hospital bed in NAD on HFNC.  Unable to obtain HPI 2/2 pt's current clinical condition.      LAST BLOOD THINNER-->   heparin   Injectable   5000 Unit(s) SubCutaneous (01-13-25 @ 14:12)   5000 Unit(s) SubCutaneous (01-13-25 @ 05:10)   5000 Unit(s) SubCutaneous (01-12-25 @ 22:55)   5000 Unit(s) SubCutaneous (01-12-25 @ 15:21)        SOCIAL HISTORY   patient lives with ; stairs; assist devices - unable to obtain  smoking history - unable to obtain  drinking history - unable to obtain  previous occupation - unable to obtain         PAST MEDICAL & SURGICAL HISTORY:  Hepatitis C virus infection    No significant past surgical history        Home Medications:        Allergies    No Known Allergies    Intolerances        OBJECTIVE DATA    VITALS   currently in sinus rhythm  ICU Vital Signs Last 24 Hrs  T(C): 36.3 (13 Jan 2025 17:00), Max: 37.4 (13 Jan 2025 09:30)  T(F): 97.3 (13 Jan 2025 17:00), Max: 99.3 (13 Jan 2025 09:30)  HR: 73 (13 Jan 2025 17:00) (60 - 104)  BP: 113/86 (13 Jan 2025 17:00) (77/62 - 154/84)  BP(mean): 95 (13 Jan 2025 17:00) (69 - 124)  ABP: --  ABP(mean): --  RR: 26 (13 Jan 2025 17:00) (9 - 33)  SpO2: 91% (13 Jan 2025 17:00) (87% - 97%)    O2 Parameters below as of 13 Jan 2025 08:00  Patient On (Oxygen Delivery Method): nasal cannula, high flow      LABS                11.2   11.41 )-----------( 114      ( 13 Jan 2025 14:14 )             35.8     Culture - Body Fluid with Gram Stain (collected 11 Jan 2025 21:05)  Source: Pleural Fl  Gram Stain (12 Jan 2025 02:37):    polymorphonuclear leukocytes seen    No organisms seen    by cytocentrifuge  Preliminary Report (12 Jan 2025 21:44):    No growth to date.    Culture - Blood (collected 11 Jan 2025 18:25)  Source: .Blood BLOOD  Preliminary Report (13 Jan 2025 06:01):    No growth at 24 hours    Culture - Blood (collected 11 Jan 2025 18:21)  Source: .Blood BLOOD  Preliminary Report (13 Jan 2025 06:01):    No growth at 24 hours    PT/INR - ( 13 Jan 2025 14:14 )   PT: 18.6 sec;   INR: 1.61 ratio      PTT - ( 13 Jan 2025 14:14 )  PTT:35.7 glu92-14      143  |  108  |  36.3[H]  ----------------------------<  110[H]  4.9   |  22.0  |  1.86[H]    Ca    8.5      13 Jan 2025 14:14  Phos  4.0     01-13  Mg     2.2     01-13      TPro  6.4[L]  /  Alb  2.7[L]  /  TBili  2.1[H]  /  DBili  x   /  AST  211[H]  /  ALT  62[H]  /  AlkPhos  146[H]  01-13    ABG - ( 13 Jan 2025 14:00 )  pH, Arterial: 7.390 pH, Blood: x     /  pCO2: 42    /  pO2: 123   / HCO3: 25    / Base Excess: 0.4   /  SaO2: 100.0       I&O's Summary    12 Jan 2025 07:01  -  13 Jan 2025 07:00  --------------------------------------------------------  IN: 840.9 mL / OUT: 625 mL / NET: 215.9 mL    13 Jan 2025 07:01  -  13 Jan 2025 17:58  --------------------------------------------------------  IN: 168.5 mL / OUT: 510 mL / NET: -341.5 mL        Physical Exam  Neuro: responsive to stimuli  HEENT: PERRL, EOMI, oral mucosa pink and moist  Neck: supple, no JVD  CV: regular rate, regular rhythm, +S1S2, no murmurs or rub, on HFNC  Pulm/chest: lung sounds CTA and equal bilaterally, no accessory muscle use noted  Abd: soft, NT, ND, +BS  Ext: KHAN x 4, no C/C/E  Skin: warm, well perfused, no rashes  Tubes: left chest tube to suction, no AL, no SQ air    IMAGING   personally reviewed imaging         CXR 1/12/25    < from: Xray Chest 1 View- PORTABLE-Urgent (Xray Chest 1 View- PORTABLE-Urgent .) (01.12.25 @ 06:39) >  INTERPRETATION:  HISTORY: Admitting Dxs: J96.01 ABDOMINAL PAIN; ; re-eval   ngt;  TECHNIQUE: Portable frontal view of the chest, 1 view.  COMPARISON: 1/11/2025 6:57 PM.  FINDINGS/  IMPRESSION:  ET tube above the jonathan, nasogastric tube tip overlies stomach, sidehole   is likely GE junction. Left chest tube. Left central line tip SVC.  HEART: Enlarged.  LUNGS: Mild interstitial edema. Left basilar infiltrate and effusion. No   pneumothorax.. Aeration similar to prior  BONES: degenerative changes    --- End of Report ---    < end of copied text >        CT Chest/Abdomen/Pelvis with IV Contrast 1/11/25    < from: CT Abdomen and Pelvis w/ IV Cont (01.11.25 @ 17:39) >  FINDINGS:  CHEST:  LUNGS AND LARGE AIRWAYS: Endotracheal tube with tip in the lower trachea.   Patent central airways. No pulmonary nodules.  PLEURA: Moderate loculated left pleural effusion, increased in size, with   associated passive atelectasis. Mild dependent atelectasis in the right   lung. Additional airspace disease in the right upper lobe, and to a   lesser degree the anterior left upper lobe.  VESSELS: No pulmonary embolism. Left-sided central venous catheter with   tip at the junction of the left brachiocephalic vein and superior vena   cava.  HEART: Heart size is normal. No pericardial effusion.  MEDIASTINUM AND OCTAVIO: No lymphadenopathy.  CHEST WALL AND LOWER NECK: Within normal limits.    ABDOMEN AND PELVIS:  LIVER: Large mass with ill-defined borders again noted involving segments   4A, 4B, 5 and 8 measuring approximately 14.3 cm. Left portal vein   thrombosis, likely tumor thrombus, again noted. Main and right portal   veins are patent. Hepatic veins not well visualized. Detailed evaluation   is limited due to lack of multiphasic liver imaging.  BILE DUCTS: Normal caliber.  GALLBLADDER: Sludge and/or vicarious excretion within the gallbladder   lumen.  SPLEEN: Within normallimits.  PANCREAS: Within normal limits.  ADRENALS: Within normal limits.  KIDNEYS/URETERS: Left renal cyst.    BLADDER: Collapsed around a Lobo catheter balloon.  REPRODUCTIVE ORGANS: Prostate within normal limits.    BOWEL: Malpositioned nasogastric tube which loops upward at the GE   junction and has its tip in the midesophagus. No bowel obstruction.   Appendix is normal.  PERITONEUM/RETROPERITONEUM: Moderate ascites, increased in volume.  VESSELS: Left portal vein tumor thrombus. Large paraesophageal varices   and gastrorenal shunt.  LYMPH NODES: A few enlarged periportal lymph nodes, for example measuring   2.7 x 1.5 cm in the portacaval space (3; 53).  ABDOMINAL WALL: Ventral abdominal wall mesh in place.  BONES: No lytic or blastic bony lesion. Severe T7 wedge compression   deformity and fusion of the T6, T7 and T8 vertebral bodies. There is also   bilateral fusion of the costovertebral joints at the T6 and T7 levels.    IMPRESSION:  No pulmonary embolism.    Moderate left pleuraleffusion, increased compared with January 01, 2025.    Airspace opacities in both upper lobes, right side greater than left,   raising the possibility of pneumonia.    Cirrhosis and portal hypertension.    Large ill-defined hepatic mass again noted and suspicious for   infiltrating hepatocellular carcinoma.    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    Moderate ascites, increased in volume.    Malpositioned nasogastric tube, tip in the mid esophagus.    Enlarged portacaval lymph node nodes.    < end of copied text >      
Mr. José Box is 74y Male with PMHx cirrhosis secondary to hepatitis C, daily smoker presented with abdominal pain with distention and difficulty breathing. CT showed ascites, cirrhosis, possible hepatocellular carcinoma/liver mass with left portal vein tumor thrombus.  In ED found had cardiac arrest ROSC acheived with 2 rounds of CPR subsequently intubated and admitted in MICU and treated with zosyn for L-sided PNA with pleural effusion s/p PTC placement (1/11), course c/b hypotension, sepsis.  Pt extubated 1/13. Also pt had paracentesis with 600cc removed, s/p albumin infusion + lactulose + rifaximin. nephrology was consulted for MARY.      MEDICATIONS  (STANDING):  albumin human 25% IVPB 50 milliLiter(s) IV Intermittent every 8 hours  albuterol/ipratropium for Nebulization 3 milliLiter(s) Nebulizer every 6 hours  atorvastatin 40 milliGRAM(s) Oral at bedtime  carvedilol 3.125 milliGRAM(s) Oral every 12 hours  chlorhexidine 2% Cloths 1 Application(s) Topical daily  folic acid 1 milliGRAM(s) Oral daily  heparin   Injectable 5000 Unit(s) SubCutaneous every 8 hours  lactulose Syrup 15 Gram(s) Oral two times a day  lidocaine   4% Patch 1 Patch Transdermal daily  methadone    Tablet 40 milliGRAM(s) Oral daily  multivitamin 1 Tablet(s) Oral daily  pantoprazole  Injectable 40 milliGRAM(s) IV Push daily  piperacillin/tazobactam IVPB.. 3.375 Gram(s) IV Intermittent every 12 hours  rifAXIMin 550 milliGRAM(s) Oral two times a day  thiamine 100 milliGRAM(s) Oral daily    MEDICATIONS  (PRN):  acetaminophen     Tablet .. 650 milliGRAM(s) Oral every 6 hours PRN Temp greater or equal to 38C (100.4F), Mild Pain (1 - 3)  hydrALAZINE Injectable 5 milliGRAM(s) IV Push every 6 hours PRN SBP > 160  ondansetron Injectable 4 milliGRAM(s) IV Push every 6 hours PRN Nausea and/or Vomiting       Vital Signs Last 24 Hrs  T(C): 36.3 (01-17-25 @ 08:32), Max: 36.5 (01-17-25 @ 00:00)  T(F): 97.3 (01-17-25 @ 08:32), Max: 97.7 (01-17-25 @ 00:00)  HR: 60 (01-17-25 @ 08:32) (60 - 74)  BP: 127/85 (01-17-25 @ 08:32) (120/69 - 147/73)  RR: 18 (01-17-25 @ 08:32) (18 - 19)  SpO2: 93% (01-17-25 @ 08:32) (93% - 98%) on (O2)  I&O's Detail      General: NAD  Neurology: Awake, nonfocal, KHAN x 4  Eyes: Scleras clear, EOMI, Gross vision intact  ENT: Gross hearing intact, grossly patent pharynx, no stridor  Neck: Neck supple, trachea midline, No JVD  Respiratory: CTA B/L  CV: S1S2, no murmurs, rubs or gallops  Abdominal: Soft  Tubes: L pigtail to water seal, 50cc out, no air leak     Relevant labs, radiology and Medications reviewed                        11.5   7.77  )-----------( 92       ( 17 Jan 2025 06:33 )             37.4     01-17    150[H]  |  113[H]  |  55.8[H]  ----------------------------<  94  4.4   |  25.0  |  1.47[H]    Ca    8.8      17 Jan 2025 06:33  Mg     2.6     01-17    TPro  6.6  /  Alb  2.9[L]  /  TBili  1.8  /  DBili  x   /  AST  92[H]  /  ALT  48[H]  /  AlkPhos  108  01-17       
REASON FOR CONSULTATION:     HPI:  74-year-old  male with history of hepatitis C virus with cirrhosis tobacco use disorder recent admission to ED with abd pain and melena which was transient. Presented on January 11 with difficulty breathing admitted to MICU with Acute hypoxic hypercapnic respiratory failure. Patient found to be in cardiac arrest, ACLS initiated, 1 mg of epinephrine given.  Patient subsequently intubated and then achieve return of spontaneous circulation. CT with liver mass possible HCC, L portal vein tumor thrombus, L-sided PNA with pleural effusion s/p PTC placement (1/11), course c/b hypotension, sepsis.  Pt extubated 1/13 with severe respiratory distress with stridor postextubation, improved s/p racemic epi DuoNeb and decadron. Currently on HF, with NG tube.     1/11/25   CT abd :  -  Large mass with ill-defined borders measuring approximately 14.3 cm.   - Left portal vein thrombosis, likely tumor thrombus, again noted.   - Cirrhosis and portal hypertension.  - Enlarged portacaval lymph node nodes.  -  No pulmonary embolism.  - Moderate left pleural effusion, increased compared with January 01, 2025.  - Airspace opacities in both upper lobes, right side greater than left,  raising the possibility of pneumonia.      REVIEW OF SYSTEMS:  Constitutional, Eyes, ENT, Cardiovascular, Respiratory, Gastrointestinal, Genitourinary, Musculoskeletal, Integumentary, Neurological, Psychiatric, Endocrine, Heme/Lymph, and Allergic/Immunologic review of systems are otherwise negative except as noted in the HPI.    PAST MEDICAL & SURGICAL HISTORY:  Hepatitis C virus infection      No significant past surgical history          FAMILY HISTORY:      SOCIAL HISTORY:    Allergies    No Known Allergies    Intolerances        MEDICATIONS  (STANDING):  acetylcysteine 10%  Inhalation 4 milliLiter(s) Inhalation every 6 hours  albuterol/ipratropium for Nebulization 3 milliLiter(s) Nebulizer every 6 hours  atorvastatin 40 milliGRAM(s) Oral at bedtime  heparin   Injectable 5000 Unit(s) SubCutaneous every 8 hours  lactulose Syrup 15 Gram(s) Oral two times a day  pantoprazole  Injectable 40 milliGRAM(s) IV Push daily  piperacillin/tazobactam IVPB.. 3.375 Gram(s) IV Intermittent every 12 hours  potassium chloride   Powder 40 milliEquivalent(s) Oral daily  rifAXIMin 550 milliGRAM(s) Oral two times a day    MEDICATIONS  (PRN):      Vital Signs Last 24 Hrs  T(C): 36.7 (15 Carlo 2025 07:40), Max: 36.9 (14 Jan 2025 19:54)  T(F): 98 (15 Carlo 2025 07:40), Max: 98.4 (14 Jan 2025 19:54)  HR: 83 (15 Carlo 2025 12:00) (67 - 85)  BP: 133/71 (15 Carlo 2025 12:00) (106/50 - 155/80)  BP(mean): 90 (15 Carlo 2025 12:00) (62 - 110)  RR: 13 (15 Carlo 2025 12:00) (12 - 20)  SpO2: 93% (15 Carlo 2025 12:00) (87% - 100%)    Parameters below as of 15 Carlo 2025 12:00  Patient On (Oxygen Delivery Method): nasal cannula  O2 Flow (L/min): 4      PHYSICAL EXAM:        LABS:                        12.6   14.95 )-----------( 150      ( 15 Carlo 2025 06:00 )             41.1     01-15    144  |  108  |  57.2[H]  ----------------------------<  112[H]  4.4   |  22.0  |  1.95[H]    Ca    8.7      15 Carlo 2025 06:00  Phos  3.8     01-14  Mg     2.7     01-14    TPro  7.2  /  Alb  3.0[L]  /  TBili  1.6  /  DBili  x   /  AST  139[H]  /  ALT  60[H]  /  AlkPhos  132[H]  01-15    PT/INR - ( 13 Jan 2025 14:14 )   PT: 18.6 sec;   INR: 1.61 ratio         PTT - ( 13 Jan 2025 14:14 )  PTT:35.7 sec  Urinalysis Basic - ( 15 Carlo 2025 06:00 )    Color: x / Appearance: x / SG: x / pH: x  Gluc: 112 mg/dL / Ketone: x  / Bili: x / Urobili: x   Blood: x / Protein: x / Nitrite: x   Leuk Esterase: x / RBC: x / WBC x   Sq Epi: x / Non Sq Epi: x / Bacteria: x          RADIOLOGY & ADDITIONAL STUDIES:    PATHOLOGY:    
    Chief Complaint:  Patient is a 74y old  Male who presents with a chief complaint of Cardiac Arrest (15 Carlo 2025 14:11)      HPI:  This is a 74y Male with a past medical history significant for HCV cirrhosis c/b HE,ascites, HCC, PVT who presented with acute hypoxic respiratory failure.  Patient recently admitted to the hospital with concern for cirrhosis and HCC he was discharged to follow-up outpatient, but he reports he was never able to make it to his appointment with a hepatologist.  He noted worsening shortness of breath and dyspnea on exertion and so presented to the hospital for further evaluation.  Arrival patient noted to be hypoxic requiring supplemental oxygen.  He then had an episode of cardiac arrest with ROSC and ICU stay.  He has not been brought to the floor and is stable.  Today he reports no confusion or forgetfulness, but very painful abdomen with significant amount of swelling.  No current shortness of breath.  Notes lower extremity swelling as well.    REVIEW OF SYSTEMS:   12 point review of systems completed and negative other than as stated above    PAST MEDICAL & SURGICAL HISTORY:  Hepatitis C virus infection      No significant past surgical history          MEDICATIONS:   MEDICATIONS  (STANDING):  acetylcysteine 10%  Inhalation 4 milliLiter(s) Inhalation every 6 hours  albumin human 25% IVPB 50 milliLiter(s) IV Intermittent every 6 hours  albuterol/ipratropium for Nebulization 3 milliLiter(s) Nebulizer every 6 hours  atorvastatin 40 milliGRAM(s) Oral at bedtime  chlorhexidine 2% Cloths 1 Application(s) Topical daily  heparin   Injectable 5000 Unit(s) SubCutaneous every 8 hours  lactulose Syrup 15 Gram(s) Oral two times a day  lidocaine   4% Patch 1 Patch Transdermal daily  methocarbamol 500 milliGRAM(s) Oral two times a day  pantoprazole  Injectable 40 milliGRAM(s) IV Push daily  piperacillin/tazobactam IVPB.. 3.375 Gram(s) IV Intermittent every 12 hours  rifAXIMin 550 milliGRAM(s) Oral two times a day    MEDICATIONS  (PRN):  acetaminophen     Tablet .. 650 milliGRAM(s) Oral every 6 hours PRN Temp greater or equal to 38C (100.4F), Mild Pain (1 - 3)  ondansetron Injectable 4 milliGRAM(s) IV Push every 6 hours PRN Nausea and/or Vomiting  traMADol 25 milliGRAM(s) Oral every 8 hours PRN Moderate Pain (4 - 6)  traMADol 50 milliGRAM(s) Oral every 8 hours PRN Severe Pain (7 - 10)          ALLERGIES:   Allergies    No Known Allergies    Intolerances        Social Hx:     Family History: Denies FH liver or GI issues, including malignancy      PHYSICAL EXAM:  VITAL SIGNS:   Vital Signs Last 24 Hrs  T(C): 36.6 (15 Carlo 2025 15:36), Max: 36.9 (14 Jan 2025 19:54)  T(F): 97.8 (15 Carlo 2025 15:36), Max: 98.4 (14 Jan 2025 19:54)  HR: 71 (15 Carlo 2025 16:00) (67 - 85)  BP: 161/85 (15 Carlo 2025 16:00) (104/81 - 161/85)  BP(mean): 106 (15 Carlo 2025 16:00) (87 - 110)  RR: 21 (15 Carlo 2025 16:00) (12 - 21)  SpO2: 93% (15 Carlo 2025 16:00) (91% - 100%)    Parameters below as of 15 Carlo 2025 16:00  Patient On (Oxygen Delivery Method): nasal cannula  O2 Flow (L/min): 5    I&O's Summary    14 Jan 2025 07:01  -  15 Carlo 2025 07:00  --------------------------------------------------------  IN: 200 mL / OUT: 890 mL / NET: -690 mL    15 Carlo 2025 07:01  -  15 Carlo 2025 17:13  --------------------------------------------------------  IN: 550 mL / OUT: 1050 mL / NET: -500 mL      GENERAL:  No acute distress  HEENT:  NC/AT, conjunctiva clear, sclera anicteric  CHEST:  No increased effort, chest tube in place  HEART:  Regular rate  ABDOMEN: tense, tender, distended  EXTREMITIES: No edema  SKIN:  Warm, dry  NEURO:  Calm, cooperative    LABS:                        12.6   14.95 )-----------( 150      ( 15 Carlo 2025 06:00 )             41.1     01-15    144  |  108  |  57.2[H]  ----------------------------<  112[H]  4.4   |  22.0  |  1.95[H]    Ca    8.7      15 Carlo 2025 06:00  Phos  3.8     01-14  Mg     2.7     01-14    TPro  7.2  /  Alb  3.0[L]  /  TBili  1.6  /  DBili  x   /  AST  139[H]  /  ALT  60[H]  /  AlkPhos  132[H]  01-15    LIVER FUNCTIONS - ( 15 Carlo 2025 06:00 )  Alb: 3.0 g/dL / Pro: 7.2 g/dL / ALK PHOS: 132 U/L / ALT: 60 U/L / AST: 139 U/L / GGT: x                   RADIOLOGY & ADDITIONAL STUDIES:  I personally reviewed the studies and agree with the radiologists review    ACC: [id number] EXAM:  CT CHEST   ORDERED BY: RADHA MOORE     ACC: [id number] EXAM:  CT ABDOMEN AND PELVIS   ORDERED BY: KESHA BELLE     PROCEDURE DATE:  01/15/2025          INTERPRETATION:  CLINICAL INFORMATION: Chest pain. Left pleural effusion   with chest tube.    COMPARISON: CT pulmonary angiogram and contrast-enhanced CT of the   abdomen and pelvis January 11, 2025.    CONTRAST/COMPLICATIONS:  IV Contrast: NONE  Oral Contrast: NONE  .    PROCEDURE:  CT of the Chest, Abdomen and Pelvis was performed.  Sagittal and coronal reformats were performed.    FINDINGS:  CHEST:  LUNGS AND LARGE AIRWAYS: Patent central airways. There has been interval   improvement in patchy and coarse groundglass opacities with underlying   cystic changes in the upper lobes. Mild linear and dependent atelectasis   in the right lower lobe.  PLEURA: There has been complete resolution of the left pleural effusion   with a left posterior basilar pleural catheter entering between the   eighth and ninth ribs laterally. There is a trace left pneumothorax.   There is a trace layering right pleural effusion.  VESSELS: Within normal limits.  HEART: Heart size is normal. No pericardial effusion.  MEDIASTINUM AND OCTAVIO: No lymphadenopathy.  CHEST WALL AND LOWER NECK: Mild subcutaneous emphysema of the left   lateral chest wall.    ABDOMEN AND PELVIS:  LIVER: Again is noted a shrunken nodular liver with prominence of the   left lobe laterally and a subtle infiltrating lesion in the right lobe   anteriorly, compatible with cirrhosis and likely infiltrating hepatoma.   Again is noted thrombosis in the left portal vein. Stable prominent moni   hepatis, portacaval, upper abdominal, and gastrohepatic ligament lymph   nodes.  BILE DUCTS: Normal caliber.  GALLBLADDER: Within normal limits.  SPLEEN: The spleen remains enlarged to 14.0 cm. Containing a splenorenal   shunt.  PANCREAS: Within normal limits.  ADRENALS: Within normal limits.  KIDNEYS/URETERS: Within normal limits.    BLADDER: Within normal limits.  REPRODUCTIVE ORGANS: The prostate is not enlarged.    BOWEL: No bowel obstruction. Appendix is normal.  PERITONEUM/RETROPERITONEUM: Mild ascites.  VESSELS: Within normal limits.  LYMPH NODES: No lymphadenopathy.  ABDOMINAL WALL: Left abdominal wall hernia repair with mesh.  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    IMPRESSION: Resolved left pleural effusion with trace left pneumothorax.   Improved bilateral coarse groundglass opacities.    --- End of Report ---            VICKIE HILL MD; Attending Radiologist  This document has been electronically signed. Carlo 15 2025  3:42PM  01-15-25 @ 15:29    -- --   ACC: [id number] EXAM:  US ABDOMEN LIMITED   ORDERED BY: ALEXANDRA ROSA     PROCEDURE DATE:  01/15/2025          INTERPRETATION:  CLINICAL INFORMATION: Abdominal ascites    COMPARISON: CT 1/11/2025  TECHNIQUE: Limited four-quadrant abdominal ultrasoundperformed to assess   for ascites.    FINDINGS/  IMPRESSION:  1.  Diffuse mild volume all- quadrant  abdominal ascites, not   significantly changed from 1/11/2025.  2.  Deepest pocket : RLQ -  approx 6 cm    --- End of Report ---            MORA PISANO MD; Attending Radiologist  This document has been electronically signed. Carlo 15 2025  1:58PM --

## 2025-01-17 NOTE — PROGRESS NOTE ADULT - ASSESSMENT
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 GI consulted for Cirrhosis.    #HCV cirrhosis   US abdomen (01.15.25) Diffuse mild volume all- quadrant  abdominal ascites  CT A/P (01.15.25) Again is noted a shrunken nodular liver with prominence of the left lobe laterally and a subtle infiltrating lesion in the right lobe anteriorly, compatible with cirrhosis and likely infiltrating hepatoma. Again is noted thrombosis in the left portal vein.  S/p paracentesis (01.15.25) -600ml fluid   AFP: [id number]  - Trend renal function, LFTs, electrolytes, coags daily  - Lactulose titrated to 2-3 soft bowel movements per day, avoid diarrhea  - Rifaximin 550mg BID   - diuretics on hold 2/2 elevated Cr, albumin x 72 hours for ?HRS v MARY  - Recommend nephrology consult   - Avoid NSAIDs, hepatotoxic drugs  - MVI, thiamine and folic acid. Optimize nutrition, ensure adequate protein intake, low sodium, avoid raw shellfish. Alcohol cessation counseling. WA protocol.   - Tylenol 2g max per day  - Varices: EGD, can consider outpatient once medically optimized   -  will need to be discharged with SBP ppx ( cipro 500 QD)  _________________________________________________________________  Assessment and recommendations are final when note is signed by the attending physician.

## 2025-01-17 NOTE — PROGRESS NOTE ADULT - ASSESSMENT
74yMale with h/o cirrhosis 2/2 HCV, daily smoker, who presented to [facility] c/o abdominal pain/distention, sob, previously seen for similar.  Pt hypoxic to 80s.  Episode of cardiac arrest in ED, intubated, ROSC achieved.  CT with liver mass possible HCC, L portal vein tumor thrombus, L-sided PNA with pleural effusion s/p PTC placement (1/11), course c/b hypotension, sepsis.  Pt extubated 1/13.  Thoracic Surgery consulted for chest tube management. 1/14 Patient downgraded from MICU.

## 2025-01-17 NOTE — PROGRESS NOTE ADULT - SUBJECTIVE AND OBJECTIVE BOX
Subjective: Patient doing well saturating on nasal cannula.     Vital Signs:  Vital Signs Last 24 Hrs  T(C): 36.3 (01-17-25 @ 08:32), Max: 36.5 (01-17-25 @ 00:00)  T(F): 97.3 (01-17-25 @ 08:32), Max: 97.7 (01-17-25 @ 00:00)  HR: 60 (01-17-25 @ 08:32) (60 - 74)  BP: 127/85 (01-17-25 @ 08:32) (120/69 - 147/73)  RR: 18 (01-17-25 @ 08:32) (18 - 19)  SpO2: 93% (01-17-25 @ 08:32) (93% - 98%) on (O2)  I&O's Detail    16 Jan 2025 07:01  -  17 Jan 2025 07:00  --------------------------------------------------------  IN:    Oral Fluid: 120 mL  Total IN: 120 mL    OUT:    Chest Tube (mL): 40 mL  Total OUT: 40 mL    Total NET: 80 mL      17 Jan 2025 07:01  -  17 Jan 2025 12:01  --------------------------------------------------------  IN:  Total IN: 0 mL    OUT:    Chest Tube (mL): 50 mL  Total OUT: 50 mL    Total NET: -50 mL        General: NAD  Neurology: Awake, nonfocal, KHAN x 4  Eyes: Scleras clear, EOMI, Gross vision intact  ENT: Gross hearing intact, grossly patent pharynx, no stridor  Neck: Neck supple, trachea midline, No JVD  Respiratory: CTA B/L  CV: S1S2, no murmurs, rubs or gallops  Abdominal: Soft  Tubes: L pigtail to water seal, 50cc out, no air leak     Relevant labs, radiology and Medications reviewed                        11.5   7.77  )-----------( 92       ( 17 Jan 2025 06:33 )             37.4     01-17    150[H]  |  113[H]  |  55.8[H]  ----------------------------<  94  4.4   |  25.0  |  1.47[H]    Ca    8.8      17 Jan 2025 06:33  Mg     2.6     01-17    TPro  6.6  /  Alb  2.9[L]  /  TBili  1.8  /  DBili  x   /  AST  92[H]  /  ALT  48[H]  /  AlkPhos  108  01-17      MEDICATIONS  (STANDING):  albumin human 25% IVPB 50 milliLiter(s) IV Intermittent every 8 hours  albuterol/ipratropium for Nebulization 3 milliLiter(s) Nebulizer every 6 hours  atorvastatin 40 milliGRAM(s) Oral at bedtime  carvedilol 3.125 milliGRAM(s) Oral every 12 hours  chlorhexidine 2% Cloths 1 Application(s) Topical daily  folic acid 1 milliGRAM(s) Oral daily  heparin   Injectable 5000 Unit(s) SubCutaneous every 8 hours  lactulose Syrup 15 Gram(s) Oral two times a day  lidocaine   4% Patch 1 Patch Transdermal daily  methadone    Tablet 40 milliGRAM(s) Oral daily  multivitamin 1 Tablet(s) Oral daily  pantoprazole  Injectable 40 milliGRAM(s) IV Push daily  piperacillin/tazobactam IVPB.. 3.375 Gram(s) IV Intermittent every 12 hours  rifAXIMin 550 milliGRAM(s) Oral two times a day  thiamine 100 milliGRAM(s) Oral daily    MEDICATIONS  (PRN):  acetaminophen     Tablet .. 650 milliGRAM(s) Oral every 6 hours PRN Temp greater or equal to 38C (100.4F), Mild Pain (1 - 3)  hydrALAZINE Injectable 5 milliGRAM(s) IV Push every 6 hours PRN SBP > 160  ondansetron Injectable 4 milliGRAM(s) IV Push every 6 hours PRN Nausea and/or Vomiting        Assessment  74y Male  w/ PAST MEDICAL & SURGICAL HISTORY:  Hepatitis C virus infection      No significant past surgical history      admitted with complaints of Patient is a 74y old  Male who presents with a chief complaint of Cardiac Arrest (17 Jan 2025 10:50)    Subjective: Patient with no complaints, saturating well on nasal cannula.     Vital Signs:  Vital Signs Last 24 Hrs  T(C): 36.3 (01-17-25 @ 08:32), Max: 36.5 (01-17-25 @ 00:00)  T(F): 97.3 (01-17-25 @ 08:32), Max: 97.7 (01-17-25 @ 00:00)  HR: 60 (01-17-25 @ 08:32) (60 - 74)  BP: 127/85 (01-17-25 @ 08:32) (120/69 - 147/73)  RR: 18 (01-17-25 @ 08:32) (18 - 19)  SpO2: 93% (01-17-25 @ 08:32) (93% - 98%) on (O2)  I&O's Detail    16 Jan 2025 07:01  -  17 Jan 2025 07:00  --------------------------------------------------------  IN:    Oral Fluid: 120 mL  Total IN: 120 mL    OUT:    Chest Tube (mL): 40 mL  Total OUT: 40 mL    Total NET: 80 mL      17 Jan 2025 07:01  -  17 Jan 2025 12:01  --------------------------------------------------------  IN:  Total IN: 0 mL    OUT:    Chest Tube (mL): 50 mL  Total OUT: 50 mL    Total NET: -50 mL        General: NAD  Neurology: Awake, nonfocal, KHAN x 4  Eyes: Scleras clear, EOMI, Gross vision intact  ENT: Gross hearing intact, grossly patent pharynx, no stridor  Neck: Neck supple, trachea midline, No JVD  Respiratory: CTA B/L  CV: S1S2, no murmurs, rubs or gallops  Abdominal: Soft  Tubes: L pigtail to water seal, 50cc out, no air leak     Relevant labs, radiology and Medications reviewed                        11.5   7.77  )-----------( 92       ( 17 Jan 2025 06:33 )             37.4     01-17    150[H]  |  113[H]  |  55.8[H]  ----------------------------<  94  4.4   |  25.0  |  1.47[H]    Ca    8.8      17 Jan 2025 06:33  Mg     2.6     01-17    TPro  6.6  /  Alb  2.9[L]  /  TBili  1.8  /  DBili  x   /  AST  92[H]  /  ALT  48[H]  /  AlkPhos  108  01-17      MEDICATIONS  (STANDING):  albumin human 25% IVPB 50 milliLiter(s) IV Intermittent every 8 hours  albuterol/ipratropium for Nebulization 3 milliLiter(s) Nebulizer every 6 hours  atorvastatin 40 milliGRAM(s) Oral at bedtime  carvedilol 3.125 milliGRAM(s) Oral every 12 hours  chlorhexidine 2% Cloths 1 Application(s) Topical daily  folic acid 1 milliGRAM(s) Oral daily  heparin   Injectable 5000 Unit(s) SubCutaneous every 8 hours  lactulose Syrup 15 Gram(s) Oral two times a day  lidocaine   4% Patch 1 Patch Transdermal daily  methadone    Tablet 40 milliGRAM(s) Oral daily  multivitamin 1 Tablet(s) Oral daily  pantoprazole  Injectable 40 milliGRAM(s) IV Push daily  piperacillin/tazobactam IVPB.. 3.375 Gram(s) IV Intermittent every 12 hours  rifAXIMin 550 milliGRAM(s) Oral two times a day  thiamine 100 milliGRAM(s) Oral daily    MEDICATIONS  (PRN):  acetaminophen     Tablet .. 650 milliGRAM(s) Oral every 6 hours PRN Temp greater or equal to 38C (100.4F), Mild Pain (1 - 3)  hydrALAZINE Injectable 5 milliGRAM(s) IV Push every 6 hours PRN SBP > 160  ondansetron Injectable 4 milliGRAM(s) IV Push every 6 hours PRN Nausea and/or Vomiting        Assessment  74y Male  w/ PAST MEDICAL & SURGICAL HISTORY:  Hepatitis C virus infection      No significant past surgical history      admitted with complaints of Patient is a 74y old  Male who presents with a chief complaint of Cardiac Arrest (17 Jan 2025 10:50)

## 2025-01-17 NOTE — PROGRESS NOTE ADULT - ASSESSMENT
DVT ppx: Heparin 5000 U sq q8   Status: Full code   Dispo: [facility]INA. To follow with [facility] following d/c.   DVT ppx: Heparin 5000 U sq q8   Status: Full code   Dispo: [facility]. To follow with [facility]anna following d/c. Discharged with Cipro 500mg daily for SBP ppx.  74y Male with PMHx cirrhosis 2/2 Hep C admitted for cardiac arrest + hepatic hydrothorax in s/o potential HCC.     #AHRF  #Cardiac arrest   -s/p 2 rounds of CPR + intubation  -Extubated 1/13 to [place], currently on 3LNC , satting 91-92  -CT Chest showing L loculated pleural effusion s/p PTC   -CT surgery placed PTC s/p removal 1/17            DVT ppx: Heparin 5000 U sq q8   Status: Full code   Dispo: [facility]INA. To follow with [facility]anna following d/c. Discharged with Cipro 500mg daily for SBP ppx.  74y Male with PMHx cirrhosis 2/2 Hep C admitted for cardiac arrest + hepatic hydrothorax in s/o potential HCC.     #AHRF, resolving   #Cardiac arrest   -s/p 2 rounds of CPR + intubation  -Extubated 1/13 to [place], currently on 3LNC , satting 91-92  -CT Chest showing L loculated pleural effusion s/p PTC + Umbilical hernia repair with mesh   -CT surgery placed PTC placed 1/11 initially draining 1.5L bloody fluid, s/p removal 1/17  -DuoNeb q6     #Hep C Cirrhosis with potential HCC  #SBP  -AFP [id number]  -Cipro 500 ppx upon discharge   -to follow with Arianna upon discharge   -Zosyn 3.375G ppx (End Date 1/18/25)  -rifaximin 550mg BID to titrate to 2-3 bowel movements per day  -lactulose syrup 15G BID   -Coreg 3.125mg BID   -Protonix 40mg daily   -s/p paracentesis 1/15 with 600cc removed   -GI following, recs appreciated   -consider EGD outpatient for varices management       #MARY  -likely 2/2 HRS  -Serial monitoring   -BUN Cr downtrending   -Na 150 1/17  -Albumin 50cc q8  for 3 doses   -Nephrology consulted       #history of opioid use + alcohol abuse   -follows with [facility] ([phone])  -Started on methadone 40mg daily, decreased from 134mg baseline in s/o cirrhosis + prolonged QTc   -Multivitamin daily   -Thiamine 100mg daily   -Folic acid 1mg daily         DVT ppx: Heparin 5000 U sq q8   Status: Full code   Dispo: [facility]INA. To follow with Arianna following d/c. To be discharged with Cipro 500mg daily for SBP ppx.

## 2025-01-18 LAB
ALBUMIN SERPL ELPH-MCNC: 3.3 G/DL — SIGNIFICANT CHANGE UP (ref 3.3–5.2)
ALP SERPL-CCNC: 101 U/L — SIGNIFICANT CHANGE UP (ref 40–120)
ALT FLD-CCNC: 41 U/L — HIGH
ANION GAP SERPL CALC-SCNC: 13 MMOL/L — SIGNIFICANT CHANGE UP (ref 5–17)
AST SERPL-CCNC: 80 U/L — HIGH
BASOPHILS # BLD AUTO: 0.01 K/UL — SIGNIFICANT CHANGE UP (ref 0–0.2)
BASOPHILS NFR BLD AUTO: 0.1 % — SIGNIFICANT CHANGE UP (ref 0–2)
BILIRUB SERPL-MCNC: 1.9 MG/DL — SIGNIFICANT CHANGE UP (ref 0.4–2)
BUN SERPL-MCNC: 49.9 MG/DL — HIGH (ref 8–20)
CALCIUM SERPL-MCNC: 9.2 MG/DL — SIGNIFICANT CHANGE UP (ref 8.4–10.5)
CHLORIDE SERPL-SCNC: 110 MMOL/L — HIGH (ref 96–108)
CO2 SERPL-SCNC: 28 MMOL/L — SIGNIFICANT CHANGE UP (ref 22–29)
CREAT SERPL-MCNC: 1.31 MG/DL — HIGH (ref 0.5–1.3)
EGFR: 57 ML/MIN/1.73M2 — LOW
EOSINOPHIL # BLD AUTO: 0.11 K/UL — SIGNIFICANT CHANGE UP (ref 0–0.5)
EOSINOPHIL NFR BLD AUTO: 1.4 % — SIGNIFICANT CHANGE UP (ref 0–6)
GLUCOSE SERPL-MCNC: 86 MG/DL — SIGNIFICANT CHANGE UP (ref 70–99)
HCT VFR BLD CALC: 38.8 % — LOW (ref 39–50)
HGB BLD-MCNC: 11.6 G/DL — LOW (ref 13–17)
IMM GRANULOCYTES NFR BLD AUTO: 0.5 % — SIGNIFICANT CHANGE UP (ref 0–0.9)
LYMPHOCYTES # BLD AUTO: 1.1 K/UL — SIGNIFICANT CHANGE UP (ref 1–3.3)
LYMPHOCYTES # BLD AUTO: 14.2 % — SIGNIFICANT CHANGE UP (ref 13–44)
MAGNESIUM SERPL-MCNC: 2.8 MG/DL — HIGH (ref 1.8–2.6)
MCHC RBC-ENTMCNC: 28 PG — SIGNIFICANT CHANGE UP (ref 27–34)
MCHC RBC-ENTMCNC: 29.9 G/DL — LOW (ref 32–36)
MCV RBC AUTO: 93.7 FL — SIGNIFICANT CHANGE UP (ref 80–100)
MONOCYTES # BLD AUTO: 0.63 K/UL — SIGNIFICANT CHANGE UP (ref 0–0.9)
MONOCYTES NFR BLD AUTO: 8.1 % — SIGNIFICANT CHANGE UP (ref 2–14)
NEUTROPHILS # BLD AUTO: 5.87 K/UL — SIGNIFICANT CHANGE UP (ref 1.8–7.4)
NEUTROPHILS NFR BLD AUTO: 75.7 % — SIGNIFICANT CHANGE UP (ref 43–77)
PHOSPHATE SERPL-MCNC: 2.6 MG/DL — SIGNIFICANT CHANGE UP (ref 2.4–4.7)
PLATELET # BLD AUTO: 107 K/UL — LOW (ref 150–400)
POTASSIUM SERPL-MCNC: 4.6 MMOL/L — SIGNIFICANT CHANGE UP (ref 3.5–5.3)
POTASSIUM SERPL-SCNC: 4.6 MMOL/L — SIGNIFICANT CHANGE UP (ref 3.5–5.3)
PROT SERPL-MCNC: 6.6 G/DL — SIGNIFICANT CHANGE UP (ref 6.6–8.7)
RBC # BLD: 4.14 M/UL — LOW (ref 4.2–5.8)
RBC # FLD: 20.9 % — HIGH (ref 10.3–14.5)
SODIUM SERPL-SCNC: 150 MMOL/L — HIGH (ref 135–145)
WBC # BLD: 7.76 K/UL — SIGNIFICANT CHANGE UP (ref 3.8–10.5)
WBC # FLD AUTO: 7.76 K/UL — SIGNIFICANT CHANGE UP (ref 3.8–10.5)

## 2025-01-18 PROCEDURE — 74018 RADEX ABDOMEN 1 VIEW: CPT | Mod: 26

## 2025-01-18 PROCEDURE — 99232 SBSQ HOSP IP/OBS MODERATE 35: CPT

## 2025-01-18 PROCEDURE — 99233 SBSQ HOSP IP/OBS HIGH 50: CPT

## 2025-01-18 RX ORDER — LACTULOSE 10 G/15 ML
20 SOLUTION, ORAL ORAL
Refills: 0 | Status: DISCONTINUED | OUTPATIENT
Start: 2025-01-18 | End: 2025-01-20

## 2025-01-18 RX ORDER — POLYETHYLENE GLYCOL 3350 17 G/17G
17 POWDER, FOR SOLUTION ORAL
Refills: 0 | Status: DISCONTINUED | OUTPATIENT
Start: 2025-01-18 | End: 2025-01-18

## 2025-01-18 RX ADMIN — METHADONE HYDROCHLORIDE 40 MILLIGRAM(S): 5 SOLUTION ORAL at 11:33

## 2025-01-18 RX ADMIN — Medication 15 GRAM(S): at 17:11

## 2025-01-18 RX ADMIN — Medication 15 GRAM(S): at 05:21

## 2025-01-18 RX ADMIN — ANTISEPTIC SURGICAL SCRUB 1 APPLICATION(S): 0.04 SOLUTION TOPICAL at 11:44

## 2025-01-18 RX ADMIN — IPRATROPIUM BROMIDE AND ALBUTEROL SULFATE 3 MILLILITER(S): .5; 2.5 SOLUTION RESPIRATORY (INHALATION) at 14:58

## 2025-01-18 RX ADMIN — ALBUMIN HUMAN 50 MILLILITER(S): 50 SOLUTION INTRAVENOUS at 07:17

## 2025-01-18 RX ADMIN — Medication 1 TABLET(S): at 11:35

## 2025-01-18 RX ADMIN — PANTOPRAZOLE 40 MILLIGRAM(S): 20 TABLET, DELAYED RELEASE ORAL at 11:35

## 2025-01-18 RX ADMIN — Medication 3.12 MILLIGRAM(S): at 21:37

## 2025-01-18 RX ADMIN — ACETAMINOPHEN 650 MILLIGRAM(S): 160 SUSPENSION ORAL at 21:43

## 2025-01-18 RX ADMIN — LIDOCAINE HYDROCHLORIDE 1 PATCH: 30 CREAM TOPICAL at 00:24

## 2025-01-18 RX ADMIN — ATORVASTATIN CALCIUM 40 MILLIGRAM(S): 80 TABLET, FILM COATED ORAL at 21:38

## 2025-01-18 RX ADMIN — ACETAMINOPHEN 650 MILLIGRAM(S): 160 SUSPENSION ORAL at 22:30

## 2025-01-18 RX ADMIN — LIDOCAINE HYDROCHLORIDE 1 PATCH: 30 CREAM TOPICAL at 23:00

## 2025-01-18 RX ADMIN — Medication 5000 UNIT(S): at 11:40

## 2025-01-18 RX ADMIN — PIPERACILLIN SODIUM AND TAZOBACTAM SODIUM 25 GRAM(S): 2; 250 INJECTION, POWDER, FOR SOLUTION INTRAVENOUS at 05:21

## 2025-01-18 RX ADMIN — IPRATROPIUM BROMIDE AND ALBUTEROL SULFATE 3 MILLILITER(S): .5; 2.5 SOLUTION RESPIRATORY (INHALATION) at 03:20

## 2025-01-18 RX ADMIN — Medication 5000 UNIT(S): at 21:37

## 2025-01-18 RX ADMIN — Medication 5000 UNIT(S): at 05:21

## 2025-01-18 RX ADMIN — LIDOCAINE HYDROCHLORIDE 1 PATCH: 30 CREAM TOPICAL at 21:47

## 2025-01-18 RX ADMIN — Medication 3.12 MILLIGRAM(S): at 08:26

## 2025-01-18 RX ADMIN — Medication 1 MILLIGRAM(S): at 11:34

## 2025-01-18 RX ADMIN — IPRATROPIUM BROMIDE AND ALBUTEROL SULFATE 3 MILLILITER(S): .5; 2.5 SOLUTION RESPIRATORY (INHALATION) at 20:31

## 2025-01-18 RX ADMIN — Medication 100 MILLIGRAM(S): at 11:34

## 2025-01-18 RX ADMIN — LIDOCAINE HYDROCHLORIDE 1 PATCH: 30 CREAM TOPICAL at 11:41

## 2025-01-18 RX ADMIN — ALBUMIN HUMAN 50 MILLILITER(S): 50 SOLUTION INTRAVENOUS at 00:22

## 2025-01-18 RX ADMIN — IPRATROPIUM BROMIDE AND ALBUTEROL SULFATE 3 MILLILITER(S): .5; 2.5 SOLUTION RESPIRATORY (INHALATION) at 14:57

## 2025-01-18 NOTE — PROGRESS NOTE ADULT - SUBJECTIVE AND OBJECTIVE BOX
Chief complaint: Cirrhosis    Patient seen and examined at bedside. No acute overnight events reported. No fever, chills, nausea or vomiting.     Vital Signs Last 24 Hrs  T(F): 97.3 (18 Jan 2025 08:47), Max: 97.8 (17 Jan 2025 12:00)  HR: 58 (18 Jan 2025 08:47) (58 - 66)  BP: 120/71 (18 Jan 2025 08:47) (115/64 - 143/75)  RR: 18 (18 Jan 2025 08:47) (18 - 19)  SpO2: 96% (18 Jan 2025 08:47) (94% - 99%)    Physical Exam:  Constitutional: alert and oriented, in no acute distress   Neck: Soft and supple  Respiratory: Clear to auscultation bilaterally  Cardiovascular: Regular rate and rhyhtm  Gastrointestinal: +distended, firm to palpation  Vascular: 2+ peripheral pulses  Neurological: A/O x 3  Musculoskeletal: no lower extremity edema bilaterally    Labs:                        11.6   7.76  )-----------( 107      ( 18 Jan 2025 05:37 )             38.8   01-18    150[H]  |  110[H]  |  49.9[H]  ----------------------------<  86  4.6   |  28.0  |  1.31[H]    Ca    9.2      18 Jan 2025 05:37  Phos  2.6     01-18  Mg     2.8     01-18    TPro  6.6  /  Alb  3.3  /  TBili  1.9  /  DBili  x   /  AST  80[H]  /  ALT  41[H]  /  AlkPhos  101  01-18

## 2025-01-18 NOTE — PROGRESS NOTE ADULT - SUBJECTIVE AND OBJECTIVE BOX
Mr. José Box is 74y Male with PMHx cirrhosis secondary to hepatitis C, daily smoker presented with abdominal pain with distention and difficulty breathing. CT showed ascites, cirrhosis, possible hepatocellular carcinoma/liver mass with left portal vein tumor thrombus.  In ED found had cardiac arrest ROSC acheived with 2 rounds of CPR subsequently intubated and admitted in MICU and treated with zosyn for L-sided PNA with pleural effusion s/p PTC placement (1/11), course c/b hypotension, sepsis.  Pt extubated 1/13. Also pt had paracentesis with 600cc removed, s/p albumin infusion + lactulose + rifaximin. nephrology was consulted for MARY.      MEDICATIONS  (STANDING):  albumin human 25% IVPB 50 milliLiter(s) IV Intermittent every 8 hours  albuterol/ipratropium for Nebulization 3 milliLiter(s) Nebulizer every 6 hours  atorvastatin 40 milliGRAM(s) Oral at bedtime  carvedilol 3.125 milliGRAM(s) Oral every 12 hours  chlorhexidine 2% Cloths 1 Application(s) Topical daily  folic acid 1 milliGRAM(s) Oral daily  heparin   Injectable 5000 Unit(s) SubCutaneous every 8 hours  lactulose Syrup 15 Gram(s) Oral two times a day  lidocaine   4% Patch 1 Patch Transdermal daily  methadone    Tablet 40 milliGRAM(s) Oral daily  multivitamin 1 Tablet(s) Oral daily  pantoprazole  Injectable 40 milliGRAM(s) IV Push daily  piperacillin/tazobactam IVPB.. 3.375 Gram(s) IV Intermittent every 12 hours  rifAXIMin 550 milliGRAM(s) Oral two times a day  thiamine 100 milliGRAM(s) Oral daily    MEDICATIONS  (PRN):  acetaminophen     Tablet .. 650 milliGRAM(s) Oral every 6 hours PRN Temp greater or equal to 38C (100.4F), Mild Pain (1 - 3)  hydrALAZINE Injectable 5 milliGRAM(s) IV Push every 6 hours PRN SBP > 160  ondansetron Injectable 4 milliGRAM(s) IV Push every 6 hours PRN Nausea and/or Vomiting     General: NAD  Neurology: Awake, nonfocal, KHAN x 4  Eyes: Scleras clear, EOMI, Gross vision intact  ENT: Gross hearing intact, grossly patent pharynx, no stridor  Neck: Neck supple, trachea midline, No JVD  Respiratory: CTA B/L  CV: S1S2, no murmurs, rubs or gallops  Abdominal: Soft  Tubes: L pigtail to water seal, 50cc out, no air leak     Relevant labs, radiology and Medications reviewed                        11.5   7.77  )-----------( 92       ( 17 Jan 2025 06:33 )             37.4     01-17    150[H]  |  113[H]  |  55.8[H]  ----------------------------<  94  4.4   |  25.0  |  1.47[H]    Ca    8.8      17 Jan 2025 06:33  Mg     2.6     01-17    TPro  6.6  /  Alb  2.9[L]  /  TBili  1.8  /  DBili  x   /  AST  92[H]  /  ALT  48[H]  /  AlkPhos  108  01-17

## 2025-01-18 NOTE — CHART NOTE - NSCHARTNOTEFT_GEN_A_CORE
Chart/Event Note  [facility] [id number]  DAMON CHRISTIANSON, 74y, Male  [id number]    Asked by day team to f/u with abdominal xray. Patient has not had BM In several days despite being on lactulose. Patient examined at bedside. Patient denies N/V, diarrhea. Has not had BM in several days. + passing flatus. Tenderness to umbilical region, abdomen distended but soft. Of note the patient states that he had an abdominal abscess several years ago in the same location that was removed, pt unable to provide specific information.  Xray results discussed with radiology, shows ileus with some degree of distal obstruction, recommend 12hr f/u xray to compare, if no improvement consult GI.      Review of Systems:  Constitutional: No fever, chills, or fatigue.  Neurologic: No headache, dizziness, vision/speech changes, numbness, or weakness.  Respiratory: No cough, wheezing, shortness of breath, or dyspnea.   Cardiovascular: No chest pain, pressure, or palpitations.   GI: No abdominal pain, nausea, vomiting, diarrhea, constipation.   : No dysuria, burning, frequency, incontinence, or retention.     T(C): 36.4 (01-18-25 @ 21:34), Max: 36.7 (01-18-25 @ 11:34)  HR: 60 (01-18-25 @ 21:34) (55 - 66)  BP: 135/81 (01-18-25 @ 21:34) (118/75 - 140/73)  RR: 18 (01-18-25 @ 21:34) (18 - 19)  SpO2: 95% (01-18-25 @ 21:34) (92% - 99%)               Physical Examination:  GENERAL: No acute distress noted during examination.   NERVOUS SYSTEM:  Alert & oriented X3 with appropriate concentration. No focal or lateralizing neurologic deficits noted.   CHEST: Clear to ascultation bilaterally. No rales, rhonchi, wheezing, or rubs heard. Symmetrical/bilateral chest wall rise.   HEART: Regular rate and rhythm. Normotensive.  ABDOMEN: Distended, soft, umbilical tenderness.   EXTREMITIES:  2+ peripheral pulses without clubbing, cyanosis, or edema.     Assessment/Plan:  74y-year-old Male with a past medical history of cirrhosis 2/2 Hep C admitted for cardiac arrest and hepatic hydrothorax in s/o potential HCC, now with ileus and distal obstruction.       1) Abd xray scheduled for 8am  2) NPO  3) Will endorse the above diagnostics and findings to the day medicine team for review and follow up. Will additionally sign out to day medicine teams to follow with relevant specialties.     Sachi Orantes NP  Department of Medicine

## 2025-01-18 NOTE — PROGRESS NOTE ADULT - ASSESSMENT
Mr. José Box is 74y Male with PMHx cirrhosis secondary to hepatitis C, daily smoker presented with abdominal pain with distention and difficulty breathing. CT showed ascites, cirrhosis, possible hepatocellular carcinoma/liver mass with left portal vein tumor thrombus.  In ED found had cardiac arrest ROSC acheived with 2 rounds of CPR subsequently intubated and admitted in MICU and treated with zosyn for L-sided PNA with pleural effusion s/p PTC placement (1/11), course c/b hypotension, sepsis.  Pt extubated 1/13. Also pt had paracentesis with 600cc removed, s/p albumin infusion + lactulose + rifaximin. Nephrology was consulted for MARY.      MARY likely ADAN vs Ischemic ATN due hemodynamic instability 2/2 cardiac arrest and hypotension   Reviewed labs   Initial on admission BUN/Cr: 27.2/1.10   Noted creatinine trended up following the 2 days of contrast exposure 1.6 > 1.8 > 2 > 1.47 > 1.31    Current BUN/Cr: 49.9/1.3   Reviewed UA on admission   Please repeat UA and send urine lytes   Obtain Renal sonogram   Continue gentle IV fluid LR at 75 ml/hr   Avoid diuretics - clinical examination finding are suggestive of dehydration  Avoid IV contrast and other nephrotoxins    Optimize renal hemodynamics by avoiding ACE/ARB, NSAIDs   Monitor strictly I’s&O’s and document.   Renal dose all meds and renal diet.    Nephrology will follow up

## 2025-01-19 LAB
ALBUMIN SERPL ELPH-MCNC: 3.1 G/DL — LOW (ref 3.3–5.2)
ALP SERPL-CCNC: 120 U/L — SIGNIFICANT CHANGE UP (ref 40–120)
ALT FLD-CCNC: 43 U/L — HIGH
ANION GAP SERPL CALC-SCNC: 9 MMOL/L — SIGNIFICANT CHANGE UP (ref 5–17)
AST SERPL-CCNC: 94 U/L — HIGH
BASOPHILS # BLD AUTO: 0.01 K/UL — SIGNIFICANT CHANGE UP (ref 0–0.2)
BASOPHILS NFR BLD AUTO: 0.1 % — SIGNIFICANT CHANGE UP (ref 0–2)
BILIRUB DIRECT SERPL-MCNC: 0.8 MG/DL — HIGH (ref 0–0.3)
BILIRUB INDIRECT FLD-MCNC: 0.9 MG/DL — SIGNIFICANT CHANGE UP (ref 0.2–1)
BILIRUB SERPL-MCNC: 1.7 MG/DL — SIGNIFICANT CHANGE UP (ref 0.4–2)
BUN SERPL-MCNC: 46.7 MG/DL — HIGH (ref 8–20)
CALCIUM SERPL-MCNC: 9 MG/DL — SIGNIFICANT CHANGE UP (ref 8.4–10.5)
CHLORIDE SERPL-SCNC: 109 MMOL/L — HIGH (ref 96–108)
CO2 SERPL-SCNC: 30 MMOL/L — HIGH (ref 22–29)
CREAT SERPL-MCNC: 1.16 MG/DL — SIGNIFICANT CHANGE UP (ref 0.5–1.3)
EGFR: 66 ML/MIN/1.73M2 — SIGNIFICANT CHANGE UP
EOSINOPHIL # BLD AUTO: 0.1 K/UL — SIGNIFICANT CHANGE UP (ref 0–0.5)
EOSINOPHIL NFR BLD AUTO: 1 % — SIGNIFICANT CHANGE UP (ref 0–6)
GLUCOSE SERPL-MCNC: 83 MG/DL — SIGNIFICANT CHANGE UP (ref 70–99)
HCT VFR BLD CALC: 39.5 % — SIGNIFICANT CHANGE UP (ref 39–50)
HGB BLD-MCNC: 12 G/DL — LOW (ref 13–17)
IMM GRANULOCYTES NFR BLD AUTO: 0.5 % — SIGNIFICANT CHANGE UP (ref 0–0.9)
LYMPHOCYTES # BLD AUTO: 1.2 K/UL — SIGNIFICANT CHANGE UP (ref 1–3.3)
LYMPHOCYTES # BLD AUTO: 12.5 % — LOW (ref 13–44)
MCHC RBC-ENTMCNC: 28.4 PG — SIGNIFICANT CHANGE UP (ref 27–34)
MCHC RBC-ENTMCNC: 30.4 G/DL — LOW (ref 32–36)
MCV RBC AUTO: 93.6 FL — SIGNIFICANT CHANGE UP (ref 80–100)
MONOCYTES # BLD AUTO: 0.67 K/UL — SIGNIFICANT CHANGE UP (ref 0–0.9)
MONOCYTES NFR BLD AUTO: 7 % — SIGNIFICANT CHANGE UP (ref 2–14)
NEUTROPHILS # BLD AUTO: 7.54 K/UL — HIGH (ref 1.8–7.4)
NEUTROPHILS NFR BLD AUTO: 78.9 % — HIGH (ref 43–77)
PLATELET # BLD AUTO: 109 K/UL — LOW (ref 150–400)
POTASSIUM SERPL-MCNC: 4.3 MMOL/L — SIGNIFICANT CHANGE UP (ref 3.5–5.3)
POTASSIUM SERPL-SCNC: 4.3 MMOL/L — SIGNIFICANT CHANGE UP (ref 3.5–5.3)
PROT SERPL-MCNC: 6.7 G/DL — SIGNIFICANT CHANGE UP (ref 6.6–8.7)
RBC # BLD: 4.22 M/UL — SIGNIFICANT CHANGE UP (ref 4.2–5.8)
RBC # FLD: 20.9 % — HIGH (ref 10.3–14.5)
SODIUM SERPL-SCNC: 148 MMOL/L — HIGH (ref 135–145)
WBC # BLD: 9.57 K/UL — SIGNIFICANT CHANGE UP (ref 3.8–10.5)
WBC # FLD AUTO: 9.57 K/UL — SIGNIFICANT CHANGE UP (ref 3.8–10.5)

## 2025-01-19 PROCEDURE — 99233 SBSQ HOSP IP/OBS HIGH 50: CPT

## 2025-01-19 PROCEDURE — 74177 CT ABD & PELVIS W/CONTRAST: CPT | Mod: 26

## 2025-01-19 PROCEDURE — 74018 RADEX ABDOMEN 1 VIEW: CPT | Mod: 26

## 2025-01-19 RX ADMIN — IPRATROPIUM BROMIDE AND ALBUTEROL SULFATE 3 MILLILITER(S): .5; 2.5 SOLUTION RESPIRATORY (INHALATION) at 14:35

## 2025-01-19 RX ADMIN — IPRATROPIUM BROMIDE AND ALBUTEROL SULFATE 3 MILLILITER(S): .5; 2.5 SOLUTION RESPIRATORY (INHALATION) at 03:09

## 2025-01-19 RX ADMIN — ATORVASTATIN CALCIUM 40 MILLIGRAM(S): 80 TABLET, FILM COATED ORAL at 21:40

## 2025-01-19 RX ADMIN — Medication 1 TABLET(S): at 11:34

## 2025-01-19 RX ADMIN — Medication 5000 UNIT(S): at 12:37

## 2025-01-19 RX ADMIN — Medication 20 GRAM(S): at 05:51

## 2025-01-19 RX ADMIN — ANTISEPTIC SURGICAL SCRUB 1 APPLICATION(S): 0.04 SOLUTION TOPICAL at 11:35

## 2025-01-19 RX ADMIN — Medication 20 GRAM(S): at 17:30

## 2025-01-19 RX ADMIN — Medication 3.12 MILLIGRAM(S): at 21:40

## 2025-01-19 RX ADMIN — PANTOPRAZOLE 40 MILLIGRAM(S): 20 TABLET, DELAYED RELEASE ORAL at 11:35

## 2025-01-19 RX ADMIN — IPRATROPIUM BROMIDE AND ALBUTEROL SULFATE 3 MILLILITER(S): .5; 2.5 SOLUTION RESPIRATORY (INHALATION) at 09:07

## 2025-01-19 RX ADMIN — IPRATROPIUM BROMIDE AND ALBUTEROL SULFATE 3 MILLILITER(S): .5; 2.5 SOLUTION RESPIRATORY (INHALATION) at 20:54

## 2025-01-19 RX ADMIN — Medication 5000 UNIT(S): at 05:50

## 2025-01-19 RX ADMIN — Medication 5000 UNIT(S): at 21:40

## 2025-01-19 RX ADMIN — Medication 1 MILLIGRAM(S): at 11:34

## 2025-01-19 RX ADMIN — Medication 100 MILLIGRAM(S): at 11:34

## 2025-01-19 RX ADMIN — Medication 3.12 MILLIGRAM(S): at 08:25

## 2025-01-19 RX ADMIN — METHADONE HYDROCHLORIDE 40 MILLIGRAM(S): 5 SOLUTION ORAL at 11:33

## 2025-01-19 NOTE — PROGRESS NOTE ADULT - SUBJECTIVE AND OBJECTIVE BOX
HPI  Patient is a 74y Male with PMHx cirrhosis 2/2 Hep C admitted for cardiac arrest + hepatic hydrothorax in s/o potential HCC. Seen in ED 10 days PTA with similar complaints with CT showing ascites, potential HCC with left portal vein tumor thrombus.     INTERVAL EVENTS  1/17, nephrology consulted, rec UA, U Cx, renal US. 1/18, abdominal XR for constipation showing ileus with some degree of distal obstruction.     HOSPITAL COURSE  Pt presented to ED on 1/11 with LLQ abdominal pain radiating to RLQ + black stool, /97, HR 90, RR 18, O2 87% on room air. Given 40mg Lasix for fluid retention and admitted to MICU for intubation following cardiac arrest and ACLS with 2 rounds of CPR in ED. 1mg epi given during code and was started on propofol + fentanyl post ROSC. Received 1L IVF for lactate> 4. Noted to have L PNA with pleural effusions/p PTC per CT Sx and started on Zosyn End Date 1/18. Extubated 1/13 to HIFLOW. Downgraded to Gen Med 1/14. 1/15, Heme Onc consulted, rec dedicated liver imaging with triple phase CT + GI consult. Paracentesis with 600cc removed. Gi rec lactulose + rifaximin + o/p EGD. 1/17, GI rec holding of diuretics for elevated Cr + albumin x 72 hours + rec nephrology consult. CTSx removed chest tube for minimal output with follow up CXR stable.      REVIEW OF SYSTEMS   General: No fatigue, decreased apatite, weight loss  HEENT: No cough, throat pain, rhinorrhea hemoptysis    Chest: No shortness of breath, chest pain  Abdomen: No abdominal pain, hematemesis   Genitourinary: No dysuria, No hematuria   Extremities: No joint pain, no change in range of motion  Skin: No rashes, ecchymoses purpura, nodules       MEDICATIONS  MEDICATIONS  (STANDING):  albuterol/ipratropium for Nebulization 3 milliLiter(s) Nebulizer every 6 hours  atorvastatin 40 milliGRAM(s) Oral at bedtime  carvedilol 3.125 milliGRAM(s) Oral every 12 hours  chlorhexidine 2% Cloths 1 Application(s) Topical daily  folic acid 1 milliGRAM(s) Oral daily  heparin   Injectable 5000 Unit(s) SubCutaneous every 8 hours  lactulose Syrup 20 Gram(s) Oral two times a day  lidocaine   4% Patch 1 Patch Transdermal daily  methadone    Tablet 40 milliGRAM(s) Oral daily  multivitamin 1 Tablet(s) Oral daily  pantoprazole  Injectable 40 milliGRAM(s) IV Push daily  rifAXIMin 550 milliGRAM(s) Oral two times a day  thiamine 100 milliGRAM(s) Oral daily    MEDICATIONS  (PRN):  acetaminophen     Tablet .. 650 milliGRAM(s) Oral every 6 hours PRN Temp greater or equal to 38C (100.4F), Mild Pain (1 - 3)  hydrALAZINE Injectable 5 milliGRAM(s) IV Push every 6 hours PRN SBP > 160  ondansetron Injectable 4 milliGRAM(s) IV Push every 6 hours PRN Nausea and/or Vomiting      ALLERGIES  Allergies    No Known Allergies    Intolerances        PHYSICAL EXAM   Vital Signs Last 24 Hrs  T(C): 36.6 (19 Jan 2025 05:00), Max: 36.7 (18 Jan 2025 11:34)  T(F): 97.9 (19 Jan 2025 05:00), Max: 98.1 (18 Jan 2025 11:34)  HR: 54 (19 Jan 2025 05:00) (54 - 65)  BP: 119/71 (19 Jan 2025 05:00) (104/65 - 135/81)  BP(mean): --  RR: 18 (19 Jan 2025 05:00) (16 - 18)  SpO2: 96% (19 Jan 2025 05:00) (92% - 98%)    Parameters below as of 19 Jan 2025 05:00  Patient On (Oxygen Delivery Method): nasal cannula  O2 Flow (L/min): 4      T(C): 36.6 (01-19-25 @ 05:00), Max: 36.7 (01-18-25 @ 11:34)  HR: 54 (01-19-25 @ 05:00) (54 - 65)  BP: 119/71 (01-19-25 @ 05:00) (104/65 - 135/81)  RR: 18 (01-19-25 @ 05:00) (16 - 18)  SpO2: 96% (01-19-25 @ 05:00) (92% - 98%)    CONSTITUTIONAL: Well groomed, no apparent distress, sitting in bed comfortably, unclear if skin is jaundiced   EYES: symmetric, EOMI, No conjunctival or scleral injection  ENMT: Oral mucosa with moist membranes. Lower teeth removed.   NECK: symmetric and without tracheal deviation   RESP: No respiratory distress, no use of accessory muscles; CTA b/l, no WRR. Satting 91-92 on 3LNC.   CV: RRR, +S1S2, no MRG; no JVD; no peripheral edema  GI: Protuberant, tender in all regions, no rebound, no guarding; no hernia palpated. Right flank ecchymoses with L periumbilical ecchymoses noted.    MSK: Normal strength in bilateral LE + UE   SKIN: No rashes or ulcers noted. Multiple areas of ecchymoses noted on the upper arms. Chronic venous stasis changes of the RLE.   NEURO: sensation intact in upper and lower extremities b/l to light touch   PSYCH: Appropriate insight/judgment; A+O x 4, mood and affect appropriate, recent/remote memory intact      LABS                        12.0   9.57  )-----------( 109      ( 19 Jan 2025 06:00 )             39.5       CBC Full  -  ( 19 Jan 2025 06:00 )  WBC Count : 9.57 K/uL  RBC Count : 4.22 M/uL  Hemoglobin : 12.0 g/dL  Hematocrit : 39.5 %  Platelet Count - Automated : 109 K/uL  Mean Cell Volume : 93.6 fl  Mean Cell Hemoglobin : 28.4 pg  Mean Cell Hemoglobin Concentration : 30.4 g/dL  Auto Neutrophil # : 7.54 K/uL  Auto Lymphocyte # : 1.20 K/uL  Auto Monocyte # : 0.67 K/uL  Auto Eosinophil # : 0.10 K/uL  Auto Basophil # : 0.01 K/uL  Auto Neutrophil % : 78.9 %  Auto Lymphocyte % : 12.5 %  Auto Monocyte % : 7.0 %  Auto Eosinophil % : 1.0 %  Auto Basophil % : 0.1 %      01-18    150[H]  |  110[H]  |  49.9[H]  ----------------------------<  86  4.6   |  28.0  |  1.31[H]    Ca    9.2      18 Jan 2025 05:37  Phos  2.6     01-18  Mg     2.8     01-18    TPro  6.6  /  Alb  3.3  /  TBili  1.9  /  DBili  x   /  AST  80[H]  /  ALT  41[H]  /  AlkPhos  101  01-18      CAPILLARY BLOOD GLUCOSE              Urinalysis Basic - ( 18 Jan 2025 05:37 )    Color: x / Appearance: x / SG: x / pH: x  Gluc: 86 mg/dL / Ketone: x  / Bili: x / Urobili: x   Blood: x / Protein: x / Nitrite: x   Leuk Esterase: x / RBC: x / WBC x   Sq Epi: x / Non Sq Epi: x / Bacteria: x        IMAGING          HPI  Patient is a 74y Male with PMHx cirrhosis 2/2 Hep C admitted for cardiac arrest + hepatic hydrothorax in s/o potential HCC. Seen in ED 10 days PTA with similar complaints with CT showing ascites, potential HCC with left portal vein tumor thrombus.     INTERVAL EVENTS  1/17, nephrology consulted, rec UA, U Cx, renal US. 1/18, abdominal XR for constipation showing ileus with some degree of distal obstruction. BM noted on 1/19.     HOSPITAL COURSE  Pt presented to ED on 1/11 with LLQ abdominal pain radiating to RLQ + black stool, /97, HR 90, RR 18, O2 87% on room air. Given 40mg Lasix for fluid retention and admitted to MICU for intubation following cardiac arrest and ACLS with 2 rounds of CPR in ED. 1mg epi given during code and was started on propofol + fentanyl post ROSC. Received 1L IVF for lactate> 4. Noted to have L PNA with pleural effusions/p PTC per CT Sx and started on Zosyn End Date 1/18. Extubated 1/13 to HIFLOW. Downgraded to Gen Med 1/14. 1/15, Heme Onc consulted, rec dedicated liver imaging with triple phase CT + GI consult. Paracentesis with 600cc removed. Gi rec lactulose + rifaximin + o/p EGD. 1/17, GI rec holding of diuretics for elevated Cr + albumin x 72 hours + rec nephrology consult. CTSx removed chest tube for minimal output with follow up CXR stable.      REVIEW OF SYSTEMS Denies abdominal pain.   General: No fatigue, decreased apatite, weight loss  HEENT: No cough, throat pain, rhinorrhea hemoptysis    Chest: No shortness of breath, chest pain  Abdomen: No abdominal pain, hematemesis   Genitourinary: No dysuria, No hematuria   Extremities: No joint pain, no change in range of motion  Skin: No rashes, ecchymoses purpura, nodules       MEDICATIONS  MEDICATIONS  (STANDING):  albuterol/ipratropium for Nebulization 3 milliLiter(s) Nebulizer every 6 hours  atorvastatin 40 milliGRAM(s) Oral at bedtime  carvedilol 3.125 milliGRAM(s) Oral every 12 hours  chlorhexidine 2% Cloths 1 Application(s) Topical daily  folic acid 1 milliGRAM(s) Oral daily  heparin   Injectable 5000 Unit(s) SubCutaneous every 8 hours  lactulose Syrup 20 Gram(s) Oral two times a day  lidocaine   4% Patch 1 Patch Transdermal daily  methadone    Tablet 40 milliGRAM(s) Oral daily  multivitamin 1 Tablet(s) Oral daily  pantoprazole  Injectable 40 milliGRAM(s) IV Push daily  rifAXIMin 550 milliGRAM(s) Oral two times a day  thiamine 100 milliGRAM(s) Oral daily    MEDICATIONS  (PRN):  acetaminophen     Tablet .. 650 milliGRAM(s) Oral every 6 hours PRN Temp greater or equal to 38C (100.4F), Mild Pain (1 - 3)  hydrALAZINE Injectable 5 milliGRAM(s) IV Push every 6 hours PRN SBP > 160  ondansetron Injectable 4 milliGRAM(s) IV Push every 6 hours PRN Nausea and/or Vomiting      ALLERGIES  Allergies    No Known Allergies    Intolerances        PHYSICAL EXAM   Vital Signs Last 24 Hrs  T(C): 36.6 (19 Jan 2025 05:00), Max: 36.7 (18 Jan 2025 11:34)  T(F): 97.9 (19 Jan 2025 05:00), Max: 98.1 (18 Jan 2025 11:34)  HR: 54 (19 Jan 2025 05:00) (54 - 65)  BP: 119/71 (19 Jan 2025 05:00) (104/65 - 135/81)  BP(mean): --  RR: 18 (19 Jan 2025 05:00) (16 - 18)  SpO2: 96% (19 Jan 2025 05:00) (92% - 98%)    Parameters below as of 19 Jan 2025 05:00  Patient On (Oxygen Delivery Method): nasal cannula  O2 Flow (L/min): 4      T(C): 36.6 (01-19-25 @ 05:00), Max: 36.7 (01-18-25 @ 11:34)  HR: 54 (01-19-25 @ 05:00) (54 - 65)  BP: 119/71 (01-19-25 @ 05:00) (104/65 - 135/81)  RR: 18 (01-19-25 @ 05:00) (16 - 18)  SpO2: 96% (01-19-25 @ 05:00) (92% - 98%)    CONSTITUTIONAL: Well groomed, no apparent distress, sitting in bed comfortably, unclear if skin is jaundiced   EYES: symmetric, EOMI, No conjunctival or scleral injection  ENMT: Oral mucosa with moist membranes. Lower teeth removed.   NECK: symmetric and without tracheal deviation   RESP: No respiratory distress, no use of accessory muscles; CTA b/l, no WRR. Satting 91-92 on 3LNC.   CV: RRR, +S1S2, no MRG; no JVD; no peripheral edema  GI: Protuberant, tender in all regions, no rebound, no guarding; no hernia palpated. Right flank ecchymoses with L periumbilical ecchymoses noted.    MSK: Normal strength in bilateral LE + UE   SKIN: No rashes or ulcers noted. Multiple areas of ecchymoses noted on the upper arms. Chronic venous stasis changes of the RLE.   NEURO: sensation intact in upper and lower extremities b/l to light touch. bilateral UE tremor much improved.    PSYCH: Appropriate insight/judgment; A+O x 4, mood and affect appropriate, recent/remote memory intact      LABS                        12.0   9.57  )-----------( 109      ( 19 Jan 2025 06:00 )             39.5       CBC Full  -  ( 19 Jan 2025 06:00 )  WBC Count : 9.57 K/uL  RBC Count : 4.22 M/uL  Hemoglobin : 12.0 g/dL  Hematocrit : 39.5 %  Platelet Count - Automated : 109 K/uL  Mean Cell Volume : 93.6 fl  Mean Cell Hemoglobin : 28.4 pg  Mean Cell Hemoglobin Concentration : 30.4 g/dL  Auto Neutrophil # : 7.54 K/uL  Auto Lymphocyte # : 1.20 K/uL  Auto Monocyte # : 0.67 K/uL  Auto Eosinophil # : 0.10 K/uL  Auto Basophil # : 0.01 K/uL  Auto Neutrophil % : 78.9 %  Auto Lymphocyte % : 12.5 %  Auto Monocyte % : 7.0 %  Auto Eosinophil % : 1.0 %  Auto Basophil % : 0.1 %      01-18    150[H]  |  110[H]  |  49.9[H]  ----------------------------<  86  4.6   |  28.0  |  1.31[H]    Ca    9.2      18 Jan 2025 05:37  Phos  2.6     01-18  Mg     2.8     01-18    TPro  6.6  /  Alb  3.3  /  TBili  1.9  /  DBili  x   /  AST  80[H]  /  ALT  41[H]  /  AlkPhos  101  01-18      CAPILLARY BLOOD GLUCOSE              Urinalysis Basic - ( 18 Jan 2025 05:37 )    Color: x / Appearance: x / SG: x / pH: x  Gluc: 86 mg/dL / Ketone: x  / Bili: x / Urobili: x   Blood: x / Protein: x / Nitrite: x   Leuk Esterase: x / RBC: x / WBC x   Sq Epi: x / Non Sq Epi: x / Bacteria: x        IMAGING

## 2025-01-19 NOTE — PROGRESS NOTE ADULT - ASSESSMENT
74y Male with PMHx cirrhosis 2/2 Hep C admitted for cardiac arrest + hepatic hydrothorax in s/o potential HCC.     #AHRF, resolving   #Cardiac arrest   -s/p 2 rounds of CPR + intubation  -Extubated 1/13 to [facility], currently on 3LNC , satting 91-92  -CT Chest showing L loculated pleural effusion s/p PTC + Umbilical hernia repair with mesh   -CT surgery placed PTC placed 1/11 initially draining 1.5L bloody fluid, s/p removal 1/17  -DuoNeb q6     #Hep C Cirrhosis with potential HCC  #SBP  -AFP [id number]  -Cipro 500 ppx upon discharge   -to follow with [facility]anna upon discharge   -Zosyn 3.375G ppx (End Date 1/18/25)  -rifaximin 550mg BID to titrate to 2-3 bowel movements per day  -lactulose syrup 15G BID   -Coreg 3.125mg BID   -Protonix 40mg daily   -s/p paracentesis 1/15 with 600cc removed   -GI following, recs appreciated   -consider EGD outpatient for varices management       #MARY  -likely 2/2 HRS  -Serial monitoring   -BUN Cr downtrending   -Na 150 1/17  -Albumin 50cc q8  for 3 doses   -Nephrology consulted       #history of opioid use + alcohol abuse   -follows with [facility] Methadone clinic ([phone])  -Started on methadone 40mg daily, decreased from 134mg baseline in s/o cirrhosis + prolonged QTc   -Multivitamin daily   -Thiamine 100mg daily   -Folic acid 1mg daily         DVT ppx: Heparin 5000 U sq q8   Status: Full code   Dispo: TINA. To follow with Arianna following d/c. To be discharged with Cipro 500mg daily for SBP ppx.    74y Male with PMHx cirrhosis 2/2 Hep C admitted for cardiac arrest + hepatic hydrothorax in s/o potential HCC.     #AHRF, resolving   #Cardiac arrest   -s/p 2 rounds of CPR + intubation  -Extubated 1/13 to [facility], currently on 3LNC , satting 91-92  -CT Chest showing L loculated pleural effusion s/p PTC + Umbilical hernia repair with mesh   -CT surgery placed PTC placed 1/11 initially draining 1.5L bloody fluid, s/p removal 1/17  -DuoNeb q6     #Hep C Cirrhosis with potential HCC  #SBP  -AFP [id number]  -Cipro 500 ppx upon discharge   -to follow with Arianna upon discharge   -Zosyn 3.375G ppx (End Date 1/18/25)  -rifaximin 550mg BID to titrate to 2-3 bowel movements per day  -lactulose syrup 15G BID   -Coreg 3.125mg BID   -Protonix 40mg daily   -s/p paracentesis 1/15 with 600cc removed   -GI following, recs appreciated   -consider EGD outpatient for varices management       #MARY  -likely 2/2 HRS  -Serial monitoring   -BUN Cr downtrending   -Na 150 1/17  -Albumin 50cc q8  for 3 doses   -Nephrology consulted, rec renal US       #history of opioid use + alcohol abuse   -follows with [facility] Methadone clinic ([phone])  -Started on methadone 40mg daily, decreased from 134mg baseline in s/o cirrhosis + prolonged QTc   -Multivitamin daily   -Thiamine 100mg daily   -Folic acid 1mg daily         DVT ppx: Heparin 5000 U sq q8   Status: Full code   Dispo: TINA. To follow with Arianna following d/c. To be discharged with Cipro 500mg daily for SBP ppx.

## 2025-01-20 LAB
ALBUMIN SERPL ELPH-MCNC: 3.1 G/DL — LOW (ref 3.3–5.2)
ALP SERPL-CCNC: 136 U/L — HIGH (ref 40–120)
ALT FLD-CCNC: 49 U/L — HIGH
ANION GAP SERPL CALC-SCNC: 10 MMOL/L — SIGNIFICANT CHANGE UP (ref 5–17)
APTT BLD: 43 SEC — HIGH (ref 24.5–35.6)
AST SERPL-CCNC: 114 U/L — HIGH
BASOPHILS # BLD AUTO: 0.01 K/UL — SIGNIFICANT CHANGE UP (ref 0–0.2)
BASOPHILS NFR BLD AUTO: 0.1 % — SIGNIFICANT CHANGE UP (ref 0–2)
BILIRUB SERPL-MCNC: 1.8 MG/DL — SIGNIFICANT CHANGE UP (ref 0.4–2)
BUN SERPL-MCNC: 43.4 MG/DL — HIGH (ref 8–20)
CALCIUM SERPL-MCNC: 9.3 MG/DL — SIGNIFICANT CHANGE UP (ref 8.4–10.5)
CHLORIDE SERPL-SCNC: 109 MMOL/L — HIGH (ref 96–108)
CO2 SERPL-SCNC: 28 MMOL/L — SIGNIFICANT CHANGE UP (ref 22–29)
CREAT SERPL-MCNC: 1.23 MG/DL — SIGNIFICANT CHANGE UP (ref 0.5–1.3)
CULTURE RESULTS: SIGNIFICANT CHANGE UP
EGFR: 62 ML/MIN/1.73M2 — SIGNIFICANT CHANGE UP
EOSINOPHIL # BLD AUTO: 0.1 K/UL — SIGNIFICANT CHANGE UP (ref 0–0.5)
EOSINOPHIL NFR BLD AUTO: 0.8 % — SIGNIFICANT CHANGE UP (ref 0–6)
GLUCOSE SERPL-MCNC: 104 MG/DL — HIGH (ref 70–99)
HCT VFR BLD CALC: 38.4 % — LOW (ref 39–50)
HGB BLD-MCNC: 11.7 G/DL — LOW (ref 13–17)
IMM GRANULOCYTES NFR BLD AUTO: 0.4 % — SIGNIFICANT CHANGE UP (ref 0–0.9)
INR BLD: 1.72 RATIO — HIGH (ref 0.85–1.16)
LYMPHOCYTES # BLD AUTO: 1.28 K/UL — SIGNIFICANT CHANGE UP (ref 1–3.3)
LYMPHOCYTES # BLD AUTO: 10.3 % — LOW (ref 13–44)
MAGNESIUM SERPL-MCNC: 2.4 MG/DL — SIGNIFICANT CHANGE UP (ref 1.6–2.6)
MCHC RBC-ENTMCNC: 27.9 PG — SIGNIFICANT CHANGE UP (ref 27–34)
MCHC RBC-ENTMCNC: 30.5 G/DL — LOW (ref 32–36)
MCV RBC AUTO: 91.4 FL — SIGNIFICANT CHANGE UP (ref 80–100)
MONOCYTES # BLD AUTO: 0.81 K/UL — SIGNIFICANT CHANGE UP (ref 0–0.9)
MONOCYTES NFR BLD AUTO: 6.5 % — SIGNIFICANT CHANGE UP (ref 2–14)
NEUTROPHILS # BLD AUTO: 10.13 K/UL — HIGH (ref 1.8–7.4)
NEUTROPHILS NFR BLD AUTO: 81.9 % — HIGH (ref 43–77)
PHOSPHATE SERPL-MCNC: 2.5 MG/DL — SIGNIFICANT CHANGE UP (ref 2.4–4.7)
PLATELET # BLD AUTO: 116 K/UL — LOW (ref 150–400)
POTASSIUM SERPL-MCNC: 4.4 MMOL/L — SIGNIFICANT CHANGE UP (ref 3.5–5.3)
POTASSIUM SERPL-SCNC: 4.4 MMOL/L — SIGNIFICANT CHANGE UP (ref 3.5–5.3)
PROT SERPL-MCNC: 7 G/DL — SIGNIFICANT CHANGE UP (ref 6.6–8.7)
PROTHROM AB SERPL-ACNC: 19.3 SEC — HIGH (ref 9.9–13.4)
RBC # BLD: 4.2 M/UL — SIGNIFICANT CHANGE UP (ref 4.2–5.8)
RBC # FLD: 20.6 % — HIGH (ref 10.3–14.5)
SODIUM SERPL-SCNC: 147 MMOL/L — HIGH (ref 135–145)
SPECIMEN SOURCE: SIGNIFICANT CHANGE UP
WBC # BLD: 12.38 K/UL — HIGH (ref 3.8–10.5)
WBC # FLD AUTO: 12.38 K/UL — HIGH (ref 3.8–10.5)

## 2025-01-20 PROCEDURE — 99232 SBSQ HOSP IP/OBS MODERATE 35: CPT

## 2025-01-20 PROCEDURE — 99233 SBSQ HOSP IP/OBS HIGH 50: CPT

## 2025-01-20 RX ORDER — PIPERACILLIN SODIUM AND TAZOBACTAM SODIUM 2; 250 G/50ML; MG/50ML
4.47 INJECTION, POWDER, FOR SOLUTION INTRAVENOUS EVERY 12 HOURS
Refills: 0 | Status: DISCONTINUED | OUTPATIENT
Start: 2025-01-20 | End: 2025-01-20

## 2025-01-20 RX ORDER — PHYTONADIONE 2 MG/ML
10 INJECTION, EMULSION INTRAMUSCULAR; INTRAVENOUS; SUBCUTANEOUS DAILY
Refills: 0 | Status: COMPLETED | OUTPATIENT
Start: 2025-01-20 | End: 2025-01-22

## 2025-01-20 RX ORDER — PIPERACILLIN SODIUM AND TAZOBACTAM SODIUM 2; 250 G/50ML; MG/50ML
3.38 INJECTION, POWDER, FOR SOLUTION INTRAVENOUS EVERY 8 HOURS
Refills: 0 | Status: DISCONTINUED | OUTPATIENT
Start: 2025-01-20 | End: 2025-01-24

## 2025-01-20 RX ORDER — LACTULOSE 10 G/15 ML
20 SOLUTION, ORAL ORAL THREE TIMES A DAY
Refills: 0 | Status: DISCONTINUED | OUTPATIENT
Start: 2025-01-20 | End: 2025-01-24

## 2025-01-20 RX ADMIN — Medication 3.12 MILLIGRAM(S): at 22:38

## 2025-01-20 RX ADMIN — Medication 5000 UNIT(S): at 22:38

## 2025-01-20 RX ADMIN — Medication 3.12 MILLIGRAM(S): at 08:59

## 2025-01-20 RX ADMIN — Medication 5000 UNIT(S): at 05:32

## 2025-01-20 RX ADMIN — IPRATROPIUM BROMIDE AND ALBUTEROL SULFATE 3 MILLILITER(S): .5; 2.5 SOLUTION RESPIRATORY (INHALATION) at 13:41

## 2025-01-20 RX ADMIN — ATORVASTATIN CALCIUM 40 MILLIGRAM(S): 80 TABLET, FILM COATED ORAL at 22:36

## 2025-01-20 RX ADMIN — IPRATROPIUM BROMIDE AND ALBUTEROL SULFATE 3 MILLILITER(S): .5; 2.5 SOLUTION RESPIRATORY (INHALATION) at 20:52

## 2025-01-20 RX ADMIN — ANTISEPTIC SURGICAL SCRUB 1 APPLICATION(S): 0.04 SOLUTION TOPICAL at 13:00

## 2025-01-20 RX ADMIN — PHYTONADIONE 102 MILLIGRAM(S): 2 INJECTION, EMULSION INTRAMUSCULAR; INTRAVENOUS; SUBCUTANEOUS at 13:25

## 2025-01-20 RX ADMIN — Medication 1 MILLIGRAM(S): at 12:59

## 2025-01-20 RX ADMIN — PIPERACILLIN SODIUM AND TAZOBACTAM SODIUM 25 GRAM(S): 2; 250 INJECTION, POWDER, FOR SOLUTION INTRAVENOUS at 14:20

## 2025-01-20 RX ADMIN — PANTOPRAZOLE 40 MILLIGRAM(S): 20 TABLET, DELAYED RELEASE ORAL at 12:59

## 2025-01-20 RX ADMIN — IPRATROPIUM BROMIDE AND ALBUTEROL SULFATE 3 MILLILITER(S): .5; 2.5 SOLUTION RESPIRATORY (INHALATION) at 08:20

## 2025-01-20 RX ADMIN — LIDOCAINE HYDROCHLORIDE 1 PATCH: 30 CREAM TOPICAL at 19:00

## 2025-01-20 RX ADMIN — LIDOCAINE HYDROCHLORIDE 1 PATCH: 30 CREAM TOPICAL at 13:00

## 2025-01-20 RX ADMIN — Medication 100 MILLIGRAM(S): at 12:59

## 2025-01-20 RX ADMIN — PIPERACILLIN SODIUM AND TAZOBACTAM SODIUM 25 GRAM(S): 2; 250 INJECTION, POWDER, FOR SOLUTION INTRAVENOUS at 22:44

## 2025-01-20 RX ADMIN — Medication 1 TABLET(S): at 12:59

## 2025-01-20 RX ADMIN — Medication 5000 UNIT(S): at 12:59

## 2025-01-20 RX ADMIN — METHADONE HYDROCHLORIDE 40 MILLIGRAM(S): 5 SOLUTION ORAL at 12:59

## 2025-01-20 RX ADMIN — Medication 20 GRAM(S): at 22:36

## 2025-01-20 RX ADMIN — Medication 20 GRAM(S): at 05:32

## 2025-01-20 NOTE — PROGRESS NOTE ADULT - ASSESSMENT
Mr. José Box is 74y Male with PMHx cirrhosis secondary to hepatitis C, daily smoker presented with abdominal pain with distention and difficulty breathing. CT showed ascites, cirrhosis, possible hepatocellular carcinoma/liver mass with left portal vein tumor thrombus.  In ED found had cardiac arrest ROSC acheived with 2 rounds of CPR subsequently intubated and admitted in MICU and treated with zosyn for L-sided PNA with pleural effusion s/p PTC placement (1/11), course c/b hypotension, sepsis.  Pt extubated 1/13. Also pt had paracentesis with 600cc removed, s/p albumin infusion + lactulose + rifaximin. Nephrology was consulted for MARY.      MARY likely ADAN vs Ischemic ATN due hemodynamic instability 2/2 cardiac arrest and hypotension   Reviewed labs   Initial on admission BUN/Cr: 27.2/1.10   Noted creatinine trended up following the 2 days of contrast exposure 1.6 > 1.8 > 2 > 1.47 > 1.31    Current BUN/Cr: 49.9/1.3   Reviewed UA on admission   Please repeat UA and send urine lytes   Obtain Renal sonogram   Continue gentle IV fluid LR at 75 ml/hr   Avoid diuretics - clinical examination finding are suggestive of dehydration  Avoid IV contrast and other nephrotoxins    Optimize renal hemodynamics by avoiding ACE/ARB, NSAIDs   Monitor strictly I’s&O’s and document.   Renal dose all meds and renal diet.      Plan   Renal function continues to improve   Encourage po intake   Nephrology will sign off   Please call us if you have any questions

## 2025-01-20 NOTE — PROGRESS NOTE ADULT - SUBJECTIVE AND OBJECTIVE BOX
[facility] DIVISION OF KIDNEY DISEASES AND HYPERTENSION -- PROGRESS NOTE    24 hour events/subjective:  Seen today   Doing well   REnal function is stable   Tolerating food     MEDICATIONS    Standing Inpatient Medications  albuterol/ipratropium for Nebulization 3 milliLiter(s) Nebulizer every 6 hours  atorvastatin 40 milliGRAM(s) Oral at bedtime  carvedilol 3.125 milliGRAM(s) Oral every 12 hours  chlorhexidine 2% Cloths 1 Application(s) Topical daily  folic acid 1 milliGRAM(s) Oral daily  heparin   Injectable 5000 Unit(s) SubCutaneous every 8 hours  lactulose Syrup 20 Gram(s) Oral three times a day  lidocaine   4% Patch 1 Patch Transdermal daily  methadone    Tablet 40 milliGRAM(s) Oral daily  multivitamin 1 Tablet(s) Oral daily  pantoprazole  Injectable 40 milliGRAM(s) IV Push daily  phytonadione  IVPB 10 milliGRAM(s) IV Intermittent daily  piperacillin/tazobactam IVPB.. 3.375 Gram(s) IV Intermittent every 8 hours  rifAXIMin 550 milliGRAM(s) Oral two times a day  thiamine 100 milliGRAM(s) Oral daily    PRN Inpatient Medications  acetaminophen     Tablet .. 650 milliGRAM(s) Oral every 6 hours PRN  hydrALAZINE Injectable 5 milliGRAM(s) IV Push every 6 hours PRN  ondansetron Injectable 4 milliGRAM(s) IV Push every 6 hours PRN      VITALS/PHYSICAL EXAM  --------------------------------------------------------------------------------  T(C): 36.5 (01-20-25 @ 09:00), Max: 36.6 (01-20-25 @ 00:31)  HR: 63 (01-20-25 @ 09:00) (58 - 63)  BP: 126/75 (01-20-25 @ 09:00) (104/60 - 134/75)  RR: 17 (01-20-25 @ 09:00) (16 - 19)  SpO2: 93% (01-20-25 @ 09:00) (93% - 96%)  Wt(kg): --        Physical Exam:  Gen: NAD, well-appearing  HEENT: normal  Pulm: CTA B/L  CV: normal S1S2; no rub, no edema  Abd: soft, non-tender  MSK no deformities   Neuro: Alert and oriented x3     LABS/STUDIES  --------------------------------------------------------------------------------  147  |  109  |  43.4  ----------------------------<  104      [01-20-25 @ 07:54]  4.4   |  28.0  |  1.23        Ca     9.3     [01-20-25 @ 07:54]      Mg     2.4     [01-20-25 @ 07:54]      Phos  2.5     [01-20-25 @ 07:54]    TPro  7.0  /  Alb  3.1  /  TBili  1.8  /  DBili  x   /  AST  114  /  ALT  49  /  AlkPhos  136  [01-20-25 @ 07:54]    PT/INR: PT 19.3 , INR 1.72       [01-20-25 @ 07:54]  PTT: 43.0       [01-20-25 @ 07:54]      Creatinine Trend:  SCr 1.23 [01-20 @ 07:54]  SCr 1.16 [01-19 @ 06:00]  SCr 1.31 [01-18 @ 05:37]  SCr 1.47 [01-17 @ 06:33]  SCr 2.01 [01-16 @ 06:00]

## 2025-01-20 NOTE — PROGRESS NOTE ADULT - ASSESSMENT
74y Male with PMHx cirrhosis 2/2 Hep C admitted for cardiac arrest + hepatic hydrothorax in s/o potential HCC.     #AHRF, resolving   #Cardiac arrest   -s/p 2 rounds of CPR + intubation  -Extubated 1/13 to [place], currently on 3LNC , satting 91-92  -CT Chest showing L loculated pleural effusion s/p PTC + Umbilical hernia repair with mesh   -CT surgery placed PTC placed 1/11 initially draining 1.5L bloody fluid, s/p removal 1/17  -DuoNeb q6     #Hep C Cirrhosis with potential HCC  #SBP  -AFP [id number]  -Cipro 500 ppx upon discharge   -to follow with Arianna upon discharge   -Zosyn 3.375G ppx restarted   -rifaximin 550mg BID to titrate to 2-3 bowel movements per day  -lactulose syrup 15G BID was switched to 15 G TID  -Coreg 3.125mg BID   -Protonix 40mg daily   -s/p paracentesis 1/15 with 600cc removed   -started on vitamin K 10 mg x 3 days  - INR, cmp ordered  - GI following, recs appreciated   - paracentesis as needed  - CT imaging reviewed: Cirrhosis. Thrombosis of the left portal vein and branches of segment 4, mass in the right lobe of the liver similar in appearance( CT 01/01). Nonspecific mildly dilated loops of small bowel centrally. Nonspecific periportal adenopathy. Small bilateral effusions and compressive atelectasis.  -consider EGD outpatient for varices management       #MARY  -likely 2/2 HRS  -Serial monitoring   -BUN Cr downtrending, creatinine normalized  -Na 150 1/17, improving 147 01/20, encouraged better PO intake   -sp Albumin 50cc q8  for 3 doses   -Nephrology consulted, reqs appreciated      #history of opioid use + alcohol abuse   -follows with [facility] Methadone clinic ([phone])  -Started on methadone 40mg daily, decreased from 134mg baseline in s/o cirrhosis + prolonged QTc   -Multivitamin daily   -Thiamine 100mg daily   -Folic acid 1mg daily       DVT ppx: Heparin 5000 U sq q8   Status: Full code   Dispo: TINA. To follow with Arianna following d/c. To be discharged with Cipro 500mg daily for SBP ppx.

## 2025-01-20 NOTE — PROGRESS NOTE ADULT - SUBJECTIVE AND OBJECTIVE BOX
Patient is a 74y old  Male who presents with a chief complaint of Cardiac Arrest (19 Jan 2025 07:29)      HPI:  74-year-old male patient with a history of cirrhosis secondary to hepatitis C, daily smoker .  Patient lethargic this am .  ( though nurse tells me he is better ) . He does respond to questions and touch. States he has mild abdominal discomfort           REVIEW OF SYSTEMS: limited  history - lethargy   Gastrointestinal: No abdominal or epigastric pain. No nausea, vomiting or hematemesis; No diarrhea or constipation. No melena or hematochezia.    PAST MEDICAL & SURGICAL HISTORY:  Hepatitis C virus infection      No significant past surgical history          FAMILY HISTORY:      SOCIAL HISTORY:  Smoking Status: [ ] Current, [ ] Former, [ ] Never  Pack Years:  [ X ] EtOH  [  ] IVDA    MEDICATIONS:  MEDICATIONS  (STANDING):  albuterol/ipratropium for Nebulization 3 milliLiter(s) Nebulizer every 6 hours  atorvastatin 40 milliGRAM(s) Oral at bedtime  carvedilol 3.125 milliGRAM(s) Oral every 12 hours  chlorhexidine 2% Cloths 1 Application(s) Topical daily  folic acid 1 milliGRAM(s) Oral daily  heparin   Injectable 5000 Unit(s) SubCutaneous every 8 hours  lactulose Syrup 20 Gram(s) Oral two times a day  lidocaine   4% Patch 1 Patch Transdermal daily  methadone    Tablet 40 milliGRAM(s) Oral daily  multivitamin 1 Tablet(s) Oral daily  pantoprazole  Injectable 40 milliGRAM(s) IV Push daily  rifAXIMin 550 milliGRAM(s) Oral two times a day  thiamine 100 milliGRAM(s) Oral daily    MEDICATIONS  (PRN):  acetaminophen     Tablet .. 650 milliGRAM(s) Oral every 6 hours PRN Temp greater or equal to 38C (100.4F), Mild Pain (1 - 3)  hydrALAZINE Injectable 5 milliGRAM(s) IV Push every 6 hours PRN SBP > 160  ondansetron Injectable 4 milliGRAM(s) IV Push every 6 hours PRN Nausea and/or Vomiting      Allergies    No Known Allergies    Intolerances        Vital Signs Last 24 Hrs  T(C): 36.5 (20 Jan 2025 05:33), Max: 36.6 (20 Jan 2025 00:31)  T(F): 97.7 (20 Jan 2025 05:33), Max: 97.9 (20 Jan 2025 00:31)  HR: 60 (20 Jan 2025 08:30) (52 - 63)  BP: 104/60 (20 Jan 2025 05:33) (104/60 - 134/75)  BP(mean): --  RR: 17 (20 Jan 2025 05:33) (16 - 19)  SpO2: 93% (20 Jan 2025 05:33) (93% - 96%)    Parameters below as of 20 Jan 2025 05:33  Patient On (Oxygen Delivery Method): room air            PHYSICAL EXAM:    General: ; in no acute distress  HEENT: MMM, conjunctiva and sclera clear  H-RRR  L-CTA  Gastrointestinal: Soft, non-tender, midlly -distended; Normal bowel sounds; No rebound or guarding  Extremities: Normal range of motion, No clubbing, cyanosis or edema  Neurological: Alert and oriented x 1  Skin: Warm and dry. No obvious rash      LABS:                        11.7   12.38 )-----------( 116      ( 20 Jan 2025 07:54 )             38.4     20 Jan 2025 07:54    147    |  109    |  43.4   ----------------------------<  104    4.4     |  28.0   |  1.23     Ca    9.3        20 Jan 2025 07:54  Phos  2.5       20 Jan 2025 07:54  Mg     2.4       20 Jan 2025 07:54    TPro  7.0    /  Alb  3.1    /  TBili  1.8    /  DBili  x      /  AST  114    /  ALT  49     /  AlkPhos  136    / Amylase x      /Lipase x      20 Jan 2025 07:54              RADIOLOGY & ADDITIONAL STUDIES:     < from: CT Abdomen and Pelvis w/ IV Cont (01.19.25 @ 12:05) >  IVER: Liver is small and nodular contour likely representing cirrhosis.   There is a ill-defined low-density lesion in segment 5, similar to the   study from 1/1/2025. There are residual tubular hypodensities in segment   4A/4B likely representing residual portal vein thrombus. The main portal   vein is patent. The left portal vein is remains occluded.  BILE DUCTS: Normal caliber.  GALLBLADDER: Is not significantly distended. Contains high-density   material likely vicarious excretion of contrast  SPLEEN: Enlarged measuring 14 cm.  PANCREAS: Within normal limits.  ADRENALS: Within normal limits.  KIDNEYS/URETERS: Within normal limits.    < end of copied text >

## 2025-01-20 NOTE — PROGRESS NOTE ADULT - SUBJECTIVE AND OBJECTIVE BOX
Patient is a 74y old  Male who presents with a chief complaint of Cardiac Arrest (20 Jan 2025 12:49)      BRIEF HOSPITAL COURSE: 74-year-old male patient with a history of cirrhosis secondary to hepatitis C, daily smoker presents the ED complaining of abdominal pain, abdominal distention, difficulty breathing.  Seen in the ED 10 days ago secondary to abdominal pain, abdominal distention, melena. 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  Patient was discharged home, states that he was feeling somewhat better until a few days ago when he began to experience worsening abdominal pain, abdominal ascension.  Patient reports increased shortness of breath and dyspnea as well over the past few days.  Denies any prior history of COPD but is a daily smoker.  Patient noted to be hypoxic to 88% on room air in the ED, placed on 4 L nasal cannula with improvement to 94%. On reassessment this patient who appeared to be in respiratory distress, stated that he was having trouble breathing.  Supplemental oxygen was over his chin and he was slumped in the bed.  Patient moved to critical care area immediately afterwards.  Patient found to be in cardiac arrest, ACLS initiated, 1 mg of epinephrine given.  Patient subsequently intubated and then achieve return of spontaneous circulation.  Postintubation x-ray confirmed tube placement.  Patient sedated with propofol as well as given fentanyl on postintubation state.  Prior to initiation of propofol, patient was biting at the tube and attempting to pull away the tube.  Will plan to obtain CT scan imaging and admit to ICU.      INTERVAL HPI/OVERNIGHT EVENTS: no overnight events     TODAY: pt was examined at the bedside. Patient was eating breakfast, denies nausea, vomit, diarrhea, reports not liking the food. Patient took a shower in the morning, reports bowel movement, no issues with urination. Ambulates.     MEDICATIONS  (STANDING):  albuterol/ipratropium for Nebulization 3 milliLiter(s) Nebulizer every 6 hours  atorvastatin 40 milliGRAM(s) Oral at bedtime  carvedilol 3.125 milliGRAM(s) Oral every 12 hours  chlorhexidine 2% Cloths 1 Application(s) Topical daily  folic acid 1 milliGRAM(s) Oral daily  heparin   Injectable 5000 Unit(s) SubCutaneous every 8 hours  lactulose Syrup 20 Gram(s) Oral three times a day  lidocaine   4% Patch 1 Patch Transdermal daily  methadone    Tablet 40 milliGRAM(s) Oral daily  multivitamin 1 Tablet(s) Oral daily  pantoprazole  Injectable 40 milliGRAM(s) IV Push daily  phytonadione  IVPB 10 milliGRAM(s) IV Intermittent daily  piperacillin/tazobactam IVPB.. 3.375 Gram(s) IV Intermittent every 8 hours  rifAXIMin 550 milliGRAM(s) Oral two times a day  thiamine 100 milliGRAM(s) Oral daily    MEDICATIONS  (PRN):  acetaminophen     Tablet .. 650 milliGRAM(s) Oral every 6 hours PRN Temp greater or equal to 38C (100.4F), Mild Pain (1 - 3)  hydrALAZINE Injectable 5 milliGRAM(s) IV Push every 6 hours PRN SBP > 160  ondansetron Injectable 4 milliGRAM(s) IV Push every 6 hours PRN Nausea and/or Vomiting      Allergies    No Known Allergies    Intolerances        REVIEW OF SYSTEMS:  CONSTITUTIONAL:  No weakness, fevers or chills  EYES/ENT:  No visual changes;  No vertigo or throat pain   NECK:  No pain or stiffness  RESPIRATORY:  No cough, wheezing, hemoptysis; No shortness of breath  CARDIOVASCULAR:  No chest pain or palpitations  GASTROINTESTINAL:  No abdominal or epigastric pain. No nausea, vomiting, or hematemesis; No diarrhea or constipation. No melena or hematochezia.  GENITOURINARY:  No dysuria, frequency or hematuria  NEUROLOGICAL:  No numbness or weakness  SKIN:  No itching, rashes        Vital Signs Last 24 Hrs  T(C): 36.5 (20 Jan 2025 09:00), Max: 36.6 (20 Jan 2025 00:31)  T(F): 97.7 (20 Jan 2025 09:00), Max: 97.9 (20 Jan 2025 00:31)  HR: 63 (20 Jan 2025 09:00) (52 - 63)  BP: 126/75 (20 Jan 2025 09:00) (104/60 - 134/75)  BP(mean): --  RR: 17 (20 Jan 2025 09:00) (16 - 19)  SpO2: 93% (20 Jan 2025 09:00) (93% - 96%)    Parameters below as of 20 Jan 2025 09:00  Patient On (Oxygen Delivery Method): nasal cannula  O2 Flow (L/min): 3      PHYSICAL EXAM:  GENERAL: NAD, sitting in the chair comfortably, obese  HEAD:  Atraumatic, normocephalic  EYES: EOMI, PERRLA, conjunctiva and sclera clear  ENT: Moist mucous membranes  NECK: Supple, no JVD  HEART: Regular rate and rhythm, no murmurs, rubs, or gallops  LUNGS: Unlabored respirations on room air.  Clear to auscultation bilaterally, no crackles, wheezing, or rhonchi  ABDOMEN: Soft, nontender, protuberant abdomen , +BS  EXTREMITIES: 2+ peripheral pulses bilaterally. No clubbing, cyanosis, or edema  NERVOUS SYSTEM:  A&Ox3, no focal deficits   SKIN: stasis dermatitis on the lower extremities, multiple areas of ecchymosis    LABS:                        11.7   12.38 )-----------( 116      ( 20 Jan 2025 07:54 )             38.4     01-20    147[H]  |  109[H]  |  43.4[H]  ----------------------------<  104[H]  4.4   |  28.0  |  1.23    Ca    9.3      20 Jan 2025 07:54  Phos  2.5     01-20  Mg     2.4     01-20    TPro  7.0  /  Alb  3.1[L]  /  TBili  1.8  /  DBili  x   /  AST  114[H]  /  ALT  49[H]  /  AlkPhos  136[H]  01-20    PT/INR - ( 20 Jan 2025 07:54 )   PT: 19.3 sec;   INR: 1.72 ratio         PTT - ( 20 Jan 2025 07:54 )  PTT:43.0 sec  Urinalysis Basic - ( 20 Jan 2025 07:54 )    Color: x / Appearance: x / SG: x / pH: x  Gluc: 104 mg/dL / Ketone: x  / Bili: x / Urobili: x   Blood: x / Protein: x / Nitrite: x   Leuk Esterase: x / RBC: x / WBC x   Sq Epi: x / Non Sq Epi: x / Bacteria: x      CAPILLARY BLOOD GLUCOSE          RADIOLOGY & ADDITIONAL TESTS:    Imaging Personally Reviewed:  [X] YES  [ ] NO    Consultant(s) Notes Reviewed:  [X] YES  [ ] NO    Care Discussed with Consultants/Other Providers [X] YES  [ ] NO    Plan of Care discussed with House Staff: [X]YES [ ] NO

## 2025-01-21 LAB
ALBUMIN SERPL ELPH-MCNC: 2.9 G/DL — LOW (ref 3.3–5.2)
ALP SERPL-CCNC: 138 U/L — HIGH (ref 40–120)
ALT FLD-CCNC: 54 U/L — HIGH
ANION GAP SERPL CALC-SCNC: 14 MMOL/L — SIGNIFICANT CHANGE UP (ref 5–17)
APPEARANCE UR: CLEAR — SIGNIFICANT CHANGE UP
APTT BLD: 42.7 SEC — HIGH (ref 24.5–35.6)
AST SERPL-CCNC: 135 U/L — HIGH
BACTERIA # UR AUTO: NEGATIVE /HPF — SIGNIFICANT CHANGE UP
BILIRUB SERPL-MCNC: 2.1 MG/DL — HIGH (ref 0.4–2)
BILIRUB UR-MCNC: ABNORMAL
BUN SERPL-MCNC: 44.7 MG/DL — HIGH (ref 8–20)
CALCIUM SERPL-MCNC: 9 MG/DL — SIGNIFICANT CHANGE UP (ref 8.4–10.5)
CAST: 10 /LPF — HIGH (ref 0–4)
CHLORIDE SERPL-SCNC: 106 MMOL/L — SIGNIFICANT CHANGE UP (ref 96–108)
CO2 SERPL-SCNC: 28 MMOL/L — SIGNIFICANT CHANGE UP (ref 22–29)
COARSE GRAN CASTS #/AREA URNS AUTO: PRESENT
COLOR SPEC: SIGNIFICANT CHANGE UP
CREAT SERPL-MCNC: 1.45 MG/DL — HIGH (ref 0.5–1.3)
DIFF PNL FLD: NEGATIVE — SIGNIFICANT CHANGE UP
EGFR: 51 ML/MIN/1.73M2 — LOW
GLUCOSE SERPL-MCNC: 93 MG/DL — SIGNIFICANT CHANGE UP (ref 70–99)
GLUCOSE UR QL: NEGATIVE MG/DL — SIGNIFICANT CHANGE UP
HCT VFR BLD CALC: 38.8 % — LOW (ref 39–50)
HGB BLD-MCNC: 11.6 G/DL — LOW (ref 13–17)
INR BLD: 1.41 RATIO — HIGH (ref 0.85–1.16)
KETONES UR-MCNC: ABNORMAL MG/DL
LEUKOCYTE ESTERASE UR-ACNC: ABNORMAL
MCHC RBC-ENTMCNC: 28.1 PG — SIGNIFICANT CHANGE UP (ref 27–34)
MCHC RBC-ENTMCNC: 29.9 G/DL — LOW (ref 32–36)
MCV RBC AUTO: 93.9 FL — SIGNIFICANT CHANGE UP (ref 80–100)
NITRITE UR-MCNC: NEGATIVE — SIGNIFICANT CHANGE UP
PH UR: 6 — SIGNIFICANT CHANGE UP (ref 5–8)
PLATELET # BLD AUTO: 114 K/UL — LOW (ref 150–400)
POTASSIUM SERPL-MCNC: 4.4 MMOL/L — SIGNIFICANT CHANGE UP (ref 3.5–5.3)
POTASSIUM SERPL-SCNC: 4.4 MMOL/L — SIGNIFICANT CHANGE UP (ref 3.5–5.3)
PROT SERPL-MCNC: 6.8 G/DL — SIGNIFICANT CHANGE UP (ref 6.6–8.7)
PROT UR-MCNC: SIGNIFICANT CHANGE UP MG/DL
PROTHROM AB SERPL-ACNC: 16.3 SEC — HIGH (ref 9.9–13.4)
RBC # BLD: 4.13 M/UL — LOW (ref 4.2–5.8)
RBC # FLD: 21.1 % — HIGH (ref 10.3–14.5)
RBC CASTS # UR COMP ASSIST: 2 /HPF — SIGNIFICANT CHANGE UP (ref 0–4)
SODIUM SERPL-SCNC: 148 MMOL/L — HIGH (ref 135–145)
SP GR SPEC: >1.03 — HIGH (ref 1–1.03)
SQUAMOUS # UR AUTO: 18 /HPF — HIGH (ref 0–5)
UROBILINOGEN FLD QL: 2 MG/DL (ref 0.2–1)
WBC # BLD: 10.52 K/UL — HIGH (ref 3.8–10.5)
WBC # FLD AUTO: 10.52 K/UL — HIGH (ref 3.8–10.5)
WBC UR QL: 2 /HPF — SIGNIFICANT CHANGE UP (ref 0–5)

## 2025-01-21 PROCEDURE — 99232 SBSQ HOSP IP/OBS MODERATE 35: CPT

## 2025-01-21 PROCEDURE — 76775 US EXAM ABDO BACK WALL LIM: CPT | Mod: 26

## 2025-01-21 RX ORDER — OLANZAPINE 10 MG/1
5 TABLET, FILM COATED ORAL ONCE
Refills: 0 | Status: COMPLETED | OUTPATIENT
Start: 2025-01-21 | End: 2025-01-21

## 2025-01-21 RX ADMIN — ANTISEPTIC SURGICAL SCRUB 1 APPLICATION(S): 0.04 SOLUTION TOPICAL at 12:14

## 2025-01-21 RX ADMIN — Medication 1 TABLET(S): at 12:13

## 2025-01-21 RX ADMIN — PIPERACILLIN SODIUM AND TAZOBACTAM SODIUM 25 GRAM(S): 2; 250 INJECTION, POWDER, FOR SOLUTION INTRAVENOUS at 13:27

## 2025-01-21 RX ADMIN — LIDOCAINE HYDROCHLORIDE 1 PATCH: 30 CREAM TOPICAL at 19:00

## 2025-01-21 RX ADMIN — Medication 20 GRAM(S): at 13:27

## 2025-01-21 RX ADMIN — LIDOCAINE HYDROCHLORIDE 1 PATCH: 30 CREAM TOPICAL at 12:14

## 2025-01-21 RX ADMIN — IPRATROPIUM BROMIDE AND ALBUTEROL SULFATE 3 MILLILITER(S): .5; 2.5 SOLUTION RESPIRATORY (INHALATION) at 08:23

## 2025-01-21 RX ADMIN — Medication 3.12 MILLIGRAM(S): at 12:13

## 2025-01-21 RX ADMIN — IPRATROPIUM BROMIDE AND ALBUTEROL SULFATE 3 MILLILITER(S): .5; 2.5 SOLUTION RESPIRATORY (INHALATION) at 13:47

## 2025-01-21 RX ADMIN — Medication 1 MILLIGRAM(S): at 12:13

## 2025-01-21 RX ADMIN — LIDOCAINE HYDROCHLORIDE 1 PATCH: 30 CREAM TOPICAL at 01:00

## 2025-01-21 RX ADMIN — IPRATROPIUM BROMIDE AND ALBUTEROL SULFATE 3 MILLILITER(S): .5; 2.5 SOLUTION RESPIRATORY (INHALATION) at 04:23

## 2025-01-21 RX ADMIN — OLANZAPINE 5 MILLIGRAM(S): 10 TABLET, FILM COATED ORAL at 22:55

## 2025-01-21 RX ADMIN — Medication 5000 UNIT(S): at 05:38

## 2025-01-21 RX ADMIN — Medication 100 MILLIGRAM(S): at 12:13

## 2025-01-21 RX ADMIN — PANTOPRAZOLE 40 MILLIGRAM(S): 20 TABLET, DELAYED RELEASE ORAL at 12:12

## 2025-01-21 RX ADMIN — PHYTONADIONE 102 MILLIGRAM(S): 2 INJECTION, EMULSION INTRAMUSCULAR; INTRAVENOUS; SUBCUTANEOUS at 13:27

## 2025-01-21 RX ADMIN — PIPERACILLIN SODIUM AND TAZOBACTAM SODIUM 25 GRAM(S): 2; 250 INJECTION, POWDER, FOR SOLUTION INTRAVENOUS at 05:38

## 2025-01-21 RX ADMIN — METHADONE HYDROCHLORIDE 40 MILLIGRAM(S): 5 SOLUTION ORAL at 12:13

## 2025-01-21 RX ADMIN — Medication 5000 UNIT(S): at 12:13

## 2025-01-21 RX ADMIN — Medication 20 GRAM(S): at 05:38

## 2025-01-21 NOTE — PROGRESS NOTE ADULT - SUBJECTIVE AND OBJECTIVE BOX
Chief Complaint:  Patient is a 74y old  Male who presents with a chief complaint of Cardiac Arrest (20 Jan 2025 17:23)      HPI/ 24 hr events: Patient seen and examined at bedside. Pt feeling well this morning. Tolerating diet. Denies nausea, vomiting, diarrhea, abdominal pain, hematemesis, hematochezia, melena. Vitals are overall stable, leukocytosis improving.     REVIEW OF SYSTEMS:   General: Negative  HEENT: Negative  CV: Negative  Respiratory: Negative  GI: See HPI  : Negative  MSK: Negative  Hematologic: Negative  Skin: Negative    MEDICATIONS:   MEDICATIONS  (STANDING):  albuterol/ipratropium for Nebulization 3 milliLiter(s) Nebulizer every 6 hours  atorvastatin 40 milliGRAM(s) Oral at bedtime  carvedilol 3.125 milliGRAM(s) Oral every 12 hours  chlorhexidine 2% Cloths 1 Application(s) Topical daily  folic acid 1 milliGRAM(s) Oral daily  heparin   Injectable 5000 Unit(s) SubCutaneous every 8 hours  lactulose Syrup 20 Gram(s) Oral three times a day  lidocaine   4% Patch 1 Patch Transdermal daily  methadone    Tablet 40 milliGRAM(s) Oral daily  multivitamin 1 Tablet(s) Oral daily  pantoprazole  Injectable 40 milliGRAM(s) IV Push daily  phytonadione  IVPB 10 milliGRAM(s) IV Intermittent daily  piperacillin/tazobactam IVPB.. 3.375 Gram(s) IV Intermittent every 8 hours  rifAXIMin 550 milliGRAM(s) Oral two times a day  thiamine 100 milliGRAM(s) Oral daily    MEDICATIONS  (PRN):  acetaminophen     Tablet .. 650 milliGRAM(s) Oral every 6 hours PRN Temp greater or equal to 38C (100.4F), Mild Pain (1 - 3)  hydrALAZINE Injectable 5 milliGRAM(s) IV Push every 6 hours PRN SBP > 160  ondansetron Injectable 4 milliGRAM(s) IV Push every 6 hours PRN Nausea and/or Vomiting      phytonadione  IVPB: [Known as aquaMEPHYTON IVPB]  10 milliGRAM(s) in dextrose 5% 50 milliLiter(s), IV Intermittent, daily, infuse over 30 Minute(s), Stop After 3 Days  Administration Instructions: This is a High Alert Medication.  Provider's Contact #: [phone] (01-20-25 @ 11:27)        DIET:  Diet, Minced and Moist:   DASH/TLC Sodium & Cholesterol Restricted (DASH)  Mildly Thick Liquids (MILDTHICKLIQS)  Supplement Feeding Modality:  Oral  Ensure Enlive Cans or Servings Per Day:  1       Frequency:  Daily (01-19-25 @ 15:27) [Active]          ALLERGIES:   Allergies    No Known Allergies    Intolerances        VITAL SIGNS:   Vital Signs Last 24 Hrs  T(C): 36.3 (21 Jan 2025 05:08), Max: 36.4 (21 Jan 2025 00:04)  T(F): 97.4 (21 Jan 2025 05:08), Max: 97.6 (21 Jan 2025 00:04)  HR: 60 (21 Jan 2025 08:37) (60 - 61)  BP: 109/62 (21 Jan 2025 05:08) (109/62 - 124/71)  BP(mean): --  RR: 18 (21 Jan 2025 05:08) (18 - 19)  SpO2: 98% (21 Jan 2025 05:08) (93% - 98%)    Parameters below as of 21 Jan 2025 05:08  Patient On (Oxygen Delivery Method): room air      I&O's Summary      PHYSICAL EXAM:   GENERAL:  No acute distress  HEENT:  NC/AT, conjunctiva clear, sclera anicteric  CHEST:  No increased effort  HEART:  Regular rate  ABDOMEN:  Soft, non-tender,+distended, normoactive bowel sounds, no rebound or guarding  EXTREMITIES: No edema  SKIN:  Warm, dry  NEURO:  Calm, cooperative    LABS:                        11.6   10.52 )-----------( 114      ( 21 Jan 2025 06:56 )             38.8     Hemoglobin: 11.6 g/dL (01-21-25 @ 06:56)  Hemoglobin: 11.7 g/dL (01-20-25 @ 07:54)  Hemoglobin: 12.0 g/dL (01-19-25 @ 06:00)    01-21    148[H]  |  106  |  44.7[H]  ----------------------------<  93  4.4   |  28.0  |  1.45[H]    Ca    9.0      21 Jan 2025 06:56  Phos  2.5     01-20  Mg     2.4     01-20    TPro  6.8  /  Alb  2.9[L]  /  TBili  2.1[H]  /  DBili  x   /  AST  135[H]  /  ALT  54[H]  /  AlkPhos  138[H]  01-21    LIVER FUNCTIONS - ( 21 Jan 2025 06:56 )  Alb: 2.9 g/dL / Pro: 6.8 g/dL / ALK PHOS: 138 U/L / ALT: 54 U/L / AST: 135 U/L / GGT: x             PT/INR - ( 21 Jan 2025 06:56 )   PT: 16.3 sec;   INR: 1.41 ratio         PTT - ( 21 Jan 2025 06:56 )  PTT:42.7 sec      RADIOLOGY & ADDITIONAL STUDIES:      ACC: [id number] EXAM:  CT ABDOMEN AND PELVIS IC   ORDERED BY: LITTLE PIMENTEL     PROCEDURE DATE:  01/19/2025          INTERPRETATION:  CLINICAL INFORMATION: Rule out obstruction. Acute   respiratory failure.    COMPARISON: 11/20/2025 CT abdomen pelvis    CONTRAST/COMPLICATIONS:  IV Contrast: Omnipaque 350  90 cc administered   10 cc discarded  Oral Contrast: NONE  .    PROCEDURE:  CT of the Abdomen and Pelvis was performed.  Sagittal and coronal reformats were performed.    FINDINGS:  LOWER CHEST: There is a pericardial lymph node measuring 0.8 x 1.2 cm,   stable. Heart normal size. No significant pericardial effusion. Small   bilateral effusions with compressive bibasilar atelectasis. Nonspecific   groundglass opacity in the lingula. The effusion is smaller on the left,   and stable on the right.    LIVER: Liver is small and nodular contour likely representing cirrhosis.   There is a ill-defined low-density lesion in segment 5, similar to the   study from 1/1/2025. There are residual tubular hypodensities in segment   4A/4B likely representing residual portal vein thrombus. The main portal   vein is patent. The left portal vein is remains occluded.  BILE DUCTS: Normal caliber.  GALLBLADDER: Is not significantly distended. Contains high-density   material likely vicarious excretion of contrast  SPLEEN: Enlarged measuring 14 cm.  PANCREAS: Within normal limits.  ADRENALS: Within normal limits.  KIDNEYS/URETERS: Within normal limits.    BLADDER: Within normal limits.  REPRODUCTIVE ORGANS: Prostate within normal limits.    BOWEL: Mildly dilated loops are seen in the small bowel centrally with   collapsed loops of bowel distally however this is likely not represent   obstruction. There is no bowel wall thickening. The bowel enhances  symmetrically. Moderate amount of stool seen throughout the colon.  PERITONEUM/RETROPERITONEUM: Small amount of ascites. No free air. No   abscess.  VESSELS: Multiple varices are seen in the gastrohepatic ligament, and   lower esophageal region. There is a splenorenal shunt on the left.  LYMPH NODES: Jenni hepatis node measures 1.6 x 2.6 cm, and 1.8 x 2.5 cm,    similar to prior.  ABDOMINAL WALL: Within normal limits.  BONES: Within normal limits.    IMPRESSION:    Cirrhosis.    Thrombosis of the left portal vein and branches of segment 4.    The ill-defined mass in the right lobe of the liver similar in appearance   to the CT from 1/1/2025    Nonspecific mildly dilated loops of small bowel centrally, without clear   evidence of obstruction.    Small amount of ascites.    Nonspecific periportal adenopathy in the setting of cirrhosis.    Small bilateral effusions and compressive atelectasis.    --- End of Report ---            BENJI NO MD; Attending Interventional Radiologist  This document has been electronically signed. Jan 19 2025  1:35PM  01-19-25 @ 12:05    -- -- --   ACC: [id number] EXAM:  XR ABDOMEN PORTABLE URGENT 1V   ORDERED BY: LAWRENCE ALMONTE     PROCEDURE DATE:  01/18/2025          INTERPRETATION:  Clinical history: Abdominal pain    Abdomen single view    Dilated loops of small bowel are present. Some air is in the colon. This   can be seen with ileus or perhaps partial small bowel obstruction.    IMPRESSION: Ileus versus partial small bowel obstruction    --- End of Report ---            SYLVIE MELENDREZ MD; Attending Radiologist  This document has been electronically signed. Jan 19 2025  9:28AM

## 2025-01-21 NOTE — PROGRESS NOTE ADULT - ASSESSMENT
74y Male with PMHx cirrhosis 2/2 Hep C admitted for cardiac arrest + hepatic hydrothorax in s/o potential HCC.     #AHRF, resolving   #Cardiac arrest   -s/p 2 rounds of CPR + intubation  -Extubated 1/13 to [place], currently on 3LNC , satting 91-92  -CT Chest showing L loculated pleural effusion s/p PTC + Umbilical hernia repair with mesh   -CT surgery placed PTC placed 1/11 initially draining 1.5L bloody fluid, s/p removal 1/17  -DuoNeb q6     #Hep C Cirrhosis with potential HCC  #SBP  -AFP [id number]  -Cipro 500 ppx upon discharge   -to follow with [facility] upon discharge   -Zosyn 3.375G ppx restarted   -rifaximin 550mg BID to titrate to 2-3 bowel movements per day  -lactulose syrup 15G BID was switched to 15 G TID  -Coreg 3.125mg BID   -Protonix 40mg daily   -s/p paracentesis 1/15 with 600cc removed   -started on vitamin K 10 mg x 3 days  - INR, cmp ordered  - GI following, recs appreciated   - patient deferred for paracentesis by IR at this time, CT: small amount of ascites near liver edge  - CT imaging reviewed: Cirrhosis. Thrombosis of the left portal vein and branches of segment 4, mass in the right lobe of the liver similar in appearance( CT 01/01). Nonspecific mildly dilated loops of small bowel centrally. Nonspecific periportal adenopathy. Small bilateral effusions and compressive atelectasis.  -consider EGD outpatient for varices management       #MARY  -likely 2/2 HRS  -Serial monitoring   -BUN Cr downtrending, creatinine normalized  -Na 150 1/17, improving 147 01/20, encouraged better PO intake   -sp Albumin 50cc q8  for 3 doses   -Nephrology consulted, reqs appreciated  - UA with lytes ordered  - Renal US pending      #history of opioid use + alcohol abuse   -follows with [facility] ([phone])  -Started on methadone 40mg daily, decreased from 134mg baseline in s/o cirrhosis + prolonged QTc   -Multivitamin daily   -Thiamine 100mg daily   -Folic acid 1mg daily       DVT ppx: Heparin 5000 U sq q8   Status: Full code   Dispo: [facility]. To follow with [facility] following d/c. To be discharged with Cipro 500mg daily for SBP ppx.

## 2025-01-21 NOTE — PROGRESS NOTE ADULT - ASSESSMENT
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 GI consulted for Cirrhosis.    #HCV cirrhosis   US abdomen (01.15.25) Diffuse mild volume all- quadrant  abdominal ascites  CT A/P (01.15.25) Again is noted a shrunken nodular liver with prominence of the left lobe laterally and a subtle infiltrating lesion in the right lobe anteriorly, compatible with cirrhosis and likely infiltrating hepatoma. Again is noted thrombosis in the left portal vein.  S/p paracentesis (01.15.25) -600ml fluid   AFP: [id number]  - Trend renal function, LFTs, electrolytes, coags daily  - Increase Lactulose, titrated to 2-3 soft bowel movements per day, avoid diarrhea  - Rifaximin 550mg BID   - diuretics on hold 2/2 elevated Cr, albumin given   - Recommend nephrology consult   - Avoid NSAIDs, hepatotoxic drugs  - MVI, thiamine and folic acid. Optimize nutrition, ensure adequate protein intake, low sodium, avoid raw shellfish. Alcohol cessation counseling. [facility] protocol.   - Tylenol 2g max per day  - Vitamin K 10mg IV x 3 days   - Varices: EGD, can consider outpatient once medically optimized   -  Continue abx, will need to be discharged with SBP ppx ( cipro 500 QD)  _________________________________________________________________  Assessment and recommendations are final when note is signed by the attending physician.

## 2025-01-21 NOTE — PROGRESS NOTE ADULT - SUBJECTIVE AND OBJECTIVE BOX
Patient is a 74y old  Male who presents with a chief complaint of Cardiac Arrest (21 Jan 2025 09:35)      BRIEF HOSPITAL COURSE:  74-year-old male patient with a history of cirrhosis secondary to hepatitis C, daily smoker presents the ED complaining of abdominal pain, abdominal distention, difficulty breathing.  Seen in the ED 10 days ago secondary to abdominal pain, abdominal distention, melena. 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  Patient was discharged home, states that he was feeling somewhat better until a few days ago when he began to experience worsening abdominal pain, abdominal ascension.  Patient reports increased shortness of breath and dyspnea as well over the past few days.  Denies any prior history of COPD but is a daily smoker.  Patient noted to be hypoxic to 88% on room air in the ED, placed on 4 L nasal cannula with improvement to 94%. On reassessment this patient who appeared to be in respiratory distress, stated that he was having trouble breathing.  Supplemental oxygen was over his chin and he was slumped in the bed.  Patient moved to critical care area immediately afterwards.  Patient found to be in cardiac arrest, ACLS initiated, 1 mg of epinephrine given.  Patient subsequently intubated and then achieve return of spontaneous circulation.  Postintubation x-ray confirmed tube placement.  Patient sedated with propofol as well as given fentanyl on postintubation state.  Prior to initiation of propofol, patient was biting at the tube and attempting to pull away the tube.  Will plan to obtain CT scan imaging and admit to ICU.        INTERVAL HPI/OVERNIGHT EVENTS: no overnight events     TODAY: pt was examined at the bedside. Patient was eating breakfast, denies nausea, vomit, diarrhea, reports not liking the food, does not have his dentures with him. Reports bowel movement, no issues with urination. Ambulates.     MEDICATIONS  (STANDING):  albuterol/ipratropium for Nebulization 3 milliLiter(s) Nebulizer every 6 hours  atorvastatin 40 milliGRAM(s) Oral at bedtime  carvedilol 3.125 milliGRAM(s) Oral every 12 hours  chlorhexidine 2% Cloths 1 Application(s) Topical daily  folic acid 1 milliGRAM(s) Oral daily  heparin   Injectable 5000 Unit(s) SubCutaneous every 8 hours  lactulose Syrup 20 Gram(s) Oral three times a day  lidocaine   4% Patch 1 Patch Transdermal daily  methadone    Tablet 40 milliGRAM(s) Oral daily  multivitamin 1 Tablet(s) Oral daily  pantoprazole  Injectable 40 milliGRAM(s) IV Push daily  phytonadione  IVPB 10 milliGRAM(s) IV Intermittent daily  piperacillin/tazobactam IVPB.. 3.375 Gram(s) IV Intermittent every 8 hours  rifAXIMin 550 milliGRAM(s) Oral two times a day  thiamine 100 milliGRAM(s) Oral daily    MEDICATIONS  (PRN):  acetaminophen     Tablet .. 650 milliGRAM(s) Oral every 6 hours PRN Temp greater or equal to 38C (100.4F), Mild Pain (1 - 3)  hydrALAZINE Injectable 5 milliGRAM(s) IV Push every 6 hours PRN SBP > 160  ondansetron Injectable 4 milliGRAM(s) IV Push every 6 hours PRN Nausea and/or Vomiting      Allergies    No Known Allergies    Intolerances      REVIEW OF SYSTEMS:  CONSTITUTIONAL:  No weakness, fevers or chills  EYES/ENT:  No visual changes;  No vertigo or throat pain   NECK:  No pain or stiffness  RESPIRATORY:  No cough, wheezing, hemoptysis; No shortness of breath  CARDIOVASCULAR:  No chest pain or palpitations  GASTROINTESTINAL:  No abdominal or epigastric pain. No nausea, vomiting, or hematemesis; No diarrhea or constipation. No melena or hematochezia.  GENITOURINARY:  No dysuria, frequency or hematuria  NEUROLOGICAL:  No numbness or weakness  SKIN:  No itching, rashes      Vital Signs Last 24 Hrs  T(C): 36.4 (21 Jan 2025 08:00), Max: 36.4 (21 Jan 2025 00:04)  T(F): 97.5 (21 Jan 2025 08:00), Max: 97.6 (21 Jan 2025 00:04)  HR: 64 (21 Jan 2025 11:55) (52 - 64)  BP: 110/68 (21 Jan 2025 11:55) (109/62 - 124/71)  BP(mean): --  RR: 18 (21 Jan 2025 08:00) (18 - 19)  SpO2: 97% (21 Jan 2025 08:00) (93% - 98%)    Parameters below as of 21 Jan 2025 08:00  Patient On (Oxygen Delivery Method): room air        PHYSICAL EXAM:  GENERAL: NAD, sitting in the chair comfortably, obese  HEAD:  Atraumatic, normocephalic  EYES: EOMI, PERRLA, conjunctiva and sclera clear  ENT: Moist mucous membranes  NECK: Supple, no JVD  HEART: Regular rate and rhythm, no murmurs, rubs, or gallops  LUNGS: Unlabored respirations on room air.  Clear to auscultation bilaterally, no crackles, wheezing, or rhonchi  ABDOMEN: Soft, nontender, protuberant abdomen , +BS  EXTREMITIES: 2+ peripheral pulses bilaterally. No clubbing, cyanosis, or edema  NERVOUS SYSTEM:  A&Ox3, no focal deficits   SKIN: stasis dermatitis on the lower extremities, multiple areas of ecchymosis    LABS:                        11.6   10.52 )-----------( 114      ( 21 Jan 2025 06:56 )             38.8     01-21    148[H]  |  106  |  44.7[H]  ----------------------------<  93  4.4   |  28.0  |  1.45[H]    Ca    9.0      21 Jan 2025 06:56  Phos  2.5     01-20  Mg     2.4     01-20    TPro  6.8  /  Alb  2.9[L]  /  TBili  2.1[H]  /  DBili  x   /  AST  135[H]  /  ALT  54[H]  /  AlkPhos  138[H]  01-21    PT/INR - ( 21 Jan 2025 06:56 )   PT: 16.3 sec;   INR: 1.41 ratio         PTT - ( 21 Jan 2025 06:56 )  PTT:42.7 sec  Urinalysis Basic - ( 21 Jan 2025 06:56 )    Color: x / Appearance: x / SG: x / pH: x  Gluc: 93 mg/dL / Ketone: x  / Bili: x / Urobili: x   Blood: x / Protein: x / Nitrite: x   Leuk Esterase: x / RBC: x / WBC x   Sq Epi: x / Non Sq Epi: x / Bacteria: x      CAPILLARY BLOOD GLUCOSE          RADIOLOGY & ADDITIONAL TESTS:    Imaging Personally Reviewed:  [X] YES  [ ] NO    Consultant(s) Notes Reviewed:  [X] YES  [ ] NO    Care Discussed with Consultants/Other Providers [X] YES  [ ] NO    Plan of Care discussed with House Staff: [X]YES [ ] NO

## 2025-01-21 NOTE — CHART NOTE - NSCHARTNOTEFT_GEN_A_CORE
IR consulted for paracentesis  CT reviewed, small amount of ascites near liver edge  would defer paracentesis at this time

## 2025-01-21 NOTE — CHART NOTE - NSCHARTNOTEFT_GEN_A_CORE
Called by nurse due to patient being uncooperative and demanding to leave the hospital. Evaluated the patient by bedside. Patient seems to be encephalopathic due to cirrhosis. Patient has been refusing medications including lactulose. Security was called and verbal deescalation was pursued. Patient continued to be agitated and made violent comments. Ordered 1x IM Zyprexa for agitation. Will continue to assess patient's mental status. Called by nurse due to patient's being uncooperative and demanding to leave the hospital. Evaluated the patient by bedside. Patient seems to be encephalopathic due to cirrhosis. Patient has been refusing medications including lactulose. Security was called and verbal deescalation was pursued. Patient continued to be agitated and made violent comments. Ordered 1x IM Zyprexa for agitation. Will continue to assess patient's mental status.

## 2025-01-22 LAB
ALBUMIN SERPL ELPH-MCNC: 2.7 G/DL — LOW (ref 3.3–5.2)
ALP SERPL-CCNC: 125 U/L — HIGH (ref 40–120)
ALT FLD-CCNC: 58 U/L — HIGH
ANION GAP SERPL CALC-SCNC: 10 MMOL/L — SIGNIFICANT CHANGE UP (ref 5–17)
APTT BLD: 34.3 SEC — SIGNIFICANT CHANGE UP (ref 24.5–35.6)
AST SERPL-CCNC: 147 U/L — HIGH
BILIRUB SERPL-MCNC: 2.3 MG/DL — HIGH (ref 0.4–2)
BUN SERPL-MCNC: 47.6 MG/DL — HIGH (ref 8–20)
CALCIUM SERPL-MCNC: 8.8 MG/DL — SIGNIFICANT CHANGE UP (ref 8.4–10.5)
CHLORIDE SERPL-SCNC: 109 MMOL/L — HIGH (ref 96–108)
CO2 SERPL-SCNC: 26 MMOL/L — SIGNIFICANT CHANGE UP (ref 22–29)
CREAT SERPL-MCNC: 1.56 MG/DL — HIGH (ref 0.5–1.3)
EGFR: 46 ML/MIN/1.73M2 — LOW
GLUCOSE SERPL-MCNC: 72 MG/DL — SIGNIFICANT CHANGE UP (ref 70–99)
HCT VFR BLD CALC: 36.8 % — LOW (ref 39–50)
HGB BLD-MCNC: 11.2 G/DL — LOW (ref 13–17)
INR BLD: 1.43 RATIO — HIGH (ref 0.85–1.16)
MCHC RBC-ENTMCNC: 28.2 PG — SIGNIFICANT CHANGE UP (ref 27–34)
MCHC RBC-ENTMCNC: 30.4 G/DL — LOW (ref 32–36)
MCV RBC AUTO: 92.7 FL — SIGNIFICANT CHANGE UP (ref 80–100)
NON-GYNECOLOGICAL CYTOLOGY STUDY: SIGNIFICANT CHANGE UP
PLATELET # BLD AUTO: 104 K/UL — LOW (ref 150–400)
POTASSIUM SERPL-MCNC: 4.2 MMOL/L — SIGNIFICANT CHANGE UP (ref 3.5–5.3)
POTASSIUM SERPL-SCNC: 4.2 MMOL/L — SIGNIFICANT CHANGE UP (ref 3.5–5.3)
PROT SERPL-MCNC: 6.4 G/DL — LOW (ref 6.6–8.7)
PROTHROM AB SERPL-ACNC: 16.1 SEC — HIGH (ref 9.9–13.4)
RBC # BLD: 3.97 M/UL — LOW (ref 4.2–5.8)
RBC # FLD: 20.8 % — HIGH (ref 10.3–14.5)
SODIUM SERPL-SCNC: 145 MMOL/L — SIGNIFICANT CHANGE UP (ref 135–145)
WBC # BLD: 10.17 K/UL — SIGNIFICANT CHANGE UP (ref 3.8–10.5)
WBC # FLD AUTO: 10.17 K/UL — SIGNIFICANT CHANGE UP (ref 3.8–10.5)

## 2025-01-22 PROCEDURE — 99232 SBSQ HOSP IP/OBS MODERATE 35: CPT

## 2025-01-22 RX ORDER — OLANZAPINE 10 MG/1
5 TABLET, FILM COATED ORAL ONCE
Refills: 0 | Status: COMPLETED | OUTPATIENT
Start: 2025-01-22 | End: 2025-01-22

## 2025-01-22 RX ORDER — LACTULOSE 10 G/15 ML
200 SOLUTION, ORAL ORAL ONCE
Refills: 0 | Status: COMPLETED | OUTPATIENT
Start: 2025-01-22 | End: 2025-01-22

## 2025-01-22 RX ADMIN — Medication 5000 UNIT(S): at 06:44

## 2025-01-22 RX ADMIN — LIDOCAINE HYDROCHLORIDE 1 PATCH: 30 CREAM TOPICAL at 00:00

## 2025-01-22 RX ADMIN — PIPERACILLIN SODIUM AND TAZOBACTAM SODIUM 25 GRAM(S): 2; 250 INJECTION, POWDER, FOR SOLUTION INTRAVENOUS at 06:44

## 2025-01-22 RX ADMIN — PIPERACILLIN SODIUM AND TAZOBACTAM SODIUM 25 GRAM(S): 2; 250 INJECTION, POWDER, FOR SOLUTION INTRAVENOUS at 23:21

## 2025-01-22 RX ADMIN — Medication 3.12 MILLIGRAM(S): at 10:15

## 2025-01-22 RX ADMIN — ATORVASTATIN CALCIUM 40 MILLIGRAM(S): 80 TABLET, FILM COATED ORAL at 23:20

## 2025-01-22 RX ADMIN — Medication 5000 UNIT(S): at 23:20

## 2025-01-22 RX ADMIN — Medication 20 GRAM(S): at 06:44

## 2025-01-22 RX ADMIN — Medication 3.12 MILLIGRAM(S): at 23:20

## 2025-01-22 RX ADMIN — PANTOPRAZOLE 40 MILLIGRAM(S): 20 TABLET, DELAYED RELEASE ORAL at 14:12

## 2025-01-22 RX ADMIN — OLANZAPINE 5 MILLIGRAM(S): 10 TABLET, FILM COATED ORAL at 14:54

## 2025-01-22 RX ADMIN — LIDOCAINE HYDROCHLORIDE 1 PATCH: 30 CREAM TOPICAL at 19:30

## 2025-01-22 NOTE — PROGRESS NOTE ADULT - SUBJECTIVE AND OBJECTIVE BOX
Patient is a 74y old  Male who presents with a chief complaint of Cardiac Arrest (22 Jan 2025 11:23)      BRIEF HOSPITAL COURSE: 74-year-old male patient with a history of cirrhosis secondary to hepatitis C, daily smoker presents the ED complaining of abdominal pain, abdominal distention, difficulty breathing.  Seen in the ED 10 days ago secondary to abdominal pain, abdominal distention, melena. 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  Patient was discharged home, states that he was feeling somewhat better until a few days ago when he began to experience worsening abdominal pain, abdominal ascension.  Patient reports increased shortness of breath and dyspnea as well over the past few days.  Denies any prior history of COPD but is a daily smoker.  Patient noted to be hypoxic to 88% on room air in the ED, placed on 4 L nasal cannula with improvement to 94%. On reassessment this patient who appeared to be in respiratory distress, stated that he was having trouble breathing.  Supplemental oxygen was over his chin and he was slumped in the bed.  Patient moved to critical care area immediately afterwards.  Patient found to be in cardiac arrest, ACLS initiated, 1 mg of epinephrine given.  Patient subsequently intubated and then achieve return of spontaneous circulation.  Postintubation x-ray confirmed tube placement.  Patient sedated with propofol as well as given fentanyl on postintubation state.  Prior to initiation of propofol, patient was biting at the tube and attempting to pull away the tube.  Will plan to obtain CT scan imaging and admit to ICU.    INTERVAL HPI/OVERNIGHT EVENTS: patient refused to take medications, became agitated overnight, was given zyprexa    TODAY: pt was examined at the bedside. Patient refuses to eat food based on the diet, SLP was re-consulted patient  denies nausea, vomit, diarrhea, reports not liking the food, does not have his dentures with him. Reports bowel movement, no issues with urination. Ambulates.       MEDICATIONS  (STANDING):  albuterol/ipratropium for Nebulization 3 milliLiter(s) Nebulizer every 6 hours  atorvastatin 40 milliGRAM(s) Oral at bedtime  carvedilol 3.125 milliGRAM(s) Oral every 12 hours  chlorhexidine 2% Cloths 1 Application(s) Topical daily  folic acid 1 milliGRAM(s) Oral daily  heparin   Injectable 5000 Unit(s) SubCutaneous every 8 hours  lactulose Retention Enema 200 Gram(s) Rectal once  lactulose Syrup 20 Gram(s) Oral three times a day  lidocaine   4% Patch 1 Patch Transdermal daily  methadone    Tablet 40 milliGRAM(s) Oral daily  multivitamin 1 Tablet(s) Oral daily  pantoprazole  Injectable 40 milliGRAM(s) IV Push daily  phytonadione  IVPB 10 milliGRAM(s) IV Intermittent daily  piperacillin/tazobactam IVPB.. 3.375 Gram(s) IV Intermittent every 8 hours  rifAXIMin 550 milliGRAM(s) Oral two times a day  thiamine 100 milliGRAM(s) Oral daily    MEDICATIONS  (PRN):  acetaminophen     Tablet .. 650 milliGRAM(s) Oral every 6 hours PRN Temp greater or equal to 38C (100.4F), Mild Pain (1 - 3)  hydrALAZINE Injectable 5 milliGRAM(s) IV Push every 6 hours PRN SBP > 160  ondansetron Injectable 4 milliGRAM(s) IV Push every 6 hours PRN Nausea and/or Vomiting      Allergies    No Known Allergies    Intolerances        REVIEW OF SYSTEMS:        Vital Signs Last 24 Hrs  T(C): 36.4 (22 Jan 2025 10:04), Max: 36.4 (22 Jan 2025 10:04)  T(F): 97.6 (22 Jan 2025 10:04), Max: 97.6 (22 Jan 2025 10:04)  HR: 87 (22 Jan 2025 10:04) (59 - 87)  BP: 116/62 (22 Jan 2025 10:04) (111/67 - 127/73)  BP(mean): --  RR: 18 (22 Jan 2025 10:04) (18 - 19)  SpO2: 96% (22 Jan 2025 10:04) (94% - 96%)    Parameters below as of 22 Jan 2025 05:00  Patient On (Oxygen Delivery Method): nasal cannula  O2 Flow (L/min): 4      PHYSICAL EXAM:      LABS:                        11.2   10.17 )-----------( 104      ( 22 Jan 2025 06:33 )             36.8     01-22    145  |  109[H]  |  47.6[H]  ----------------------------<  72  4.2   |  26.0  |  1.56[H]    Ca    8.8      22 Jan 2025 06:33    TPro  6.4[L]  /  Alb  2.7[L]  /  TBili  2.3[H]  /  DBili  x   /  AST  147[H]  /  ALT  58[H]  /  AlkPhos  125[H]  01-22    PT/INR - ( 22 Jan 2025 06:33 )   PT: 16.1 sec;   INR: 1.43 ratio         PTT - ( 22 Jan 2025 06:33 )  PTT:34.3 sec  Urinalysis Basic - ( 22 Jan 2025 06:33 )    Color: x / Appearance: x / SG: x / pH: x  Gluc: 72 mg/dL / Ketone: x  / Bili: x / Urobili: x   Blood: x / Protein: x / Nitrite: x   Leuk Esterase: x / RBC: x / WBC x   Sq Epi: x / Non Sq Epi: x / Bacteria: x      CAPILLARY BLOOD GLUCOSE          RADIOLOGY & ADDITIONAL TESTS:    Imaging Personally Reviewed:  [X] YES  [ ] NO    Consultant(s) Notes Reviewed:  [X] YES  [ ] NO    Care Discussed with Consultants/Other Providers [X] YES  [ ] NO    Plan of Care discussed with House Staff: [X]YES [ ] NO

## 2025-01-22 NOTE — PROGRESS NOTE ADULT - ATTENDING COMMENTS
74-year-old male with a history of hepatitis C, liver cirrhosis, and opioid use, currently on methadone, who presented to the ED with abdominal discomfort and dyspnea, leading to hypoxia requiring supplemental oxygen.  He  was evaluated in the ED ten days prior and was found to have a hepatic mass. CT abdomen was noted to have a large mass with ill defined borders, left portal vein thrombosis, likely tumor thrombus, moderate ascites, large paraesophageal varices and gastrorenal shunt. He developed respiratory distress and cardiac arrest, achieving return of spontaneous circulation with epinephrine. He was intubated, admitted to the ICU, treated for pneumonia and a left-sided chest tube was placed for pleural effusion. Following stabilization, he was extubated and downgraded to a step-down unit. The pneumonia is managed with piperacillin/tazobactam and oxygen therapy, with negative blood cultures thus far. CT imaging revealed a moderate loculated left pleural effusion and airspace disease. Cardiac arrest was likely secondary to hypoxia, and echocardiogram showed normal left ventricular function with an LVEF of 56%. Cirrhosis management involves holding diuretics due to renal insufficiency, continuing lactulose and rifaximin, adding Coreg, and recent paracentesis by IR on 1/15 with 600cc removed. His methadone has been adjusted to 40 mg daily due to liver concerns and QTc prolongation, coordinated with the [facility]. Eventual plan is for discharge to [facility] once stable.    Paracentesis scheduled for 1/21  Increase Lactulose to 20g TID  Vitamin K x 1  Restart Zosyn for SBP, DC on [facility]   GI recs appreciated
74-year-old male with a history of hepatitis C, liver cirrhosis, and opioid use, currently on methadone, who presented to the ED with abdominal discomfort and dyspnea, leading to hypoxia requiring supplemental oxygen.  He  was evaluated in the ED ten days prior and was found to have a hepatic mass. CT abdomen was noted to have a large mass with ill defined borders, left portal vein thrombosis, likely tumor thrombus, moderate ascites, large paraesophageal varices and gastrorenal shunt. He developed respiratory distress and cardiac arrest, achieving return of spontaneous circulation with epinephrine. He was intubated, admitted to the ICU, treated for pneumonia and a left-sided chest tube was placed for pleural effusion. Following stabilization, he was extubated and downgraded to a step-down unit. The pneumonia is managed with piperacillin/tazobactam and oxygen therapy, with negative blood cultures thus far. CT imaging revealed a moderate loculated left pleural effusion and airspace disease. Cardiac arrest was likely secondary to hypoxia, and echocardiogram showed normal left ventricular function with an LVEF of 56%. Cirrhosis management involves holding diuretics due to renal insufficiency, continuing lactulose and rifaximin, adding Coreg, and recent paracentesis by IR on 1/15 with 600cc removed. His methadone has been adjusted to 40 mg daily due to liver concerns and QTc prolongation, coordinated with the [facility]. Eventual plan is for discharge to [facility] once stable.    Mental status improved with Lactulose 20g TID  Continue Zosyn  Paracentesis today  Auth started for [facility]
74-year-old  male with history of hepatitis C virus with cirrhosis tobacco use disorder recent admission to ED with melena which was transient presented on January 11 with difficulty breathing admitted to medical ICU with    In-hospital brief periintubation cardiac arrest  Acute hypoxic hypercapnic respiratory failure  Bilateral upper lobe aspiration pneumonia  Left-sided pleural effusion  Encephalopathy: Multifactorial hyperactive delirium/hypoxic/possible component of hepatic  Hepatic mass: Needs further evaluation  Acute kidney injury with metabolic acidosis  Transaminitis    Patient extubated this morning  Severe respiratory distress with stridor postextubation requiring racemic epi and 3 DuoNeb nebulized treatment and placement on high flow nasal cannula with aggressive chest PT try to maintain saturation more than 92%  Adding DuoNeb and Mucomyst as well as chest PT every 6 hours for now   Decadron added  Continue with high flow nasal cannula  Continue with antibiotics  Adding intermittent albumin with intermittent Bumex intravenously  Maintain NG tube with n.p.o. status  Continue with rifaximin and lactulose  Indwelling Lobo to be continued  Left-sided chest tube to suction -20, thoracic consult  Will need overall goals of care  H&H stable, IV PPI daily for now  Precedex infusion as patient needs to be compliant with  high flow cannula    MEDICATIONS  (STANDING):  albuterol/ipratropium for Nebulization 3 milliLiter(s) Nebulizer every 20 minutes  atorvastatin 40 milliGRAM(s) Oral at bedtime  chlorhexidine 2% Cloths 1 Application(s) Topical <User Schedule>  dexAMETHasone  Injectable 4 milliGRAM(s) IV Push every 6 hours  dexMEDEtomidine Infusion 0.2 MICROgram(s)/kG/Hr (5.47 mL/Hr) IV Continuous <Continuous>  furosemide   Injectable 60 milliGRAM(s) IV Push daily  heparin   Injectable 5000 Unit(s) SubCutaneous every 8 hours  lactulose Syrup 15 Gram(s) Oral two times a day  pantoprazole  Injectable 40 milliGRAM(s) IV Push daily  piperacillin/tazobactam IVPB.. 3.375 Gram(s) IV Intermittent every 8 hours  potassium chloride   Powder 40 milliEquivalent(s) Oral daily  rifAXIMin 550 milliGRAM(s) Oral two times a day  spironolactone 50 milliGRAM(s) Oral daily           upon my evaluation, this patient had a high probability of imminent or life-threatening deterioration due to critical condition, which required my direct attention, intervention, and personal management. I have personally provided 70 minutes of critical care time exclusive of time spent teaching and/or time spent on separately billable procedures. Time includes review of laboratory data, radiology results, discussion with consultants, and monitoring for potential decompensation. Interventions were performed as documented above.
74-year-old male with a history of hepatitis C, liver cirrhosis, and opioid use, currently on methadone, who presented to the ED with abdominal discomfort and dyspnea, leading to hypoxia requiring supplemental oxygen.  He  was evaluated in the ED ten days prior and was found to have a hepatic mass. CT abdomen was noted to have a large mass with ill defined borders, left portal vein thrombosis, likely tumor thrombus, moderate ascites, large paraesophageal varices and gastrorenal shunt. He developed respiratory distress and cardiac arrest, achieving return of spontaneous circulation with epinephrine. He was intubated, admitted to the ICU, treated for pneumonia and a left-sided chest tube was placed for pleural effusion. Following stabilization, he was extubated and downgraded to a step-down unit. The pneumonia is managed with piperacillin/tazobactam and oxygen therapy, with negative blood cultures thus far. CT imaging revealed a moderate loculated left pleural effusion and airspace disease. Cardiac arrest was likely secondary to hypoxia, and echocardiogram showed normal left ventricular function with an LVEF of 56%. Cirrhosis management involves holding diuretics due to renal insufficiency, continuing lactulose and rifaximin, adding Coreg, and recent paracentesis by IR on 1/15 with 600cc removed. His methadone has been adjusted to 40 mg daily due to liver concerns and QTc prolongation, coordinated with the [facility]. Eventual plan is for discharge to [facility] once stable.    SLP re-evalaution apprecaited, started on soft and bite sized diet  Lactulose enema x 1  Continue Lacutlose 20g TID  Continue SOPHIA Meyer on cipro  Auth pending for [facility]
74-year-old male with a history of hepatitis C, liver cirrhosis, and opioid use, currently on methadone, who presented to the ED with abdominal discomfort and dyspnea, leading to hypoxia requiring supplemental oxygen.  He  was evaluated in the ED ten days prior and was found to have a hepatic mass. CT abdomen was noted to have a large mass with ill defined borders, left portal vein thrombosis, likely tumor thrombus, moderate ascites, large paraesophageal varices and gastrorenal shunt. He developed respiratory distress and cardiac arrest, achieving return of spontaneous circulation with epinephrine. He was intubated, admitted to the ICU, treated for pneumonia and a left-sided chest tube was placed for pleural effusion. Following stabilization, he was extubated and downgraded to a step-down unit. The pneumonia is managed with piperacillin/tazobactam and oxygen therapy, with negative blood cultures thus far. CT imaging revealed a moderate loculated left pleural effusion and airspace disease. Cardiac arrest was likely secondary to hypoxia, and echocardiogram showed normal left ventricular function with an LVEF of 56%. Cirrhosis management involves holding diuretics due to renal insufficiency, continuing lactulose and rifaximin, adding Coreg, and recent paracentesis by IR on 1/15 with 600cc removed. His methadone has been adjusted to 40 mg daily due to liver concerns and QTc prolongation, coordinated with the [facility]. Eventual plan is for discharge to [facility] once stable.    Abdominal distention worsening. Abdominal xray with ileus versus partial small bowel obstruction. CT A/P ordered. Had BM this morning.
Patient is a 74-year-old male with a history of hepatitis C, liver cirrhosis, and opioid use, currently on methadone, who presented to the ED with abdominal discomfort and dyspnea, leading to hypoxia requiring supplemental oxygen.  He  was evaluated in the ED ten days prior and was found to have a hepatic mass. CT abdomen was noted to have a large mass with ill defined borders, left portal vein thrombosis, likely tumor thrombus, moderate ascites, large paraesophageal varices and gastrorenal shunt. He developed respiratory distress and cardiac arrest, achieving return of spontaneous circulation with epinephrine. He was intubated, admitted to the ICU, treated for pneumonia and a left-sided chest tube was placed for pleural effusion. Following stabilization, he was extubated and downgraded to a step-down unit. The pneumonia is managed with piperacillin/tazobactam and oxygen therapy, with negative blood cultures thus far. CT imaging revealed a moderate loculated left pleural effusion and airspace disease. Cardiac arrest was likely secondary to hypoxia, and echocardiogram showed normal left ventricular function with an LVEF of 56%. Cirrhosis management involves holding diuretics due to renal insufficiency, continuing lactulose and rifaximin, adding Coreg, and recent paracentesis by IR on 1/15 with 600cc removed. His methadone has been adjusted to 40 mg daily due to liver concerns and QTc prolongation, coordinated with the [facility]. Eventual plan is for discharge to [facility] once stable.    Patient seen and examined at bedside. No acute events overnight. Patient is complaining of abdominal pain. He had a recent IR paracentesis on 1/15 with 600cc drained. Left chest tube in place with monitoring of output. His oxygen supplemental has been weaned down to 3L NC. His hypernatremia is worsening, will order Albumin. Will continue to monitor renal function for hepatorenal syndrome. If any worsening, will consider Nephrology evaluation.

## 2025-01-22 NOTE — DIETITIAN NUTRITION RISK NOTIFICATION - TREATMENT: THE FOLLOWING DIET HAS BEEN RECOMMENDED
Diet, Easy to Chew:   DASH/TLC {Sodium & Cholesterol Restricted} (DASH) (01-22-25 @ 12:49) [Active]

## 2025-01-22 NOTE — CHART NOTE - NSCHARTNOTEFT_GEN_A_CORE
Source: Patient [ ]  Family [ ]   other [ x]EMR    Current Diet: easy to chew with thin liquids, Dash     Patient reports [ ] nausea  [ ] vomiting [ ] diarrhea [ ] constipation  [ ]chewing problems [ ] swallowing issues  [ ] other:     PO intake:  < 50% [ x]   50-75%  [ ]   %  [ ]  other :    Source for PO intake [ ] Patient [ ] family [x ] chart [ ] staff [ ] other    Enteral /Parenteral Nutrition:     Current Weight: 199# bedscale    % Weight Change     Pertinent Medications: MEDICATIONS  (STANDING):  albuterol/ipratropium for Nebulization 3 milliLiter(s) Nebulizer every 6 hours  atorvastatin 40 milliGRAM(s) Oral at bedtime  carvedilol 3.125 milliGRAM(s) Oral every 12 hours  chlorhexidine 2% Cloths 1 Application(s) Topical daily  folic acid 1 milliGRAM(s) Oral daily  heparin   Injectable 5000 Unit(s) SubCutaneous every 8 hours  lactulose Retention Enema 200 Gram(s) Rectal once  lactulose Syrup 20 Gram(s) Oral three times a day  lidocaine   4% Patch 1 Patch Transdermal daily  methadone    Tablet 40 milliGRAM(s) Oral daily  multivitamin 1 Tablet(s) Oral daily  pantoprazole  Injectable 40 milliGRAM(s) IV Push daily  phytonadione  IVPB 10 milliGRAM(s) IV Intermittent daily  piperacillin/tazobactam IVPB.. 3.375 Gram(s) IV Intermittent every 8 hours  rifAXIMin 550 milliGRAM(s) Oral two times a day  thiamine 100 milliGRAM(s) Oral daily    MEDICATIONS  (PRN):  acetaminophen     Tablet .. 650 milliGRAM(s) Oral every 6 hours PRN Temp greater or equal to 38C (100.4F), Mild Pain (1 - 3)  hydrALAZINE Injectable 5 milliGRAM(s) IV Push every 6 hours PRN SBP > 160  ondansetron Injectable 4 milliGRAM(s) IV Push every 6 hours PRN Nausea and/or Vomiting    Pertinent Labs: CBC Full  -  ( 22 Jan 2025 06:33 )  WBC Count : 10.17 K/uL  RBC Count : 3.97 M/uL  Hemoglobin : 11.2 g/dL  Hematocrit : 36.8 %  Platelet Count - Automated : 104 K/uL  Mean Cell Volume : 92.7 fl  Mean Cell Hemoglobin : 28.2 pg  Mean Cell Hemoglobin Concentration : 30.4 g/dL  Auto Neutrophil # : x  Auto Lymphocyte # : x  Auto Monocyte # : x  Auto Eosinophil # : x  Auto Basophil # : x  Auto Neutrophil % : x  Auto Lymphocyte % : x  Auto Monocyte % : x  Auto Eosinophil % : x  Auto Basophil % : x      01-22 Na145 mmol/L Glu 72 mg/dL K+ 4.2 mmol/L Cr  1.56 mg/dL[H] BUN 47.6 mg/dL[H] Phos n/a   Alb 2.7 g/dL[L] PAB n/a           Skin:     Nutrition focused physical exam conducted - found signs of malnutrition [ ]absent [ ]present    Subcutaneous fat loss: [ ] Orbital fat pads region, [ ]Buccal fat region, [ ]Triceps region,  [ ]Ribs region    Muscle wasting: [ ]Temples region, [ ]Clavicle region, [ ]Shoulder region, [ ]Scapula region, [ ]Interosseous region,  [ ]thigh region, [ ]Calf region    Estimated Needs:   [ x] no change since previous assessment  [ ] recalculated:     Current Nutrition Diagnosis: Pt now meets criteria for moderate acute malnutrition related to inadequate energy intake in setting of cirrhosis with abdominal distention as evidenced by meeting<75%EER>1 week, 10# (4%)weight loss x 10 days. Diet changed today to easy to chew, pt disliked minced and moist.     Recommendations:   Rec add Ensure BID to optimize po intake and provide an additional 350 kcal, 20g protein per serving   Continue thiamine, folic acid, MVI    Monitoring and Evaluation:   [ x] PO intake [x ] Tolerance to diet prescription [X] Weights  [X] Follow up per protocol [X] Labs:

## 2025-01-22 NOTE — PROGRESS NOTE ADULT - ASSESSMENT
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 GI consulted for Cirrhosis.    #HCV cirrhosis   US abdomen (01.15.25) Diffuse mild volume all- quadrant  abdominal ascites  CT A/P (01.15.25) Again is noted a shrunken nodular liver with prominence of the left lobe laterally and a subtle infiltrating lesion in the right lobe anteriorly, compatible with cirrhosis and likely infiltrating hepatoma. Again is noted thrombosis in the left portal vein.  S/p paracentesis (01.15.25) -600ml fluid   AFP: [id number]  - Trend renal function, LFTs, electrolytes, coags daily  - Increase Lactulose, titrated to 2-3 soft bowel movements per day, avoid diarrhea  - Rifaximin 550mg BID   - diuretics on hold 2/2 elevated Cr, albumin given. Would have nephro re-eval.   - Avoid NSAIDs, hepatotoxic drugs  - MVI, thiamine and folic acid. Optimize nutrition, ensure adequate protein intake, low sodium, avoid raw shellfish. Alcohol cessation counseling. CIWA protocol.   - Tylenol 2g max per day  - Vitamin K 10mg IV x 3 days   - Varices: EGD, can consider outpatient once medically optimized   - Continue abx, will need to be discharged with SBP ppx ( cipro 500 QD)  _________________________________________________________________  Assessment and recommendations are final when note is signed by the attending physician.

## 2025-01-22 NOTE — DIETITIAN NUTRITION RISK NOTIFICATION - ADDITIONAL COMMENTS/DIETITIAN RECOMMENDATIONS
ensure BID to optimize po intake and provide an additional 350 kcal, 20g protein per serving   Continue MVI, thiamine, folic acid  Diet as ordered as per SLP

## 2025-01-22 NOTE — PROGRESS NOTE ADULT - ASSESSMENT
74y Male with PMHx cirrhosis 2/2 Hep C admitted for cardiac arrest + hepatic hydrothorax in s/o potential HCC.     #AHRF, resolving   #Cardiac arrest   -s/p 2 rounds of CPR + intubation  [facility] , satting 91-92  -CT Chest showing L loculated pleural effusion s/p PTC + Umbilical hernia repair with mesh   -CT surgery placed PTC placed 1/11 initially draining 1.5L bloody fluid, s/p removal 1/17  -DuoNeb q6     #Hep C Cirrhosis with potential HCC  #SBP  -AFP 35080  -to follow with Arianna upon discharge   -Zosyn 3.375G ppx restarted   -rifaximin 550mg BID to titrate to 2-3 bowel movements per day,   -lactulose syrup 15G BID was switched to 15 G TID, lactulose enema ordered, pt hasn't had a bowel movement since sunday  -Coreg 3.125mg BID   -Protonix 40mg daily   -s/p paracentesis 1/15 with 600cc removed, currently no plan to repeat paracenthesis  -started on vitamin K 10 mg x 3 days  - INR, cmp ordered  - GI following, recs appreciated   - patient deferred for paracentesis by IR at this time, CT: small amount of ascites near liver edge  - CT imaging reviewed: Cirrhosis. Thrombosis of the left portal vein and branches of segment 4, mass in the right lobe of the liver similar in appearance( CT 01/01). Nonspecific mildly dilated loops of small bowel centrally. Nonspecific periportal adenopathy. Small bilateral effusions and compressive atelectasis.  - follow up with GI outpatient, EGD outpatient for varices management   - pt to be d/c with SBP ppx ( cipro 500 QD)      #MARY  -likely 2/2 HRS  -Serial monitoring   -BUN Cr downtrending, creatinine normalized  -Na improved, encouraged better PO intake   -sp Albumin 50cc q8  for 3 doses   -Nephrology consulted, reqs appreciated  - UA with lytes negative  - Renal US: No hydronephrosis.      #history of opioid use + alcohol abuse   -follows with [facility] ([phone])  -Started on methadone 40mg daily, decreased from 134mg baseline in s/o cirrhosis + prolonged QTc   -Multivitamin daily   -Thiamine 100mg daily   -Folic acid 1mg daily       DVT ppx: Heparin 5000 U sq q8   Status: Full code   Dispo: [facility]. To follow with Arianna following d/c. To be discharged with Cipro 500mg daily for SBP ppx.

## 2025-01-22 NOTE — PROGRESS NOTE ADULT - SUBJECTIVE AND OBJECTIVE BOX
Chief Complaint:  Patient is a 74y old  Male who presents with a chief complaint of Cardiac Arrest (21 Jan 2025 09:35)      HPI/ 24 hr events: Patient seen and examined at bedside. Pt feeling well this morning. Pt has been constipated past few days, per RN he had BM today. Tolerating diet, although not happy with consistency. Denies nausea, vomiting, abdominal pain, hematemesis, hematochezia, melena. Vitals are overall stable, no leukocytosis, Hgb, LFTs stable.       REVIEW OF SYSTEMS:   General: Negative  HEENT: Negative  CV: Negative  Respiratory: Negative  GI: See HPI  : Negative  MSK: Negative  Hematologic: Negative  Skin: Negative    MEDICATIONS:   MEDICATIONS  (STANDING):  albuterol/ipratropium for Nebulization 3 milliLiter(s) Nebulizer every 6 hours  atorvastatin 40 milliGRAM(s) Oral at bedtime  carvedilol 3.125 milliGRAM(s) Oral every 12 hours  chlorhexidine 2% Cloths 1 Application(s) Topical daily  folic acid 1 milliGRAM(s) Oral daily  heparin   Injectable 5000 Unit(s) SubCutaneous every 8 hours  lactulose Syrup 20 Gram(s) Oral three times a day  lidocaine   4% Patch 1 Patch Transdermal daily  methadone    Tablet 40 milliGRAM(s) Oral daily  multivitamin 1 Tablet(s) Oral daily  pantoprazole  Injectable 40 milliGRAM(s) IV Push daily  phytonadione  IVPB 10 milliGRAM(s) IV Intermittent daily  piperacillin/tazobactam IVPB.. 3.375 Gram(s) IV Intermittent every 8 hours  rifAXIMin 550 milliGRAM(s) Oral two times a day  thiamine 100 milliGRAM(s) Oral daily    MEDICATIONS  (PRN):  acetaminophen     Tablet .. 650 milliGRAM(s) Oral every 6 hours PRN Temp greater or equal to 38C (100.4F), Mild Pain (1 - 3)  hydrALAZINE Injectable 5 milliGRAM(s) IV Push every 6 hours PRN SBP > 160  ondansetron Injectable 4 milliGRAM(s) IV Push every 6 hours PRN Nausea and/or Vomiting      phytonadione  IVPB: [Known as aquaMEPHYTON IVPB]  10 milliGRAM(s) in dextrose 5% 50 milliLiter(s), IV Intermittent, daily, infuse over 30 Minute(s), Stop After 3 Days  Administration Instructions: This is a High Alert Medication.  Provider's Contact #: [phone] (01-20-25 @ 11:27)        DIET:  Diet, Minced and Moist:   DASH/TLC Sodium & Cholesterol Restricted (DASH)  Mildly Thick Liquids (MILDTHICKLIQS)  Supplement Feeding Modality:  Oral  Ensure Enlive Cans or Servings Per Day:  1       Frequency:  Daily (01-19-25 @ 15:27) [Active]          ALLERGIES:   Allergies    No Known Allergies    Intolerances        VITAL SIGNS:   Vital Signs Last 24 Hrs  T(C): 36.4 (22 Jan 2025 10:04), Max: 36.4 (22 Jan 2025 10:04)  T(F): 97.6 (22 Jan 2025 10:04), Max: 97.6 (22 Jan 2025 10:04)  HR: 87 (22 Jan 2025 10:04) (59 - 87)  BP: 116/62 (22 Jan 2025 10:04) (110/68 - 127/73)  BP(mean): --  RR: 18 (22 Jan 2025 10:04) (18 - 19)  SpO2: 96% (22 Jan 2025 10:04) (94% - 96%)    Parameters below as of 22 Jan 2025 05:00  Patient On (Oxygen Delivery Method): nasal cannula  O2 Flow (L/min): 4    I&O's Summary      PHYSICAL EXAM:   GENERAL:  No acute distress  HEENT:  NC/AT, conjunctiva clear, sclera anicteric  CHEST:  No increased effort  HEART:  Regular rate  ABDOMEN:  Soft, non-tender, +distended, normoactive bowel sounds, no rebound or guarding  EXTREMITIES: No edema  SKIN:  Warm, dry  NEURO:  Calm, cooperative    LABS:                        11.2   10.17 )-----------( 104      ( 22 Jan 2025 06:33 )             36.8     Hemoglobin: 11.2 g/dL (01-22-25 @ 06:33)  Hemoglobin: 11.6 g/dL (01-21-25 @ 06:56)  Hemoglobin: 11.7 g/dL (01-20-25 @ 07:54)    01-22    145  |  109[H]  |  47.6[H]  ----------------------------<  72  4.2   |  26.0  |  1.56[H]    Ca    8.8      22 Jan 2025 06:33    TPro  6.4[L]  /  Alb  2.7[L]  /  TBili  2.3[H]  /  DBili  x   /  AST  147[H]  /  ALT  58[H]  /  AlkPhos  125[H]  01-22    LIVER FUNCTIONS - ( 22 Jan 2025 06:33 )  Alb: 2.7 g/dL / Pro: 6.4 g/dL / ALK PHOS: 125 U/L / ALT: 58 U/L / AST: 147 U/L / GGT: x             PT/INR - ( 22 Jan 2025 06:33 )   PT: 16.1 sec;   INR: 1.43 ratio         PTT - ( 22 Jan 2025 06:33 )  PTT:34.3 sec            RADIOLOGY & ADDITIONAL STUDIES:      ACC: [id number] EXAM:  CT ABDOMEN AND PELVIS IC   ORDERED BY: LITTLE PIMENTEL     PROCEDURE DATE:  01/19/2025          INTERPRETATION:  CLINICAL INFORMATION: Rule out obstruction. Acute   respiratory failure.    COMPARISON: 11/20/2025 CT abdomen pelvis    CONTRAST/COMPLICATIONS:  IV Contrast: Omnipaque 350  90 cc administered   10 cc discarded  Oral Contrast: NONE  .    PROCEDURE:  CT of the Abdomen and Pelvis was performed.  Sagittal and coronal reformats were performed.    FINDINGS:  LOWER CHEST: There is a pericardial lymph node measuring 0.8 x 1.2 cm,   stable. Heart normal size. No significant pericardial effusion. Small   bilateral effusions with compressive bibasilar atelectasis. Nonspecific   groundglass opacity in the lingula. The effusion is smaller on the left,   and stable on the right.    LIVER: Liver is small and nodular contour likely representing cirrhosis.   There is a ill-defined low-density lesion in segment 5, similar to the   study from 1/1/2025. There are residual tubular hypodensities in segment   4A/4B likely representing residual portal vein thrombus. The main portal   vein is patent. The left portal vein is remains occluded.  BILE DUCTS: Normal caliber.  GALLBLADDER: Is not significantly distended. Contains high-density   material likely vicarious excretion of contrast  SPLEEN: Enlarged measuring 14 cm.  PANCREAS: Within normal limits.  ADRENALS: Within normal limits.  KIDNEYS/URETERS: Within normal limits.    BLADDER: Within normal limits.  REPRODUCTIVE ORGANS: Prostate within normal limits.    BOWEL: Mildly dilated loops are seen in the small bowel centrally with   collapsed loops of bowel distally however this is likely not represent   obstruction. There is no bowel wall thickening. The bowel enhances  symmetrically. Moderate amount of stool seen throughout the colon.  PERITONEUM/RETROPERITONEUM: Small amount of ascites. No free air. No   abscess.  VESSELS: Multiple varices are seen in the gastrohepatic ligament, and   lower esophageal region. There is a splenorenal shunt on the left.  LYMPH NODES: Jenni hepatis node measures 1.6 x 2.6 cm, and 1.8 x 2.5 cm,    similar to prior.  ABDOMINAL WALL: Within normal limits.  BONES: Within normal limits.    IMPRESSION:    Cirrhosis.    Thrombosis of the left portal vein and branches of segment 4.    The ill-defined mass in the right lobe of the liver similar in appearance   to the CT from 1/1/2025    Nonspecific mildly dilated loops of small bowel centrally, without clear   evidence of obstruction.    Small amount of ascites.    Nonspecific periportal adenopathy in the setting of cirrhosis.    Small bilateral effusions and compressive atelectasis.    --- End of Report ---            BENJI NO MD; Attending Interventional Radiologist  This document has been electronically signed. Jan 19 2025  1:35PM  01-19-25 @ 12:05

## 2025-01-22 NOTE — PROVIDER CONTACT NOTE (CHANGE IN STATUS NOTIFICATION) - SITUATION
Attempted to give to afternoon meds, pt refusing medications, upset and requesting to leave. Pt removed oxygen, ID bands, and IV.

## 2025-01-23 ENCOUNTER — TRANSCRIPTION ENCOUNTER (OUTPATIENT)
Age: 75
End: 2025-01-23

## 2025-01-23 DIAGNOSIS — K74.60 UNSPECIFIED CIRRHOSIS OF LIVER: ICD-10-CM

## 2025-01-23 LAB
ANION GAP SERPL CALC-SCNC: 11 MMOL/L — SIGNIFICANT CHANGE UP (ref 5–17)
BUN SERPL-MCNC: 42.8 MG/DL — HIGH (ref 8–20)
CALCIUM SERPL-MCNC: 9.2 MG/DL — SIGNIFICANT CHANGE UP (ref 8.4–10.5)
CHLORIDE SERPL-SCNC: 107 MMOL/L — SIGNIFICANT CHANGE UP (ref 96–108)
CO2 SERPL-SCNC: 27 MMOL/L — SIGNIFICANT CHANGE UP (ref 22–29)
CREAT SERPL-MCNC: 1.49 MG/DL — HIGH (ref 0.5–1.3)
EGFR: 49 ML/MIN/1.73M2 — LOW
GLUCOSE SERPL-MCNC: 88 MG/DL — SIGNIFICANT CHANGE UP (ref 70–99)
HCT VFR BLD CALC: 37.4 % — LOW (ref 39–50)
HGB BLD-MCNC: 11.6 G/DL — LOW (ref 13–17)
MAGNESIUM SERPL-MCNC: 2.4 MG/DL — SIGNIFICANT CHANGE UP (ref 1.6–2.6)
MCHC RBC-ENTMCNC: 28.4 PG — SIGNIFICANT CHANGE UP (ref 27–34)
MCHC RBC-ENTMCNC: 31 G/DL — LOW (ref 32–36)
MCV RBC AUTO: 91.7 FL — SIGNIFICANT CHANGE UP (ref 80–100)
PLATELET # BLD AUTO: 101 K/UL — LOW (ref 150–400)
POTASSIUM SERPL-MCNC: 3.7 MMOL/L — SIGNIFICANT CHANGE UP (ref 3.5–5.3)
POTASSIUM SERPL-SCNC: 3.7 MMOL/L — SIGNIFICANT CHANGE UP (ref 3.5–5.3)
RBC # BLD: 4.08 M/UL — LOW (ref 4.2–5.8)
RBC # FLD: 21 % — HIGH (ref 10.3–14.5)
SODIUM SERPL-SCNC: 145 MMOL/L — SIGNIFICANT CHANGE UP (ref 135–145)
WBC # BLD: 10.21 K/UL — SIGNIFICANT CHANGE UP (ref 3.8–10.5)
WBC # FLD AUTO: 10.21 K/UL — SIGNIFICANT CHANGE UP (ref 3.8–10.5)

## 2025-01-23 PROCEDURE — 99232 SBSQ HOSP IP/OBS MODERATE 35: CPT

## 2025-01-23 PROCEDURE — 99231 SBSQ HOSP IP/OBS SF/LOW 25: CPT

## 2025-01-23 RX ORDER — RIFAXIMIN 200 MG
1 TABLET ORAL
Qty: 0 | Refills: 0 | DISCHARGE
Start: 2025-01-23

## 2025-01-23 RX ORDER — FOLIC ACID 1 MG
1 TABLET ORAL
Qty: 0 | Refills: 0 | DISCHARGE
Start: 2025-01-23

## 2025-01-23 RX ORDER — MECOBAL/LEVOMEFOLAT CA/B6 PHOS 2-3-35 MG
1 TABLET ORAL
Qty: 0 | Refills: 0 | DISCHARGE
Start: 2025-01-23

## 2025-01-23 RX ORDER — CARVEDILOL 6.25 MG
1 TABLET ORAL
Qty: 0 | Refills: 0 | DISCHARGE
Start: 2025-01-23

## 2025-01-23 RX ORDER — CIPROFLOXACIN HCL 500 MG
1 TABLET ORAL
Qty: 30 | Refills: 0
Start: 2025-01-23 | End: 2025-02-21

## 2025-01-23 RX ORDER — ATORVASTATIN CALCIUM 80 MG/1
1 TABLET, FILM COATED ORAL
Qty: 0 | Refills: 0 | DISCHARGE
Start: 2025-01-23

## 2025-01-23 RX ORDER — THIAMINE HCL 100 MG
1 TABLET ORAL
Qty: 0 | Refills: 0 | DISCHARGE
Start: 2025-01-23

## 2025-01-23 RX ORDER — METHADONE HYDROCHLORIDE 5 MG/5ML
4 SOLUTION ORAL
Qty: 0 | Refills: 0 | DISCHARGE
Start: 2025-01-23

## 2025-01-23 RX ORDER — PANTOPRAZOLE 20 MG/1
1 TABLET, DELAYED RELEASE ORAL
Qty: 30 | Refills: 0
Start: 2025-01-23 | End: 2025-02-21

## 2025-01-23 RX ORDER — LACTULOSE 10 G/15 ML
30 SOLUTION, ORAL ORAL
Qty: 0 | Refills: 0 | DISCHARGE
Start: 2025-01-23

## 2025-01-23 RX ADMIN — ACETAMINOPHEN 650 MILLIGRAM(S): 160 SUSPENSION ORAL at 09:48

## 2025-01-23 RX ADMIN — Medication 5000 UNIT(S): at 22:03

## 2025-01-23 RX ADMIN — PIPERACILLIN SODIUM AND TAZOBACTAM SODIUM 25 GRAM(S): 2; 250 INJECTION, POWDER, FOR SOLUTION INTRAVENOUS at 06:07

## 2025-01-23 RX ADMIN — ONDANSETRON 4 MILLIGRAM(S): 4 TABLET, ORALLY DISINTEGRATING ORAL at 09:50

## 2025-01-23 RX ADMIN — Medication 3.12 MILLIGRAM(S): at 22:03

## 2025-01-23 RX ADMIN — Medication 20 GRAM(S): at 06:07

## 2025-01-23 RX ADMIN — ANTISEPTIC SURGICAL SCRUB 1 APPLICATION(S): 0.04 SOLUTION TOPICAL at 12:04

## 2025-01-23 RX ADMIN — IPRATROPIUM BROMIDE AND ALBUTEROL SULFATE 3 MILLILITER(S): .5; 2.5 SOLUTION RESPIRATORY (INHALATION) at 20:58

## 2025-01-23 RX ADMIN — Medication 1 TABLET(S): at 12:03

## 2025-01-23 RX ADMIN — Medication 3.12 MILLIGRAM(S): at 09:47

## 2025-01-23 RX ADMIN — Medication 100 MILLIGRAM(S): at 12:03

## 2025-01-23 RX ADMIN — IPRATROPIUM BROMIDE AND ALBUTEROL SULFATE 3 MILLILITER(S): .5; 2.5 SOLUTION RESPIRATORY (INHALATION) at 09:01

## 2025-01-23 RX ADMIN — Medication 5000 UNIT(S): at 12:03

## 2025-01-23 RX ADMIN — Medication 1 MILLIGRAM(S): at 12:03

## 2025-01-23 RX ADMIN — Medication 5000 UNIT(S): at 06:07

## 2025-01-23 RX ADMIN — ATORVASTATIN CALCIUM 40 MILLIGRAM(S): 80 TABLET, FILM COATED ORAL at 22:03

## 2025-01-23 RX ADMIN — PIPERACILLIN SODIUM AND TAZOBACTAM SODIUM 25 GRAM(S): 2; 250 INJECTION, POWDER, FOR SOLUTION INTRAVENOUS at 22:03

## 2025-01-23 RX ADMIN — METHADONE HYDROCHLORIDE 40 MILLIGRAM(S): 5 SOLUTION ORAL at 12:03

## 2025-01-23 RX ADMIN — IPRATROPIUM BROMIDE AND ALBUTEROL SULFATE 3 MILLILITER(S): .5; 2.5 SOLUTION RESPIRATORY (INHALATION) at 15:26

## 2025-01-23 RX ADMIN — PANTOPRAZOLE 40 MILLIGRAM(S): 20 TABLET, DELAYED RELEASE ORAL at 12:03

## 2025-01-23 RX ADMIN — ACETAMINOPHEN 650 MILLIGRAM(S): 160 SUSPENSION ORAL at 10:48

## 2025-01-23 NOTE — PROGRESS NOTE ADULT - PROBLEM SELECTOR PLAN 1
maintain L pigtail to water seal  Daily CXR  Record output every shift  C/W lasix  To complete course of Zosyn (last dose 1/18)  Pleural fluid CX with no growth  Cyto pending  Will follow    D/W Dr. Gatica
maintain R pigtail to water seal  Daily CXR  Record output every shift  C/W lasix  To complete course of Zosyn (last dose 1/18)  Pleural fluid CX with no growth  Cyto pending  Will follow  D/W Dr. Gatica
maintain L pigtail to water seal  Daily CXR  Record output every shift  Pleural fluid CX with no growth  path pending  Will follow    D/W Dr. Gatica
Cultures NGTD, cyto pending  Chest tube removed today for minimal output  Post pull CXR ordered  If CXR stable thoracic to sign off  Please reconsult as needed  Maintain dressing for 24H and follow up cytology   Perform paracentesis for ascites prn  D/W Dr. Gatica
Hcc,  cirrhosis, ascites, SBP   patient had 1 week of zosyn. Now on no antibiotic   Pt is more lethargic today - now with increased WBC 12.3, INR increasing to 1.7  MARY - creatinine stable at 1.2  Blood cultures and paracentesis cultures negative from admisison  Would repeat paracentesis and restart antibiotics  would give vitamin K 10 mg qd X 3 days. Check INR qd  cmp ordered , INR ordered   will increase lactulose to tid   CT - 1/19- nodular liver, moderate ascites   spoke to Dr Feng
cirrhosis, HCC  Hepatic encephaloapthy- now better. Yesterday refused last dose of lactose. Had constipation, but now BM today   continue lactulose  TID and xifaxan   MARY - renal function now worse, please reconsult nephrology   SBP - wbc now 10.1. U/S- as per IR , not enough fluid to tap . On Zosyn . Will change to Cipro 500 mg qd for SBP prophylactically indefinitely .   bilirubin 2.3 , cmp , INR ordered for tomorrow
Patent with HCC, HCV, ascites- portal vein thrombosis   + SBP- on Zosyn  MARY- creatinine  1.95 went to 2. Please call renal consult   diuretics on hold
cirrhosis HCC, ascites  SBP - improved. Pt to continue Cipro 500 mg indefinitely  for SBP prophylaxis   continue lactulose, xifaxan  F/U with Dr Burgess in  3-4 weeks - hepatology clinic
HCC, cirrhosis  NO paracentesis-  not much fluid to tap  WBC better at 10.5  lactulose was increased yesterday- pt more alert   creatinine increased today  to 1.4. Nephrology signed off yesterday. CMP ordered for tomorrow   Pt is back on IV antibiotic- will eventually  need to switch to cipro 500 qd indefinitely   will need F/U with hepatology

## 2025-01-23 NOTE — DISCHARGE NOTE NURSING/CASE MANAGEMENT/SOCIAL WORK - FINANCIAL ASSISTANCE
[facility] provides services at a reduced cost to those who are determined to be eligible through [facility]’s financial assistance program. Information regarding [facility]’s financial assistance program can be found by going to [email or web address] or by calling [phone].

## 2025-01-23 NOTE — PROGRESS NOTE ADULT - PROVIDER SPECIALTY LIST ADULT
Gastroenterology
Internal Medicine
Internal Medicine
MICU
Nephrology
Thoracic Surgery
Gastroenterology
Hospitalist
Internal Medicine
MICU
Thoracic Surgery
Thoracic Surgery
Gastroenterology
Gastroenterology
Hospitalist
Internal Medicine
Internal Medicine
MICU
Nephrology
Thoracic Surgery
Gastroenterology
Hospitalist
Internal Medicine
Gastroenterology
Hospitalist

## 2025-01-23 NOTE — DISCHARGE NOTE PROVIDER - DETAILS OF MALNUTRITION DIAGNOSIS/DIAGNOSES
This patient has been assessed with a concern for Malnutrition and was treated during this hospitalization for the following Nutrition diagnosis/diagnoses:     -  01/22/2025: Moderate protein-calorie malnutrition

## 2025-01-23 NOTE — PROGRESS NOTE ADULT - NS ATTEND AMEND GEN_ALL_CORE FT
Patient feeling well today s/p paracentesis. He denies N/V/D/C and is tolerating a diet. Exam with continued abdominal distention with TTP throughout. Labs with worsening MARY- continue to hold diuretics and would start albumin x 72 hours for ?HRS v MARY. Please get urine electrolytes ( Specifically Sodium for HRS evaluation as well). Paracentesis from yesterday without cell count. On review of chart, note prior paracentesis results with cell count- these results with concern for HRS.  Patient has been on zosyn, would continue antibiotics for 7 days to ensure appropriate treatement. Please send ascitic fluid for cultures. Would discharge with SBP ppx ( cipro 500QD). Continue lactulose/ rifaximin. Paracentesis as able to ensure full removal of ascitic fluid.
HCC, cirrhosis  NO paracentesis-  not much fluid to tap  WBC better at 10.5  lactulose was increased yesterday- pt more alert   creatinine increased today  to 1.4. Nephrology signed off yesterday. CMP ordered for tomorrow   Pt is back on IV antibiotic- will eventually  need to switch to cipro 500 qd indefinitely   will need F/U with hepatology     A - + BS, softly distended
cirrhosis, HCC  Hepatic encephaloapthy- now better. Yesterday refused last dose of lactose. Had constipation, but now BM today   continue lactulose  TID and xifaxan   MARY - renal function now worse, please reconsult nephrology   SBP - wbc now 10.1. U/S- as per IR , not enough fluid to tap . On Zosyn . Will change to Cipro 500 mg qd for SBP prophylactically indefinitely .   bilirubin 2.3 , cmp , INR ordered for tomorrow       A- +BS, soft non tender
NO complaints of abdominal  pain     A= softly distended       cirrhosis HCC, ascites  SBP - improved. Pt to continue Cipro 500 mg indefinitely  for SBP prophylaxis   continue lactulose, xifaxan  F/U with Dr Burgess in  3-4 weeks - hepatology clinic
No complaints of abdominal pain. No fever. Tolerating solid diet     Abdomen- softly distended            Patent with HCC, HCV, ascites- portal vein thrombosis   + SBP- on Zosyn  MARY- creatinine  1.95 went to 2. Please call renal consult   diuretics on hold

## 2025-01-23 NOTE — DISCHARGE NOTE PROVIDER - CARE PROVIDERS DIRECT ADDRESSES
[email or web address]

## 2025-01-23 NOTE — PROGRESS NOTE ADULT - ASSESSMENT
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 GI consulted for Cirrhosis.    #HCV cirrhosis   US abdomen (01.15.25) Diffuse mild volume all- quadrant  abdominal ascites  CT A/P (01.15.25) Again is noted a shrunken nodular liver with prominence of the left lobe laterally and a subtle infiltrating lesion in the right lobe anteriorly, compatible with cirrhosis and likely infiltrating hepatoma. Again is noted thrombosis in the left portal vein.  S/p paracentesis (01.15.25) -600ml fluid   AFP: [id number]  - Trend renal function, LFTs, electrolytes, coags daily  - Increase Lactulose, titrated to 2-3 soft bowel movements per day, avoid diarrhea  - Rifaximin 550mg BID   - diuretics on hold 2/2 elevated Cr, albumin given. Would have nephro re-eval.   - Avoid NSAIDs, hepatotoxic drugs  - MVI, thiamine and folic acid. Optimize nutrition, ensure adequate protein intake, low sodium, avoid raw shellfish. Alcohol cessation counseling. CIWA protocol.   - Tylenol 2g max per day  - Vitamin K 10mg IV x 3 days   - Varices: EGD, can consider outpatient once medically optimized   - Continue abx, will need to be discharged with SBP ppx ( cipro 500 QD)  _________________________________________________________________  Assessment and recommendations are final when note is signed by the attending physician.

## 2025-01-23 NOTE — PROGRESS NOTE ADULT - REASON FOR ADMISSION
Cardiac Arrest

## 2025-01-23 NOTE — PROGRESS NOTE ADULT - SUBJECTIVE AND OBJECTIVE BOX
Patient is a 74y old  Male who presents with a chief complaint of Cardiac Arrest (22 Jan 2025 12:40)      BRIEF HOSPITAL COURSE:  74-year-old male patient with a history of cirrhosis secondary to hepatitis C, daily smoker presents the ED complaining of abdominal pain, abdominal distention, difficulty breathing.  Seen in the ED 10 days ago secondary to abdominal pain, abdominal distention, melena. 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  Patient was discharged home, states that he was feeling somewhat better until a few days ago when he began to experience worsening abdominal pain, abdominal ascension.  Patient reports increased shortness of breath and dyspnea as well over the past few days.  Denies any prior history of COPD but is a daily smoker.  Patient noted to be hypoxic to 88% on room air in the ED, placed on 4 L nasal cannula with improvement to 94%. On reassessment this patient who appeared to be in respiratory distress, stated that he was having trouble breathing.  Supplemental oxygen was over his chin and he was slumped in the bed.  Patient moved to critical care area immediately afterwards.  Patient found to be in cardiac arrest, ACLS initiated, 1 mg of epinephrine given.  Patient subsequently intubated and then achieve return of spontaneous circulation.  Postintubation x-ray confirmed tube placement.  Patient sedated with propofol as well as given fentanyl on postintubation state.  Prior to initiation of propofol, patient was biting at the tube and attempting to pull away the tube.  Will plan to obtain CT scan imaging and admit to ICU.      INTERVAL HPI/OVERNIGHT EVENTS: no overnight events     TODAY: pt was examined at the bedside. Yesterday in the afternoon, pt was refusing medication, pulled out his telemetry monitoring, wanted to leave, was aggressive with staff, was unable to be re-oriented and refused to come back to the room. Security was called and patient needed 2 doses of 5 mg IM Zyprexa to get back into the bed.   Pt denies nausea, vomit, diarrhea, reports not liking the food, does not have his dentures with him. Last bowel movement yesterday morning,  no issues with urination. Ambulates.       MEDICATIONS  (STANDING):  albuterol/ipratropium for Nebulization 3 milliLiter(s) Nebulizer every 6 hours  atorvastatin 40 milliGRAM(s) Oral at bedtime  carvedilol 3.125 milliGRAM(s) Oral every 12 hours  chlorhexidine 2% Cloths 1 Application(s) Topical daily  folic acid 1 milliGRAM(s) Oral daily  heparin   Injectable 5000 Unit(s) SubCutaneous every 8 hours  lactulose Syrup 20 Gram(s) Oral three times a day  lidocaine   4% Patch 1 Patch Transdermal daily  methadone    Tablet 40 milliGRAM(s) Oral daily  multivitamin 1 Tablet(s) Oral daily  pantoprazole  Injectable 40 milliGRAM(s) IV Push daily  piperacillin/tazobactam IVPB.. 3.375 Gram(s) IV Intermittent every 8 hours  rifAXIMin 550 milliGRAM(s) Oral two times a day  thiamine 100 milliGRAM(s) Oral daily    MEDICATIONS  (PRN):  acetaminophen     Tablet .. 650 milliGRAM(s) Oral every 6 hours PRN Temp greater or equal to 38C (100.4F), Mild Pain (1 - 3)  hydrALAZINE Injectable 5 milliGRAM(s) IV Push every 6 hours PRN SBP > 160  ondansetron Injectable 4 milliGRAM(s) IV Push every 6 hours PRN Nausea and/or Vomiting      Allergies    No Known Allergies    Intolerances        REVIEW OF SYSTEMS:    CONSTITUTIONAL:  No weakness, fevers or chills  EYES/ENT:  No visual changes;  No vertigo or throat pain   NECK:  No pain or stiffness  RESPIRATORY:  No cough, wheezing, hemoptysis; No shortness of breath  CARDIOVASCULAR:  No chest pain or palpitations  GASTROINTESTINAL:  No abdominal or epigastric pain. No nausea, vomiting, or hematemesis; No diarrhea or constipation. No melena or hematochezia.  GENITOURINARY:  No dysuria, frequency or hematuria  NEUROLOGICAL:  No numbness or weakness  SKIN:  No itching, rashes        Vital Signs Last 24 Hrs  T(C): 36.4 (23 Jan 2025 04:50), Max: 36.6 (22 Jan 2025 17:15)  T(F): 97.6 (23 Jan 2025 04:50), Max: 97.8 (22 Jan 2025 17:15)  HR: 62 (23 Jan 2025 04:50) (55 - 87)  BP: 100/62 (23 Jan 2025 04:50) (100/62 - 117/77)  BP(mean): --  RR: 18 (23 Jan 2025 04:50) (16 - 18)  SpO2: 94% (23 Jan 2025 04:50) (91% - 97%)    Parameters below as of 23 Jan 2025 04:50  Patient On (Oxygen Delivery Method): nasal cannula  O2 Flow (L/min): 2      PHYSICAL EXAM:    GENERAL: NAD, sitting in the chair at the bedside  HEAD:  Atraumatic, normocephalic  EYES: EOMI, PERRLA, conjunctiva and sclera clear  ENT: Moist mucous membranes  NECK: Supple, no JVD  HEART: Regular rate and rhythm, no murmurs, rubs, or gallops  LUNGS: Unlabored respirations.  Clear to auscultation bilaterally, no crackles, wheezing, or rhonchi  ABDOMEN: Soft, nontender, protuberant +BS  EXTREMITIES: 2+ peripheral pulses bilaterally. No clubbing, cyanosis, or edema  NERVOUS SYSTEM:  A&Ox3, no focal deficits   SKIN: No rashes or lesions    LABS:                        11.6   10.21 )-----------( 101      ( 23 Jan 2025 04:40 )             37.4     01-23    145  |  107  |  42.8[H]  ----------------------------<  88  3.7   |  27.0  |  1.49[H]    Ca    9.2      23 Jan 2025 04:40  Mg     2.4     01-23    TPro  6.4[L]  /  Alb  2.7[L]  /  TBili  2.3[H]  /  DBili  x   /  AST  147[H]  /  ALT  58[H]  /  AlkPhos  125[H]  01-22    PT/INR - ( 22 Jan 2025 06:33 )   PT: 16.1 sec;   INR: 1.43 ratio         PTT - ( 22 Jan 2025 06:33 )  PTT:34.3 sec  Urinalysis Basic - ( 23 Jan 2025 04:40 )    Color: x / Appearance: x / SG: x / pH: x  Gluc: 88 mg/dL / Ketone: x  / Bili: x / Urobili: x   Blood: x / Protein: x / Nitrite: x   Leuk Esterase: x / RBC: x / WBC x   Sq Epi: x / Non Sq Epi: x / Bacteria: x      CAPILLARY BLOOD GLUCOSE          RADIOLOGY & ADDITIONAL TESTS:    Imaging Personally Reviewed:  [X] YES  [ ] NO    Consultant(s) Notes Reviewed:  [X] YES  [ ] NO    Care Discussed with Consultants/Other Providers [X] YES  [ ] NO    Plan of Care discussed with House Staff: [X]YES [ ] NO

## 2025-01-23 NOTE — DISCHARGE NOTE PROVIDER - PROVIDER TOKENS
PROVIDER:[TOKEN:[[id number]],FOLLOWUP:[2 weeks]],PROVIDER:[TOKEN:[[id number]],FOLLOWUP:[1 week]]

## 2025-01-23 NOTE — PROGRESS NOTE ADULT - SUBJECTIVE AND OBJECTIVE BOX
Chief Complaint:  Patient is a 74y old  Male who presents with a chief complaint of Cardiac Arrest (23 Jan 2025 11:14)      HPI/ 24 hr events: Patient seen and examined at bedside. Pt refused lactulose yesterday had on ly 1 BM. Does not like the food, asking to leave. Denies nausea, vomiting, diarrhea, abdominal pain, hematemesis, hematochezia, melena. Vitals are overall stable, no leukocytosis, Hgb, LFTs stable.       REVIEW OF SYSTEMS:   General: Negative  HEENT: Negative  CV: Negative  Respiratory: Negative  GI: See HPI  : Negative  MSK: Negative  Hematologic: Negative  Skin: Negative    MEDICATIONS:   MEDICATIONS  (STANDING):  albuterol/ipratropium for Nebulization 3 milliLiter(s) Nebulizer every 6 hours  atorvastatin 40 milliGRAM(s) Oral at bedtime  carvedilol 3.125 milliGRAM(s) Oral every 12 hours  chlorhexidine 2% Cloths 1 Application(s) Topical daily  folic acid 1 milliGRAM(s) Oral daily  heparin   Injectable 5000 Unit(s) SubCutaneous every 8 hours  lactulose Syrup 20 Gram(s) Oral three times a day  lidocaine   4% Patch 1 Patch Transdermal daily  multivitamin 1 Tablet(s) Oral daily  pantoprazole  Injectable 40 milliGRAM(s) IV Push daily  piperacillin/tazobactam IVPB.. 3.375 Gram(s) IV Intermittent every 8 hours  rifAXIMin 550 milliGRAM(s) Oral two times a day  thiamine 100 milliGRAM(s) Oral daily    MEDICATIONS  (PRN):  acetaminophen     Tablet .. 650 milliGRAM(s) Oral every 6 hours PRN Temp greater or equal to 38C (100.4F), Mild Pain (1 - 3)  hydrALAZINE Injectable 5 milliGRAM(s) IV Push every 6 hours PRN SBP > 160  ondansetron Injectable 4 milliGRAM(s) IV Push every 6 hours PRN Nausea and/or Vomiting      phytonadione  IVPB: [Known as aquaMEPHYTON IVPB]  10 milliGRAM(s) in dextrose 5% 50 milliLiter(s), IV Intermittent, daily, infuse over 30 Minute(s), Stop After 3 Days  Administration Instructions: This is a High Alert Medication.  Provider's Contact #: [phone] (01-20-25 @ 11:27)        DIET:  Diet, Easy to Chew:   DASH/TLC Sodium & Cholesterol Restricted (DASH) (01-22-25 @ 12:49) [Active]          ALLERGIES:   Allergies    No Known Allergies    Intolerances        VITAL SIGNS:   Vital Signs Last 24 Hrs  T(C): 36.3 (23 Jan 2025 09:03), Max: 36.6 (22 Jan 2025 17:15)  T(F): 97.4 (23 Jan 2025 09:03), Max: 97.8 (22 Jan 2025 17:15)  HR: 60 (23 Jan 2025 09:03) (55 - 63)  BP: 112/65 (23 Jan 2025 09:03) (100/62 - 117/77)  BP(mean): --  RR: 18 (23 Jan 2025 09:03) (16 - 18)  SpO2: 94% (23 Jan 2025 09:03) (91% - 97%)    Parameters below as of 23 Jan 2025 09:03  Patient On (Oxygen Delivery Method): nasal cannula  O2 Flow (L/min): 2    I&O's Summary      PHYSICAL EXAM:   GENERAL:  No acute distress  HEENT:  NC/AT, conjunctiva clear, sclera anicteric  CHEST:  No increased effort  HEART:  Regular rate  ABDOMEN:  Soft, non-tender, +distended, normoactive bowel sounds, no rebound or guarding  EXTREMITIES: No edema  SKIN:  Warm, dry  NEURO:  Calm, cooperative    LABS:                        11.6   10.21 )-----------( 101      ( 23 Jan 2025 04:40 )             37.4     Hemoglobin: 11.6 g/dL (01-23-25 @ 04:40)  Hemoglobin: 11.2 g/dL (01-22-25 @ 06:33)  Hemoglobin: 11.6 g/dL (01-21-25 @ 06:56)    01-23    145  |  107  |  42.8[H]  ----------------------------<  88  3.7   |  27.0  |  1.49[H]    Ca    9.2      23 Jan 2025 04:40  Mg     2.4     01-23    TPro  6.4[L]  /  Alb  2.7[L]  /  TBili  2.3[H]  /  DBili  x   /  AST  147[H]  /  ALT  58[H]  /  AlkPhos  125[H]  01-22    LIVER FUNCTIONS - ( 22 Jan 2025 06:33 )  Alb: 2.7 g/dL / Pro: 6.4 g/dL / ALK PHOS: 125 U/L / ALT: 58 U/L / AST: 147 U/L / GGT: x             PT/INR - ( 22 Jan 2025 06:33 )   PT: 16.1 sec;   INR: 1.43 ratio         PTT - ( 22 Jan 2025 06:33 )  PTT:34.3 sec                                        RADIOLOGY & ADDITIONAL STUDIES:

## 2025-01-23 NOTE — PROGRESS NOTE ADULT - PROBLEM SELECTOR PROBLEM 1
Pleural effusion
Pleural effusion
SBP (spontaneous bacterial peritonitis)
Pleural effusion
Pleural effusion
Hepatic cirrhosis
SBP (spontaneous bacterial peritonitis)
09-Jul-2020 14:19

## 2025-01-23 NOTE — PROGRESS NOTE ADULT - NUTRITIONAL ASSESSMENT
This patient has been assessed with a concern for Malnutrition and has been determined to have a diagnosis/diagnoses of Moderate protein-calorie malnutrition.    This patient is being managed with:   Diet Easy to Chew-  DASH/TLC {Sodium & Cholesterol Restricted} (DASH)  Entered: Jan 22 2025 12:49PM

## 2025-01-23 NOTE — DISCHARGE NOTE PROVIDER - NSDCCPCAREPLAN_GEN_ALL_CORE_FT
PRINCIPAL DISCHARGE DIAGNOSIS  Diagnosis: Spontaneous bacterial peritonitis  Assessment and Plan of Treatment: -CT abdomen was noted to have a large mass with ill defined borders, left portal vein thrombosis, likely tumor thrombus, moderate ascites, large paraesophageal varices and gastrorenal shunt  - s/p paracentesis  - Follow up with GI as an outpatient  - Please continue Cipro as prescribed

## 2025-01-23 NOTE — DISCHARGE NOTE PROVIDER - CARE PROVIDER_API CALL
Return to the emergency department if any redness, pus, fevers or other complaints or concerns. It is a low risk of getting infected. The wound does not need stitches at this moment. Yessenia Reza  Gastroenterology  [address], [address]  [place] [address]  Phone: [phone]  Fax: [phone]  Follow Up Time: 2 weeks    Shannon Shah  Nephrology  [address], [place] [address]  Phone: [phone]  Fax: [phone]  Follow Up Time: 1 week

## 2025-01-23 NOTE — DISCHARGE NOTE PROVIDER - NSDCMRMEDTOKEN_GEN_ALL_CORE_FT
atorvastatin 40 mg oral tablet: 1 tab(s) orally once a day (at bedtime)  carvedilol 3.125 mg oral tablet: 1 tab(s) orally every 12 hours  Cipro 500 mg oral tablet: 1 tab(s) orally once a day  folic acid 1 mg oral tablet: 1 tab(s) orally once a day  lactulose 10 g/15 mL oral syrup: 30 milliliter(s) orally 3 times a day  methadone 10 mg oral tablet: 4 tab(s) orally once a day  Multiple Vitamins oral tablet: 1 tab(s) orally once a day  Protonix 40 mg oral delayed release tablet: 1 tab(s) orally once a day  rifAXIMin 550 mg oral tablet: 1 tab(s) orally 2 times a day  thiamine 100 mg oral tablet: 1 tab(s) orally once a day

## 2025-01-23 NOTE — DISCHARGE NOTE NURSING/CASE MANAGEMENT/SOCIAL WORK - PATIENT PORTAL LINK FT
You can access the FollowMyHealth Patient Portal offered by [facility] by registering at the following website: [email or web address]. By joining [employer]’s FollowMyHealth portal, you will also be able to view your health information using other applications (apps) compatible with our system.

## 2025-01-23 NOTE — PROGRESS NOTE ADULT - NS ATTEST RISK PROBLEM GEN_ALL_CORE FT
HCC, cirrhosis  NO paracentesis-  not much fluid to tap  WBC better at 10.5  lactulose was increased yesterday- pt more alert   creatinine increased today  to 1.4. Nephrology signed off yesterday. CMP ordered for tomorrow   Pt is back on IV antibiotic- will eventually  need to switch to cipro 500 qd indefinitely   will need F/U with hepatology
Liver Cirrhosis with Ascites.  Left Pleural Effusion.
Left Pleural Effusion.
No complaints of abdominal pain. No fever. Tolerating solid diet     Abdomen- softly distended         A/P   Patent with HCC, HCV, ascites- portal vein thrombosis   + SBP- on Zosyn  MARY- creatinine  1.95 went to 2. Please call renal consult   diuretics on hold
patient had 1 week of zosyn. Now on no antibiotic   Pt is more lethargic today - now with increased WBC 12.3, INR increasing to 1.7  MARY - creatinine stable at 1.2  Blood cultures and paracentesis cultures negative from admisison  Would repeat paracentesis and restart antibiotics  would give vitamin K 10 mg qd X 3 days. Check INR qd  cmp ordered , INR ordered   will increase lactulose to tid   CT - 1/19- nodular liver, moderate ascites   spoke to Dr Feng
cirrhosis HCC, ascites  SBP - improved. Pt to continue Cipro 500 mg indefinitely  for SBP prophylaxis   continue lactulose, xifaxan  F/U with Dr Burgess in  3-4 weeks - hepatology clinic
Cirrhosis, HCC  Hepatic encephaloapthy- now better. Yesterday refused last dose of lactose. Had constipation, but now BM today   continue lactulose  TID and xifaxan   MARY - renal function now worse, please reconsult nephrology   SBP - wbc now 10.1. U/S- as per IR , not enough fluid to tap . On Zosyn . Will change to Cipro 500 mg qd for SBP prophylactically indefinitely .   bilirubin 2.3 , cmp , INR ordered for tomorrow

## 2025-01-23 NOTE — DISCHARGE NOTE PROVIDER - HOSPITAL COURSE
74-year-old male with a history of hepatitis C, liver cirrhosis, and opioid use, currently on methadone, who presented to the ED with abdominal discomfort and dyspnea, leading to hypoxia requiring supplemental oxygen.  He  was evaluated in the ED ten days prior and was found to have a hepatic mass. CT abdomen was noted to have a large mass with ill defined borders, left portal vein thrombosis, likely tumor thrombus, moderate ascites, large paraesophageal varices and gastrorenal shunt. He developed respiratory distress and cardiac arrest, achieving return of spontaneous circulation with epinephrine. He was intubated, admitted to the ICU, treated for pneumonia and a left-sided chest tube was placed for pleural effusion. Following stabilization, he was extubated and downgraded to a step-down unit. The pneumonia is managed with piperacillin/tazobactam and oxygen therapy, with negative blood cultures thus far. CT imaging revealed a moderate loculated left pleural effusion and airspace disease. Cardiac arrest was likely secondary to hypoxia, and echocardiogram showed normal left ventricular function with an LVEF of 56%. Cirrhosis will continue to be managed with lactulose and rifaximin. The patient underwent paracentesis by IR on 1/15. The patient is hemodynamically stable for discharge with appropriate follow up.

## 2025-01-23 NOTE — PROGRESS NOTE ADULT - ASSESSMENT
74y Male with PMHx cirrhosis 2/2 Hep C admitted for cardiac arrest + hepatic hydrothorax in s/o potential HCC.     #AHRF, resolving   #Cardiac arrest   -s/p 2 rounds of CPR + intubation  [facility] , satting 91-92  -CT Chest showing L loculated pleural effusion s/p PTC + Umbilical hernia repair with mesh   -CT surgery placed PTC placed 1/11 initially draining 1.5L bloody fluid, s/p removal 1/17  -DuoNeb q6     #Hep C Cirrhosis with potential HCC  #SBP  -AFP [id number]  -to follow with Arianna upon discharge   -Zosyn 3.375G ppx restarted   -rifaximin 550mg BID to titrate to 2-3 bowel movements per day,   -lactulose syrup 15G BID was switched to 15 G TID, lactulose enema ordered, pt hasn't had a bowel movement since sunday  -Coreg 3.125mg BID   -Protonix 40mg daily   -s/p paracentesis 1/15 with 600cc removed, currently no plan to repeat paracenthesis  -started on vitamin K 10 mg x 3 days  - INR, cmp ordered  - GI following, recs appreciated   - patient deferred for paracentesis by IR at this time, CT: small amount of ascites near liver edge  - CT imaging reviewed: Cirrhosis. Thrombosis of the left portal vein and branches of segment 4, mass in the right lobe of the liver similar in appearance( CT 01/01). Nonspecific mildly dilated loops of small bowel centrally. Nonspecific periportal adenopathy. Small bilateral effusions and compressive atelectasis.  - follow up with GI outpatient, EGD outpatient for varices management   - pt to be d/c with SBP ppx ( cipro 500 QD)      #MARY  -likely 2/2 HRS  -Serial monitoring   -BUN Cr downtrending, creatinine normalized  -Na improved, encouraged better PO intake   -sp Albumin 50cc q8  for 3 doses   -Nephrology consulted, reqs appreciated  - UA with lytes negative  - Renal US: No hydronephrosis.      #history of opioid use + alcohol abuse   -follows with [facility] ([phone])  -Started on methadone 40mg daily, decreased from 134mg baseline in s/o cirrhosis + prolonged QTc   -Multivitamin daily   -Thiamine 100mg daily   -Folic acid 1mg daily       DVT ppx: Heparin 5000 U sq q8   Status: Full code   Dispo: [facility]. To follow with Arianna following d/c. To be discharged with Cipro 500mg daily for SBP ppx.    74y Male with PMHx cirrhosis 2/2 Hep C admitted for cardiac arrest + hepatic hydrothorax in s/o potential HCC.     #AHRF, resolving   #Cardiac arrest   -s/p 2 rounds of CPR + intubation  -Extubated 1/13 to [facility], currently on 3LNC , satting 91-92  -CT Chest showing L loculated pleural effusion s/p PTC + Umbilical hernia repair with mesh   -CT surgery placed PTC placed 1/11 initially draining 1.5L bloody fluid, s/p removal 1/17  -DuoNeb q6     #Hep C Cirrhosis with potential HCC  #SBP  -AFP [id number]  -to follow with Arianna upon discharge   -Zosyn 3.375G ppx restarted   -rifaximin 550mg BID to titrate to 2-3 bowel movements per day,   -lactulose syrup 15G BID was switched to 15 G TID, lactulose enema ordered, pt hasn't had a bowel movement since sunday  -Coreg 3.125mg BID   -Protonix 40mg daily   -s/p paracentesis 1/15 with 600cc removed, currently no plan to repeat paracenthesis  -started on vitamin K 10 mg x 3 days  - INR, cmp ordered  - GI following, recs appreciated   - patient deferred for paracentesis by IR at this time, CT: small amount of ascites near liver edge  - CT imaging reviewed: Cirrhosis. Thrombosis of the left portal vein and branches of segment 4, mass in the right lobe of the liver similar in appearance( CT 01/01). Nonspecific mildly dilated loops of small bowel centrally. Nonspecific periportal adenopathy. Small bilateral effusions and compressive atelectasis.  - follow up with GI outpatient, EGD outpatient for varices management   - pt to be d/c with SBP ppx ( cipro 500 QD)  - F/U with Dr Burgess in  3-4 weeks - hepatology clinic      #MARY  -likely 2/2 HRS  -Serial monitoring   -BUN Cr downtrending, creatinine normalized  -Na improved, encouraged better PO intake   -sp Albumin 50cc q8  for 3 doses   -Nephrology consulted, reqs appreciated  - UA with lytes negative  - Renal US: No hydronephrosis.      #history of opioid use + alcohol abuse   -follows with [facility] ([phone])  -Started on methadone 40mg daily, decreased from 134mg baseline in s/o cirrhosis + prolonged QTc   -Multivitamin daily   -Thiamine 100mg daily   -Folic acid 1mg daily       DVT ppx: Heparin 5000 U sq q8   Status: Full code   Dispo: [facility]. To follow with Arianna following d/c. To be discharged with Cipro 500mg daily for SBP ppx.

## 2025-01-24 VITALS
DIASTOLIC BLOOD PRESSURE: 70 MMHG | HEART RATE: 62 BPM | RESPIRATION RATE: 18 BRPM | OXYGEN SATURATION: 93 % | SYSTOLIC BLOOD PRESSURE: 109 MMHG | TEMPERATURE: 97 F

## 2025-01-24 PROCEDURE — 82140 ASSAY OF AMMONIA: CPT

## 2025-01-24 PROCEDURE — 87075 CULTR BACTERIA EXCEPT BLOOD: CPT

## 2025-01-24 PROCEDURE — C1729: CPT

## 2025-01-24 PROCEDURE — 83880 ASSAY OF NATRIURETIC PEPTIDE: CPT

## 2025-01-24 PROCEDURE — C8929: CPT

## 2025-01-24 PROCEDURE — 85025 COMPLETE CBC W/AUTO DIFF WBC: CPT

## 2025-01-24 PROCEDURE — P9047: CPT

## 2025-01-24 PROCEDURE — 80048 BASIC METABOLIC PNL TOTAL CA: CPT

## 2025-01-24 PROCEDURE — 93005 ELECTROCARDIOGRAM TRACING: CPT

## 2025-01-24 PROCEDURE — 82550 ASSAY OF CK (CPK): CPT

## 2025-01-24 PROCEDURE — 76705 ECHO EXAM OF ABDOMEN: CPT

## 2025-01-24 PROCEDURE — 80307 DRUG TEST PRSMV CHEM ANLYZR: CPT

## 2025-01-24 PROCEDURE — 94003 VENT MGMT INPAT SUBQ DAY: CPT

## 2025-01-24 PROCEDURE — 82330 ASSAY OF CALCIUM: CPT

## 2025-01-24 PROCEDURE — 31500 INSERT EMERGENCY AIRWAY: CPT

## 2025-01-24 PROCEDURE — 84100 ASSAY OF PHOSPHORUS: CPT

## 2025-01-24 PROCEDURE — 87015 SPECIMEN INFECT AGNT CONCNTJ: CPT

## 2025-01-24 PROCEDURE — 82553 CREATINE MB FRACTION: CPT

## 2025-01-24 PROCEDURE — 83690 ASSAY OF LIPASE: CPT

## 2025-01-24 PROCEDURE — 88341 IMHCHEM/IMCYTCHM EA ADD ANTB: CPT

## 2025-01-24 PROCEDURE — 71250 CT THORAX DX C-: CPT | Mod: MC

## 2025-01-24 PROCEDURE — 83615 LACTATE (LD) (LDH) ENZYME: CPT

## 2025-01-24 PROCEDURE — 82947 ASSAY GLUCOSE BLOOD QUANT: CPT

## 2025-01-24 PROCEDURE — 88342 IMHCHEM/IMCYTCHM 1ST ANTB: CPT

## 2025-01-24 PROCEDURE — 36415 COLL VENOUS BLD VENIPUNCTURE: CPT

## 2025-01-24 PROCEDURE — 96375 TX/PRO/DX INJ NEW DRUG ADDON: CPT

## 2025-01-24 PROCEDURE — 86850 RBC ANTIBODY SCREEN: CPT

## 2025-01-24 PROCEDURE — 84132 ASSAY OF SERUM POTASSIUM: CPT

## 2025-01-24 PROCEDURE — P9045: CPT

## 2025-01-24 PROCEDURE — 85018 HEMOGLOBIN: CPT

## 2025-01-24 PROCEDURE — 82962 GLUCOSE BLOOD TEST: CPT

## 2025-01-24 PROCEDURE — 88305 TISSUE EXAM BY PATHOLOGIST: CPT

## 2025-01-24 PROCEDURE — 83986 ASSAY PH BODY FLUID NOS: CPT

## 2025-01-24 PROCEDURE — 87205 SMEAR GRAM STAIN: CPT

## 2025-01-24 PROCEDURE — 82042 OTHER SOURCE ALBUMIN QUAN EA: CPT

## 2025-01-24 PROCEDURE — 71045 X-RAY EXAM CHEST 1 VIEW: CPT

## 2025-01-24 PROCEDURE — 87641 MR-STAPH DNA AMP PROBE: CPT

## 2025-01-24 PROCEDURE — T1013: CPT

## 2025-01-24 PROCEDURE — 85027 COMPLETE CBC AUTOMATED: CPT

## 2025-01-24 PROCEDURE — 86900 BLOOD TYPING SEROLOGIC ABO: CPT

## 2025-01-24 PROCEDURE — 97163 PT EVAL HIGH COMPLEX 45 MIN: CPT

## 2025-01-24 PROCEDURE — 74018 RADEX ABDOMEN 1 VIEW: CPT

## 2025-01-24 PROCEDURE — 82105 ALPHA-FETOPROTEIN SERUM: CPT

## 2025-01-24 PROCEDURE — 92950 HEART/LUNG RESUSCITATION CPR: CPT

## 2025-01-24 PROCEDURE — 94002 VENT MGMT INPAT INIT DAY: CPT

## 2025-01-24 PROCEDURE — 89051 BODY FLUID CELL COUNT: CPT

## 2025-01-24 PROCEDURE — 87070 CULTURE OTHR SPECIMN AEROBIC: CPT

## 2025-01-24 PROCEDURE — 84157 ASSAY OF PROTEIN OTHER: CPT

## 2025-01-24 PROCEDURE — 99232 SBSQ HOSP IP/OBS MODERATE 35: CPT

## 2025-01-24 PROCEDURE — 86901 BLOOD TYPING SEROLOGIC RH(D): CPT

## 2025-01-24 PROCEDURE — 76775 US EXAM ABDO BACK WALL LIM: CPT

## 2025-01-24 PROCEDURE — 96374 THER/PROPH/DIAG INJ IV PUSH: CPT

## 2025-01-24 PROCEDURE — 81001 URINALYSIS AUTO W/SCOPE: CPT

## 2025-01-24 PROCEDURE — 86880 COOMBS TEST DIRECT: CPT

## 2025-01-24 PROCEDURE — 85730 THROMBOPLASTIN TIME PARTIAL: CPT

## 2025-01-24 PROCEDURE — 80053 COMPREHEN METABOLIC PANEL: CPT

## 2025-01-24 PROCEDURE — 71275 CT ANGIOGRAPHY CHEST: CPT | Mod: MC

## 2025-01-24 PROCEDURE — 84295 ASSAY OF SERUM SODIUM: CPT

## 2025-01-24 PROCEDURE — 83735 ASSAY OF MAGNESIUM: CPT

## 2025-01-24 PROCEDURE — 82803 BLOOD GASES ANY COMBINATION: CPT

## 2025-01-24 PROCEDURE — 74176 CT ABD & PELVIS W/O CONTRAST: CPT | Mod: MC

## 2025-01-24 PROCEDURE — 82435 ASSAY OF BLOOD CHLORIDE: CPT

## 2025-01-24 PROCEDURE — 84484 ASSAY OF TROPONIN QUANT: CPT

## 2025-01-24 PROCEDURE — 85610 PROTHROMBIN TIME: CPT

## 2025-01-24 PROCEDURE — 99291 CRITICAL CARE FIRST HOUR: CPT

## 2025-01-24 PROCEDURE — 94640 AIRWAY INHALATION TREATMENT: CPT

## 2025-01-24 PROCEDURE — 74177 CT ABD & PELVIS W/CONTRAST: CPT | Mod: MC

## 2025-01-24 PROCEDURE — 83605 ASSAY OF LACTIC ACID: CPT

## 2025-01-24 PROCEDURE — 80076 HEPATIC FUNCTION PANEL: CPT

## 2025-01-24 PROCEDURE — 36600 WITHDRAWAL OF ARTERIAL BLOOD: CPT

## 2025-01-24 PROCEDURE — 87640 STAPH A DNA AMP PROBE: CPT

## 2025-01-24 PROCEDURE — 85014 HEMATOCRIT: CPT

## 2025-01-24 PROCEDURE — 88112 CYTOPATH CELL ENHANCE TECH: CPT

## 2025-01-24 PROCEDURE — 94760 N-INVAS EAR/PLS OXIMETRY 1: CPT

## 2025-01-24 PROCEDURE — 70450 CT HEAD/BRAIN W/O DYE: CPT | Mod: MC

## 2025-01-24 RX ADMIN — Medication 5000 UNIT(S): at 05:32

## 2025-01-24 RX ADMIN — PIPERACILLIN SODIUM AND TAZOBACTAM SODIUM 25 GRAM(S): 2; 250 INJECTION, POWDER, FOR SOLUTION INTRAVENOUS at 05:32

## 2025-01-24 RX ADMIN — IPRATROPIUM BROMIDE AND ALBUTEROL SULFATE 3 MILLILITER(S): .5; 2.5 SOLUTION RESPIRATORY (INHALATION) at 01:16

## 2025-01-24 RX ADMIN — IPRATROPIUM BROMIDE AND ALBUTEROL SULFATE 3 MILLILITER(S): .5; 2.5 SOLUTION RESPIRATORY (INHALATION) at 09:16

## 2025-01-24 RX ADMIN — Medication 3.12 MILLIGRAM(S): at 10:15

## 2025-01-24 NOTE — CHART NOTE - NSCHARTNOTEFT_GEN_A_CORE
Rolling Walker   Patient requires walker due recent hospitalization to the hospital with acute respiratory failure with hypoxia and cardiac arrest in the ED to help complete their MRALDs

## 2025-01-24 NOTE — CHART NOTE - NSCHARTNOTESELECT_GEN_ALL_CORE
Event Note
Event Note
IR/Event Note
Nutrition Services
Thoracic Surgery PA/Event Note
Event Note
Event Note
Follow up progress

## 2025-01-28 ENCOUNTER — APPOINTMENT (OUTPATIENT)
Age: 75
End: 2025-01-28

## 2025-01-30 NOTE — CDI QUERY NOTE - NSCDIOTHERTXTBX_GEN_ALL_CORE_HH
A diagnosis is documented in the medical record, however it lacks the specific indicators to support the present on admission status of the diagnosis.     In order to ensure accurate coding and accuracy of the clinical record, the documentation in this patient’s medical record requires additional clarification of the diagnosis.      The diagnosis of sepsis has been documented in the medical record.  Please clarify if the diagnosis was present on admission or if it developed during the course of the hospital stay:    •	Sepsis was Present on Admission (POA)  •	Sepsis developed during the hospital stay / was not Present on Admission (not POA)  •	Clinically undetermined (unable to clinically determine whether the condition was present at the time of inpatient admission).  •	Other (specify)      Supporting documentation and/or clinical evidence:     1/11 ED: SEPSIS – was this patient treated for sepsis? No.  1/11 H&P: CT chest shows left sided consolidation/ pneumonia, pleural effusion. Will treat for pneumonia with severe sepsis/septic shock,--Aferbrile, WBC WNL  1/11 Signed provider Sepsis notes- The patient was NOT given 30 mL/kG of fluid, having hypotension, lactate >=4, or septic shock due to: Concern for fluid overload, The patient received the following amount of fluid (specify mL or mL/kG) 1000 mL.  1/11 WBC 13.88, Temp 35.9, RR 26, Received 1L IVF for lactate> 4  1/13 PN Shock state - distributive- Wean Levo

## 2025-02-11 NOTE — H&P ADULT - HISTORY OF PRESENT ILLNESS
74y/oM PMH HCV cirrhosis, HCC, hx opioid and alcohol use, recently admitted to [facility] (1/11-1/23 s/p cardiac arrest, treated for pna and L-sided pleural efusion s/p chest tube and s/p paracentesis 1/15 with 600cc removed) presenting to ER c/o abd pain and distention x3 weeks and associated shortness of breath. Additionally reporting some brbpr but unable to clarify when this stated or frequency of occurrence.      Pt answering most questions appropriately and following commands at time of evaluation, however, slow to answer and with general confusion. denies taking any medications since leaving the hospital. reports living alone, uses cane with ambulation.

## 2025-02-11 NOTE — ED ADULT TRIAGE NOTE - PATIENT ON (OXYGEN DELIVERY METHOD)
[facility] requesting last 3 OV notes and medication list faxed to [phone]. Form placed in 's box.  
Office visit notes faxed and filed along with med list.  
room air

## 2025-02-11 NOTE — H&P ADULT - NSHPLABSRESULTS_GEN_ALL_CORE
12.4   7.63  )-----------( 78       ( 11 Feb 2025 20:46 )             37.6     02-11    131[L]  |  94[L]  |  78.0[H]  ----------------------------<  73  4.0   |  20.0[L]  |  4.89[H]    Ca    9.2      11 Feb 2025 20:46    TPro  6.6  /  Alb  2.5[L]  /  TBili  4.1[H]  /  DBili  2.8[H]  /  AST  133[H]  /  ALT  58[H]  /  AlkPhos  224[H]  02-11    < from: CT Abdomen and Pelvis w/ IV Cont (02.11.25 @ 21:07) >    INTERPRETATION:  CLINICAL INFORMATION: Abdominal pain.    COMPARISON: 1/19/2025.    CONTRAST/COMPLICATIONS:  IV Contrast: Omnipaque 350  90 cc administered   10 cc discarded  Oral Contrast: NONE  .    PROCEDURE:  CT of the Abdomen and Pelvis was performed.  Sagittal and coronal reformats were performed.    FINDINGS:  LOWER CHEST: Moderate left and small right pleural effusion with   associated compressive atelectasis. Subsegmental atelectasis in the   lingula. Mild thoracic aortic atherosclerotic calcifications.    LIVER: Cirrhosis. Unchanged 7 cm low-attenuation masslike lesion in   segment 4/5 of the liver and scattered subcentimeter hypodensities which   are too small to characterize. Left portal vein remains occluded.  BILE DUCTS: Normal caliber.  GALLBLADDER: Within normal limits.  SPLEEN: Upper limits of normal in size.  PANCREAS: Within normal limits.  ADRENALS: Within normal limits.  KIDNEYS/URETERS: Kidneys enhance symmetrically without hydronephrosis.   Focal areas of left renal cortical thinning/scarring. Left renal cyst.   Punctate nonobstructing right intrarenal calculus.    BLADDER: Mild circumferential thickening of the urinary bladder walls   likely due to underdistention.  REPRODUCTIVE ORGANS: Prostate is within normal limits.    BOWEL: No bowel obstruction. Appendix is normal. Thickening of the walls   of the ascending colon likely due to portal colopathy.  PERITONEUM/RETROPERITONEUM: Moderate volume of abdominal and pelvic   ascites. No pneumoperitoneum or loculated collection to suggest abscess.  VESSELS: Portosystemic collaterals with perigastric varices.   Atherosclerotic calcifications of the aortoiliac tree. Normal caliber   abdominal aorta.  LYMPH NODES: Enlarged periportal and gastrohepatic lymph nodes, unchanged.  ABDOMINAL WALL: Ventral abdominal wall hernia repair. Generalized soft   tissue edema.  BONES: Degenerative changes of the spine. Focal anterior wedge   compression deformity of T8 with focal kyphosis. Hemangioma in the L2   vertebral body.    IMPRESSION:  Cirrhosis with evidence of portal hypertension. Moderate volume of   abdominal and pelvic ascites. Moderate left and small right pleural   effusions with associated compressive atelectasis.    Unchanged low-attenuation masslike lesion in the liver which is highly   concerning for malignancy in the background of cirrhosis.    Unchanged periportal and gastrohepaticlymphadenopathy.    Thickening of the ascending colon likely due to portal colopathy.      < end of copied text >

## 2025-02-11 NOTE — ED PROVIDER NOTE - ATTENDING CONTRIBUTION TO CARE
74-year-old male with a history of cirrhosis 2/2 HepC abdominal distention presented to the emergency room for abdominal pain he had the pain for 3 weeks that is constant and shortness of breath.  pt with + confusion; pe awake alert heent ncat eyes icteric; neck  supple cor s1s2 lung clear; abd distended nontender neuro aa confused; moving all extremities  dx ams ; cirrhosis labs, ct ammonia; and reasses

## 2025-02-11 NOTE — ED PROVIDER NOTE - NSCAREINITIATED _GEN_ER
[de-identified] : 6MONTH OLD M HERE FOR A PHYSICAL [FreeTextEntry1] : FEEDING: Tolerating feeds well. BF every 2-3 hours. SLEEP:  No issues. ELIMINATION: Frequent urination. Stools daily, soft. SAFETY: Rear facing car seat No exposure to cigarette smoking. CONCERNS: had fever over weekend now with cough and congestion Shabbir Javier(Resident)

## 2025-02-11 NOTE — ED ADULT NURSE NOTE - NSFALLRISKINTERV_ED_ALL_ED

## 2025-02-11 NOTE — ED PROVIDER NOTE - PHYSICAL EXAMINATION
Gen: +appears tired but arousable  Head: normocephalic, atraumatic  EENT: EOMI, moist mucous membranes  Lung: no increased work of breathing, clear to auscultation bilaterally, no wheezing, rales, rhonchi, speaking in full sentences  CV: regular rate, regular rhythm, normal s1/s2, 2+ radial pulses upper extremity, +edematous lower extremity  Abd: soft, +generalized tenderness, distended abdomen    MSK: No gross deformities  Neuro: +lethargic but arousable, no gross neurologic deficits  Skin: +chronic lower extremity skin erethema  Psych: confused

## 2025-02-11 NOTE — ED PROCEDURE NOTE - ATTENDING CONTRIBUTION TO CARE
Dr. Marquez, Attending Physician-  I performed a face to face bedside interview with patient regarding history of present illness, review of symptoms and past medical history. I completed an independent physical exam.  I have discussed patient's plan of care with the resident.

## 2025-02-11 NOTE — ED ADULT NURSE NOTE - NSFALLDEVICES_ED_ALL_ED
Addendum  created 09/23/19 1407 by Christian Cintron CRNA    Alternative orders not taken and original order placed, Order sets accessed       Cane

## 2025-02-11 NOTE — ED PROVIDER NOTE - OBJECTIVE STATEMENT
74-year-old male with a history of cirrhosis 2/2 HepC abdominal distention presented to the emergency room for abdominal pain he had the pain for 3 weeks that is constant and shortness of breath. Patient is confused.

## 2025-02-11 NOTE — ED PROCEDURE NOTE - CPROC ED PARACENTESIS DETAIL1
The anatomic location was identified, and a needle with catheter/plastic sheath was introduced into the peritoneal cavity. Fluid was allowed to drain. Ultrasound guidance was used.

## 2025-02-11 NOTE — ED ADULT NURSE NOTE - OBJECTIVE STATEMENT
Pt presents to ED for shortness of breath. pt c/o abdominal pain for 3 weeks and sent in from urgent care for eval. pt stomach very distended. pt placed on 2L NC stating 93%. Pt NSR on cardiac monitor. Pt is A&Ox3 but slow to respond to questions.    ED  Eusebio

## 2025-02-11 NOTE — H&P ADULT - ASSESSMENT
74y/oM PMH HCV cirrhosis, HCC, hx opioid and alcohol use, recently admitted to [facility] (1/11-1/23 s/p cardiac arrest, treated for pna and L-sided pleural efusion s/p chest tube and s/p paracentesis 1/15 with 600cc removed) presenting to ER c/o abd pain and distention x3 weeks and associated shortness of breath. Additionally reporting some brbpr but unable to clarify when this stated or frequency of occurrence.  Pt admitted with decompensated cirrhosis, hepatic encephalopathy     Hepatic encephalopathy   Hepatitis C cirrhosis with potential HCC   r/o sbp   -s/p 2g ceftriaxone, lactulose, 30mL paracentesis in ER   -though ascitic fluid pmn <250, given recent sbp and GI recommendations for indefinite ppx and likely pt non-compliance, will cont ceftriaxone for now   -cont lactulose, titrate to 2-3 soft BMs/day  -f/u am ammonia   -cont rifaximin, coreg, ppi  -f/u am lfts, coags   -f/u GI consult   -f/u abd US   -f/u IR consult for repeat paracentesis     Acute hypoxic respiratory failure   -CXR with small b/l pleural effusions  -wean supplemental O2 as tolerated     ?BRBPR   -pt reporting with diarrhea, though unable to clarify due to encephalopathy   -H/H stable compared to recent admission   -f/u am cbc   -monitor stool count, output     MARY   -f/u repeat bmp   -recent renal US without hydronephrosis  -f/u nephro consult   -f/u urine studies     Hx opioid and EtOH abuse   -as per recent admission, follows with [facility] Meth[facility]one clinic [phone], call in am to confirm dosing   -holding methadone for now given encephalopathy   -f/u urine studies   -cont mvi, thiamine, folic acid     vte ppx: scds

## 2025-02-11 NOTE — H&P ADULT - NSHPPHYSICALEXAM_GEN_ALL_CORE
CONSTITUTIONAL: ill-appearing  CARDIAC: Regular rate, regular rhythm.  normal +S1, S2  RESPIRATORY: decreased at bases; intermittently tachypneic on 2L NC   GASTROINTESTINAL: Abdomen distended; firm, mildly ttp; +bowel sounds; no guarding or rebound   NEUROLOGICAL: Awake, alert; answering most questions, following commands, though generally confused   SKIN: warm, dry

## 2025-02-12 NOTE — PROVIDER CONTACT NOTE (HYPOGLYCEMIA EVENT) - NS PROVIDER CONTACT BACKGROUND-HYPO
Age: 74y    Gender: Male    POCT Blood Glucose:  142 mg/dL (02-12-25 @ 19:17)  dextose 50% given  57 mg/dL (02-12-25 @ 18:27)  64 mg/dL (02-12-25 @ 18:25)      eMAR:dextrose 50% Injectable   25 Gram(s) IV Push (02-12-25 @ 18:47)    octreotide  Injectable   100 MICROGram(s) IV Push (02-12-25 @ 13:49)

## 2025-02-12 NOTE — CONSULT NOTE ADULT - PROBLEM SELECTOR RECOMMENDATION 9
Pt with ascites- Neutrphils 235- borderline SBP, hypothermia    I spoke to Dr Aleman- will give rocephin qd  check blood culture, urine culture and CXR  continue lactulose and xifaxan   MARY- IV fluids- renal to see  LIver lesion= AFP in jan was great than 11,000. Will need to F/U with hepatology as outpatient  Will need eventual  large volume Paracentesis from  IR  plan discussed with Dr ALEMAN

## 2025-02-12 NOTE — CHART NOTE - NSCHARTNOTEFT_GEN_A_CORE
Pt. w/hypoxia, O2 sat dropped to 88% on 6L NC, hypothermic in am  Pt. is somnolent, non labored breathing, on bear hugger/hyperthermic blanket    Vital Signs Last 24 Hrs  T(C): 35.6 (12 Feb 2025 14:00), Max: 36.8 (12 Feb 2025 01:04)  T(F): 96 (12 Feb 2025 14:00), Max: 98.2 (12 Feb 2025 01:04)  HR: 63 (12 Feb 2025 14:00) (56 - 105)  BP: 103/55 (12 Feb 2025 14:00) (95/53 - 134/76)  RR: 18 (12 Feb 2025 14:05) (15 - 20)  SpO2: 95% (12 Feb 2025 14:05) (89% - 95%)  Parameters below as of 12 Feb 2025 14:05  Patient On (Oxygen Delivery Method): mask, nonrebreather    RVP neg    PHYSICAL EXAM:    GENERAL: ill appearing, frail, +jaundice skin, + icterus  CHEST/LUNG: + rhonchii/wheezing Right middle and lower bases, decreased breath sounds left base   HEART: Regular rate and rhythm; No murmurs, rubs, or gallops  ABDOMEN: +distended; firm, +Bowel sounds present, NT  EXTREMITIES:  2+ Peripheral Pulses, No clubbing, cyanosis, or edema  NERVOUS SYSTEM: somnolent, arrousable      A/P: 74y/oM PMH HCV cirrhosis, HCC, hx opioid and alcohol use, recently admitted to [facility] (1/11-1/23 s/p cardiac arrest, treated for pna and L-sided pleural efusion s/p chest tube and s/p paracentesis 1/15 with 600cc removed) presenting to ER c/o abd pain and distention x3 weeks and associated shortness of breath. Pt admitted with decompensated cirrhosis, hepatic encephalopathy, MARY,  Acute hypoxic respiratory failure    Hypoxia--  CXR stat to R/O Aspiration PNA  Aspiration precautions  monitor vitals Q2H  c/w hyperthermic blanket until T 97  NPO  Swallow eval  NRB  Duonebs  bladder scan  ua(straight cath to evaluate for residual)- pt hasn't voided  Accucheck  labs  Lactulose PO to be changed to enemas  Palliative Consult    Transfer to [facility]  Discussed w/pt's nurse  D/w Medicine attending  agreed w/the plan

## 2025-02-12 NOTE — CONSULT NOTE ADULT - ATTENDING COMMENTS
Patient was reviewed, and examined with the resident.   Agree with impression and plan as outlined above.    73 yo M with history of HCV cirrhosis, hepatoma, h/o opioid/EtOH use, recent admission to [facility] 1/11 - 1/23 for cardiac arrest, PNA, & left-sided pleural effusion (s/p chest tube) & paracentesis (1/15) complicated by SBP came to ED complaining of worsening abdominal distention. Unclear whether the patient was taking his home medications. Patient is currently lethargic, and unable to provide any information via .   In the ED he was found to have hepatic encephalopathy with ammonia level of 120, CT abdomen/pelvis with iv contrast with large volume ascites.   Patient was found to have MARY with sCr 4.89->4.61. He received 1L of LR yesterday. Currently noted to have no urine output.   IR and GI are following and emergent paracentesis is planned.     - Oliguric MARY on CKD, could be pre-renal/compartment syndrome in the setting of ascites vs. HRS, currently worsening   sCr used to be at 1.5 in 1/23/2025   CT abdomen/pelvis reviewed, noted to have normal-sized kidneys and no obstructive uropathy   UA reviewed with increased SG, expected after iv contrast vs. due to vascular constriction in the setting of portal hypertension, otherwise bland   - Decompensated HCV liver cirrhosis with ascites, worsening   - Hepatic encephalopathy, worsening   - Anemia   - History of PSA with opioids and EtOH   - History of cardiac arrest in the past     Plan   No emergent indications for hemodialysis at this time   Give NS bolus 500 mlx1 dose   Arrange for paracenetesis   Currently on empiric ceftriaxone due to history of SBP in the past   Will initiate HRS cocktail due to severe MARY, with midodrine 5 mg tid, octreotide and albumin 25 g q6h   If no improvement in urine output after HRS cocktail and paracentesis will consider hemodialysis   Lactulose as per primary team   Monitor urine output   Hold carvediolol   Dose medications for eGFR<15  GI follow up  Guarded prognosis   Discussed with Dr. Aleman

## 2025-02-12 NOTE — PATIENT PROFILE ADULT - FALL HARM RISK - HARM RISK INTERVENTIONS

## 2025-02-12 NOTE — PROGRESS NOTE ADULT - SUBJECTIVE AND OBJECTIVE BOX
DAMON CHRISTIANSON    [id number]    74y      Male    CC: abd pain     INTERVAL HPI/OVERNIGHT EVENTS: Pt seen and examined. Hypothermic in am    REVIEW OF SYSTEMS:    RESPIRATORY: No cough, wheezing, hemoptysis  CARDIOVASCULAR: No chest pain, palpitations  GASTROINTESTINAL: No nausea, vomiting    Vital Signs Last 24 Hrs  T(C): 34.4 (12 Feb 2025 11:00), Max: 36.8 (12 Feb 2025 01:04)  T(F): 94 (12 Feb 2025 11:00), Max: 98.2 (12 Feb 2025 01:04)  HR: 56 (12 Feb 2025 11:54) (56 - 105)  BP: 95/61 (12 Feb 2025 11:54) (95/53 - 134/76)  BP(mean): --  RR: 18 (12 Feb 2025 11:54) (15 - 20)  SpO2: 94% (12 Feb 2025 11:54) (92% - 95%)    Parameters below as of 12 Feb 2025 11:54  Patient On (Oxygen Delivery Method): nasal cannula        PHYSICAL EXAM:    CONSTITUTIONAL: ill-appearing  CARDIAC: Regular rate, regular rhythm.  normal +S1, S2  RESPIRATORY: decreased at bases; intermittently tachypneic on 2L NC   GASTROINTESTINAL: Abdomen distended; firm, mildly ttp; +bowel sounds; no guarding or rebound   NEUROLOGICAL: somnolent  SKIN: warm, dry    LABS:                        12.9   7.39  )-----------( 76       ( 12 Feb 2025 05:05 )             37.7     02-12    132[L]  |  96  |  76.9[H]  ----------------------------<  71  4.0   |  19.0[L]  |  4.61[H]    Ca    9.1      12 Feb 2025 05:05  Phos  5.4     02-12  Mg     2.4     02-12    TPro  6.8  /  Alb  2.6[L]  /  TBili  3.9[H]  /  DBili  2.8[H]  /  AST  142[H]  /  ALT  61[H]  /  AlkPhos  225[H]  02-12    PT/INR - ( 12 Feb 2025 05:05 )   PT: 21.8 sec;   INR: 1.94 ratio         PTT - ( 12 Feb 2025 05:05 )  PTT:44.3 sec  Urinalysis Basic - ( 12 Feb 2025 05:05 )    Color: x / Appearance: x / SG: x / pH: x  Gluc: 71 mg/dL / Ketone: x  / Bili: x / Urobili: x   Blood: x / Protein: x / Nitrite: x   Leuk Esterase: x / RBC: x / WBC x   Sq Epi: x / Non Sq Epi: x / Bacteria: x          MEDICATIONS  (STANDING):  albumin human 25% IVPB 50 milliLiter(s) IV Intermittent every 6 hours  carvedilol 3.125 milliGRAM(s) Oral every 12 hours  cefTRIAXone Injectable. 2000 milliGRAM(s) IV Push every 24 hours  folic acid 1 milliGRAM(s) Oral daily  lactulose Syrup 20 Gram(s) Oral three times a day  midodrine 5 milliGRAM(s) Oral every 8 hours  multivitamin 1 Tablet(s) Oral daily  octreotide  Injectable 100 MICROGram(s) IV Push every 8 hours  pantoprazole  Injectable 40 milliGRAM(s) IV Push daily  rifAXIMin 550 milliGRAM(s) Oral two times a day  sodium chloride 0.9% Bolus 500 milliLiter(s) IV Bolus once  thiamine 100 milliGRAM(s) Oral daily    MEDICATIONS  (PRN):  acetaminophen     Tablet .. 650 milliGRAM(s) Oral every 6 hours PRN Temp greater or equal to 38C (100.4F), Mild Pain (1 - 3)  aluminum hydroxide/magnesium hydroxide/simethicone Suspension 30 milliLiter(s) Oral every 4 hours PRN Dyspepsia  melatonin 3 milliGRAM(s) Oral at bedtime PRN Insomnia  ondansetron Injectable 4 milliGRAM(s) IV Push every 8 hours PRN Nausea and/or Vomiting      RADIOLOGY & ADDITIONAL TESTS:

## 2025-02-12 NOTE — CONSULT NOTE ADULT - SUBJECTIVE AND OBJECTIVE BOX
[facility] DIVISION OF KIDNEY DISEASES AND HYPERTENSION -- INITIAL CONSULT NOTE  --------------------------------------------------------------------------------  HPI: 74/M PMHx HCV cirrhosis, hepatoma, h/o opioid/EtOH use, recent admission to [facility] 1/11 - 1/23 for cardiac arrest, PNA, & left-sided pleural effusion (s/p chest tube) & paracentesis (1/15) complicated by SBP.  Now presenting the ER with complaints of abdominal pain & distention associated with shortness of breath x 3 weeks.  Admitted to medicine for acute hepatic encephalopathy 2/2 cirrhosis/HCC.  Initial labs significant for elevated creatinine/BUN (4.89/78) & S.ammonia (122).  Nephrology consulted for MARY.    Patient unable to provide information due to current mental state.  Collateral information obtained from chart review.        PAST HISTORY  --------------------------------------------------------------------------------  PAST MEDICAL & SURGICAL HISTORY:  Hepatitis C virus infection      No significant past surgical history        FAMILY HISTORY:    PAST SOCIAL HISTORY:    ALLERGIES & MEDICATIONS  --------------------------------------------------------------------------------  Allergies    No Known Allergies    Intolerances      Standing Inpatient Medications  albumin human 25% IVPB 50 milliLiter(s) IV Intermittent every 6 hours  carvedilol 3.125 milliGRAM(s) Oral every 12 hours  cefTRIAXone Injectable. 2000 milliGRAM(s) IV Push every 24 hours  folic acid 1 milliGRAM(s) Oral daily  lactulose Syrup 20 Gram(s) Oral three times a day  midodrine 5 milliGRAM(s) Oral every 8 hours  multivitamin 1 Tablet(s) Oral daily  octreotide  Injectable 100 MICROGram(s) IV Push every 8 hours  pantoprazole  Injectable 40 milliGRAM(s) IV Push daily  rifAXIMin 550 milliGRAM(s) Oral two times a day  thiamine 100 milliGRAM(s) Oral daily    PRN Inpatient Medications  acetaminophen     Tablet .. 650 milliGRAM(s) Oral every 6 hours PRN  aluminum hydroxide/magnesium hydroxide/simethicone Suspension 30 milliLiter(s) Oral every 4 hours PRN  melatonin 3 milliGRAM(s) Oral at bedtime PRN  ondansetron Injectable 4 milliGRAM(s) IV Push every 8 hours PRN      REVIEW OF SYSTEMS  --------------------------------------------------------------------------------  Unable to obtain 2/2 mental status    VITALS/PHYSICAL EXAM  --------------------------------------------------------------------------------  T(C): 34.4 (02-12-25 @ 11:00), Max: 36.8 (02-12-25 @ 01:04)  HR: 56 (02-12-25 @ 11:54) (56 - 105)  BP: 95/61 (02-12-25 @ 11:54) (95/53 - 134/76)  RR: 18 (02-12-25 @ 11:54) (15 - 20)  SpO2: 94% (02-12-25 @ 11:54) (92% - 95%)  Wt(kg): --  Height (cm): 170.2 (02-11-25 @ 16:29)      Physical Exam:  	Gen: NAD, ill appearing  	HEENT: PERRL, supple neck, clear oropharynx  	Pulm: Decreased b/l  	CV: RRR, S1S2; no rub  	Back: No spinal or CVA tenderness; no sacral edema  	Abd: +BS, soft, distended. No guarding/rigidity  	: No suprapubic tenderness  	UE: Warm, FROM, no clubbing, intact strength; no edema  	LE: Warm, FROM, no clubbing, intact strength; no edema  	Neuro: AAOx1 (person), lethargic,     LABS/STUDIES  --------------------------------------------------------------------------------              12.9   7.39  >-----------<  76       [02-12-25 @ 05:05]              37.7     132  |  96  |  76.9  ----------------------------<  71      [02-12-25 @ 05:05]  4.0   |  19.0  |  4.61        Ca     9.1     [02-12-25 @ 05:05]      Mg     2.4     [02-12-25 @ 05:05]      Phos  5.4     [02-12-25 @ 05:05]    TPro  6.8  /  Alb  2.6  /  TBili  3.9  /  DBili  2.8  /  AST  142  /  ALT  61  /  AlkPhos  225  [02-12-25 @ 05:05]    PT/INR: PT 21.8 , INR 1.94       [02-12-25 @ 05:05]  PTT: 44.3       [02-12-25 @ 05:05]      Creatinine Trend:  SCr 4.61 [02-12 @ 05:05]  SCr 4.89 [02-11 @ 20:46]  SCr 1.49 [01-23 @ 04:40]  SCr 1.56 [01-22 @ 06:33]  SCr 1.45 [01-21 @ 06:56]    Urinalysis - [02-12-25 @ 05:05]      Color  / Appearance  / SG  / pH       Gluc 71 / Ketone   / Bili  / Urobili        Blood  / Protein  / Leuk Est  / Nitrite       RBC  / WBC  / Hyaline  / Gran  / Sq Epi  / Non Sq Epi  / Bacteria       Lipid: chol --, , HDL --, LDL --      [01-12-25 @ 03:05]    HCV 10.86, Reactive      [01-01-25 @ 13:40]  HIV Nonreact      [02-11-25 @ 20:46]

## 2025-02-12 NOTE — CONSULT NOTE ADULT - SUBJECTIVE AND OBJECTIVE BOX
Patient is a 74y old  Male who presents with a chief complaint of     HPI:  74y/oM PMH HCV cirrhosis, HCC?, hx opioid and alcohol use, recently admitted to [facility] (1/11-1/23 s/p cardiac arrest, treated for pna and L-sided pleural efusion s/p chest tube and s/p paracentesis 1/15 with 600cc removed)+ SBP .  presenting to ER c/o abd pain and distention x3 weeks and associated shortness of breath. Additionally reporting some brbpr but unable to clarify when this stated or frequency of occurrence as Per admission H+P       I attempted to use   [id number], but pt was lethargic and not answering questions . Patient noted to be hypothermic - on warming blanket .  Not clear if pt is compliant with home meds. Was sent home on cipro for SBP prophylaxis as well lactulose, Xifaxan . In ER, WBC 7.39,  Creatinine 4.6 ( was 1.49 on 1/23) , LFT about the same as last admission. Bilirubin 2.8, ALK phos 225, , ALT 61.  paracentesis neutraphils 235      REVIEW OF SYSTEMS:  unable to obtain    PAST MEDICAL & SURGICAL HISTORY:  Hepatitis C virus infection      No significant past surgical history          FAMILY HISTORY:      SOCIAL HISTORY:  Smoking Status: [ ] Current, [ ] Former, [ ] Never  Pack Years:  [ X ] EtOH  [  ] IVDA    MEDICATIONS:  MEDICATIONS  (STANDING):  albumin human 25% IVPB 50 milliLiter(s) IV Intermittent every 6 hours  carvedilol 3.125 milliGRAM(s) Oral every 12 hours  cefTRIAXone Injectable. 2000 milliGRAM(s) IV Push every 24 hours  folic acid 1 milliGRAM(s) Oral daily  lactulose Syrup 20 Gram(s) Oral three times a day  midodrine 5 milliGRAM(s) Oral every 8 hours  multivitamin 1 Tablet(s) Oral daily  octreotide  Injectable 100 MICROGram(s) IV Push every 8 hours  pantoprazole  Injectable 40 milliGRAM(s) IV Push daily  rifAXIMin 550 milliGRAM(s) Oral two times a day  thiamine 100 milliGRAM(s) Oral daily    MEDICATIONS  (PRN):  acetaminophen     Tablet .. 650 milliGRAM(s) Oral every 6 hours PRN Temp greater or equal to 38C (100.4F), Mild Pain (1 - 3)  aluminum hydroxide/magnesium hydroxide/simethicone Suspension 30 milliLiter(s) Oral every 4 hours PRN Dyspepsia  melatonin 3 milliGRAM(s) Oral at bedtime PRN Insomnia  ondansetron Injectable 4 milliGRAM(s) IV Push every 8 hours PRN Nausea and/or Vomiting      Allergies    No Known Allergies    Intolerances        Vital Signs Last 24 Hrs  T(C): 34.4 (12 Feb 2025 11:00), Max: 36.8 (12 Feb 2025 01:04)  T(F): 94 (12 Feb 2025 11:00), Max: 98.2 (12 Feb 2025 01:04)  HR: 60 (12 Feb 2025 07:35) (60 - 105)  BP: 124/80 (12 Feb 2025 07:35) (95/53 - 134/76)  BP(mean): --  RR: 18 (12 Feb 2025 07:35) (15 - 20)  SpO2: 93% (12 Feb 2025 07:35) (92% - 95%)    Parameters below as of 12 Feb 2025 07:35  Patient On (Oxygen Delivery Method): nasal cannula            PHYSICAL EXAM:    General:  ill appearing, lethargic, in warming blanket  HEENT: MMM, conjunctiva and scleral icterus   H-RRR  L-CTA  Gastrointestinal: Soft, non-tender non-distended; Normal bowel sounds; No rebound or guarding    Neurological: lethargic and oriented x O )   Skin: Warm and dry. No obvious rash      LABS:                        12.9   7.39  )-----------( 76       ( 12 Feb 2025 05:05 )             37.7     12 Feb 2025 05:05    132    |  96     |  76.9   ----------------------------<  71     4.0     |  19.0   |  4.61     Ca    9.1        12 Feb 2025 05:05  Phos  5.4       12 Feb 2025 05:05  Mg     2.4       12 Feb 2025 05:05    TPro  6.8    /  Alb  2.6    /  TBili  3.9    /  DBili  2.8    /  AST  142    /  ALT  61     /  AlkPhos  225    / Amylase x      /Lipase x      12 Feb 2025 05:05              RADIOLOGY & ADDITIONAL STUDIES:     < from: CT Abdomen and Pelvis w/ IV Cont (02.11.25 @ 21:07) >  IMPRESSION:  Cirrhosis with evidence of portal hypertension. Moderate volume of   abdominal and pelvic ascites. Moderate left and small right pleural   effusions with associated compressive atelectasis.    Unchanged low-attenuation masslike lesion in the liver which is highly   concerning for malignancy in the background of cirrhosis.    Unchanged periportal and gastrohepaticlymphadenopathy.    Thickening of the ascending colon likely due to portal colopathy.      < end of copied text >

## 2025-02-12 NOTE — CONSULT NOTE ADULT - ASSESSMENT
74/M PMHx HCV cirrhosis, hepatoma, h/o opioid/EtOH use, recent admission to [facility] 1/11 - 1/23 for cardiac arrest, PNA, & left-sided pleural effusion (s/p chest tube) & paracentesis (1/15) complicated by SBP.  Now presenting the ER with complaints of abdominal pain & distention associated with shortness of breath x 3 weeks.  Admitted to medicine for acute hepatic encephalopathy 2/2 cirrhosis/HCC.  Initial labs significant for elevated creatinine/BUN (4.89/78) & S.ammonia (122).  Nephrology consulted for MARY.    #MARY on CKD2  #Acute hepatic encephalopathy  #HCC cirrhosis  #Ascites  #Hepatoma  - Cr 4.89 on admission (baseline Cr: 1.2)  - S. Ammonia: 122  – CT A/P: No hydronephrosis, focal areas of left renal cortical thinning.  Nonobstructing right intrarenal calculus.  Moderate ascites.  - Suspect component of hepatorenal syndrome   - IV albumin q.6h x 8 doses  - No recent contrast or offending medications  - IV octreotide 100 mcg q.8  – PO midodrine 5 mg Q8  – C/w lactulose 20 mg TID  – Consider large-volume paracentesis by IR  - GI consult  - Strict I/Os, monitor UOP  - Renal dose med, avoid nephrotoxins & IV contrast  - Check urine lytes & FeNa  - No need for emergent dialysis

## 2025-02-12 NOTE — PROGRESS NOTE ADULT - ASSESSMENT
74y/oM PMH HCV cirrhosis, HCC, hx opioid and alcohol use, recently admitted to [facility] (1/11-1/23 s/p cardiac arrest, treated for pna and L-sided pleural efusion s/p chest tube and s/p paracentesis 1/15 with 600cc removed) presenting to ER c/o abd pain and distention x3 weeks and associated shortness of breath. Additionally reporting some brbpr but unable to clarify when this stated or frequency of occurrence.  Pt admitted with decompensated cirrhosis, hepatic encephalopathy     Hepatic encephalopathy   Hepatitis C cirrhosis with potential HCC   r/o sbp   -s/p 2g ceftriaxone, lactulose, 30mL paracentesis in ER   -though ascitic fluid pmn <250, given recent sbp and GI recommendations for indefinite ppx and likely pt non-compliance, will cont ceftriaxone for now   -cont lactulose, titrate to 2-3 soft BMs/day  -f/u am ammonia   -cont rifaximin, ppi  -hold coreg  -f/u am lfts, coags   -GI following, d/w Dr. Reza  -f/u abd US   -f/u IR consult for repeat paracentesis     Acute hypoxic respiratory failure   -CXR with small b/l pleural effusions  -wean supplemental O2 as tolerated     ?BRBPR   -pt reporting with diarrhea, though unable to clarify due to encephalopathy   -H/H stable compared to recent admission   -f/u am cbc   -monitor stool count, output     MARY   Poss hepatorenal syndrome   -f/u repeat bmp   -recent renal US without hydronephrosis  -nephro following ; d/w Dr. Shah  -f/u urine studies   -holding coreg   -cont albumin, octreotide, midodrine     Hx opioid and EtOH abuse   -as per recent admission, follows with [facility] Methadone clinic [phone], call in am to confirm dosing   -holding methadone for now given encephalopathy   -f/u urine studies   -cont mvi, thiamine, folic acid     vte ppx: scds

## 2025-02-12 NOTE — CONSULT NOTE ADULT - NS ATTEST RISK PROBLEM GEN_ALL_CORE FT
CIrrhosis, ascites- now with HE and hypothermia    Pt with ascites- Neutraphils 235- borderline SBP, hypothermia    I spoke to Dr Aleman- will give rocephin qd  check blood culture, urine culture and CXR  continue lactulose and xifaxan   MARY- IV fluids- renal to see  LIver lesion= AFP in jan was great than 11,000. Will need to F/U with hepatology as outpatient  Will need eventual  large volume Paracentesis from  IR  plan discussed with Dr ALEMAN   Ct - my interpretation- nodular liver with large liver lesion   prognosis guarded

## 2025-02-13 NOTE — PROVIDER CONTACT NOTE (MEDICATION) - REASON
How Severe Are Your Spot(S)?: mild
Hold Rifaxim due to NPO status
What Is The Reason For Today's Visit?: Full Body Skin Examination
What Is The Reason For Today's Visit? (Being Monitored For X): the re-examination of lesions previously examined

## 2025-02-13 NOTE — SWALLOW BEDSIDE ASSESSMENT ADULT - ASR SWALLOW REFERRAL
Registered Dietitian Can consider palliative care consult given constellation of comorbidities, if medically appropriate./Registered Dietitian

## 2025-02-13 NOTE — CONSULT NOTE ADULT - SUBJECTIVE AND OBJECTIVE BOX
Consult requested by: KATHRYN Lehman MD	Role: Attending 	    Service: Palliative Care   Current Attending: KATHRYN Aleman DO, Medicine		   Consultant: MAGDALENO Salazar MA (Medical Ethicist)   Contact #s: [phone] (Cell) [phone] (Office)   Consult purpose: To assist in the ethical dilemma posed by a 74-year-old male admitted for decompensated cirrhosis and hepatic encephalopathy, regarding surrogacy.     Clinical summary: This is a 74-year-old patient with a medical history of cirrhosis secondary to Hepatitis C and opioid and alcohol with a recent admission to [facility] ([facility] from 1/11/2025-1/23/2025 after cardiac arrest, where the patient was treated for pneumonia; left sided pleural effusion with chest tube placement; and paracentesis on 1/15/2025 with 600cc removed. The patient presented to the Emergency Department on 2/11/2025 due to abdominal distension and pain over the past 3 weeks with associated shortness of breath. On arrival to the ED, the patient was noted to be tachycardic, and hypoxic requiring 4L of oxygen via nasal cannula; and the patient reported bright red blood per rectum but was unable to clarify when or the frequency of occurrence. A CT of the abdomen on 2/11/2025 revealed cirrhosis with evidence of portal hypertension, abdominal and pelvic ascites, moderate left and small right pleural effusions with associated compressive atelectasis, and unchanged low-attenuation mass-like lesion in the liver highly concerning for malignancy in the background of cirrhosis. A paracentesis was performed in the ED, with 30cc of cloudy, serous fluid obtained. On 2/11/2025 the patient was admitted to the Medicine service for further management of decompensated cirrhosis and hepatic encephalopathy. Gastroenterology evaluated the patient on 2/12/2025, noted the patient’s liver function labs were about the same as prior admission and recommended eventual outpatient follow up with Hepatology. Nephrology also evaluated the patient on 2/12/2025 due to elevated creatinine, BUN, and serum ammonia; and noted the patient with an oliguric acute on chronic kidney injury, suspected hepatorenal syndrome (HRS). Palliative Care was consulted on 2/13/2025 to support and to assist with goals of care in the setting of life limiting illnesses. Interventional Radiology noted on 2/13//2025 that the patient was planned for paracentesis, however the patient was unable to provide consent, and the patient’s emergency contact is someone who works in the patient’s apartment building and is unwilling to make medical decisions for the patient. Gastroenterology, Nephrology, and Palliative Care on consult. Ethics consult initiated to assist with determining the appropriate surrogate decision maker for the incapacitated patient given social complexities.     Prognosis Estimate (survival in hrs, days, wks, mos, yrs): Guarded – per medical team   Patient Decision-Making Capacity: Lacks capacity – due to medical condition   Patient Aware of: Diagnosis: Unknown	Prognosis: Unknown   Name of medical decision-maker should patient lack capacity (relationship): None, see discussions and analysis below  Role: N/A		Contact #(s): N/A   Other Decision-Maker (I.e., HCA or Surrogate) Aware of: Diagnosis: N/A  Prognosis: N/A   Other Stakeholders: Arian (Acquaintance, [phone]), Caroline ( at [facility], [phone]), Jania (patient’s neighbor, [phone])   Evidence of Patient’s Preference of Life Sustaining Treatment (Written or Oral): No   Resuscitation status: DNR: No DNI: No      Discussions:   Discussion with MAGDALENO Salazar MA (Medical Ethicist) and KATHRYN Lehman MD (Palliative Care Attending) 2/13/[phone]: Case discussed in detail. Dr. Lehman asked for assistance in identifying a surrogate decision maker for the patient.     Discussion with MAGDALENO Salazar MA (Medical Ethicist) and the patient [phone]: The Ethicist met with the patient at bedside and attempted to speak with the patient. The patient was lethargic and opened his eyes to his name. Patient did not engage with the Ethicist.      Discussion with MAGDALENO Salazar MA (Medical Ethicist) and Caroline (Patient’s  at [facility], [phone]) [phone]: The Ethicist called and introduced herself and her role. Caroline explained her relation to the patient. Caroline checked the system at [place] and confirmed that the patient has reported family in [place], however, he has never provided any names or phone numbers. Caroline was able to provide the Ethicist with the name and phone number of the patient’s neighbor, Jania, [phone], whom the patient listed as an emergency contact.      Discussion with MAGDALENO Salazar MA (Medical Ethicist) and Jania (patient’s neighbor, [phone]) 2/13/2025 1530: The Ethicist called and left a message for Jania, asking for a return call.     Discussion with Magdaleno Salazar MA (Medical Ethicist), LEYLA Smith LCSW (Palliative Care ), KATHRYN Lehman MD (Palliative Care Attending), ESTEFANÍA Gloria MD (Ethics Attending) 2/13/2025 [phone]: The Ethicist updated Palliative Care on their attempts to locate a surrogate.      Discussion with MAGDALENO Salazar MA (Medical Ethicist) and TORY Aleman DO (Medicine Attending) 2/13/2025 [phone]: Case discussed in detail.        Bioethics analysis: The central issues in this case pertain to the patient’s autonomy and the practitioner’s beneficence and nonmaleficence.     The principle of respect for autonomous persons acknowledges a patient’s right to hold views, to make choices and to take actions based on their values and beliefs(i). Furthermore, this principle acknowledges the patient’s dignity and bodily integrity(i). Essential to this principle is the capacity for autonomous choice(i). In other words, the patient’s capacity to decide what can and cannot be done to him according to his set of values.     Essential to the notion of autonomy is that of capacity, the ability to comprehend, understand, appreciate and reason (C-U-A-R).(ii) Crucial to capacity is the ability to communicate an understanding and appreciation of their illness, as well as reasons for their decisions. It is important to note that capacity is decision-specific and can thus wax and wane over time. In that regard, capacity is an ongoing process, not a single event or assessment, and it involves a willingness on the part of the practitioners to reassess capacity over time. Ethics concurs with Palliative Care and Interventional Radiology’s assessment that the patient does not have capacity to consent for the procedure. When Ethics attempted to speak with the patient, he was lethargic and did not engage. When a patient does not have the ability to make decisions for himself, we must protect his autonomy by finding an appropriate surrogate decision maker.     Medical decision making for patients with neither decision-making capacity (DMC) nor a surrogate decision-maker presents an ethical challenge for healthcare providers because there is no way to obtain informed consent for treatment. This is particularly so when these decisions involve invasive/life-threatening procedures or withholding or withdrawing life-sustaining treatments and providing appropriate palliative care. In this case, the health team is commended for their virtue and invoking justice – by providing fairness and equitable care to while attempting to locate a surrogate. In this case, the patient has reported having siblings, however, he has not provided any names or contact information on prior admissions, nor to his primary care office, [facility]. Ethics has attempted to contact the patient’s neighbor, Jania, to inquire about contact information for the patient’s family, but has not been successful.      The 2010 [facility] Family Health Care Decisions Act (FHDCA)(iii) addresses the situation of a sick individual who lacks capacity and has no available surrogate. The FHDCA requires hospitals, after a patient is admitted, to determine if the patient has a health care agent, guardian, or a person who can serve as the patient’s surrogate. If the patient has no such person and lacks capacity, the hospital must identify, to the extent practical, the patient’s wishes and preferences about pending health care decisions. WHERE THE PATIENT CONTINUES TO BE INCAPACITATED THE ATTENDING PHYSICIAN MAY AUTHORIZE MAJOR MEDICAL TREATMENT AFTER CONSULTATION AND CONCURRENCE WITH THE PATIENT’S HEALTH CARE TEAM AND AN INDEPENDENT PHYSICIAN NOT INVOLVED WITH THE PATIENT’S CARE CONCURS WITH THE TREATMENT.     [facility] Policy 100.23 Section 8.2.4: If it is determined after reasonable investigation that the patient does not have a Surrogate, the Attending may authorize routine medical care. In addition. An Attending can authorize Major Medical Treatment after consultation and concurrence with the patient’s health care team and an independent physician not involved with the patient’s care concurs with the treatment.     This case also revolves around the practitioners’ beneficence and nonmaleficence. Beneficence calls on practitioners to promote the best possible health or quality of living with aggressive interventions when these help prolong life with “good quality.” Also, to provide comfort care when cure and remission are not possible, and the aim is to achieve the best “quality of dying.” The same is true for the clinician’s duty to nonmaleficence, avoiding medical interventions whose risk and burden exceed their benefit. (i) Treatment considerations should be weighed in light of best interests, benefits, burdens and risk to the patient’s authentic self. In this case, it is helpful to look at potential proposed goals of care and not just procedures. In this case, the proposed intervention is paracentesis to relieve the pressure on the patient’s lungs and kidneys caused by the ascites, per the medicine attending.      Mr. Box also has innate MORAL STATUS – that is, his distinct personhood, personal experience and interpretation of suffering, life-story, etc. – which must be respected. As he is unrepresented and does not have the capacity to consent for paracentesis, 2 physician consent is appropriate because the patient is not refusing the procedure. The clinician’s domain of VIRTUE is the source of the bioethical principles of beneficence and non-maleficence – both of which should aim to honor a patient’s moral status: wanting to help a patient live optimally, while avoiding inflicting harm (which may be experienced psycho-spiritually and physically). Thus, the healthcare team must show respect for Mr. Box as a person with dignity. This moral status requires careful consideration of his chance for recovery and a life he may come to enjoy.

## 2025-02-13 NOTE — PROGRESS NOTE ADULT - SUBJECTIVE AND OBJECTIVE BOX
DAMON CHRISTIANSON    [id number]    74y      Male    CC: abd pain     INTERVAL HPI/OVERNIGHT EVENTS: Pt seen and examined in am.     REVIEW OF SYSTEMS:    RESPIRATORY: No cough, wheezing, hemoptysis  CARDIOVASCULAR: No chest pain, palpitations  GASTROINTESTINAL: No nausea, vomiting  NEUROLOGICAL: No headaches    Vital Signs Last 24 Hrs  T(C): 35.5 (13 Feb 2025 14:00), Max: 36.9 (13 Feb 2025 00:00)  T(F): 95.9 (13 Feb 2025 14:00), Max: 98.4 (13 Feb 2025 00:00)  HR: 62 (13 Feb 2025 14:00) (58 - 79)  BP: 97/58 (13 Feb 2025 14:00) (90/50 - 144/78)  BP(mean): 76 (13 Feb 2025 12:21) (73 - 76)  RR: 20 (13 Feb 2025 14:00) (17 - 20)  SpO2: 99% (13 Feb 2025 14:00) (92% - 99%)    Parameters below as of 13 Feb 2025 14:00  Patient On (Oxygen Delivery Method): nasal cannula  O2 Flow (L/min): 5      PHYSICAL EXAM:    GENERAL: NAD  CHEST/LUNG: decreased at bases; respirations unlabored on NC   HEART: S1S2+, Regular rate and rhythm  ABDOMEN: +generally ttp; +distended   SKIN: warm, dry   NEURO: somnolent but wakes to voice, oriented to self, place, year      LABS:                        10.6   6.30  )-----------( 61       ( 13 Feb 2025 03:10 )             31.2     02-13    134[L]  |  97  |  81.9[H]  ----------------------------<  73  3.7   |  20.0[L]  |  5.30[H]    Ca    8.8      13 Feb 2025 03:10  Phos  6.5     02-13  Mg     2.4     02-13    TPro  5.9[L]  /  Alb  2.6[L]  /  TBili  3.1[H]  /  DBili  2.2[H]  /  AST  117[H]  /  ALT  48[H]  /  AlkPhos  183[H]  02-13    PT/INR - ( 13 Feb 2025 03:10 )   PT: 22.5 sec;   INR: 1.95 ratio         PTT - ( 13 Feb 2025 03:10 )  PTT:47.5 sec  Urinalysis Basic - ( 13 Feb 2025 03:10 )    Color: x / Appearance: x / SG: x / pH: x  Gluc: 73 mg/dL / Ketone: x  / Bili: x / Urobili: x   Blood: x / Protein: x / Nitrite: x   Leuk Esterase: x / RBC: x / WBC x   Sq Epi: x / Non Sq Epi: x / Bacteria: x          MEDICATIONS  (STANDING):  albumin human 25% IVPB 50 milliLiter(s) IV Intermittent every 6 hours  cefTRIAXone Injectable. 2000 milliGRAM(s) IV Push every 24 hours  folic acid 1 milliGRAM(s) Oral daily  lactulose Retention Enema 200 Gram(s) Rectal four times a day  methadone   Solution 40 milliGRAM(s) Oral daily  midodrine 5 milliGRAM(s) Oral every 8 hours  multivitamin 1 Tablet(s) Oral daily  naloxone Injectable 0.4 milliGRAM(s) IV Push once  octreotide  Injectable 100 MICROGram(s) IV Push every 8 hours  pantoprazole  Injectable 40 milliGRAM(s) IV Push daily  rifAXIMin 550 milliGRAM(s) Oral two times a day  thiamine 100 milliGRAM(s) Oral daily    MEDICATIONS  (PRN):  acetaminophen     Tablet .. 650 milliGRAM(s) Oral every 6 hours PRN Temp greater or equal to 38C (100.4F), Mild Pain (1 - 3)  albuterol/ipratropium for Nebulization 3 milliLiter(s) Nebulizer every 6 hours PRN Shortness of Breath and/or Wheezing  aluminum hydroxide/magnesium hydroxide/simethicone Suspension 30 milliLiter(s) Oral every 4 hours PRN Dyspepsia  HYDROmorphone  Injectable 0.2 milliGRAM(s) IV Push every 6 hours PRN Moderate Pain (4 - 6)  HYDROmorphone  Injectable 0.5 milliGRAM(s) IV Push every 6 hours PRN Severe Pain (7 - 10)  melatonin 3 milliGRAM(s) Oral at bedtime PRN Insomnia  ondansetron Injectable 4 milliGRAM(s) IV Push every 8 hours PRN Nausea and/or Vomiting      RADIOLOGY & ADDITIONAL TESTS:

## 2025-02-13 NOTE — CONSULT NOTE ADULT - ASSESSMENT
RECOMMENDATION: At this time, this patient does not have capacity to consent to paracentesis due to his acute medical condition. The patient is unrepresented as the team and Ethics have not been able to identify any potential surrogates for the patient at this time.     [facility] Policy 100.23 echos [place] State Law that THE ATTENDING PHYSICIAN MAY AUTHORIZE MAJOR MEDICAL TREATMENT AFTER CONSULTATION AND CONCURRENCE WITH THE PATIENT’S HEALTH CARE TEAM AND AN INDEPENDENT PHYSICIAN NOT INVOLVED WITH THE PATIENT’S CARE CONCURS WITH THE TREATMENT.     As the patient is currently unable to provide consent, and is not refusing the procedure, 2 physician consent may be appropriate.     CASE DISCUSSED WITH MEDICAL ETHICIST VALERIA GUERRERO MD, MS, MPH, [facility]  OF MEDICAL ETHICS     MORE THAN 50% OF THE TIME OF THIS CONSULTATION WAS SPENT IN COORDINATION OF CARE OF PATIENT       References   (i) Bethel TL & Stefanie JF. Principles of Biomedical Ethics. [place]: [employer]; 2019.   (ii) Alia DV, Ramiro BW, Appelbaum PS, Florian S, Joyce D, Andre LB, Deepa K, Felix T, Phong HC. A new brief instrument for assessing decisional capacity for clinical research. Archives of general psychiatry. 2007 Aug 1;64(8):966-74.   (iii) CASPER Cochran & KATHRYN Hung (2020). Update Summary of The Family Health Care Decisions Act. [facility]"[employer] Health Law Journal [email or web address]

## 2025-02-13 NOTE — PROGRESS NOTE ADULT - ASSESSMENT
73 yo M with history of HCV cirrhosis, hepatoma, h/o opioid/EtOH use, recent admission to [facility] 1/11 - 1/23 for cardiac arrest, PNA, & left-sided pleural effusion (s/p chest tube) & paracentesis (1/15) complicated by SBP came to ED complaining of worsening abdominal distention. Unclear whether the patient was taking his home medications. Patient is currently lethargic, and unable to provide any information via .   In the ED he was found to have hepatic encephalopathy with ammonia level of 120, CT abdomen/pelvis with iv contrast with large volume ascites.   Patient was found to have MARY with sCr 4.89->4.61. He received 1L of LR yesterday. Currently noted to have no urine output.   IR and GI are following and emergent paracentesis is planned.     - Oliguric MARY on CKD, could be pre-renal/compartment syndrome in the setting of ascites vs. HRS, continues to worsen  sCr used to be at 1.5 in 1/23/2025   CT abdomen/pelvis reviewed, noted to have normal-sized kidneys and no obstructive uropathy   UA reviewed with increased SG, expected after iv contrast vs. due to vascular constriction in the setting of portal hypertension, otherwise bland   - Decompensated HCV liver cirrhosis with ascites, worsening   - Hepatic encephalopathy, worsening   - Anemia   - History of PSA with opioids and EtOH   - History of cardiac arrest in the past     Plan   No emergent indications for hemodialysis at this time   Arrange for paracenetesis ASAP  Currently on empiric ceftriaxone due to history of SBP in the past   Continue HRS cocktail due to severe MARY, with midodrine 5 mg tid, octreotide and albumin 25 g q6h   If no improvement in urine output after HRS cocktail and paracentesis will consider hemodialysis   Lactulose as per primary team   Monitor urine output   Hold carvediolol   Dose medications for eGFR<15  GI follow up  Guarded prognosis   Discussed with Dr. Aleman .

## 2025-02-13 NOTE — PROGRESS NOTE ADULT - SUBJECTIVE AND OBJECTIVE BOX
[facility] DIVISION OF KIDNEY DISEASES AND HYPERTENSION -- PROGRESS NOTE    24 hour events/subjective:  Seen today   Intermittently lethargic   Has been making urine, but urine output is not clear at this point  He was supposed to go for IR-guided paracentesis today, but could not consent, and his HCP could not consent, either, so the procedure was cancelled.   Renal function continues to worsen     MEDICATIONS    Standing Inpatient Medications  albumin human 25% IVPB 50 milliLiter(s) IV Intermittent every 6 hours  cefTRIAXone Injectable. 2000 milliGRAM(s) IV Push every 24 hours  folic acid 1 milliGRAM(s) Oral daily  lactulose Retention Enema 200 Gram(s) Rectal four times a day  midodrine 5 milliGRAM(s) Oral every 8 hours  multivitamin 1 Tablet(s) Oral daily  naloxone Injectable 0.4 milliGRAM(s) IV Push once  octreotide  Injectable 100 MICROGram(s) IV Push every 8 hours  pantoprazole  Injectable 40 milliGRAM(s) IV Push daily  rifAXIMin 550 milliGRAM(s) Oral two times a day  thiamine 100 milliGRAM(s) Oral daily    PRN Inpatient Medications  acetaminophen     Tablet .. 650 milliGRAM(s) Oral every 6 hours PRN  albuterol/ipratropium for Nebulization 3 milliLiter(s) Nebulizer every 6 hours PRN  aluminum hydroxide/magnesium hydroxide/simethicone Suspension 30 milliLiter(s) Oral every 4 hours PRN  HYDROmorphone  Injectable 0.2 milliGRAM(s) IV Push every 6 hours PRN  HYDROmorphone  Injectable 0.5 milliGRAM(s) IV Push every 6 hours PRN  melatonin 3 milliGRAM(s) Oral at bedtime PRN  ondansetron Injectable 4 milliGRAM(s) IV Push every 8 hours PRN      VITALS/PHYSICAL EXAM  --------------------------------------------------------------------------------  T(C): 36.3 (02-13-25 @ 15:34), Max: 36.9 (02-13-25 @ 00:00)  HR: 62 (02-13-25 @ 15:34) (58 - 79)  BP: 101/60 (02-13-25 @ 15:34) (90/50 - 144/78)  RR: 20 (02-13-25 @ 15:34) (17 - 20)  SpO2: 97% (02-13-25 @ 15:34) (92% - 99%)  Wt(kg): --        02-12-25 @ 07:01  -  02-13-25 @ 07:00  --------------------------------------------------------  IN: 0 mL / OUT: 90 mL / NET: -90 mL      Physical Exam:  Gen: NAD, well-appearing  HEENT: normal  Pulm: CTA B/L  CV: normal S1S2; no rub, no edema  Abd: soft, non-tender  MSK no deformities   Neuro: lethargic    LABS/STUDIES  --------------------------------------------------------------------------------  134  |  97  |  81.9  ----------------------------<  73      [02-13-25 @ 03:10]  3.7   |  20.0  |  5.30        Ca     8.8     [02-13-25 @ 03:10]      Mg     2.4     [02-13-25 @ 03:10]      Phos  6.5     [02-13-25 @ 03:10]    TPro  5.9  /  Alb  2.6  /  TBili  3.1  /  DBili  2.2  /  AST  117  /  ALT  48  /  AlkPhos  183  [02-13-25 @ 03:10]    PT/INR: PT 22.5 , INR 1.95       [02-13-25 @ 03:10]  PTT: 47.5       [02-13-25 @ 03:10]      Creatinine Trend:  SCr 5.30 [02-13 @ 03:10]  SCr 4.61 [02-12 @ 05:05]  SCr 4.89 [02-11 @ 20:46]  SCr 1.49 [01-23 @ 04:40]  SCr 1.56 [01-22 @ 06:33]

## 2025-02-13 NOTE — PROGRESS NOTE ADULT - ASSESSMENT
4y/oM PMH HCV cirrhosis, HCC?, hx opioid and alcohol use, recently admitted to [facility] (1/11-1/23 s/p cardiac arrest, treated for pna and L-sided pleural efusion s/p chest tube and s/p paracentesis 1/15 with 600cc removed)+ SBP .  presenting to ER c/o abd pain and distention x3 weeks and associated shortness of breath. GI asked to consult for further management.    #cirrhosis   #hepatic encephalopathy  CT Abdomen and Pelvis w/ IV Cont (02.11.25) Cirrhosis with evidence of portal hypertension. Moderate volume of abdominal and pelvic ascites. Moderate left and small right pleural effusions with associated compressive atelectasis. Unchanged low-attenuation masslike lesion in the liver which is highly concerning for malignancy in the background of cirrhosis.  MELD 31 68.3% estimated 90 day survival   - Infectious workup per primary team, remains hypothermic on warming blanket  - Trend renal function, LFTs, electrolytes, coags daily  - Lactulose titrated to 2-3 soft bowel movements per day, avoid diarrhea.   - No diuretics renal function worsening, nephro following   - Can take up to 2G Tylenol per day  -  MVI, thiamine and folic acid.   - Optimize nutrition, High Protein/Low Fat/Low Salt (up to 2G Na/day), avoid raw shellfish, unpasteurized dairy products; avoid eating any raw or undercooked eggs, fish, poultry, or meat  - Abstain from alcohol and illicit drugs. Alcohol cessation counseling. [facility] protocol.   - Check AFP, abdominal ultrasound complete w/ dopplers   - Varices: EGD, can consider outpatient  - SBP screening Paracentesis if needed  - Outpatient follow up with hepatologist Dr. Burgess  _________________________________________________________________  Assessment and recommendations are final when note is signed by the attending physician.

## 2025-02-13 NOTE — PROGRESS NOTE ADULT - ASSESSMENT
74y/oM PMH HCV cirrhosis, HCC, hx opioid and alcohol use, recently admitted to [facility] (1/11-1/23 s/p cardiac arrest, treated for pna and L-sided pleural efusion s/p chest tube and s/p paracentesis 1/15 with 600cc removed) presenting to ER c/o abd pain and distention x3 weeks and associated shortness of breath. Additionally reporting some brbpr but unable to clarify when this stated or frequency of occurrence.  Pt admitted with decompensated cirrhosis, hepatic encephalopathy     Hepatic encephalopathy   Hepatitis C cirrhosis with potential HCC   r/o sbp   -s/p 2g ceftriaxone, lactulose, 30mL paracentesis in ER   -though ascitic fluid pmn <250, given recent sbp and GI recommendations for indefinite ppx and likely pt non-compliance, will cont ceftriaxone  -cont lactulose, titrate to 2-3 soft BMs/day  -f/u am ammonia   -cont rifaximin, ppi  -hold coreg  -f/u am lfts, coags   -GI following  -IR for repeat paracentesis; cancelled today as pt unable to consent, pt's emergency contact unwilling to consent; may need 2 PC     Acute hypoxic respiratory failure   -CXR with small b/l pleural effusions  -wean supplemental O2 as tolerated     ?BRBPR   -pt reporting with diarrhea, though unable to clarify due to encephalopathy   -H/H stable compared to recent admission   -f/u am cbc   -monitor stool count, output     MARY   Poss hepatorenal syndrome   -recent renal US without hydronephrosis  -nephro following ; d/w Dr. Shah  -holding coreg   -cont albumin, octreotide, midodrine     Hx opioid and EtOH abuse   -as per recent admission, follows with [facility]one clinic [phone], called x2 to confirm dosing, no answer  -holding methadone for now given encephalopathy, if more alert to take po, will resume dose from prior admission 40mg qd   -cont mvi, thiamine, folic acid     Palliative consult appreciated; d/w Dr. Lehman   f/u Ethics consult for aid in determining surrogate     vte ppx: scds         guarded prognosis

## 2025-02-13 NOTE — CONSULT NOTE ADULT - SUBJECTIVE AND OBJECTIVE BOX
[facility] PALLIATIVE MEDICINE CONSULT    CC: Patient is a 74y old  Male who presents with a chief complaint of abdominal distention    HPI:  Pt unable to provide history, info obtained from chart and staff.   74y/oM PMH HCV cirrhosis, HCC, hx opioid and alcohol use, recently admitted to [facility] (1/11-1/23 s/p cardiac arrest, treated for pna and L-sided pleural efusion s/p chest tube and s/p paracentesis 1/15 with 600cc removed) presenting to ER c/o abd pain and distention x3 weeks and associated shortness of breath. Additionally reporting some brbpr but unable to clarify when this stated or frequency of occurrence.      Pt answering most questions appropriately and following commands at time of evaluation, however, slow to answer and with general confusion. denies taking any medications since leaving the hospital. reports living alone, uses cane with ambulation.  (11 Feb 2025 23:23)    Pallaitive consulted for support and to assist with goals of care in the setting of life limiting illness.     Present Symptoms:     Dyspnea: no  Nausea/Vomiting:  No  Anxiety:   No  Depression: unable to assess  Fatigue: Yes --> unable to maintain wakefulness during visit  Loss of appetite: NPO at time of visit  Constipation:  None documented     Pain: No overt sign of distress            Character-            Duration-            Effect-            Factors-            Frequency-            Location-            Severity-    Pain AD Score:  [email or web address] (press ctrl + left click to view)    Review of Systems: Unable to obtain due to poor mentation     PERTINENT PMH REVIEWED: Yes      PAST MEDICAL & SURGICAL HISTORY:  Hepatitis C virus infection      No significant past surgical history    FAMILY HISTORY: Unable to obtain due to poor mentation       Allergies    No Known Allergies    Intolerances    SOCIAL HISTORY:                      Substance history:                    Admitted from:  home                      Children:                     Advent/spirituality:                    Cultural concerns:       DECISION MAKER(s):[] Health Care Proxy(s)  [] Surrogate(s)  [] Guardian             ADVANCE DIRECTIVES/TREATMENT PREFERENCES:  DNR YES NO  Completed on:                     MOLST  YES NO   Completed on:  Living Will  YES NO   Completed on:    Karnofsky/Palliative Performance Status Version 2:  %  http://npcrc.org/files/news/palliative_performance_scale_ppsv2.pdf    Baseline ADLs (prior to admission):  n/a      MEDICATIONS  (STANDING):  albumin human 25% IVPB 50 milliLiter(s) IV Intermittent every 6 hours  cefTRIAXone Injectable. 2000 milliGRAM(s) IV Push every 24 hours  folic acid 1 milliGRAM(s) Oral daily  lactulose Retention Enema 200 Gram(s) Rectal four times a day  midodrine 5 milliGRAM(s) Oral every 8 hours  multivitamin 1 Tablet(s) Oral daily  octreotide  Injectable 100 MICROGram(s) IV Push every 8 hours  pantoprazole  Injectable 40 milliGRAM(s) IV Push daily  rifAXIMin 550 milliGRAM(s) Oral two times a day  thiamine 100 milliGRAM(s) Oral daily    MEDICATIONS  (PRN):  acetaminophen     Tablet .. 650 milliGRAM(s) Oral every 6 hours PRN Temp greater or equal to 38C (100.4F), Mild Pain (1 - 3)  albuterol/ipratropium for Nebulization 3 milliLiter(s) Nebulizer every 6 hours PRN Shortness of Breath and/or Wheezing  aluminum hydroxide/magnesium hydroxide/simethicone Suspension 30 milliLiter(s) Oral every 4 hours PRN Dyspepsia  melatonin 3 milliGRAM(s) Oral at bedtime PRN Insomnia  ondansetron Injectable 4 milliGRAM(s) IV Push every 8 hours PRN Nausea and/or Vomiting      PHYSICAL EXAM:    Vital Signs Last 24 Hrs  T(C): 35.9 (13 Feb 2025 12:21), Max: 36.9 (13 Feb 2025 00:00)  T(F): 96.7 (13 Feb 2025 12:21), Max: 98.4 (13 Feb 2025 00:00)  HR: 67 (13 Feb 2025 12:21) (58 - 79)  BP: 107/61 (13 Feb 2025 12:21) (90/50 - 144/78)  BP(mean): 76 (13 Feb 2025 12:21) (73 - 76)  RR: 18 (13 Feb 2025 12:21) (17 - 20)  SpO2: 93% (13 Feb 2025 12:21) (89% - 98%)    Parameters below as of 13 Feb 2025 12:21  Patient On (Oxygen Delivery Method): nasal cannula  O2 Flow (L/min): 5      General: resting comfortably. NAD.   HEENT: mmm    Lungs: comfortable nonlabored    CV:  rrr  GI: +BS abdomen soft, distended, nontender to palpation  MSK:   no cyanosis. generalized edema +weakness  Neuro: nonfocal. wakes to name but falls back to sleep easily, poor concentration  Skin: warm and dry. +chronic venous stasis changes noted of BLE    LABS:                        10.6   6.30  )-----------( 61       ( 13 Feb 2025 03:10 )             31.2     02-13    134[L]  |  97  |  81.9[H]  ----------------------------<  73  3.7   |  20.0[L]  |  5.30[H]    Ca    8.8      13 Feb 2025 03:10  Phos  6.5     02-13  Mg     2.4     02-13    TPro  5.9[L]  /  Alb  2.6[L]  /  TBili  3.1[H]  /  DBili  2.2[H]  /  AST  117[H]  /  ALT  48[H]  /  AlkPhos  183[H]  02-13    PT/INR - ( 13 Feb 2025 03:10 )   PT: 22.5 sec;   INR: 1.95 ratio         PTT - ( 13 Feb 2025 03:10 )  PTT:47.5 sec  Urinalysis Basic - ( 13 Feb 2025 03:10 )    Color: x / Appearance: x / SG: x / pH: x  Gluc: 73 mg/dL / Ketone: x  / Bili: x / Urobili: x   Blood: x / Protein: x / Nitrite: x   Leuk Esterase: x / RBC: x / WBC x   Sq Epi: x / Non Sq Epi: x / Bacteria: x      I&O's Summary    12 Feb 2025 07:01  -  13 Feb 2025 07:00  --------------------------------------------------------  IN: 0 mL / OUT: 90 mL / NET: -90 mL        RADIOLOGY & ADDITIONAL STUDIES: reviewed     CXR    ACC: [id number] EXAM:  XR CHEST PORTABLE URGENT 1V   ORDERED BY: JONATHAN CAMARENA     PROCEDURE DATE:  02/11/2025          INTERPRETATION:  TECHNIQUE: Single portable view of the chest.    COMPARISON:  1/17/2025    CLINICAL HISTORY: SOB    FINDINGS:    Single frontal view of the chest demonstrates small bilateral pleural   effusions. The cardiomediastinal silhouette is unremarkable. No acute   osseous abnormalities. Overlying EKG leads and wires are noted    IMPRESSION: Small bilateral pleural effusions.    --- End of Report ---  XOCHILT SMITH MD; Attending Radiologist  This document has been electronically signed. Feb 11 2025 11:14PM    CT     ACC: [id number] EXAM:  CT ABDOMEN AND PELVIS IC   ORDERED BY: JONATHAN CAMARENA     PROCEDURE DATE:  02/11/2025          INTERPRETATION:  CLINICAL INFORMATION: Abdominal pain.    COMPARISON: 1/19/2025.    CONTRAST/COMPLICATIONS:  IV Contrast: Omnipaque 350  90 cc administered   10 cc discarded  Oral Contrast: NONE  .    PROCEDURE:  CT of the Abdomen and Pelvis was performed.  Sagittal and coronal reformats were performed.    FINDINGS:  LOWER CHEST: Moderate left and small right pleural effusion with   associated compressive atelectasis. Subsegmental atelectasis in the   lingula. Mild thoracic aortic atherosclerotic calcifications.    LIVER: Cirrhosis. Unchanged 7 cm low-attenuation masslike lesion in   segment 4/5 of the liver and scattered subcentimeter hypodensities which   are too small to characterize. Left portal vein remains occluded.  BILE DUCTS: Normal caliber.  GALLBLADDER: Within normal limits.  SPLEEN: Upper limits of normal in size.  PANCREAS: Within normal limits.  ADRENALS: Within normal limits.  KIDNEYS/URETERS: Kidneys enhance symmetrically without hydronephrosis.   Focal areas of left renal cortical thinning/scarring. Left renal cyst.   Punctate nonobstructing right intrarenal calculus.    BLADDER: Mild circumferential thickening of the urinary bladder walls   likely due to underdistention.  REPRODUCTIVE ORGANS: Prostate is within normal limits.    BOWEL: No bowel obstruction. Appendix is normal. Thickening of the walls   of the ascending colon likely due to portal colopathy.  PERITONEUM/RETROPERITONEUM: Moderate volume of abdominal and pelvic   ascites. No pneumoperitoneum or loculated collection to suggest abscess.  VESSELS: Portosystemic collaterals with perigastric varices.   Atherosclerotic calcifications of the aortoiliac tree. Normal caliber   abdominal aorta.  LYMPH NODES: Enlarged periportal and gastrohepatic lymph nodes, unchanged.  ABDOMINAL WALL: Ventral abdominal wall hernia repair. Generalized soft   tissue edema.  BONES: Degenerative changes of the spine. Focal anterior wedge   compression deformity of T8 with focal kyphosis. Hemangioma in the L2   vertebral body.    IMPRESSION:  Cirrhosis with evidence of portal hypertension. Moderate volume of   abdominal and pelvic ascites. Moderate left and small right pleural   effusions with associated compressive atelectasis.    Unchanged low-attenuation masslike lesion in the liver which is highly   concerning for malignancy in the background of cirrhosis.    Unchanged periportal and gastrohepatic lymphadenopathy.    Thickening of the ascending colon likely due to portal colopathy.        --- End of Report ---  CARLYN HASTINGS DO; Attending Radiologist  This document has been electronically signed. Feb 11 2025  9:37PM    BLE Doppler    ACC: [id number] EXAM:  US DPLX LWR EXT VEINS COMPL BI   ORDERED BY: CHIKIS DELGADO     PROCEDURE DATE:  02/13/2025          INTERPRETATION:  CLINICAL INFORMATION: Lower extremity swelling.    COMPARISON: None available.    TECHNIQUE: Duplex sonography of the BILATERAL LOWER extremity veins with   color and spectral Doppler, with and without compression.    FINDINGS:    RIGHT:  Normal compressibility of the RIGHT common femoral, femoral and popliteal   veins.  Doppler examination shows normal spontaneous and phasic flow.  No RIGHT calf vein thrombosis is detected.    LEFT:  Normal compressibility of the LEFT common femoral, femoral and popliteal   veins.  Doppler examination shows normal spontaneous and phasic flow.  No LEFT calf vein thrombosis is detected.    IMPRESSION:  No evidence of deep venous thrombosis in either lower extremity.  --- End of Report ---    UMANG JARQUIN MD; Attending Radiologist  This document has been electronically signed. Feb 13 2025  4:38AM    Abdominal Ultrasound    ACC: [id number] EXAM:  US ABDOMEN LIMITED   ORDERED BY: TRACEY NICHOLS     PROCEDURE DATE:  02/13/2025          INTERPRETATION:  CLINICAL INFORMATION: Cirrhosis.  Hepatic   encephalopathy.  Distended abdomen.    COMPARISON: 01/21/2025.    TECHNIQUE: Limited ultrasound of the abdomen to evaluate for ascites.    FINDINGS:  Right upper quadrant: Pocket of ascites measures up to depth of 11 cm.    Right lower quadrant: Pocket of ascites measures up to 9 cm.    Left upper quadrant: Mild ascites..    Left lower quadrant: Pocket of ascites measures up to depth of 6 cm.    Pleural effusions: Pleural effusions are partially imaged bilaterally.      IMPRESSION:  Moderate ascites in the right hemiabdomen and mild ascites in the left   hemiabdomen.    Bilateral pleural effusions.    --- End of Report ---  ROMARIO BAUTISTA MD; Attending Radiologist  This document has been electronically signed. Feb 13 2025  5:53AM    NEUROLOGICAL MEDICATIONS/OPIOIDS/BENZODIAZEPINE OVER PAST 24 HOURS    morphine  - Injectable   1 milliGRAM(s) IV Push (02-12-25 @ 22:11)    morphine  - Injectable   1 milliGRAM(s) IV Push (02-12-25 @ 22:24)    morphine  - Injectable   1 milliGRAM(s) IV Push (02-13-25 @ 12:45)

## 2025-02-13 NOTE — SWALLOW BEDSIDE ASSESSMENT ADULT - SWALLOW EVAL: RECOMMENDED DIET
NPO, with non-oral nutrition/hydration/medications. Ice chips for QOL purposes and to combat disuse atrophy.

## 2025-02-13 NOTE — CONSULT NOTE ADULT - ASSESSMENT
73 y/o male with hx of HCV (treated) with cirrhosis, liver mass concerning for HCC, hx of opioid abuse on methadone and alcohol abuse, recent hospitalization (1/11-1/13 for acute respiratory failure secondary to pna, L pleural effusion requiring chest tube and brief cardiac arrest now readmitted on 02/11 for worsening abdominal distention and dyspnea complicated by altered mental status likely secondary to acute decompensated liver failure with recurrent ascites. Palliative consulted for support and to assist with goals of care.     #Palliative Care Encounter  #Advance Care Planning  - palliative consulted for goals of care  - surrogate --> emergency contact listed as friend Nico however IR contacted friend and he declined to make decisions on patient's behalf  - Case discussed with Hospitalist. Ethics consult placed and discussed with ethicist Nae to assist with clarifying surrogate decision maker.   - Palliative will support and follow along to assist primary team with collaborative goals of care once appropriate surrogate is confirmed  - Overall prognosis guarded at best due to complex comorbidities    #Acute Decompensated Liver Failure with Recurrent Ascites  #Hx of HCV (treated) with Cirrhosis, Liver Mass Suspicious for Hepatocellular Carcinoma  #Hx of Alcohol Abuse  - initial Ammonia 131  - MELD 32  - imaging reviewed  - IR consulted for paracentesis however procedure cancelled pending surrogate decision maker  - GI input appreciated, will need eventual outpatient follow up hepatology Dr Burgess and EGD  - oncology input noted from 01/15/25, recommend liver imaging dedicated triple phase CT and outpatient follow up   - continue lactulose and rifaximin, empiric antibiotic for SBP  - continue multivitamin, thiamine, folic acid, PPI, octreotide    #Acute Delirium, Hepatic Encephalopathy 2/2 Hyperammoniemia   - supportive care, reorientation, sleep hygiene   - pt unable to tolerate SLP eval due to lethargy --> remains NPO  - aspiration precaution     #Hx of Opioid Abuse  - per EMR during last admission: follows with [facility] ([phone]) and was on methadone 40mg daily (decreased from 134mg baseline in s/o cirrhosis + prolonged QTc)  - EKG (02/11): QTc 400ms  - hospitalist to follow up with clinic to verify current dosing 73 y/o male with hx of HCV (treated) with cirrhosis, liver mass concerning for HCC, hx of opioid abuse on methadone and alcohol abuse, recent hospitalization (1/11-1/13 for acute respiratory failure secondary to pna, L pleural effusion requiring chest tube and brief cardiac arrest now readmitted on 02/11 for worsening abdominal distention and dyspnea complicated by altered mental status likely secondary to acute decompensated liver failure with recurrent ascites. Palliative consulted for support and to assist with goals of care.     #Palliative Care Encounter  #Advance Care Planning  - palliative consulted for goals of care  - pt currently lacks insight and medical decision making capcity  - surrogate --> emergency contact listed as friend Nico however IR contacted friend and he declined to make decisions on patient's behalf  - Case discussed with Hospitalist. Ethics consult placed and discussed with ethicist Nae to assist with clarifying surrogate decision maker.   - Palliative will support and follow along to assist primary team with collaborative goals of care once appropriate surrogate is confirmed  - Overall prognosis guarded at best due to complex comorbidities    #Acute Decompensated Liver Failure with Recurrent Ascites  #Hx of HCV (treated) with Cirrhosis, Liver Mass Suspicious for Hepatocellular Carcinoma  #Hx of Alcohol Abuse  - initial Ammonia 131  - MELD 32  - imaging reviewed  - IR consulted for paracentesis however procedure cancelled pending surrogate decision maker  - GI input appreciated, will need eventual outpatient follow up hepatology Dr Burgess and EGD  - oncology input noted from 01/15/25, recommend liver imaging dedicated triple phase CT and outpatient follow up   - continue lactulose and rifaximin, empiric antibiotic for SBP  - continue multivitamin, thiamine, folic acid    #MARY, Suspected Hepatorenal Syndrome  - nephrology input appreciated: no emergent need for dialysis however if no improvement with current conservative medical management may need to reevaluate need if in line with GOC  - continue midodrine, octreotide, albumin    #Acute Delirium, Hepatic Encephalopathy 2/2 Hyperammoniemia   - supportive care, reorientation, sleep hygiene   - pt unable to tolerate SLP eval due to lethargy --> remains NPO  - aspiration precaution     #Hx of Opioid Abuse  - per EMR during last admission: follows with [facility] ([phone]) and was on methadone 40mg daily (decreased from 134mg baseline in s/o cirrhosis + prolonged QTc)  - EKG (02/11): QTc 400ms  - hospitalist to follow up with clinic to verify current dosing

## 2025-02-13 NOTE — SWALLOW BEDSIDE ASSESSMENT ADULT - SLP PERTINENT HISTORY OF CURRENT PROBLEM
74y/oM PMH HCV cirrhosis, HCC, hx opioid and alcohol use, recently admitted to [facility] (1/11-1/23 s/p cardiac arrest, treated for pna and L-sided pleural efusion s/p chest tube and s/p paracentesis 1/15 with 600cc removed) presenting to ER c/o abd pain and distention x3 weeks and associated shortness of breath. Additionally reporting some brbpr. Pt admitted with decompensated cirrhosis, hepatic encephalopathy. Pt continues to be lethargic. Remains on hypothermic on warming blanket. S/p paracentesis. GI onboard. CT Abdomen and Pelvis w/ IV Cont (02.11.25) Cirrhosis with evidence of portal hypertension. Moderate volume of abdominal and pelvic ascites. Moderate left and small right pleural effusions with associated compressive atelectasis. Unchanged low-attenuation masslike lesion in the liver which is highly concerning for malignancy in the background of cirrhosis.

## 2025-02-13 NOTE — SWALLOW BEDSIDE ASSESSMENT ADULT - ORAL PREPARATORY PHASE
Anterior loss of bolus Reduced oral grading/Anterior loss of bolus/Lateral loss of bolus Reduced oral grading/Anterior loss of bolus/Lateral loss of bolus/Bolus falls into left lateral sulci/Bolus falls into right lateral sulci

## 2025-02-13 NOTE — PROGRESS NOTE ADULT - NS ATTEST RISK PROBLEM GEN_ALL_CORE FT
Pt with borderline SBP - neutraphils 235  unable to do large volume paracentesis - lack of consent   on antibiotics - Rocephin  Hypothermia continues - blood cultures negative   Continue lactulose enema  and Xifaxan   cmp ordered for tomorrow

## 2025-02-13 NOTE — CONSULT NOTE ADULT - TIME BILLING
D/W RN, hospitalist Dr Aleman, Ethics Nae  Total time also includes discussion during interdisciplinary team rounds, chart review including but not limited to prior admissions/ GOC discussions,  review of medications/ labs/ imaging, examination, care coordination with other health care professionals, documentation EXCLUDING  advance care planning discussions.

## 2025-02-13 NOTE — SWALLOW BEDSIDE ASSESSMENT ADULT - SWALLOW EVAL: DIAGNOSIS
Mentation does not support oral feeding at this time. Pt with poor level of alertness requiring max noxious stimuli to obtain wakefulness. Pt unable to sustain arousal without max stim. Pt fell asleep with PO in the oral cavity which was removed by this clinician. This service will continue to follow.

## 2025-02-13 NOTE — SWALLOW BEDSIDE ASSESSMENT ADULT - SLP GENERAL OBSERVATIONS
Pt encountered supine in bed, Lao speaking  [id number] utilized. O2 via nasal cannula. Pt lethargic, Ox1. Required max tactile and verbal stim for wakefulness. Reduced vocal intensity likely 2/2 reduced effort. C/o difficulty breathing at the end of assessment, RN immediately notified and in to assess.

## 2025-02-13 NOTE — CHART NOTE - NSCHARTNOTEFT_GEN_A_CORE
IR consulted for paracentesis  Patient presented to IR dept, somnolent unable to consent to procedure  emergency contacted Arian called at [phone] with  (Solomon ID# [id number])  Procedure explained to Arian, who would not consent to procedure as he did not want to be liable for decision he made for Mr. Box.   Procedure cancelled and patient was sent back to CDU  IR to sign off, please reconsult once patient mentation has improved so he can provide consent.

## 2025-02-13 NOTE — PROGRESS NOTE ADULT - SUBJECTIVE AND OBJECTIVE BOX
Chief Complaint:  Patient is a 74y old  Male who presents with a chief complaint of AMS    HPI/ 24 hr events: Patient seen and examined at bedside. Pt continues to be lethargic. Remains on hypothermic on warming blanket.         REVIEW OF SYSTEMS:   General: Negative  HEENT: Negative  CV: Negative  Respiratory: Negative  GI: See HPI  : Negative  MSK: Negative  Hematologic: Negative  Skin: Negative    MEDICATIONS:   MEDICATIONS  (STANDING):  albumin human 25% IVPB 50 milliLiter(s) IV Intermittent every 6 hours  cefTRIAXone Injectable. 2000 milliGRAM(s) IV Push every 24 hours  folic acid 1 milliGRAM(s) Oral daily  lactulose Retention Enema 200 Gram(s) Rectal four times a day  midodrine 5 milliGRAM(s) Oral every 8 hours  multivitamin 1 Tablet(s) Oral daily  octreotide  Injectable 100 MICROGram(s) IV Push every 8 hours  pantoprazole  Injectable 40 milliGRAM(s) IV Push daily  rifAXIMin 550 milliGRAM(s) Oral two times a day  thiamine 100 milliGRAM(s) Oral daily    MEDICATIONS  (PRN):  acetaminophen     Tablet .. 650 milliGRAM(s) Oral every 6 hours PRN Temp greater or equal to 38C (100.4F), Mild Pain (1 - 3)  albuterol/ipratropium for Nebulization 3 milliLiter(s) Nebulizer every 6 hours PRN Shortness of Breath and/or Wheezing  aluminum hydroxide/magnesium hydroxide/simethicone Suspension 30 milliLiter(s) Oral every 4 hours PRN Dyspepsia  melatonin 3 milliGRAM(s) Oral at bedtime PRN Insomnia  ondansetron Injectable 4 milliGRAM(s) IV Push every 8 hours PRN Nausea and/or Vomiting            DIET:  Diet, NPO (02-12-25 @ 15:40) [Active]          ALLERGIES:   Allergies    No Known Allergies    Intolerances        VITAL SIGNS:   Vital Signs Last 24 Hrs  T(C): 34.7 (13 Feb 2025 08:02), Max: 36.9 (13 Feb 2025 00:00)  T(F): 94.5 (13 Feb 2025 08:02), Max: 98.4 (13 Feb 2025 00:00)  HR: 58 (13 Feb 2025 08:02) (56 - 79)  BP: 94/60 (13 Feb 2025 08:02) (90/50 - 144/78)  BP(mean): 76 (12 Feb 2025 19:24) (73 - 76)  RR: 17 (13 Feb 2025 08:02) (17 - 20)  SpO2: 93% (13 Feb 2025 08:02) (89% - 98%)    Parameters below as of 13 Feb 2025 08:02  Patient On (Oxygen Delivery Method): nasal cannula      I&O's Summary    12 Feb 2025 07:01  -  13 Feb 2025 07:00  --------------------------------------------------------  IN: 0 mL / OUT: 90 mL / NET: -90 mL        PHYSICAL EXAM:   GENERAL:  No acute distress  HEENT:  NC/AT, conjunctiva clear, sclera anicteric  CHEST:  No increased effort  HEART:  Regular rate  ABDOMEN:  Soft, non-tender, non-distended, normoactive bowel sounds, no rebound or guarding  EXTREMITIES: No edema  SKIN:  Warm, dry  NEURO:  Calm, cooperative    LABS:                        10.6   6.30  )-----------( 61       ( 13 Feb 2025 03:10 )             31.2     Hemoglobin: 10.6 g/dL (02-13-25 @ 03:10)  Hemoglobin: 12.9 g/dL (02-12-25 @ 05:05)  Hemoglobin: 12.4 g/dL (02-11-25 @ 20:46)    02-13    134[L]  |  97  |  81.9[H]  ----------------------------<  73  3.7   |  20.0[L]  |  5.30[H]    Ca    8.8      13 Feb 2025 03:10  Phos  6.5     02-13  Mg     2.4     02-13    TPro  5.9[L]  /  Alb  2.6[L]  /  TBili  3.1[H]  /  DBili  2.2[H]  /  AST  117[H]  /  ALT  48[H]  /  AlkPhos  183[H]  02-13    LIVER FUNCTIONS - ( 13 Feb 2025 03:10 )  Alb: 2.6 g/dL / Pro: 5.9 g/dL / ALK PHOS: 183 U/L / ALT: 48 U/L / AST: 117 U/L / GGT: x             PT/INR - ( 13 Feb 2025 03:10 )   PT: 22.5 sec;   INR: 1.95 ratio         PTT - ( 13 Feb 2025 03:10 )  PTT:47.5 sec    Culture - Blood (collected 11 Feb 2025 21:55)  Source: .Blood BLOOD  Preliminary Report (13 Feb 2025 04:01):    No growth at 24 hours    Culture - Body Fluid with Gram Stain (collected 11 Feb 2025 20:20)  Source: Ascites Fl  Gram Stain (12 Feb 2025 07:14):    polymorphonuclear leukocytes seen    No organisms seen    by cytocentrifuge              Ammonia, Serum: 103 umol/L (02-13-25 @ 03:10)    Albumin, Fluid: 0.6 g/dL (02-11-25 @ 20:20)  Glucose, Fluid: 85 mg/dL (02-11-25 @ 20:20)  Lactate Dehydrogenase, Fluid: 113 U/L (02-11-25 @ 20:20)  Protein Total, Fluid: 1.2 g/dL (02-11-25 @ 20:20)    Body Fluid Appearance: Slightly Cloudy (02-11-25 @ 20:20)  Color - Body Fluid: Orange (02-11-25 @ 20:20)  Total RBC Count: 11797 cells/uL (02-11-25 @ 20:20)  BF Lymphocytes: 12 % (02-11-25 @ 20:20)  Monocyte/Macrophage Count - Body Fluid: 36 % (02-11-25 @ 20:20)  Mesothelial Cells - Body Fluid: 2 % (02-11-25 @ 20:20)                          RADIOLOGY & ADDITIONAL STUDIES:      ACC: [id number] EXAM:  CT ABDOMEN AND PELVIS IC   ORDERED BY: JONATHAN CAMARENA     PROCEDURE DATE:  02/11/2025          INTERPRETATION:  CLINICAL INFORMATION: Abdominal pain.    COMPARISON: 1/19/2025.    CONTRAST/COMPLICATIONS:  IV Contrast: Omnipaque 350  90 cc administered   10 cc discarded  Oral Contrast: NONE  .    PROCEDURE:  CT of the Abdomen and Pelvis was performed.  Sagittal and coronal reformats were performed.    FINDINGS:  LOWER CHEST: Moderate left and small right pleural effusion with   associated compressive atelectasis. Subsegmental atelectasis in the   lingula. Mild thoracic aortic atherosclerotic calcifications.    LIVER: Cirrhosis. Unchanged 7 cm low-attenuation masslike lesion in   segment 4/5 of the liver and scattered subcentimeter hypodensities which   are too small to characterize. Left portal vein remains occluded.  BILE DUCTS: Normal caliber.  GALLBLADDER: Within normal limits.  SPLEEN: Upper limits of normal in size.  PANCREAS: Within normal limits.  ADRENALS: Within normal limits.  KIDNEYS/URETERS: Kidneys enhance symmetrically without hydronephrosis.   Focal areas of left renal cortical thinning/scarring. Left renal cyst.   Punctate nonobstructing right intrarenal calculus.    BLADDER: Mild circumferential thickening of the urinary bladder walls   likely due to underdistention.  REPRODUCTIVE ORGANS: Prostate is within normal limits.    BOWEL: No bowel obstruction. Appendix is normal. Thickening of the walls   of the ascending colon likely due to portal colopathy.  PERITONEUM/RETROPERITONEUM: Moderate volume of abdominal and pelvic   ascites. No pneumoperitoneum or loculated collection to suggest abscess.  VESSELS: Portosystemic collaterals with perigastric varices.   Atherosclerotic calcifications of the aortoiliac tree. Normal caliber   abdominal aorta.  LYMPH NODES: Enlarged periportal and gastrohepatic lymph nodes, unchanged.  ABDOMINAL WALL: Ventral abdominal wall hernia repair. Generalized soft   tissue edema.  BONES: Degenerative changes of the spine. Focal anterior wedge   compression deformity of T8 with focal kyphosis. Hemangioma in the L2   vertebral body.    IMPRESSION:  Cirrhosis with evidence of portal hypertension. Moderate volume of   abdominal and pelvic ascites. Moderate left and small right pleural   effusions with associated compressive atelectasis.    Unchanged low-attenuation masslike lesion in the liver which is highly   concerning for malignancy in the background of cirrhosis.    Unchanged periportal and gastrohepaticlymphadenopathy.    Thickening of the ascending colon likely due to portal colopathy.        --- End of Report ---            CARLYN HASTINGS DO; Attending Radiologist  This document has been electronically signed. Feb 11 2025  9:37PM  02-11-25 @ 21:07    -- --   ACC: [id number] EXAM:  US ABDOMEN LIMITED   ORDERED BY: TRACEY NICHOLS     PROCEDURE DATE:  02/13/2025          INTERPRETATION:  CLINICAL INFORMATION: Cirrhosis.  Hepatic   encephalopathy.  Distended abdomen.    COMPARISON: 01/21/2025.    TECHNIQUE:Limited ultrasound of the abdomen to evaluate for ascites.    FINDINGS:  Right upper quadrant: Pocket of ascites measures up to depth of 11 cm.    Right lower quadrant: Pocket of ascites measures up to 9 cm.    Left upper quadrant: Mild ascites..    Left lower quadrant: Pocket of ascites measures up to depth of 6 cm.    Pleural effusions: Pleural effusions are partially imaged bilaterally.      IMPRESSION:  Moderate ascites in the right hemiabdomen and mild ascites in the left   hemiabdomen.    Bilateral pleural effusions.    --- End of Report ---            ROMARIO BAUTISTA MD; Attending Radiologist  This document has been electronically signed. Feb 13 2025  5:53AM --             Chief Complaint:  Patient is a 74y old  Male who presents with a chief complaint of AMS    HPI/ 24 hr events: Patient seen and examined at bedside. Pt continues to be lethargic. Remains on hypothermic on warming blanket. No leukocytosis.           MEDICATIONS:   MEDICATIONS  (STANDING):  albumin human 25% IVPB 50 milliLiter(s) IV Intermittent every 6 hours  cefTRIAXone Injectable. 2000 milliGRAM(s) IV Push every 24 hours  folic acid 1 milliGRAM(s) Oral daily  lactulose Retention Enema 200 Gram(s) Rectal four times a day  midodrine 5 milliGRAM(s) Oral every 8 hours  multivitamin 1 Tablet(s) Oral daily  octreotide  Injectable 100 MICROGram(s) IV Push every 8 hours  pantoprazole  Injectable 40 milliGRAM(s) IV Push daily  rifAXIMin 550 milliGRAM(s) Oral two times a day  thiamine 100 milliGRAM(s) Oral daily    MEDICATIONS  (PRN):  acetaminophen     Tablet .. 650 milliGRAM(s) Oral every 6 hours PRN Temp greater or equal to 38C (100.4F), Mild Pain (1 - 3)  albuterol/ipratropium for Nebulization 3 milliLiter(s) Nebulizer every 6 hours PRN Shortness of Breath and/or Wheezing  aluminum hydroxide/magnesium hydroxide/simethicone Suspension 30 milliLiter(s) Oral every 4 hours PRN Dyspepsia  melatonin 3 milliGRAM(s) Oral at bedtime PRN Insomnia  ondansetron Injectable 4 milliGRAM(s) IV Push every 8 hours PRN Nausea and/or Vomiting            DIET:  Diet, NPO (02-12-25 @ 15:40) [Active]          ALLERGIES:   Allergies    No Known Allergies    Intolerances        VITAL SIGNS:   Vital Signs Last 24 Hrs  T(C): 34.7 (13 Feb 2025 08:02), Max: 36.9 (13 Feb 2025 00:00)  T(F): 94.5 (13 Feb 2025 08:02), Max: 98.4 (13 Feb 2025 00:00)  HR: 58 (13 Feb 2025 08:02) (56 - 79)  BP: 94/60 (13 Feb 2025 08:02) (90/50 - 144/78)  BP(mean): 76 (12 Feb 2025 19:24) (73 - 76)  RR: 17 (13 Feb 2025 08:02) (17 - 20)  SpO2: 93% (13 Feb 2025 08:02) (89% - 98%)    Parameters below as of 13 Feb 2025 08:02  Patient On (Oxygen Delivery Method): nasal cannula      I&O's Summary    12 Feb 2025 07:01  -  13 Feb 2025 07:00  --------------------------------------------------------  IN: 0 mL / OUT: 90 mL / NET: -90 mL        PHYSICAL EXAM:   GENERAL:  ill appearing, lethargic, in warming blanket  HEENT:  NC/AT, conjunctiva clear, sclera anicteric  CHEST:  No increased effort  HEART:  Regular rate  ABDOMEN:  Soft, non-tender, non-distended, normoactive bowel sounds, no rebound or guarding  EXTREMITIES: No edema  SKIN:  Warm, dry  NEURO:  lethargic, A&Ox0    LABS:                        10.6   6.30  )-----------( 61       ( 13 Feb 2025 03:10 )             31.2     Hemoglobin: 10.6 g/dL (02-13-25 @ 03:10)  Hemoglobin: 12.9 g/dL (02-12-25 @ 05:05)  Hemoglobin: 12.4 g/dL (02-11-25 @ 20:46)    02-13    134[L]  |  97  |  81.9[H]  ----------------------------<  73  3.7   |  20.0[L]  |  5.30[H]    Ca    8.8      13 Feb 2025 03:10  Phos  6.5     02-13  Mg     2.4     02-13    TPro  5.9[L]  /  Alb  2.6[L]  /  TBili  3.1[H]  /  DBili  2.2[H]  /  AST  117[H]  /  ALT  48[H]  /  AlkPhos  183[H]  02-13    LIVER FUNCTIONS - ( 13 Feb 2025 03:10 )  Alb: 2.6 g/dL / Pro: 5.9 g/dL / ALK PHOS: 183 U/L / ALT: 48 U/L / AST: 117 U/L / GGT: x             PT/INR - ( 13 Feb 2025 03:10 )   PT: 22.5 sec;   INR: 1.95 ratio         PTT - ( 13 Feb 2025 03:10 )  PTT:47.5 sec    Culture - Blood (collected 11 Feb 2025 21:55)  Source: .Blood BLOOD  Preliminary Report (13 Feb 2025 04:01):    No growth at 24 hours    Culture - Body Fluid with Gram Stain (collected 11 Feb 2025 20:20)  Source: Ascites Fl  Gram Stain (12 Feb 2025 07:14):    polymorphonuclear leukocytes seen    No organisms seen    by cytocentrifuge              Ammonia, Serum: 103 umol/L (02-13-25 @ 03:10)    Albumin, Fluid: 0.6 g/dL (02-11-25 @ 20:20)  Glucose, Fluid: 85 mg/dL (02-11-25 @ 20:20)  Lactate Dehydrogenase, Fluid: 113 U/L (02-11-25 @ 20:20)  Protein Total, Fluid: 1.2 g/dL (02-11-25 @ 20:20)    Body Fluid Appearance: Slightly Cloudy (02-11-25 @ 20:20)  Color - Body Fluid: Orange (02-11-25 @ 20:20)  Total RBC Count: 59374 cells/uL (02-11-25 @ 20:20)  BF Lymphocytes: 12 % (02-11-25 @ 20:20)  Monocyte/Macrophage Count - Body Fluid: 36 % (02-11-25 @ 20:20)  Mesothelial Cells - Body Fluid: 2 % (02-11-25 @ 20:20)      RADIOLOGY & ADDITIONAL STUDIES:      ACC: [id number] EXAM:  CT ABDOMEN AND PELVIS IC   ORDERED BY: JONATHAN CAMARENA     PROCEDURE DATE:  02/11/2025          INTERPRETATION:  CLINICAL INFORMATION: Abdominal pain.    COMPARISON: 1/19/2025.    CONTRAST/COMPLICATIONS:  IV Contrast: Omnipaque 350  90 cc administered   10 cc discarded  Oral Contrast: NONE  .    PROCEDURE:  CT of the Abdomen and Pelvis was performed.  Sagittal and coronal reformats were performed.    FINDINGS:  LOWER CHEST: Moderate left and small right pleural effusion with   associated compressive atelectasis. Subsegmental atelectasis in the   lingula. Mild thoracic aortic atherosclerotic calcifications.    LIVER: Cirrhosis. Unchanged 7 cm low-attenuation masslike lesion in   segment 4/5 of the liver and scattered subcentimeter hypodensities which   are too small to characterize. Left portal vein remains occluded.  BILE DUCTS: Normal caliber.  GALLBLADDER: Within normal limits.  SPLEEN: Upper limits of normal in size.  PANCREAS: Within normal limits.  ADRENALS: Within normal limits.  KIDNEYS/URETERS: Kidneys enhance symmetrically without hydronephrosis.   Focal areas of left renal cortical thinning/scarring. Left renal cyst.   Punctate nonobstructing right intrarenal calculus.    BLADDER: Mild circumferential thickening of the urinary bladder walls   likely due to underdistention.  REPRODUCTIVE ORGANS: Prostate is within normal limits.    BOWEL: No bowel obstruction. Appendix is normal. Thickening of the walls   of the ascending colon likely due to portal colopathy.  PERITONEUM/RETROPERITONEUM: Moderate volume of abdominal and pelvic   ascites. No pneumoperitoneum or loculated collection to suggest abscess.  VESSELS: Portosystemic collaterals with perigastric varices.   Atherosclerotic calcifications of the aortoiliac tree. Normal caliber   abdominal aorta.  LYMPH NODES: Enlarged periportal and gastrohepatic lymph nodes, unchanged.  ABDOMINAL WALL: Ventral abdominal wall hernia repair. Generalized soft   tissue edema.  BONES: Degenerative changes of the spine. Focal anterior wedge   compression deformity of T8 with focal kyphosis. Hemangioma in the L2   vertebral body.    IMPRESSION:  Cirrhosis with evidence of portal hypertension. Moderate volume of   abdominal and pelvic ascites. Moderate left and small right pleural   effusions with associated compressive atelectasis.    Unchanged low-attenuation masslike lesion in the liver which is highly   concerning for malignancy in the background of cirrhosis.    Unchanged periportal and gastrohepaticlymphadenopathy.    Thickening of the ascending colon likely due to portal colopathy.        --- End of Report ---            CARLYN HASTINGS DO; Attending Radiologist  This document has been electronically signed. Feb 11 2025  9:37PM  02-11-25 @ 21:07    -- --   ACC: [id number] EXAM:  US ABDOMEN LIMITED   ORDERED BY: TRACEY NICHOLS     PROCEDURE DATE:  02/13/2025          INTERPRETATION:  CLINICAL INFORMATION: Cirrhosis.  Hepatic   encephalopathy.  Distended abdomen.    COMPARISON: 01/21/2025.    TECHNIQUE:Limited ultrasound of the abdomen to evaluate for ascites.    FINDINGS:  Right upper quadrant: Pocket of ascites measures up to depth of 11 cm.    Right lower quadrant: Pocket of ascites measures up to 9 cm.    Left upper quadrant: Mild ascites..    Left lower quadrant: Pocket of ascites measures up to depth of 6 cm.    Pleural effusions: Pleural effusions are partially imaged bilaterally.      IMPRESSION:  Moderate ascites in the right hemiabdomen and mild ascites in the left   hemiabdomen.    Bilateral pleural effusions.    --- End of Report ---            ROMARIO BAUTISTA MD; Attending Radiologist  This document has been electronically signed. Feb 13 2025  5:53AM --

## 2025-02-14 NOTE — PROVIDER CONTACT NOTE (OTHER) - ASSESSMENT
Pt is restless and complaining of abdominal pain.
Bladder scan complete. Medicine PA called for concern of skin breakdown. PA endorsed to assess and determine the need for an indwelling catheter placement during the daytime.

## 2025-02-14 NOTE — PROGRESS NOTE ADULT - ASSESSMENT
73 y/o male with hx of HCV (treated) with cirrhosis, liver mass concerning for HCC, hx of opioid abuse on methadone and alcohol abuse, recent hospitalization (1/11-1/13 for acute respiratory failure secondary to pna, L pleural effusion requiring chest tube and brief cardiac arrest now readmitted on 02/11 for worsening abdominal distention and dyspnea complicated by altered mental status likely secondary to acute decompensated liver failure with recurrent ascites. Palliative consulted for support and to assist with goals of care.     #Palliative Care Encounter  #Advance Care Planning  - palliative consulted for goals of care  - ethics assistance appreciated for clarification of surrogate --> 2 physician consent if pt lacks capacity for complex medical decision making  - pt's mentation improving today, oriented x3, appears to have limited insight but difficult to fully assess due to acute pain at time of visit  - pt continues to be medically optimized including IR reconsult for paracentesis  - encourage ongoing goals of care and advance care planning with pt if his mentation continues to improve given his complex comorbidities  - Palliative will continue to support and follow course     #Acute Decompensated Liver Failure with Recurrent Ascites  #Hx of HCV (treated) with Cirrhosis, Liver Mass Suspicious for Hepatocellular Carcinoma  #Hx of Alcohol Abuse  - initial Ammonia 131  - MELD 32  - imaging reviewed  - IR reconsulted as mentation improved for paracentesis  - GI input appreciated, will need eventual outpatient follow up hepatology Dr Burgess and EGD  - oncology input noted from 01/15/25, recommend liver imaging dedicated triple phase CT and outpatient follow up   - continue lactulose and rifaximin, empiric antibiotic for SBP  - continue multivitamin, thiamine, folic acid    #MARY, Suspected Hepatorenal Syndrome  - slight down trend of renal indices  - nephrology input appreciated: no emergent need for dialysis however if no improvement with current conservative medical management may need to reevaluate need if in line with GOC  - continue midodrine, octreotide, albumin    #Acute Delirium, Hepatic Encephalopathy 2/2 Hyperammoniemia   - supportive care, reorientation, sleep hygiene   - aspiration precaution   - SLP now that he is awake    #Hx of Opioid Abuse on Methadone  #Acute abdominal pain  - Istop checked, Reference #: [id number] --> no prescription filled in the past 12 months per report  - per EMR during last admission: follows with [facility] ([phone]) and was on methadone 40mg daily (decreased from 134mg baseline in s/o cirrhosis + prolonged QTc)  - EKG (02/11): QTc 400ms  - pt is anxious and complaining of acute abdominal pain, unclear if ascites related versus withdrawal symptoms (currently NPO and not on methadone). he has received several IVP morphine and Dilaudid over the past 24 hours (~total 30 morphine oral equivalent)  - currently on IVP Dilaudid 0.2-0.5mg PRN for moderate to severe pain by primary team  - recommend pain consult to assist with medication titration given complex pain regimen

## 2025-02-14 NOTE — DIETITIAN INITIAL EVALUATION ADULT - PERTINENT MEDS FT
MEDICATIONS  (STANDING):  folic acid 1 milliGRAM(s) Oral daily  multivitamin 1 Tablet(s) Oral daily  thiamine 100 milliGRAM(s) Oral daily

## 2025-02-14 NOTE — PROVIDER CONTACT NOTE (CHANGE IN STATUS NOTIFICATION) - SITUATION
Pt experienced desaturation following an administration of lactulose rectally as ordered by ANTOINE Vasquez. In response, pt was placed on nonrebreather. Respiratory therapist at bedside give PT a respiratory tx and chikis STAT ABGS. Pt was placed on BIPAP to support and improve their oxygenation.
Pt coughed and needed to be suctioned while swallowing liquids. Concern for aspiration. Pt lethargic, sleeping in between care. Pt has not voided on this shift. Pt BP 95/61, rectal temp 96

## 2025-02-14 NOTE — CONSULT NOTE ADULT - SUBJECTIVE AND OBJECTIVE BOX
Patient is a 74y old  Male who presents with a chief complaint of Decompensated liver cirrhosis (13 Feb 2025 16:46)      HPI:  74y/oM PMH HCV cirrhosis, HCC, hx opioid and alcohol use, recently admitted to [facility] (1/11-1/23 s/p cardiac arrest, treated for pna and L-sided pleural efusion s/p chest tube and s/p paracentesis 1/15 with 600cc removed) presenting to ER c/o abd pain and distention x3 weeks and associated shortness of breath. Additionally reporting some brbpr but unable to clarify when this stated or frequency of occurrence.      Pt answering most questions appropriately and following commands at time of evaluation, however, slow to answer and with general confusion. denies taking any medications since leaving the hospital. reports living alone, uses cane with ambulation.  (11 Feb 2025 23:23)    Called by Medical PA as pt became hypoxemic after being positioned for rectal lactulose.  Pt placed on Bipap and doing well.      PAST MEDICAL & SURGICAL HISTORY:  Hepatitis C virus infection      No significant past surgical history        Allergies    No Known Allergies    Intolerances      FAMILY HISTORY:    Home Medications:  atorvastatin 40 mg oral tablet: 1 tab(s) orally once a day (at bedtime) (23 Jan 2025 11:17)  carvedilol 3.125 mg oral tablet: 1 tab(s) orally every 12 hours (23 Jan 2025 11:17)  folic acid 1 mg oral tablet: 1 tab(s) orally once a day (23 Jan 2025 11:17)  lactulose 10 g/15 mL oral syrup: 30 milliliter(s) orally 3 times a day (23 Jan 2025 11:17)  methadone 10 mg oral tablet: 4 tab(s) orally once a day (23 Jan 2025 11:17)  Multiple Vitamins oral tablet: 1 tab(s) orally once a day (23 Jan 2025 11:17)  rifAXIMin 550 mg oral tablet: 1 tab(s) orally 2 times a day (23 Jan 2025 11:17)  thiamine 100 mg oral tablet: 1 tab(s) orally once a day (23 Jan 2025 11:17)    Occupation:  Alochol: Denied  Smoking: Denied  Drug Use: Denied  Marital Status:         ROS: as in HPI; All other systems reviewed are negative    ICU Vital Signs Last 24 Hrs  T(C): 34.2 (14 Feb 2025 01:04), Max: 36.6 (13 Feb 2025 04:00)  T(F): 93.5 (14 Feb 2025 01:04), Max: 97.9 (13 Feb 2025 04:00)  HR: 60 (14 Feb 2025 00:45) (56 - 67)  BP: 148/60 (14 Feb 2025 00:45) (94/60 - 148/60)  BP(mean): 80 (13 Feb 2025 23:00) (76 - 80)  ABP: --  ABP(mean): --  RR: 18 (13 Feb 2025 23:00) (17 - 22)  SpO2: 100% (14 Feb 2025 00:45) (92% - 100%)    O2 Parameters below as of 14 Feb 2025 00:45  Patient On (Oxygen Delivery Method): BiPAP/CPAP              Physical Examination:    General: No acute distress.  Alert, oriented, interactive, nonfocal    HEENT: Pupils equal, reactive to light.  Symmetric.    PULM: Clear to auscultation bilaterally, no significant sputum production    CVS: Regular rate and rhythm, no murmurs, rubs, or gallops    ABD: Soft, nondistended, nontender, normoactive bowel sounds, no masses    EXT: No edema, nontender    SKIN: Warm and well perfused, no rashes noted.          ABG - ( 14 Feb 2025 00:35 )  pH, Arterial: 7.120 pH, Blood: x     /  pCO2: 65    /  pO2: 88    / HCO3: 21    / Base Excess: -8.2  /  SaO2: 94.5                I&O's Detail    12 Feb 2025 07:01  -  13 Feb 2025 07:00  --------------------------------------------------------  IN:  Total IN: 0 mL    OUT:    Intermittent Catheterization - Urethral (mL): 90 mL  Total OUT: 90 mL    Total NET: -90 mL      13 Feb 2025 07:01  -  14 Feb 2025 01:17  --------------------------------------------------------  IN:    IV PiggyBack: 50 mL  Total IN: 50 mL    OUT:  Total OUT: 0 mL    Total NET: 50 mL            LABS:                        11.3   6.90  )-----------( 59       ( 13 Feb 2025 19:00 )             35.1     13 Feb 2025 03:10    134    |  97     |  81.9   ----------------------------<  73     3.7     |  20.0   |  5.30     Ca    8.8        13 Feb 2025 03:10  Phos  6.5       13 Feb 2025 03:10  Mg     2.4       13 Feb 2025 03:10    TPro  5.9    /  Alb  2.6    /  TBili  3.1    /  DBili  2.2    /  AST  117    /  ALT  48     /  AlkPhos  183    13 Feb 2025 03:10  Amylase x     lipase x              CAPILLARY BLOOD GLUCOSE      POCT Blood Glucose.: 101 mg/dL (13 Feb 2025 13:20)    PT/INR - ( 13 Feb 2025 03:10 )   PT: 22.5 sec;   INR: 1.95 ratio         PTT - ( 13 Feb 2025 03:10 )  PTT:47.5 sec  Urinalysis Basic - ( 13 Feb 2025 03:10 )    Color: x / Appearance: x / SG: x / pH: x  Gluc: 73 mg/dL / Ketone: x  / Bili: x / Urobili: x   Blood: x / Protein: x / Nitrite: x   Leuk Esterase: x / RBC: x / WBC x   Sq Epi: x / Non Sq Epi: x / Bacteria: x      Culture    Culture - Urine (collected 12 Feb 2025 14:20)  Source: Clean Catch Clean Catch (Midstream)  Final Report (13 Feb 2025 15:22):    <10,000 CFU/mL Normal Urogenital Fanny    Culture - Blood (collected 11 Feb 2025 21:55)  Source: .Blood BLOOD  Preliminary Report (13 Feb 2025 04:01):    No growth at 24 hours    Culture - Body Fluid with Gram Stain (collected 11 Feb 2025 20:20)  Source: Ascites Fl  Gram Stain (12 Feb 2025 07:14):    polymorphonuclear leukocytes seen    No organisms seen    by cytocentrifuge  Preliminary Report (13 Feb 2025 11:22):    No growth to date.      Lactate, Blood: 2.8 mmol/L (02-12-25 @ 11:00)  Lactate, Blood: 2.7 mmol/L (02-11-25 @ 23:55)  Lactate, Blood: 2.8 mmol/L (02-11-25 @ 20:46)      MEDICATIONS  (STANDING):  albumin human 25% IVPB 50 milliLiter(s) IV Intermittent every 6 hours  albuterol/ipratropium for Nebulization. 3 milliLiter(s) Nebulizer once  cefTRIAXone Injectable. 2000 milliGRAM(s) IV Push every 24 hours  folic acid 1 milliGRAM(s) Oral daily  lactulose Retention Enema 200 Gram(s) Rectal four times a day  midodrine 5 milliGRAM(s) Oral every 8 hours  multivitamin 1 Tablet(s) Oral daily  naloxone Injectable 0.4 milliGRAM(s) IV Push once  octreotide  Injectable 100 MICROGram(s) IV Push every 8 hours  pantoprazole  Injectable 40 milliGRAM(s) IV Push daily  rifAXIMin 550 milliGRAM(s) Oral two times a day  thiamine 100 milliGRAM(s) Oral daily    MEDICATIONS  (PRN):  acetaminophen     Tablet .. 650 milliGRAM(s) Oral every 6 hours PRN Temp greater or equal to 38C (100.4F), Mild Pain (1 - 3)  albuterol/ipratropium for Nebulization 3 milliLiter(s) Nebulizer every 6 hours PRN Shortness of Breath and/or Wheezing  aluminum hydroxide/magnesium hydroxide/simethicone Suspension 30 milliLiter(s) Oral every 4 hours PRN Dyspepsia  HYDROmorphone  Injectable 0.2 milliGRAM(s) IV Push every 6 hours PRN Moderate Pain (4 - 6)  HYDROmorphone  Injectable 0.5 milliGRAM(s) IV Push every 6 hours PRN Severe Pain (7 - 10)  melatonin 3 milliGRAM(s) Oral at bedtime PRN Insomnia  ondansetron Injectable 4 milliGRAM(s) IV Push every 8 hours PRN Nausea and/or Vomiting        RADIOLOGY: ***     CXR: small b/l pleural effusions ( see official report)

## 2025-02-14 NOTE — DIETITIAN INITIAL EVALUATION ADULT - PERTINENT LABORATORY DATA
02-14 Na138 mmol/L Glu 93 mg/dL K+ 3.8 mmol/L Cr  5.03 mg/dL[H] BUN 85.6 mg/dL[H] Phos 7.0 mg/dL[H] Alb 2.8 g/dL[L] PAB n/a       A1C with Estimated Average Glucose Result: 5.4 % (01-12-25 @ 03:05)

## 2025-02-14 NOTE — PROGRESS NOTE ADULT - SUBJECTIVE AND OBJECTIVE BOX
Chief Complaint:  Patient is a 74y old  Male who presents with a chief complaint of Decompensated liver cirrhosis (13 Feb 2025 16:46)      HPI/ 24 hr events: Patient seen and examined at bedside. More awake but screaming in pain, still confused, not answering questions. Pt put on bipap overnight. LFTs stable.       REVIEW OF SYSTEMS:   General: Negative  HEENT: Negative  CV: Negative  Respiratory: Negative  GI: See HPI  : Negative  MSK: Negative  Hematologic: Negative  Skin: Negative    MEDICATIONS:   MEDICATIONS  (STANDING):  cefTRIAXone Injectable. 2000 milliGRAM(s) IV Push every 24 hours  folic acid 1 milliGRAM(s) Oral daily  lactulose Retention Enema 200 Gram(s) Rectal four times a day  midodrine 5 milliGRAM(s) Oral every 8 hours  multivitamin 1 Tablet(s) Oral daily  naloxone Injectable 0.4 milliGRAM(s) IV Push once  octreotide  Injectable 100 MICROGram(s) IV Push every 8 hours  pantoprazole  Injectable 40 milliGRAM(s) IV Push daily  rifAXIMin 550 milliGRAM(s) Oral two times a day  thiamine 100 milliGRAM(s) Oral daily    MEDICATIONS  (PRN):  acetaminophen     Tablet .. 650 milliGRAM(s) Oral every 6 hours PRN Temp greater or equal to 38C (100.4F), Mild Pain (1 - 3)  albuterol/ipratropium for Nebulization 3 milliLiter(s) Nebulizer every 6 hours PRN Shortness of Breath and/or Wheezing  aluminum hydroxide/magnesium hydroxide/simethicone Suspension 30 milliLiter(s) Oral every 4 hours PRN Dyspepsia  HYDROmorphone  Injectable 0.2 milliGRAM(s) IV Push every 6 hours PRN Moderate Pain (4 - 6)  HYDROmorphone  Injectable 0.5 milliGRAM(s) IV Push every 6 hours PRN Severe Pain (7 - 10)  melatonin 3 milliGRAM(s) Oral at bedtime PRN Insomnia  ondansetron Injectable 4 milliGRAM(s) IV Push every 8 hours PRN Nausea and/or Vomiting            DIET:  Diet, NPO (02-12-25 @ 15:40) [Active]          ALLERGIES:   Allergies    No Known Allergies    Intolerances        VITAL SIGNS:   Vital Signs Last 24 Hrs  T(C): 36.4 (14 Feb 2025 08:31), Max: 36.4 (14 Feb 2025 08:31)  T(F): 97.5 (14 Feb 2025 08:31), Max: 97.5 (14 Feb 2025 08:31)  HR: 68 (14 Feb 2025 10:01) (53 - 68)  BP: 97/53 (14 Feb 2025 08:31) (97/53 - 148/60)  BP(mean): 71 (14 Feb 2025 02:00) (71 - 80)  RR: 19 (14 Feb 2025 08:31) (18 - 22)  SpO2: 99% (14 Feb 2025 10:01) (92% - 100%)    Parameters below as of 14 Feb 2025 08:31  Patient On (Oxygen Delivery Method): BiPAP/CPAP      I&O's Summary    13 Feb 2025 07:01  -  14 Feb 2025 07:00  --------------------------------------------------------  IN: 50 mL / OUT: 0 mL / NET: 50 mL        PHYSICAL EXAM:   GENERAL:  screaming in pain   HEENT:  NC/AT, conjunctiva clear, sclera anicteric  CHEST:  No increased effort  HEART:  Regular rate  ABDOMEN:  Soft, non-tender, +distended, normoactive bowel sounds, no rebound or guarding  EXTREMITIES: No edema  SKIN:  Warm, dry  NEURO:  Confused     LABS:                        10.9   6.35  )-----------( 65       ( 14 Feb 2025 04:00 )             33.4     Hemoglobin: 10.9 g/dL (02-14-25 @ 04:00)  Hemoglobin: 11.3 g/dL (02-13-25 @ 19:00)  Hemoglobin: 10.6 g/dL (02-13-25 @ 03:10)  Hemoglobin: 12.9 g/dL (02-12-25 @ 05:05)  Hemoglobin: 12.4 g/dL (02-11-25 @ 20:46)    02-14    138  |  100  |  85.6[H]  ----------------------------<  93  3.8   |  19.0[L]  |  5.03[H]    Ca    8.9      14 Feb 2025 04:00  Phos  7.0     02-14  Mg     2.7     02-14    TPro  5.9[L]  /  Alb  2.8[L]  /  TBili  3.2[H]  /  DBili  2.0[H]  /  AST  115[H]  /  ALT  46[H]  /  AlkPhos  169[H]  02-14    LIVER FUNCTIONS - ( 14 Feb 2025 04:00 )  Alb: 2.8 g/dL / Pro: 5.9 g/dL / ALK PHOS: 169 U/L / ALT: 46 U/L / AST: 115 U/L / GGT: x             PT/INR - ( 14 Feb 2025 04:00 )   PT: 23.3 sec;   INR: 2.08 ratio         PTT - ( 14 Feb 2025 04:00 )  PTT:48.7 sec    Culture - Urine (collected 12 Feb 2025 14:20)  Source: Clean Catch Clean Catch (Midstream)  Final Report (13 Feb 2025 15:22):    <10,000 CFU/mL Normal Urogenital Fanny    Culture - Blood (collected 11 Feb 2025 21:55)  Source: .Blood BLOOD  Preliminary Report (14 Feb 2025 04:01):    No growth at 48 Hours    Culture - Body Fluid with Gram Stain (collected 11 Feb 2025 20:20)  Source: Ascites Fl  Gram Stain (12 Feb 2025 07:14):    polymorphonuclear leukocytes seen    No organisms seen    by cytocentrifuge  Preliminary Report (13 Feb 2025 11:22):    No growth to date.        Albumin, Fluid: 0.6 g/dL (02-11-25 @ 20:20)  Glucose, Fluid: 85 mg/dL (02-11-25 @ 20:20)  Lactate Dehydrogenase, Fluid: 113 U/L (02-11-25 @ 20:20)  Protein Total, Fluid: 1.2 g/dL (02-11-25 @ 20:20)      RADIOLOGY & ADDITIONAL STUDIES:      ACC: [id number] EXAM:  CT ABDOMEN AND PELVIS IC   ORDERED BY: JONATHAN CAMARENA     PROCEDURE DATE:  02/11/2025          INTERPRETATION:  CLINICAL INFORMATION: Abdominal pain.    COMPARISON: 1/19/2025.    CONTRAST/COMPLICATIONS:  IV Contrast: Omnipaque 350  90 cc administered   10 cc discarded  Oral Contrast: NONE  .    PROCEDURE:  CT of the Abdomen and Pelvis was performed.  Sagittal and coronal reformats were performed.    FINDINGS:  LOWER CHEST: Moderate left and small right pleural effusion with   associated compressive atelectasis. Subsegmental atelectasis in the   lingula. Mild thoracic aortic atherosclerotic calcifications.    LIVER: Cirrhosis. Unchanged 7 cm low-attenuation masslike lesion in   segment 4/5 of the liver and scattered subcentimeter hypodensities which   are too small to characterize. Left portal vein remains occluded.  BILE DUCTS: Normal caliber.  GALLBLADDER: Within normal limits.  SPLEEN: Upper limits of normal in size.  PANCREAS: Within normal limits.  ADRENALS: Within normal limits.  KIDNEYS/URETERS: Kidneys enhance symmetrically without hydronephrosis.   Focal areas of left renal cortical thinning/scarring. Left renal cyst.   Punctate nonobstructing right intrarenal calculus.    BLADDER: Mild circumferential thickening of the urinary bladder walls   likely due to underdistention.  REPRODUCTIVE ORGANS: Prostate is within normal limits.    BOWEL: No bowel obstruction. Appendix is normal. Thickening of the walls   of the ascending colon likely due to portal colopathy.  PERITONEUM/RETROPERITONEUM: Moderate volume of abdominal and pelvic   ascites. No pneumoperitoneum or loculated collection to suggest abscess.  VESSELS: Portosystemic collaterals with perigastric varices.   Atherosclerotic calcifications of the aortoiliac tree. Normal caliber   abdominal aorta.  LYMPH NODES: Enlarged periportal and gastrohepatic lymph nodes, unchanged.  ABDOMINAL WALL: Ventral abdominal wall hernia repair. Generalized soft   tissue edema.  BONES: Degenerative changes of the spine. Focal anterior wedge   compression deformity of T8 with focal kyphosis. Hemangioma in the L2   vertebral body.    IMPRESSION:  Cirrhosis with evidence of portal hypertension. Moderate volume of   abdominal and pelvic ascites. Moderate left and small right pleural   effusions with associated compressive atelectasis.    Unchanged low-attenuation masslike lesion in the liver which is highly   concerning for malignancy in the background of cirrhosis.    Unchanged periportal and gastrohepaticlymphadenopathy.    Thickening of the ascending colon likely due to portal colopathy.        --- End of Report ---            CARLYN HASTINGS DO; Attending Radiologist  This document has been electronically signed. Feb 11 2025  9:37PM  02-11-25 @ 21:07    -- --   ACC: [id number] EXAM:  US ABDOMEN LIMITED   ORDERED BY: TRACEY NICHOLS     PROCEDURE DATE:  02/13/2025          INTERPRETATION:  CLINICAL INFORMATION: Cirrhosis.  Hepatic   encephalopathy.  Distended abdomen.    COMPARISON: 01/21/2025.    TECHNIQUE:Limited ultrasound of the abdomen to evaluate for ascites.    FINDINGS:  Right upper quadrant: Pocket of ascites measures up to depth of 11 cm.    Right lower quadrant: Pocket of ascites measures up to 9 cm.    Left upper quadrant: Mild ascites..    Left lower quadrant: Pocket of ascites measures up to depth of 6 cm.    Pleural effusions: Pleural effusions are partially imaged bilaterally.      IMPRESSION:  Moderate ascites in the right hemiabdomen and mild ascites in the left   hemiabdomen.    Bilateral pleural effusions.    --- End of Report ---            ROMARIO BAUTISTA MD; Attending Radiologist  This document has been electronically signed. Feb 13 2025  5:53AM --     Chief Complaint:  Patient is a 74y old  Male who presents with a chief complaint of Decompensated liver cirrhosis (13 Feb 2025 16:46)      HPI/ 24 hr events: Patient seen and examined at bedside. More awake but screaming in pain, still confused, not answering questions. Pt put on bipap overnight. LFTs stable.       REVIEW OF SYSTEMS:   General: Negative  HEENT: Negative  CV: Negative  Respiratory: Negative  GI: See HPI  : Negative  MSK: Negative  Hematologic: Negative  Skin: Negative    MEDICATIONS:   MEDICATIONS  (STANDING):  cefTRIAXone Injectable. 2000 milliGRAM(s) IV Push every 24 hours  folic acid 1 milliGRAM(s) Oral daily  lactulose Retention Enema 200 Gram(s) Rectal four times a day  midodrine 5 milliGRAM(s) Oral every 8 hours  multivitamin 1 Tablet(s) Oral daily  naloxone Injectable 0.4 milliGRAM(s) IV Push once  octreotide  Injectable 100 MICROGram(s) IV Push every 8 hours  pantoprazole  Injectable 40 milliGRAM(s) IV Push daily  rifAXIMin 550 milliGRAM(s) Oral two times a day  thiamine 100 milliGRAM(s) Oral daily    MEDICATIONS  (PRN):  acetaminophen     Tablet .. 650 milliGRAM(s) Oral every 6 hours PRN Temp greater or equal to 38C (100.4F), Mild Pain (1 - 3)  albuterol/ipratropium for Nebulization 3 milliLiter(s) Nebulizer every 6 hours PRN Shortness of Breath and/or Wheezing  aluminum hydroxide/magnesium hydroxide/simethicone Suspension 30 milliLiter(s) Oral every 4 hours PRN Dyspepsia  HYDROmorphone  Injectable 0.2 milliGRAM(s) IV Push every 6 hours PRN Moderate Pain (4 - 6)  HYDROmorphone  Injectable 0.5 milliGRAM(s) IV Push every 6 hours PRN Severe Pain (7 - 10)  melatonin 3 milliGRAM(s) Oral at bedtime PRN Insomnia  ondansetron Injectable 4 milliGRAM(s) IV Push every 8 hours PRN Nausea and/or Vomiting            DIET:  Diet, NPO (02-12-25 @ 15:40) [Active]          ALLERGIES:   Allergies    No Known Allergies    Intolerances        VITAL SIGNS:   Vital Signs Last 24 Hrs  T(C): 36.4 (14 Feb 2025 08:31), Max: 36.4 (14 Feb 2025 08:31)  T(F): 97.5 (14 Feb 2025 08:31), Max: 97.5 (14 Feb 2025 08:31)  HR: 68 (14 Feb 2025 10:01) (53 - 68)  BP: 97/53 (14 Feb 2025 08:31) (97/53 - 148/60)  BP(mean): 71 (14 Feb 2025 02:00) (71 - 80)  RR: 19 (14 Feb 2025 08:31) (18 - 22)  SpO2: 99% (14 Feb 2025 10:01) (92% - 100%)    Parameters below as of 14 Feb 2025 08:31  Patient On (Oxygen Delivery Method): BiPAP/CPAP      I&O's Summary    13 Feb 2025 07:01  -  14 Feb 2025 07:00  --------------------------------------------------------  IN: 50 mL / OUT: 0 mL / NET: 50 mL        PHYSICAL EXAM:   GENERAL:  screaming in pain   HEENT:  NC/AT, conjunctiva clear, sclera anicteric  CHEST:  No increased effort  HEART:  Regular rate  ABDOMEN:  Soft, non-tender, +distended, normoactive bowel sounds, no rebound or guarding  EXTREMITIES: No edema  SKIN:  Warm, dry  NEURO:  Confused     LABS:                        10.9   6.35  )-----------( 65       ( 14 Feb 2025 04:00 )             33.4     Hemoglobin: 10.9 g/dL (02-14-25 @ 04:00)  Hemoglobin: 11.3 g/dL (02-13-25 @ 19:00)  Hemoglobin: 10.6 g/dL (02-13-25 @ 03:10)  Hemoglobin: 12.9 g/dL (02-12-25 @ 05:05)  Hemoglobin: 12.4 g/dL (02-11-25 @ 20:46)    02-14    138  |  100  |  85.6[H]  ----------------------------<  93  3.8   |  19.0[L]  |  5.03[H]    Ca    8.9      14 Feb 2025 04:00  Phos  7.0     02-14  Mg     2.7     02-14    TPro  5.9[L]  /  Alb  2.8[L]  /  TBili  3.2[H]  /  DBili  2.0[H]  /  AST  115[H]  /  ALT  46[H]  /  AlkPhos  169[H]  02-14    LIVER FUNCTIONS - ( 14 Feb 2025 04:00 )  Alb: 2.8 g/dL / Pro: 5.9 g/dL / ALK PHOS: 169 U/L / ALT: 46 U/L / AST: 115 U/L / GGT: x             PT/INR - ( 14 Feb 2025 04:00 )   PT: 23.3 sec;   INR: 2.08 ratio         PTT - ( 14 Feb 2025 04:00 )  PTT:48.7 sec    Culture - Urine (collected 12 Feb 2025 14:20)  Source: Clean Catch Clean Catch (Midstream)  Final Report (13 Feb 2025 15:22):    <10,000 CFU/mL Normal Urogenital Fanny    Culture - Blood (collected 11 Feb 2025 21:55)  Source: .Blood BLOOD  Preliminary Report (14 Feb 2025 04:01):    No growth at 48 Hours    Culture - Body Fluid with Gram Stain (collected 11 Feb 2025 20:20)  Source: Ascites Fl  Gram Stain (12 Feb 2025 07:14):    polymorphonuclear leukocytes seen    No organisms seen    by cytocentrifuge  Preliminary Report (13 Feb 2025 11:22):    No growth to date.        Albumin, Fluid: 0.6 g/dL (02-11-25 @ 20:20)  Glucose, Fluid: 85 mg/dL (02-11-25 @ 20:20)  Lactate Dehydrogenase, Fluid: 113 U/L (02-11-25 @ 20:20)  Protein Total, Fluid: 1.2 g/dL (02-11-25 @ 20:20)      RADIOLOGY & ADDITIONAL STUDIES:      ACC: [id number] EXAM:  CT ABDOMEN AND PELVIS IC   ORDERED BY: JONATHAN CAMARENA     PROCEDURE DATE:  02/11/2025          INTERPRETATION:  CLINICAL INFORMATION: Abdominal pain.    COMPARISON: 1/19/2025.    CONTRAST/COMPLICATIONS:  IV Contrast: Omnipaque 350  90 cc administered   10 cc discarded  Oral Contrast: NONE  .    PROCEDURE:  CT of the Abdomen and Pelvis was performed.  Sagittal and coronal reformats were performed.    FINDINGS:  LOWER CHEST: Moderate left and small right pleural effusion with   associated compressive atelectasis. Subsegmental atelectasis in the   lingula. Mild thoracic aortic atherosclerotic calcifications.    LIVER: Cirrhosis. Unchanged 7 cm low-attenuation masslike lesion in   segment 4/5 of the liver and scattered subcentimeter hypodensities which   are too small to characterize. Left portal vein remains occluded.  BILE DUCTS: Normal caliber.  GALLBLADDER: Within normal limits.  SPLEEN: Upper limits of normal in size.  PANCREAS: Within normal limits.  ADRENALS: Within normal limits.  KIDNEYS/URETERS: Kidneys enhance symmetrically without hydronephrosis.   Focal areas of left renal cortical thinning/scarring. Left renal cyst.   Punctate nonobstructing right intrarenal calculus.    BLADDER: Mild circumferential thickening of the urinary bladder walls   likely due to underdistention.  REPRODUCTIVE ORGANS: Prostate is within normal limits.    BOWEL: No bowel obstruction. Appendix is normal. Thickening of the walls   of the ascending colon likely due to portal colopathy.  PERITONEUM/RETROPERITONEUM: Moderate volume of abdominal and pelvic   ascites. No pneumoperitoneum or loculated collection to suggest abscess.  VESSELS: Portosystemic collaterals with perigastric varices.   Atherosclerotic calcifications of the aortoiliac tree. Normal caliber   abdominal aorta.  LYMPH NODES: Enlarged periportal and gastrohepatic lymph nodes, unchanged.  ABDOMINAL WALL: Ventral abdominal wall hernia repair. Generalized soft   tissue edema.  BONES: Degenerative changes of the spine. Focal anterior wedge   compression deformity of T8 with focal kyphosis. Hemangioma in the L2   vertebral body.    IMPRESSION:  Cirrhosis with evidence of portal hypertension. Moderate volume of   abdominal and pelvic ascites. Moderate left and small right pleural   effusions with associated compressive atelectasis.    Unchanged low-attenuation masslike lesion in the liver which is highly   concerning for malignancy in the background of cirrhosis.    Unchanged periportal and gastrohepaticlymphadenopathy.    Thickening of the ascending colon likely due to portal colopathy.        --- End of Report ---            CARLYN HASTINGS DO; Attending Radiologist  This document has been electronically signed. Feb 11 2025  9:37PM  02-11-25 @ 21:07    -- --   ACC: [id number] EXAM:  US ABDOMEN LIMITED   ORDERED BY: TRACEY NICHOLS     PROCEDURE DATE:  02/13/2025          INTERPRETATION:  CLINICAL INFORMATION: Cirrhosis.  Hepatic   encephalopathy.  Distended abdomen.    COMPARISON: 01/21/2025.    TECHNIQUE:Limited ultrasound of the abdomen to evaluate for ascites.    FINDINGS:  Right upper quadrant: Pocket of ascites measures up to depth of 11 cm.    Right lower quadrant: Pocket of ascites measures up to 9 cm.    Left upper quadrant: Mild ascites..    Left lower quadrant: Pocket of ascites measures up to depth of 6 cm.    Pleural effusions: Pleural effusions are partially imaged bilaterally.      IMPRESSION:  Moderate ascites in the right hemiabdomen and mild ascites in the left   hemiabdomen.    Bilateral pleural effusions.    --- End of Report ---            ROMARIO BAUTISTA MD; Attending Radiologist  This document has been electronically signed. Feb 13 2025  5:53AM --

## 2025-02-14 NOTE — PROVIDER CONTACT NOTE (OTHER) - BACKGROUND
Pt has a condom catheter around open skin around penis. Medicine PA made aware.
Admitted with hepatitic encephalopathy and abdominal ascites

## 2025-02-14 NOTE — PROGRESS NOTE ADULT - NS ATTEND AMEND GEN_ALL_CORE FT
Patient is a 74-year-old gentleman with a history of HCV associated cirrhosis decompensated by hepatic encephalopathy, ascites and concern for HCC who presents with altered mental status and abdominal pain.  Of note patient recently admitted with SBP.  Today, patient remains altered and is in pain with touching his abdomen. He reports it hurts, concerning for SBP or at least significant ascites. On review of labs patient with worsening Cr, BUN also rising and is now very elevated. Would continue antibiotics for SBP and get diagnostic and therapeutic paracentesis. Continue lactulose/ rifaxamin for HE. Consider uremia may be contributing to his poor mentation, will defer to nephrology re HD or other intervention. HRS v MARY with rising Cr, on octreotide, midodrine and albumin without improvement. Defer to nephrology re HD or other intervention. Please get daily liver labs ( CBC, CMP, INR). GI will continue to follow. Eval for HCC with AFP further imaging after acute issues have been evaluated and managed.
lethargic. unable to answer questions- hypothermic     H= RRR    A/P  Pt with borderline SBP - neutraphils 235  unable to do large volume paracentesis - lack of consent   on antibiotics - Rocephin  Hypothermia continues - blood cultures negative   Continue lactulose enema  and Xifaxan   cmp ordered for tomorrow

## 2025-02-14 NOTE — PROVIDER CONTACT NOTE (OTHER) - ACTION/TREATMENT ORDERED:
wound care
Medicine PA aware of skin breakdown to pt's penis, says it is ok to leave condom catheter in place at the moment, because pt is not retaining urine. Provider recommends strict I & O's
Provider placed 2 separate orders of STAT 1mg IV morphine. Total 2mg given

## 2025-02-14 NOTE — DIETITIAN INITIAL EVALUATION ADULT - ETIOLOGY
related to inability to meet sufficient protein-energy needs in setting of cirrhosis, hepatic encephalopathy

## 2025-02-14 NOTE — PROGRESS NOTE ADULT - SUBJECTIVE AND OBJECTIVE BOX
[facility] PALLIATIVE MEDICINE     INTERVAL HPI/OVERNIGHT EVENTS: Overnight with worsening acidosis and hypoxemia requiring BIPAP. MICU consulted, respiratory status improved with bipap and now weaned of since earlier this morning. Pt seen in ED with medicine PAs and RN at bedside. Pt awake and oriented x3, answering simple questions and expressing significant abdominal discomfort.    Source if other than patient:  []Family   [x]Team     PRESENT SYMPTOMS:     Dyspnea: improved, off bipap to NC  Nausea/Vomiting:  No  Anxiety:  Yes due to abdominal discomfort  Depression: unable to assess  Fatigue:  No  Loss of appetite: NPO  Constipation:  None documented in EMR    Pain: yes,                       Character-            Duration-            Effect-            Factors-            Frequency-            Location- across abdomen ?right greater than left            Severity- severe     Pain AD Score:  [email or web address] (press ctrl + left click to view)    Review of Systems: Reviewed                                              Negative: Denies any chest pain or dyspnea at rest                     Positive: see above  All others negative    MEDICATIONS  (STANDING):  cefTRIAXone Injectable. 2000 milliGRAM(s) IV Push every 24 hours  folic acid 1 milliGRAM(s) Oral daily  lactulose Retention Enema 200 Gram(s) Rectal four times a day  midodrine 5 milliGRAM(s) Oral every 8 hours  multivitamin 1 Tablet(s) Oral daily  naloxone Injectable 0.4 milliGRAM(s) IV Push once  octreotide  Injectable 100 MICROGram(s) IV Push every 8 hours  pantoprazole  Injectable 40 milliGRAM(s) IV Push daily  rifAXIMin 550 milliGRAM(s) Oral two times a day  thiamine 100 milliGRAM(s) Oral daily    MEDICATIONS  (PRN):  acetaminophen     Tablet .. 650 milliGRAM(s) Oral every 6 hours PRN Temp greater or equal to 38C (100.4F), Mild Pain (1 - 3)  albuterol/ipratropium for Nebulization 3 milliLiter(s) Nebulizer every 6 hours PRN Shortness of Breath and/or Wheezing  aluminum hydroxide/magnesium hydroxide/simethicone Suspension 30 milliLiter(s) Oral every 4 hours PRN Dyspepsia  HYDROmorphone  Injectable 0.2 milliGRAM(s) IV Push every 6 hours PRN Moderate Pain (4 - 6)  HYDROmorphone  Injectable 0.5 milliGRAM(s) IV Push every 6 hours PRN Severe Pain (7 - 10)  melatonin 3 milliGRAM(s) Oral at bedtime PRN Insomnia  ondansetron Injectable 4 milliGRAM(s) IV Push every 8 hours PRN Nausea and/or Vomiting      PHYSICAL EXAM:    Vital Signs Last 24 Hrs  T(C): 36.4 (14 Feb 2025 08:31), Max: 36.4 (14 Feb 2025 08:31)  T(F): 97.5 (14 Feb 2025 08:31), Max: 97.5 (14 Feb 2025 08:31)  HR: 68 (14 Feb 2025 10:01) (53 - 68)  BP: 97/53 (14 Feb 2025 08:31) (97/53 - 148/60)  BP(mean): 71 (14 Feb 2025 02:00) (71 - 80)  RR: 19 (14 Feb 2025 08:31) (18 - 22)  SpO2: 99% (14 Feb 2025 10:01) (92% - 100%)    Parameters below as of 14 Feb 2025 08:31  Patient On (Oxygen Delivery Method): BiPAP/CPAP    Karnofsky: 40 %    General: acute distress due to abdominal pain    HEENT: mmm   Lungs: comfortable nonlabored    CV:  rrr  GI: +BS abdomen distended but soft, tenderness to palpation on right side no guarding  MSK:   no cyanosis. +generalized edema and weakness  Neuro: nonfocal. awake and oriented x 3  Skin: warm and dry. +chronic BLE venous stasis changes  Psych: anxious     LABS: Reviewed                          10.9   6.35  )-----------( 65       ( 14 Feb 2025 04:00 )             33.4     02-14    138  |  100  |  85.6[H]  ----------------------------<  93  3.8   |  19.0[L]  |  5.03[H]    Ca    8.9      14 Feb 2025 04:00  Phos  7.0     02-14  Mg     2.7     02-14    TPro  5.9[L]  /  Alb  2.8[L]  /  TBili  3.2[H]  /  DBili  2.0[H]  /  AST  115[H]  /  ALT  46[H]  /  AlkPhos  169[H]  02-14    PT/INR - ( 14 Feb 2025 04:00 )   PT: 23.3 sec;   INR: 2.08 ratio         PTT - ( 14 Feb 2025 04:00 )  PTT:48.7 sec  Urinalysis Basic - ( 14 Feb 2025 04:00 )    Color: x / Appearance: x / SG: x / pH: x  Gluc: 93 mg/dL / Ketone: x  / Bili: x / Urobili: x   Blood: x / Protein: x / Nitrite: x   Leuk Esterase: x / RBC: x / WBC x   Sq Epi: x / Non Sq Epi: x / Bacteria: x      I&O's Summary    13 Feb 2025 07:01  -  14 Feb 2025 07:00  --------------------------------------------------------  IN: 50 mL / OUT: 0 mL / NET: 50 mL    RADIOLOGY & ADDITIONAL STUDIES: Reviewed     ADVANCE DIRECTIVES/TREATMENT PREFERENCES: FULL CODE  DNR YES NO  Completed on:                     MOLST  YES NO   Completed on:  Living Will  YES NO   Completed on:    NEUROLOGICAL MEDICATIONS/OPIOIDS/BENZODIAZEPINE IN PAST 24 HOURS:    HYDROmorphone  Injectable   0.2 milliGRAM(s) IV Push (02-14-25 @ 10:02)    HYDROmorphone  Injectable   0.5 milliGRAM(s) IV Push (02-13-25 @ 14:53)    HYDROmorphone  Injectable   1 milliGRAM(s) IV Push (02-13-25 @ 16:05)    HYDROmorphone  Injectable   1 milliGRAM(s) IV Push (02-13-25 @ 20:10)    morphine  - Injectable   1 milliGRAM(s) IV Push (02-13-25 @ 12:45)

## 2025-02-14 NOTE — PROVIDER CONTACT NOTE (CHANGE IN STATUS NOTIFICATION) - ACTION/TREATMENT ORDERED:
NRB, Chest xray, straight cath, npo, swallow eval, fluid bolus, IR consult, upgrade to SDU
ICU consulted.

## 2025-02-14 NOTE — PROGRESS NOTE ADULT - ASSESSMENT
75 yo M with history of HCV cirrhosis, hepatoma, h/o opioid/EtOH use, recent admission to [facility] 1/11 - 1/23 for cardiac arrest, PNA, & left-sided pleural effusion (s/p chest tube) & paracentesis (1/15) complicated by SBP came to ED complaining of worsening abdominal distention. Unclear whether the patient was taking his home medications. Patient is currently lethargic, and unable to provide any information via .   In the ED he was found to have hepatic encephalopathy with ammonia level of 120, CT abdomen/pelvis with iv contrast with large volume ascites.   Patient was found to have MARY with sCr 4.89->4.61. He received 1L of LR yesterday. Currently noted to have no urine output.   IR and GI are following and emergent paracentesis is planned.     - Oliguric MARY on CKD, could be pre-renal/compartment syndrome in the setting of ascites vs. HRS, continues to worsen  sCr used to be at 1.5 in 1/23/2025   CT abdomen/pelvis reviewed, noted to have normal-sized kidneys and no obstructive uropathy   UA reviewed with increased SG, expected after iv contrast vs. due to vascular constriction in the setting of portal hypertension, otherwise bland   - Decompensated HCV liver cirrhosis with ascites, worsening   - Hepatic encephalopathy, worsening   - Anemia   - History of PSA with opioids and EtOH   - History of cardiac arrest in the past     Plan   No emergent indications for hemodialysis at this time   S/p paracentesis   Currently on empiric ceftriaxone due to history of SBP in the past   Continue HRS cocktail due to severe MARY, with midodrine 5 mg tid, octreotide and albumin 25 g q12h   If no improvement in urine output after HRS cocktail and paracentesis will consider hemodialysis   Lactulose as per primary team   Monitor urine output   Hold carvediolol   Dose medications for eGFR<15  GI follow up  Guarded prognosis   Discussed with Dr. Santoro

## 2025-02-14 NOTE — PROGRESS NOTE ADULT - ASSESSMENT
74y/oM PMH HCV cirrhosis, HCC?, hx opioid and alcohol use, recently admitted to [facility] (1/11-1/23 s/p cardiac arrest, treated for pna and L-sided pleural efusion s/p chest tube and s/p paracentesis 1/15 with 600cc removed)+ SBP .  presenting to ER c/o abd pain and distention x3 weeks and associated shortness of breath. GI asked to consult for further management.    #cirrhosis   #hepatic encephalopathy  CT Abdomen and Pelvis w/ IV Cont (02.11.25) Cirrhosis with evidence of portal hypertension. Moderate volume of abdominal and pelvic ascites. Moderate left and small right pleural effusions with associated compressive atelectasis. Unchanged low-attenuation masslike lesion in the liver which is highly concerning for malignancy in the background of cirrhosis.  MELD 31 68.3% estimated 90 day survival   - Infectious workup per primary team, remains hypothermic on warming blanket  - Trend renal function, LFTs, electrolytes, coags daily  - Lactulose titrated to 2-3 soft bowel movements per day, avoid diarrhea.   - No diuretics renal function worsening, nephro following   - Can take up to 2G Tylenol per day  -  MVI, thiamine and folic acid.   - Optimize nutrition, High Protein/Low Fat/Low Salt (up to 2G Na/day), avoid raw shellfish, unpasteurized dairy products; avoid eating any raw or undercooked eggs, fish, poultry, or meat  - Abstain from alcohol and illicit drugs. Alcohol cessation counseling. [facility] protocol.   - Check AFP, abdominal ultrasound complete w/ dopplers   - Varices: EGD, can consider outpatient  - Needs IR paracentesis. if consent cannot be obtained will need 2 PC consent. Continue ceftriaxone.   - Outpatient follow up with hepatologist Dr. Burgess  _________________________________________________________________  Assessment and recommendations are final when note is signed by the attending physician.

## 2025-02-14 NOTE — PROGRESS NOTE ADULT - ASSESSMENT
74y/oM PMH HCV cirrhosis, reported HCC, h/o opioid and alcohol use, recently admitted to [facility] (1/11-1/23 s/p cardiac arrest, PNA and Lt pleural effusion s/p CT and paracentesis) now admitted with progressive abd distension and worsening abd pain. Reportedly also with complaints of BRBPR however unable to elaborate. Currently with waxing and waning mentation due to likley hepatic encephalopathy     Hepatic encephalopathy   Hepatitis C cirrhosis with documentation from prior chart with HCC   Path of pl fluid non confirmatory  AFP [id number] 1/13  Ascitic fluid done in ER borderline for SBP however with history and clinical exam started on empirics Ceftriaxone 2 gm daily  Continue NPO for now pending stable (consistent) mental status improvement   Lactulose retention enemas qid standing   Appreciate IR assistance, s/p 3550 ml yu fluid drained.   S ammonia 103 this am (improved from 122 yesterday)  cont rifaximin, ppi  hold coreg for now  MELD-Na is 33 this am. Prognosticating near 70% mortality within 90- days   Ethic and Palliative evals/recs appreciated  Since no surrogate has been identified and no GOC were done on recent admission (even though had documented cardiac arrest with ROSC) at present, IF emergent intervention is indicated 2 Physician consent will need to be used if pt is unable to give informed consent     Acute hypoxic respiratory failure   CXR with small b/l pleural effusions  Suspect hypoxia confounded by pts shallow breathing pattern due to pain   wean supplemental O2 as tolerated     ?BRBPR   Reportedly stated on admission   Currently unable to expand  H/H relatively stable.   Monitor BM for stool count, output and consistency     MARY   Unconfirmed etiology  Potentially 2/2 Hepatorenal syndrome   Started on HRS cocktail. Continue Albumin / Midodrine and Octreotide   IF fails to improve or worsens may need to consider HD (suspected limited benefit unless driving etiology is able to improve/ stabilize   US reviewed. No evidnce of post obstructive uropathy   Ranal on board. Appreciate recs    h/o opioid and EtOH abuse   as per recent admission, follows with [facility] Meth[facility]one clinic [phone],  No answer on earlier attempts at contacting  Although liver function would usually limit dilaudid use, severe renal impairment limiting morphine use and given severity of clinical pain and high risk of using long actiing opiates (Methadone), will use low dose Dilaudid PRN and introduce PO ASAP pending mental status   Cont mvi, thiamine, folic acid when able to take PO     Palliative and Ethics on board     Extremely high risk of mortality irrespective of interventions given currently hepatic function    vte ppx: scds         guarded prognosis

## 2025-02-14 NOTE — DIETITIAN INITIAL EVALUATION ADULT - OTHER INFO
74y/oM PMH HCV cirrhosis, s/p chest tube and s/p paracentesis 1/15 with 600cc removed)+ SBP .  presenting to ER c/o abd pain and distention x3 weeks and associated shortness of breath.   #cirrhosis   #hepatic encephalopathy

## 2025-02-14 NOTE — PROVIDER CONTACT NOTE (CHANGE IN STATUS NOTIFICATION) - ASSESSMENT
Shortly after administration of lactulose, pt showed signs of respiratory distress. Pt placed on bipap. Current oxygen saturation levels remain stable. Pt safety maintained.
Pt desatted to 89% on 6L NC, coughed while swallowing liquids. Pt suctioned, liquids spit out, R lung wheezing, L lung diminished. Pt now requiring NRB, satting 96%. Pt lethargic, sleeping in between care, arousal to voice. Pt slow to respond to questions and commands. Pt has not voided this shift. Pt hypothermic, BP 95/61.

## 2025-02-14 NOTE — DIETITIAN INITIAL EVALUATION ADULT - ORAL INTAKE PTA/DIET HISTORY
nutrition consult received for PI >Stage 2. Pt seen at bedside this afternoon. Slightly confused, lethargic, unable to obtain nutrition interview; groining in pain. Last BM documented 2/13 with loose stool. NPO at this time. Was seen by RD department January 2025, with weight 240.3#. No weight documented on this admission. will monitor weight trends on follow up assessment. Diet/nutrition education not appropriate at this time. Pt remains at risk for protein-calorie malnutrition. Recommend to advance diet as tolerated. RD to remain available.

## 2025-02-14 NOTE — PROVIDER CONTACT NOTE (CHANGE IN STATUS NOTIFICATION) - RECOMMENDATIONS
Continue to monitor pt's respiratory status closely. Consider further interventions or adjustments to tx plan. ANTOINE Vasquez will be notified if pt's condition does not stabilize.

## 2025-02-14 NOTE — PROVIDER CONTACT NOTE (CHANGE IN STATUS NOTIFICATION) - BACKGROUND
73 y/o admitted 2/11 c/o abd pain, sob. Dx Hepatic encephalopathy, decompensated cirrhosis, MARY. Hx recent hospitalization with cardiac arrest, pleural effusions
Pt has a hx of alcohol abuse, cirrhosis, & b/l plural effusions. Pt had a paracentesis done and had 600 cc removed. Pt had been ordered lactulose rectally to manage elevated ammonia levels. The admin of lactulose was completed as directed.

## 2025-02-14 NOTE — DIETITIAN INITIAL EVALUATION ADULT - ADD RECOMMEND
- Monitor weights daily for trend/accuracy   - advance diet when medically feasible   - Rx MVI daily, vit C 500mg

## 2025-02-14 NOTE — CONSULT NOTE ADULT - ASSESSMENT
IMPRESSION/PLAN:  Dyspnea  Volume overload  Ascites  Cirrhosis  ?hepatorenal sx    Pt doing well on bipap. recommend repeat abg, likely combined metabolic and respiratory acidosis.  Taper off bipap as tolerated.  GI follow up, may benefit from paracentesis if consent can be obtained.  Recommend renal eval as pt has worsening renal function, likely from hepatorenal sx.  May require HD for volume management.  Continue diuresis at this time.  Recommend place ruiz.  Hold narcotic pain meds.  Continue empiric abx.  Reconsult ICU prn.     CNS: avoid narcotic pain meds    HEENT: oral care    PULMONARY: taper off bipap as tolerated, continue bronchodilators    CARDIOVASCULAR: monitor BP    GI: GI prophylaxis.  npo while on Bipap, GI f/u    RENAL: place ruiz, monitor uo/creat, renal eval, diuresis    INFECTIOUS DISEASE: continue abx    HEMATOLOGICAL:  DVT prophylaxis.    ENDOCRINE:  Follow up FS.  Insulin protocol if needed.    MUSCULOSKELETAL: bed rest        CRITICAL CARE TIME SPENT: 35 minutes

## 2025-02-14 NOTE — PROGRESS NOTE ADULT - SUBJECTIVE AND OBJECTIVE BOX
[facility] Division of Hospital Medicine    Chief Complaint:  Decompensated cirrhosis     SUBJECTIVE / OVERNIGHT EVENTS:  Pt examined laying in  bed  Writhing in pain however somewhat distractible    d/w Palliative, Ethics, Renal and IR  >3L LVP. FLuid reportedly clear.   Patient denies chest pain, SOB,   Mental status at time of exam AAO2-3 (with )  ROS limited by pts pain but denies CP or SOB     MEDICATIONS  (STANDING):  albumin human 25% IVPB 100 milliLiter(s) IV Intermittent every 12 hours  cefTRIAXone Injectable. 2000 milliGRAM(s) IV Push every 24 hours  folic acid 1 milliGRAM(s) Oral daily  lactulose Retention Enema 200 Gram(s) Rectal four times a day  midodrine 5 milliGRAM(s) Oral every 8 hours  multivitamin 1 Tablet(s) Oral daily  naloxone Injectable 0.4 milliGRAM(s) IV Push once  octreotide  Injectable 100 MICROGram(s) IV Push every 8 hours  pantoprazole  Injectable 40 milliGRAM(s) IV Push daily  rifAXIMin 550 milliGRAM(s) Oral two times a day  thiamine 100 milliGRAM(s) Oral daily    MEDICATIONS  (PRN):  acetaminophen     Tablet .. 650 milliGRAM(s) Oral every 6 hours PRN Temp greater or equal to 38C (100.4F), Mild Pain (1 - 3)  albuterol/ipratropium for Nebulization 3 milliLiter(s) Nebulizer every 6 hours PRN Shortness of Breath and/or Wheezing  aluminum hydroxide/magnesium hydroxide/simethicone Suspension 30 milliLiter(s) Oral every 4 hours PRN Dyspepsia  HYDROmorphone  Injectable 0.2 milliGRAM(s) IV Push every 6 hours PRN Moderate Pain (4 - 6)  HYDROmorphone  Injectable 0.5 milliGRAM(s) IV Push every 6 hours PRN Severe Pain (7 - 10)  melatonin 3 milliGRAM(s) Oral at bedtime PRN Insomnia  ondansetron Injectable 4 milliGRAM(s) IV Push every 8 hours PRN Nausea and/or Vomiting        I&O's Summary    13 Feb 2025 07:01  -  14 Feb 2025 07:00  --------------------------------------------------------  IN: 50 mL / OUT: 0 mL / NET: 50 mL    14 Feb 2025 07:01  -  14 Feb 2025 20:04  --------------------------------------------------------  IN: 0 mL / OUT: 50 mL / NET: -50 mL        PHYSICAL EXAM:  Vital Signs Last 24 Hrs  T(C): 35.4 (14 Feb 2025 17:30), Max: 36.4 (14 Feb 2025 08:31)  T(F): 95.7 (14 Feb 2025 17:30), Max: 97.5 (14 Feb 2025 08:31)  HR: 59 (14 Feb 2025 19:30) (53 - 68)  BP: 90/52 (14 Feb 2025 19:30) (89/54 - 148/60)  BP(mean): 65 (14 Feb 2025 19:30) (65 - 80)  RR: 19 (14 Feb 2025 08:31) (18 - 21)  SpO2: 93% (14 Feb 2025 19:30) (92% - 100%)    Parameters below as of 14 Feb 2025 19:30  Patient On (Oxygen Delivery Method): nasal cannula  O2 Flow (L/min): 5          CONSTITUTIONAL: Non toxic appearing, lying in bed in pain   ENMT: Moist oral mucosa, no pharyngeal injection or exudates  RESPIRATORY: Normal respiratory effort; lungs are clear to auscultation bilaterally  CARDIOVASCULAR: Regular rate and rhythm, normal S1 and S2, no murmur/rub/gallop; No lower extremity edema; Peripheral pulses are 2+ bilaterally  ABDOMEN: Moderately tendern in RLQ/LLQ. , normoactive bowel sounds, no rebound/guarding, abd distension improved post para  MUSCLOSKELETAL:   no clubbing or cyanosis of digits; no joint swelling or tenderness to palpation  PSYCH: A+O to person, place but does not answer completely about time or recent events   NEUROLOGY: No focal CN deficits. Moving all ext w/o limitation    SKIN: No rashes;, flank bruising     LABS:                        10.9   6.35  )-----------( 65       ( 14 Feb 2025 04:00 )             33.4     02-14    138  |  100  |  85.6[H]  ----------------------------<  93  3.8   |  19.0[L]  |  5.03[H]    Ca    8.9      14 Feb 2025 04:00  Phos  7.0     02-14  Mg     2.7     02-14    TPro  5.9[L]  /  Alb  2.8[L]  /  TBili  3.2[H]  /  DBili  2.0[H]  /  AST  115[H]  /  ALT  46[H]  /  AlkPhos  169[H]  02-14    PT/INR - ( 14 Feb 2025 04:00 )   PT: 23.3 sec;   INR: 2.08 ratio         PTT - ( 14 Feb 2025 04:00 )  PTT:48.7 sec      Urinalysis Basic - ( 14 Feb 2025 04:00 )    Color: x / Appearance: x / SG: x / pH: x  Gluc: 93 mg/dL / Ketone: x  / Bili: x / Urobili: x   Blood: x / Protein: x / Nitrite: x   Leuk Esterase: x / RBC: x / WBC x   Sq Epi: x / Non Sq Epi: x / Bacteria: x        Culture - Urine (collected 12 Feb 2025 14:20)  Source: Clean Catch Clean Catch (Midstream)  Final Report (13 Feb 2025 15:22):    <10,000 CFU/mL Normal Urogenital Afnny    Culture - Blood (collected 11 Feb 2025 21:55)  Source: .Blood BLOOD  Preliminary Report (14 Feb 2025 04:01):    No growth at 48 Hours    Culture - Body Fluid with Gram Stain (collected 11 Feb 2025 20:20)  Source: Ascites Fl  Gram Stain (12 Feb 2025 07:14):    polymorphonuclear leukocytes seen    No organisms seen    by cytocentrifuge  Preliminary Report (13 Feb 2025 11:22):    No growth to date.      CAPILLARY BLOOD GLUCOSE            MICRO:        RADIOLOGY & ADDITIONAL TESTS:

## 2025-02-14 NOTE — CHART NOTE - NSCHARTNOTEFT_GEN_A_CORE
Called by RN after pt desat to 60% on 6L O2 w/ increased WOB and audible wheezing  Pt admitted w/ Ascites  Stat ABG, CXR, Duoneb treatment ordered    ICU Vital Signs Last 24 Hrs  T(C): 36.1 (13 Feb 2025 23:00), Max: 36.6 (13 Feb 2025 04:00)  T(F): 96.9 (13 Feb 2025 23:00), Max: 97.9 (13 Feb 2025 04:00)  HR: 60 (14 Feb 2025 00:45) (56 - 67)  BP: 148/60 (14 Feb 2025 00:45) (94/60 - 148/60)  BP(mean): 80 (13 Feb 2025 23:00) (76 - 80)  ABP: --  ABP(mean): --  RR: 18 (13 Feb 2025 23:00) (17 - 22)  SpO2: 100% (14 Feb 2025 00:45) (92% - 100%)    O2 Parameters below as of 14 Feb 2025 00:45  Patient On (Oxygen Delivery Method): BiPAP/CPAP    General  Pt is moaning in pain, not answering any questions   Lungs     coarse BS b/l w/ decreased BS in lower fields  Heart     s1/s2  Abdomen pendulous, distended, +tender, +BS    SOB w/ hypoxia    Bumex 1mg IVP stat  ABG  ABG - ( 14 Feb 2025 00:35 )  pH, Arterial: 7.120 pH, Blood: x     /  pCO2: 65    /  pO2: 88    / HCO3: 21    / Base Excess: -8.2  /  SaO2: 94.5      CXR   b/l pleural effusions, poor inspiratory effort    Placed on BiPAP for increased WOB and hypercapnia; 14/7 w/ FiO2 60%  ICU consulted  RN to monitor and escalate if any change in pt's status  Will f/u with ICU recommendations Called by RN after pt desat to 60% on 6L O2 w/ increased WOB and audible wheezing  Pt admitted w/ Ascites  Stat ABG, CXR, Duoneb treatment ordered    ICU Vital Signs Last 24 Hrs  T(C): 36.1 (13 Feb 2025 23:00), Max: 36.6 (13 Feb 2025 04:00)  T(F): 96.9 (13 Feb 2025 23:00), Max: 97.9 (13 Feb 2025 04:00)  HR: 60 (14 Feb 2025 00:45) (56 - 67)  BP: 148/60 (14 Feb 2025 00:45) (94/60 - 148/60)  BP(mean): 80 (13 Feb 2025 23:00) (76 - 80)  ABP: --  ABP(mean): --  RR: 18 (13 Feb 2025 23:00) (17 - 22)  SpO2: 100% (14 Feb 2025 00:45) (92% - 100%)    O2 Parameters below as of 14 Feb 2025 00:45  Patient On (Oxygen Delivery Method): BiPAP/CPAP    General  Pt is moaning in pain, not answering any questions   Lungs     coarse BS b/l w/ decreased BS in lower fields  Heart     s1/s2  Abdomen pendulous, distended, +tender, +BS    SOB w/ hypoxia    Bumex 1mg IVP stat  Strict I & Os    ABG  ABG - ( 14 Feb 2025 00:35 )  pH, Arterial: 7.120 pH, Blood: x     /  pCO2: 65    /  pO2: 88    / HCO3: 21    / Base Excess: -8.2  /  SaO2: 94.5      CXR   b/l pleural effusions, poor inspiratory effort    Placed on BiPAP for increased WOB and hypercapnia; 14/7 w/ FiO2 60%  ICU consulted  RN to monitor and escalate if any change in pt's status  Will f/u with ICU recommendations    01:35  ICU consult appreciated  Repeat ABG ordered @ 06:00  Currently pt is sating 100% on BiPAP; RT to titrate FiO2 to maintain SpO2>94%  RN to continue to monitor urinary output Called by RN after pt desat to 60% on 6L O2 w/ increased WOB and audible wheezing  Pt admitted w/ Ascites  Stat ABG, CXR, Duoneb treatment ordered    ICU Vital Signs Last 24 Hrs  T(C): 36.1 (13 Feb 2025 23:00), Max: 36.6 (13 Feb 2025 04:00)  T(F): 96.9 (13 Feb 2025 23:00), Max: 97.9 (13 Feb 2025 04:00)  HR: 60 (14 Feb 2025 00:45) (56 - 67)  BP: 148/60 (14 Feb 2025 00:45) (94/60 - 148/60)  BP(mean): 80 (13 Feb 2025 23:00) (76 - 80)  ABP: --  ABP(mean): --  RR: 18 (13 Feb 2025 23:00) (17 - 22)  SpO2: 100% (14 Feb 2025 00:45) (92% - 100%)    O2 Parameters below as of 14 Feb 2025 00:45  Patient On (Oxygen Delivery Method): BiPAP/CPAP    General  Pt is moaning in pain, not answering any questions   Lungs     coarse BS b/l w/ decreased BS in lower fields  Heart     s1/s2  Abdomen pendulous, distended, +tender, +BS    SOB w/ hypoxia    Bumex 1mg IVP stat  Strict I & Os    ABG  ABG - ( 14 Feb 2025 00:35 )  pH, Arterial: 7.120 pH, Blood: x     /  pCO2: 65    /  pO2: 88    / HCO3: 21    / Base Excess: -8.2  /  SaO2: 94.5      CXR   b/l pleural effusions, poor inspiratory effort    Placed on BiPAP for increased WOB and hypercapnia; 14/7 w/ FiO2 60%  ICU consulted  RN to monitor and escalate if any change in pt's status  Will f/u with ICU recommendations    01:35  ICU consult appreciated  Repeat ABG ordered @ 07:00  Currently pt is sating 100% on BiPAP; RT to titrate FiO2 to maintain SpO2>94%  RN to continue to monitor urinary output

## 2025-02-15 NOTE — PROGRESS NOTE ADULT - SUBJECTIVE AND OBJECTIVE BOX
[facility] Division of Hospital Medicine    Chief Complaint:  Decompensated cirrhosis     SUBJECTIVE / OVERNIGHT EVENTS:  Pt examined laying in  bed  Pain appears clinically to be more improved today. Still with tenderness in LQ (b/l) but not as severe   d/w Renal. Given progression of MARY with persistence of encephalopathic state, previously d/w Ethics and at this time will proceed with 2 physician consent for emergent initiation of HD   ROS limited by pts pain but denies CP or SOB       MEDICATIONS  (STANDING):  albumin human 25% IVPB 100 milliLiter(s) IV Intermittent every 12 hours  cefTRIAXone Injectable. 2000 milliGRAM(s) IV Push every 24 hours  folic acid 1 milliGRAM(s) Oral daily  lactulose Retention Enema 200 Gram(s) Rectal four times a day  midodrine 5 milliGRAM(s) Oral every 8 hours  multivitamin 1 Tablet(s) Oral daily  naloxone Injectable 0.4 milliGRAM(s) IV Push once  octreotide  Injectable 100 MICROGram(s) IV Push every 8 hours  pantoprazole  Injectable 40 milliGRAM(s) IV Push daily  rifAXIMin 550 milliGRAM(s) Oral two times a day  thiamine 100 milliGRAM(s) Oral daily    MEDICATIONS  (PRN):  acetaminophen     Tablet .. 650 milliGRAM(s) Oral every 6 hours PRN Temp greater or equal to 38C (100.4F), Mild Pain (1 - 3)  albuterol/ipratropium for Nebulization 3 milliLiter(s) Nebulizer every 6 hours PRN Shortness of Breath and/or Wheezing  aluminum hydroxide/magnesium hydroxide/simethicone Suspension 30 milliLiter(s) Oral every 4 hours PRN Dyspepsia  HYDROmorphone  Injectable 0.2 milliGRAM(s) IV Push every 6 hours PRN Moderate Pain (4 - 6)  HYDROmorphone  Injectable 0.5 milliGRAM(s) IV Push every 6 hours PRN Severe Pain (7 - 10)  melatonin 3 milliGRAM(s) Oral at bedtime PRN Insomnia  ondansetron Injectable 4 milliGRAM(s) IV Push every 8 hours PRN Nausea and/or Vomiting        PHYSICAL EXAM:  Vital Signs Last 24 Hrs  T(C): 36.3 (15 Feb 2025 11:26), Max: 36.3 (15 Feb 2025 01:40)  T(F): 97.4 (15 Feb 2025 11:26), Max: 97.4 (15 Feb 2025 04:00)  HR: 60 (15 Feb 2025 11:26) (55 - 66)  BP: 98/59 (15 Feb 2025 11:26) (89/54 - 115/64)  BP(mean): 71 (15 Feb 2025 04:00) (65 - 81)  RR: 19 (15 Feb 2025 11:26) (19 - 19)  SpO2: 96% (15 Feb 2025 11:26) (93% - 99%)    Parameters below as of 15 Feb 2025 11:26  Patient On (Oxygen Delivery Method): nasal cannula  O2 Flow (L/min): 5      CONSTITUTIONAL: Non toxic appearing, lying in bed in   ENMT: Moist oral mucosa, no pharyngeal injection or exudates  RESPIRATORY: Normal respiratory effort; lungs are clear to auscultation bilaterally  CARDIOVASCULAR: Regular rate and rhythm, normal S1 and S2, no murmur/rub/gallop  ABDOMEN: Improved tenderenss, now more localized in LLQ and RLQ. Distension improved    MUSCLOSKELETAL:   no clubbing or cyanosis of digits; no joint swelling or tenderness to palpation  PSYCH: Awake and responds to name   NEUROLOGY: No focal CN deficits. Moving all ext w/o limitation    SKIN: No rashes;, flank bruising     LABS:                          11.0   7.56  )-----------( 62       ( 15 Feb 2025 04:09 )             33.1   02-15    141  |  104  |  90.9[H]  ----------------------------<  77  3.6   |  19.0[L]  |  5.33[H]    Ca    8.8      15 Feb 2025 04:09  Phos  7.0     02-14  Mg     2.7     02-15    TPro  5.9[L]  /  Alb  2.9[L]  /  TBili  3.1[H]  /  DBili  2.0[H]  /  AST  100[H]  /  ALT  42[H]  /  AlkPhos  154[H]  02-15    PTT - ( 14 Feb 2025 04:00 )  PTT:48.7 sec      Urinalysis Basic - ( 14 Feb 2025 04:00 )    Color: x / Appearance: x / SG: x / pH: x  Gluc: 93 mg/dL / Ketone: x  / Bili: x / Urobili: x   Blood: x / Protein: x / Nitrite: x   Leuk Esterase: x / RBC: x / WBC x   Sq Epi: x / Non Sq Epi: x / Bacteria: x        Culture - Urine (collected 12 Feb 2025 14:20)  Source: Clean Catch Clean Catch (Midstream)  Final Report (13 Feb 2025 15:22):    <10,000 CFU/mL Normal Urogenital Fanny    Culture - Blood (collected 11 Feb 2025 21:55)  Source: .Blood BLOOD  Preliminary Report (14 Feb 2025 04:01):    No growth at 48 Hours    Culture - Body Fluid with Gram Stain (collected 11 Feb 2025 20:20)  Source: Ascites Fl  Gram Stain (12 Feb 2025 07:14):    polymorphonuclear leukocytes seen    No organisms seen    by cytocentrifuge  Preliminary Report (13 Feb 2025 11:22):    No growth to date.      CAPILLARY BLOOD GLUCOSE            MICRO:        RADIOLOGY & ADDITIONAL TESTS:                                 [facility] Division of Hospital Medicine    Chief Complaint:  Decompensated cirrhosis     SUBJECTIVE / OVERNIGHT EVENTS:  Pt examined laying in  bed  Pain appears clinically to be more improved today. Still with tenderness in LQ (b/l) but not as severe   d/w Renal. Given progression of MARY with persistence of encephalopathic state, previously d/w Ethics and at this time will proceed with 2 physician consent for emergent initiation of HD   ROS limited by pts pain but denies CP or SOB       MEDICATIONS  (STANDING):  albumin human 25% IVPB 100 milliLiter(s) IV Intermittent every 12 hours  cefTRIAXone Injectable. 2000 milliGRAM(s) IV Push every 24 hours  folic acid 1 milliGRAM(s) Oral daily  lactulose Retention Enema 200 Gram(s) Rectal four times a day  midodrine 5 milliGRAM(s) Oral every 8 hours  multivitamin 1 Tablet(s) Oral daily  naloxone Injectable 0.4 milliGRAM(s) IV Push once  octreotide  Injectable 100 MICROGram(s) IV Push every 8 hours  pantoprazole  Injectable 40 milliGRAM(s) IV Push daily  rifAXIMin 550 milliGRAM(s) Oral two times a day  thiamine 100 milliGRAM(s) Oral daily    MEDICATIONS  (PRN):  acetaminophen     Tablet .. 650 milliGRAM(s) Oral every 6 hours PRN Temp greater or equal to 38C (100.4F), Mild Pain (1 - 3)  albuterol/ipratropium for Nebulization 3 milliLiter(s) Nebulizer every 6 hours PRN Shortness of Breath and/or Wheezing  aluminum hydroxide/magnesium hydroxide/simethicone Suspension 30 milliLiter(s) Oral every 4 hours PRN Dyspepsia  HYDROmorphone  Injectable 0.2 milliGRAM(s) IV Push every 6 hours PRN Moderate Pain (4 - 6)  HYDROmorphone  Injectable 0.5 milliGRAM(s) IV Push every 6 hours PRN Severe Pain (7 - 10)  melatonin 3 milliGRAM(s) Oral at bedtime PRN Insomnia  ondansetron Injectable 4 milliGRAM(s) IV Push every 8 hours PRN Nausea and/or Vomiting        PHYSICAL EXAM:  Vital Signs Last 24 Hrs  T(C): 36.3 (15 Feb 2025 11:26), Max: 36.3 (15 Feb 2025 01:40)  T(F): 97.4 (15 Feb 2025 11:26), Max: 97.4 (15 Feb 2025 04:00)  HR: 60 (15 Feb 2025 11:26) (55 - 66)  BP: 98/59 (15 Feb 2025 11:26) (89/54 - 115/64)  BP(mean): 71 (15 Feb 2025 04:00) (65 - 81)  RR: 19 (15 Feb 2025 11:26) (19 - 19)  SpO2: 96% (15 Feb 2025 11:26) (93% - 99%)    Parameters below as of 15 Feb 2025 11:26  Patient On (Oxygen Delivery Method): nasal cannula  O2 Flow (L/min): 5      CONSTITUTIONAL: Non toxic appearing, lying in bed in   ENMT: Moist oral mucosa, no pharyngeal injection or exudates  RESPIRATORY: Normal respiratory effort; lungs are clear to auscultation bilaterally  CARDIOVASCULAR: Regular rate and rhythm, normal S1 and S2, no murmur/rub/gallop  ABDOMEN: Improved tenderenss, now more localized in LLQ and RLQ. Distension improved    MUSCLOSKELETAL:   no clubbing or cyanosis of digits; no joint swelling or tenderness to palpation  PSYCH: Awake and responds to name   NEUROLOGY: No focal CN deficits. Moving all ext w/o limitation    SKIN: No rashes;, flank bruising     LABS:                          11.0   7.56  )-----------( 62       ( 15 Feb 2025 04:09 )             33.1   02-15    141  |  104  |  90.9[H]  ----------------------------<  77  3.6   |  19.0[L]  |  5.33[H]    Ca    8.8      15 Feb 2025 04:09  Phos  7.0     02-14  Mg     2.7     02-15    TPro  5.9[L]  /  Alb  2.9[L]  /  TBili  3.1[H]  /  DBili  2.0[H]  /  AST  100[H]  /  ALT  42[H]  /  AlkPhos  154[H]  02-15    PTT - ( 14 Feb 2025 04:00 )  PTT:48.7 sec      Urinalysis Basic - ( 14 Feb 2025 04:00 )    Color: x / Appearance: x / SG: x / pH: x  Gluc: 93 mg/dL / Ketone: x  / Bili: x / Urobili: x   Blood: x / Protein: x / Nitrite: x   Leuk Esterase: x / RBC: x / WBC x   Sq Epi: x / Non Sq Epi: x / Bacteria: x        Culture - Urine (collected 12 Feb 2025 14:20)  Source: Clean Catch Clean Catch (Midstream)  Final Report (13 Feb 2025 15:22):    <10,000 CFU/mL Normal Urogenital Fanny    Culture - Blood (collected 11 Feb 2025 21:55)  Source: .Blood BLOOD  Preliminary Report (14 Feb 2025 04:01):    No growth at 48 Hours    Culture - Body Fluid with Gram Stain (collected 11 Feb 2025 20:20)  Source: Ascites Fl  Gram Stain (12 Feb 2025 07:14):    polymorphonuclear leukocytes seen    No organisms seen    by cytocentrifuge  Preliminary Report (13 Feb 2025 11:22):    No growth to date.      CAPILLARY BLOOD GLUCOSE            MICRO:  Peritoneal fluid and blood cutlures : NGTD      RADIOLOGY & ADDITIONAL TESTS:    2/11/25 CT abd/Pel  IMPRESSION:  Cirrhosis with evidence of portal hypertension. Moderate volume of abdominal and pelvic ascites. Moderate left and small right pleural effusions with associated compressive atelectasis.  Unchanged low-attenuation masslike lesion in the liver which is highly concerning for malignancy in the background of cirrhosis.  Unchanged periportal and gastrohepatic lymphadenopathy.  Thickening of the ascending colon likely due to portal colopathy.

## 2025-02-15 NOTE — PROCEDURE NOTE - ADDITIONAL PROCEDURE DETAILS
The patient was positioned flat, in Trendelenberg, and the R IJ was evaluated for thrombus and suitability for nontunneled HD placement. Consent was obtained and the patient was prepped and draped. A microvascular access kit was used to access the IJ after 1% lidocaine was administered. There was non-pulsatile blood return and the microvascular access wire was threaded through the needle without resistance.    The needle was removed and the microvascular access dilators were successively placed; the guidewire and central catheter were removed and the stiffer guidewire was threaded without resistance after manometry demonstrated appropriate columnation of blood in the tubing. Ultrasound evaluation confirmed the guidewire in lumen of the IJ. An incision was made at that time through the subcutaneous tissue. The outer microvascular access catheter was removed and the first HD catheter dilator was advanced over the guidewire; then the second HD catheter dilator was advanced over the guidewire. After successful dilation, the HD catheter (13cm, 13.5Fr) was advanced over the guidewire. Once hubbed, the guidewire was retrieved. The two ports of the HD catheter were aspirated with good blood return, then flushed with sterile NS. The catheter was sutured in place and a sterile dressing was placed.    The patient tolerated the procedure well. Blood loss was minimal. A CXR was ordered as urgent.

## 2025-02-15 NOTE — PROGRESS NOTE ADULT - ASSESSMENT
74y/oM PMH HCV cirrhosis, reported HCC, h/o opioid and alcohol use, recently admitted to [facility] (1/11-1/23 s/p cardiac arrest, PNA and Lt pleural effusion s/p CT and paracentesis) now admitted with progressive abd distension and worsening abd pain. Reportedly also with complaints of BRBPR however was unable to elaborate. Remains with waxing and waning mentation suspected due to hepatic encephalopathy compounded by acute uremia from MARY suspected due to hepatorenal syndrome     Hepatic encephalopathy   Hepatitis C cirrhosis with documentation from prior chart with HCC   Path of pl fluid non confirmatory (1/2025)  AFP [id number] (1/2025)  Ascitic fluid done in ER borderline for SBP however with history and clinical exam started on empirics Ceftriaxone 2 gm daily  Continue NPO for now pending stable (consistent) mental status improvement   Lactulose retention enemas qid standing   Appreciate IR assistance, s/p 3550 ml yu fluid drained 2/15  Cont rifaximin, PPI   hold coreg for now  MELD-Na is 31 this am. Prognosticating near 70% mortality within 90- days   Ethic and Palliative evals/recs appreciated    MARY   Unconfirmed etiology  Progressing despite LVP yesterday   d/w Renal. Given Ethics eval and pts urgent need for intervention at this time 2 physician consent done to initiate HD   Vascular consulted by Renal for placement.   Suspected 2/2 Hepatorenal syndrome (probably HRS-1 given acuity)   Started on HRS cocktail. Continue Albumin / Midodrine and Octreotide   US reviewed. No evidnce of post obstructive uropathy   Renal on board. Appreciate recs    Acute hypoxic respiratory failure   CXR with small b/l pleural effusions  wean supplemental O2 as tolerated   Anticipate initiation of HD soon. Hopefully with some volume removal     ?BRBPR   Reportedly stated on admission   Currently unable to expand  H/H relatively stable.   Monitor BM for stool count, output and consistency       h/o opioid and EtOH abuse   as per recent admission, follows with [facility] [phone],  No answer on earlier attempts at contacting   Although liver function would usually limit Dilaudid use, severe renal impairment limiting morphine use and given severity of clinical pain and high risk of using long acting opiates (Methadone), will use low dose Dilaudid PRN and introduce PO ASAP pending mental status   Cont mvi, thiamine, folic acid when able to take PO     Palliative and Ethics on board     Extremely high risk of mortality irrespective of interventions given currently hepatic function    vte ppx: scds       Cotninue SDU care. d/w Logistics to move pt to SDU bed with HD capability   guarded prognosis, suspect extremely poor  74y/oM PMH HCV cirrhosis, reported HCC, h/o opioid and alcohol use, recently admitted to [facility] (1/11-1/23 s/p cardiac arrest, PNA and Lt pleural effusion s/p CT and paracentesis) now admitted with progressive abd distension and worsening abd pain. Reportedly also with complaints of BRBPR however was unable to elaborate. Remains with waxing and waning mentation suspected due to hepatic encephalopathy compounded by acute uremia from MARY suspected due to hepatorenal syndrome     Hepatic encephalopathy   Hepatitis C cirrhosis with documentation from prior chart with HCC   Path of pl fluid non confirmatory (1/2025)  AFP [id number] (1/2025)  Ascitic fluid done in ER borderline for SBP however with history and clinical exam started on empirics Ceftriaxone 2 gm daily  Continue NPO for now pending stable (consistent) mental status improvement   Lactulose retention enemas qid standing   Appreciate IR assistance, s/p 3550 ml yu fluid drained 2/15  Cont rifaximin, PPI   hold coreg for now  MELD-Na is 31 this am. Prognosticating near 70% mortality within 90- days   d/w GI who will d/w hepatology but given thus far no social support system identified / mental status despite exhaustive attempts in the stenting of pts inability to demonstrate any reliable cognitive with  reported HCC would unlikely be considered for transplant candidacy.   Steroids unlikely to be of any benifit (from a hepatic standpoint)and could cause SBP to radpily worsen   Ethic and Palliative evals/recs appreciated    MARY   Unconfirmed etiology  Progressing despite LVP yesterday   d/w Renal. Given Ethics eval and pts urgent need for intervention at this time 2 physician consent done to initiate HD   Vascular consulted by Renal for placement.   Suspected 2/2 Hepatorenal syndrome (probably HRS-1 given acuity)   Started on HRS cocktail. Continue Albumin / Midodrine and Octreotide   US reviewed. No evidence of post obstructive uropathy   Renal on board. Appreciate recs    Acute hypoxic respiratory failure   CXR with small b/l pleural effusions  wean supplemental O2 as tolerated   Anticipate initiation of HD soon. Hopefully with some volume removal     ?BRBPR   Reportedly stated on admission   Currently unable to expand  H/H relatively stable.   Monitor BM for stool count, output and consistency     h/o opioid and EtOH abuse   as per recent admission, follows with [facility] [phone],  No answer on earlier attempts at contacting   Although liver function would usually limit Dilaudid use, severe renal impairment limiting morphine use and given severity of clinical pain and high risk of using long acting opiates (Methadone), will use low dose Dilaudid PRN and introduce PO ASAP pending mental status   Cont mvi, thiamine, folic acid when able to take PO     Palliative and Ethics on board     Extremely high risk of mortality irrespective of interventions given currently hepatic function    vte ppx: scds       Cotninue SDU care. d/w Logistics to move pt to SDU bed with HD capability   guarded prognosis, suspect extremely poor prognosis  74y/oM PMH HCV cirrhosis, reported HCC, h/o opioid and alcohol use, recently admitted to [facility] (1/11-1/23 s/p cardiac arrest, PNA and Lt pleural effusion s/p CT and paracentesis) now admitted with progressive abd distension and worsening abd pain. Reportedly also with complaints of BRBPR however was unable to elaborate. Remains with waxing and waning mentation suspected due to hepatic encephalopathy compounded by acute uremia from MARY suspected due to hepatorenal syndrome     Hepatic encephalopathy   Hepatitis C cirrhosis with documentation from prior chart with HCC   Path of pl fluid non confirmatory (1/2025)  AFP [id number] (1/2025)  Ascitic fluid done in ER borderline for SBP however with history and clinical exam started on empirics Ceftriaxone 2 gm daily  Continue NPO for now pending stable (consistent) mental status improvement   SLP eval in am. In interim start D5 1/2 NS with 75 meq Na bicarb at slow rate   Lactulose retention enemas qid standing   Appreciate IR assistance, s/p 3550 ml yu fluid drained 2/15  Cont rifaximin, PPI   hold coreg for now  MELD-Na is 31 this am. Prognosticating near 70% mortality within 90- days   d/w GI who will d/w hepatology but given thus far no social support system identified / mental status despite exhaustive attempts in the stenting of pts inability to demonstrate any reliable cognitive with  reported HCC would unlikely be considered for transplant candidacy.   Steroids unlikely to be of any benefit (from a hepatic standpoint)and could cause SBP to radpily worsen   Ethic and Palliative evals/recs appreciated    MARY   Unconfirmed etiology  Progressing despite LVP yesterday   d/w Renal. Given Ethics eval and pts urgent need for intervention at this time 2 physician consent done to initiate HD   Vascular consulted by Renal for placement.   Suspected 2/2 Hepatorenal syndrome (probably HRS-1 given acuity)   Started on HRS cocktail. Continue Albumin / Midodrine and Octreotide   US reviewed. No evidence of post obstructive uropathy   Renal on board. Appreciate recs    Acute hypoxic respiratory failure   CXR with small b/l pleural effusions  wean supplemental O2 as tolerated   Anticipate initiation of HD soon. Hopefully with some volume removal     ?BRBPR   Reportedly stated on admission   Currently unable to expand  H/H relatively stable.   Monitor BM for stool count, output and consistency     h/o opioid and EtOH abuse   as per recent admission, follows with [facility] [phone],  No answer on earlier attempts at contacting   Although liver function would usually limit Dilaudid use, severe renal impairment limiting morphine use and given severity of clinical pain and high risk of using long acting opiates (Methadone), will use low dose Dilaudid PRN and introduce PO ASAP pending mental status   Cont mvi, thiamine, folic acid when able to take PO     Palliative and Ethics on board     Extremely high risk of mortality irrespective of interventions given currently hepatic function    vte ppx: scds       Cotninue SDU care. d/w Logistics to move pt to SDU bed with HD capability   guarded prognosis, suspect extremely poor prognosis

## 2025-02-15 NOTE — CONSULT NOTE ADULT - ASSESSMENT
A 75 yo M with history of HCV cirrhosis, hepatoma, h/o opioid/EtOH use, recent admission to [facility] 1/11 - 1/23 for cardiac arrest, PNA, & left-sided pleural effusion (s/p chest tube) & paracentesis (1/15) complicated by SBP came to ED complaining of worsening abdominal distention, found to have AMS/lethargy, presumed to have hepatic encephalopathy. Vascular consulted for Shiley insertion for urgent temporary hemodialysis.    Plan:  - given patient with AMS/lethargy, may not be amenable to consent and may require two-physician consent for procedure  - plan for Shiley placement this afternoon

## 2025-02-15 NOTE — PROGRESS NOTE ADULT - SUBJECTIVE AND OBJECTIVE BOX
[facility] Division of Hospital Medicine    Chief Complaint:  Decompensated cirrhosis     SUBJECTIVE / OVERNIGHT EVENTS:  Pt examined laying in  bed  Writhing in pain however somewhat distractible    d/w Palliative, Ethics, Renal and IR  >3L LVP. FLuid reportedly clear.   Patient denies chest pain, SOB,   Mental status at time of exam AAO2-3 (with )  ROS limited by pts pain but denies CP or SOB     MEDICATIONS  (STANDING):  albumin human 25% IVPB 100 milliLiter(s) IV Intermittent every 12 hours  cefTRIAXone Injectable. 2000 milliGRAM(s) IV Push every 24 hours  folic acid 1 milliGRAM(s) Oral daily  lactulose Retention Enema 200 Gram(s) Rectal four times a day  midodrine 5 milliGRAM(s) Oral every 8 hours  multivitamin 1 Tablet(s) Oral daily  naloxone Injectable 0.4 milliGRAM(s) IV Push once  octreotide  Injectable 100 MICROGram(s) IV Push every 8 hours  pantoprazole  Injectable 40 milliGRAM(s) IV Push daily  rifAXIMin 550 milliGRAM(s) Oral two times a day  thiamine 100 milliGRAM(s) Oral daily    MEDICATIONS  (PRN):  acetaminophen     Tablet .. 650 milliGRAM(s) Oral every 6 hours PRN Temp greater or equal to 38C (100.4F), Mild Pain (1 - 3)  albuterol/ipratropium for Nebulization 3 milliLiter(s) Nebulizer every 6 hours PRN Shortness of Breath and/or Wheezing  aluminum hydroxide/magnesium hydroxide/simethicone Suspension 30 milliLiter(s) Oral every 4 hours PRN Dyspepsia  HYDROmorphone  Injectable 0.2 milliGRAM(s) IV Push every 6 hours PRN Moderate Pain (4 - 6)  HYDROmorphone  Injectable 0.5 milliGRAM(s) IV Push every 6 hours PRN Severe Pain (7 - 10)  melatonin 3 milliGRAM(s) Oral at bedtime PRN Insomnia  ondansetron Injectable 4 milliGRAM(s) IV Push every 8 hours PRN Nausea and/or Vomiting        I&O's Summary    13 Feb 2025 07:01  -  14 Feb 2025 07:00  --------------------------------------------------------  IN: 50 mL / OUT: 0 mL / NET: 50 mL    14 Feb 2025 07:01  -  14 Feb 2025 20:04  --------------------------------------------------------  IN: 0 mL / OUT: 50 mL / NET: -50 mL        PHYSICAL EXAM:  Vital Signs Last 24 Hrs  T(C): 35.4 (14 Feb 2025 17:30), Max: 36.4 (14 Feb 2025 08:31)  T(F): 95.7 (14 Feb 2025 17:30), Max: 97.5 (14 Feb 2025 08:31)  HR: 59 (14 Feb 2025 19:30) (53 - 68)  BP: 90/52 (14 Feb 2025 19:30) (89/54 - 148/60)  BP(mean): 65 (14 Feb 2025 19:30) (65 - 80)  RR: 19 (14 Feb 2025 08:31) (18 - 21)  SpO2: 93% (14 Feb 2025 19:30) (92% - 100%)    Parameters below as of 14 Feb 2025 19:30  Patient On (Oxygen Delivery Method): nasal cannula  O2 Flow (L/min): 5          CONSTITUTIONAL: Non toxic appearing, lying in bed in pain   ENMT: Moist oral mucosa, no pharyngeal injection or exudates  RESPIRATORY: Normal respiratory effort; lungs are clear to auscultation bilaterally  CARDIOVASCULAR: Regular rate and rhythm, normal S1 and S2, no murmur/rub/gallop; No lower extremity edema; Peripheral pulses are 2+ bilaterally  ABDOMEN: Moderately tendern in RLQ/LLQ. , normoactive bowel sounds, no rebound/guarding, abd distension improved post para  MUSCLOSKELETAL:   no clubbing or cyanosis of digits; no joint swelling or tenderness to palpation  PSYCH: A+O to person, place but does not answer completely about time or recent events   NEUROLOGY: No focal CN deficits. Moving all ext w/o limitation    SKIN: No rashes;, flank bruising     LABS:                        10.9   6.35  )-----------( 65       ( 14 Feb 2025 04:00 )             33.4     02-14    138  |  100  |  85.6[H]  ----------------------------<  93  3.8   |  19.0[L]  |  5.03[H]    Ca    8.9      14 Feb 2025 04:00  Phos  7.0     02-14  Mg     2.7     02-14    TPro  5.9[L]  /  Alb  2.8[L]  /  TBili  3.2[H]  /  DBili  2.0[H]  /  AST  115[H]  /  ALT  46[H]  /  AlkPhos  169[H]  02-14    PT/INR - ( 14 Feb 2025 04:00 )   PT: 23.3 sec;   INR: 2.08 ratio         PTT - ( 14 Feb 2025 04:00 )  PTT:48.7 sec      Urinalysis Basic - ( 14 Feb 2025 04:00 )    Color: x / Appearance: x / SG: x / pH: x  Gluc: 93 mg/dL / Ketone: x  / Bili: x / Urobili: x   Blood: x / Protein: x / Nitrite: x   Leuk Esterase: x / RBC: x / WBC x   Sq Epi: x / Non Sq Epi: x / Bacteria: x        Culture - Urine (collected 12 Feb 2025 14:20)  Source: Clean Catch Clean Catch (Midstream)  Final Report (13 Feb 2025 15:22):    <10,000 CFU/mL Normal Urogenital Fanny    Culture - Blood (collected 11 Feb 2025 21:55)  Source: .Blood BLOOD  Preliminary Report (14 Feb 2025 04:01):    No growth at 48 Hours    Culture - Body Fluid with Gram Stain (collected 11 Feb 2025 20:20)  Source: Ascites Fl  Gram Stain (12 Feb 2025 07:14):    polymorphonuclear leukocytes seen    No organisms seen    by cytocentrifuge  Preliminary Report (13 Feb 2025 11:22):    No growth to date.      CAPILLARY BLOOD GLUCOSE            MICRO:        RADIOLOGY & ADDITIONAL TESTS:

## 2025-02-15 NOTE — CONSULT NOTE ADULT - SUBJECTIVE AND OBJECTIVE BOX
HPI:  A 74 M with PMH of HCV cirrhosis, reported HCC, h/o opioid + alcohol use, recently admitted to [facility] (1/11-1/23 s/p cardiac arrest, PNA and Lt pleural effusion s/p CT and paracentesis) now admitted with progressive abd distension and worsening abd pain. Reportedly also with complaints of BRBPR however unable to elaborate. Currently with waxing and waning mentation due to likely hepatic encephalopathy. Also with oliguric MARY on CKD, per nephrology pre-renal/compartment syndrome in setting of ascites vs HRS. Vascular consulted for temporary dialysis catheter placement for urgent temporary hemodialysis for hepatic encephalopathy.     MEDICATIONS  (STANDING):  albumin human 25% IVPB 100 milliLiter(s) IV Intermittent every 12 hours  cefTRIAXone Injectable. 2000 milliGRAM(s) IV Push every 24 hours  folic acid 1 milliGRAM(s) Oral daily  lactulose Retention Enema 200 Gram(s) Rectal four times a day  midodrine 5 milliGRAM(s) Oral every 8 hours  multivitamin 1 Tablet(s) Oral daily  naloxone Injectable 0.4 milliGRAM(s) IV Push once  octreotide  Injectable 100 MICROGram(s) IV Push every 8 hours  pantoprazole  Injectable 40 milliGRAM(s) IV Push daily  rifAXIMin 550 milliGRAM(s) Oral two times a day  thiamine 100 milliGRAM(s) Oral daily    MEDICATIONS  (PRN):  acetaminophen     Tablet .. 650 milliGRAM(s) Oral every 6 hours PRN Temp greater or equal to 38C (100.4F), Mild Pain (1 - 3)  albuterol/ipratropium for Nebulization 3 milliLiter(s) Nebulizer every 6 hours PRN Shortness of Breath and/or Wheezing  aluminum hydroxide/magnesium hydroxide/simethicone Suspension 30 milliLiter(s) Oral every 4 hours PRN Dyspepsia  HYDROmorphone  Injectable 0.2 milliGRAM(s) IV Push every 6 hours PRN Moderate Pain (4 - 6)  HYDROmorphone  Injectable 0.5 milliGRAM(s) IV Push every 6 hours PRN Severe Pain (7 - 10)  melatonin 3 milliGRAM(s) Oral at bedtime PRN Insomnia  ondansetron Injectable 4 milliGRAM(s) IV Push every 8 hours PRN Nausea and/or Vomiting      Vital Signs Last 24 Hrs  T(C): 36.3 (15 Feb 2025 11:26), Max: 36.3 (15 Feb 2025 01:40)  T(F): 97.4 (15 Feb 2025 11:26), Max: 97.4 (15 Feb 2025 04:00)  HR: 60 (15 Feb 2025 11:26) (55 - 66)  BP: 98/59 (15 Feb 2025 11:26) (89/54 - 115/64)  BP(mean): 71 (15 Feb 2025 04:00) (65 - 81)  RR: 19 (15 Feb 2025 11:26) (19 - 19)  SpO2: 96% (15 Feb 2025 11:26) (93% - 99%)    Parameters below as of 15 Feb 2025 11:26  Patient On (Oxygen Delivery Method): nasal cannula  O2 Flow (L/min): 5      Constitutional: NAD; lethargic but arousable and able to follow commands and answer basic questions  Respiratory: Normal work of breathing, no accessory muscle use  Extremities: B/l LE edema  Vascular: bilateral radial pulses palpable  Neurological: A&O x 3      I&O's Detail    14 Feb 2025 07:01  -  15 Feb 2025 07:00  --------------------------------------------------------  IN:  Total IN: 0 mL    OUT:    Voided (mL): 150 mL  Total OUT: 150 mL    Total NET: -150 mL          LABS:                        11.0   7.56  )-----------( 62       ( 15 Feb 2025 04:09 )             33.1     02-15    141  |  104  |  90.9[H]  ----------------------------<  77  3.6   |  19.0[L]  |  5.33[H]    Ca    8.8      15 Feb 2025 04:09  Phos  7.0     02-14  Mg     2.7     02-15    TPro  5.9[L]  /  Alb  2.9[L]  /  TBili  3.1[H]  /  DBili  2.0[H]  /  AST  100[H]  /  ALT  42[H]  /  AlkPhos  154[H]  02-15    PT/INR - ( 15 Feb 2025 04:09 )   PT: 21.8 sec;   INR: 1.89 ratio         PTT - ( 15 Feb 2025 04:09 )  PTT:48.9 sec  Urinalysis Basic - ( 15 Feb 2025 04:09 )    Color: x / Appearance: x / SG: x / pH: x  Gluc: 77 mg/dL / Ketone: x  / Bili: x / Urobili: x   Blood: x / Protein: x / Nitrite: x   Leuk Esterase: x / RBC: x / WBC x   Sq Epi: x / Non Sq Epi: x / Bacteria: x        RADIOLOGY & ADDITIONAL STUDIES:

## 2025-02-15 NOTE — PROGRESS NOTE ADULT - ASSESSMENT
73 yo M with PMH HCV cirrhosis c/b liver mass concerning for HC, HE, ascites, SBP who presents for HE, ascites, abdominal pain. Patient recently admitted for SBP s/p antibiotics who presents for confusion, abdominal pain. He has been altered and unable to consent and has worsening renal function. He is s/p LVP with improvement in abdominal pain. On review of ascitic fluid labs it is consistent with SBP if calculated as %PMN x nucleated cells. He has been on antibiotics and is improved after paracentesis, would continue these meds. His mental status is mildly improved as well, would continue lactulose/ rifaxamin.       - Etiology HCV  - Severity: MELD 3.0 29,(90 d survival 76%),  CP C/13  - HE: lactulose, rifaxamin. Planned for HD which will also help uremia  - EV: outpatient EGD  - Ascites/volume: s/p LVP, diuretics held in setting of rising Cr  - SBP: continue antibiotics, send ascitic fluid for culture, will need prophylaxis on discharge  - HCC screening: CT with concerning liver lesion, but without liRads. Will discuss with radiology for lirads score. AFP elevated- very concerning for HCC  - OLT: Altered, unable to discuss if patient is interested in transplant. No social support. Will discuss with hepatologist  - Health Maintenance: Counselled to avoid oysters/shellfish, abstain from alcohol and NSAIDs, to limit Tylenol to <2 gm/day if necessary and to minimize salt intake (2gm/day preferred).

## 2025-02-15 NOTE — PROGRESS NOTE ADULT - SUBJECTIVE AND OBJECTIVE BOX
[facility] DIVISION OF KIDNEY DISEASES AND HYPERTENSION -- PROGRESS NOTE    24 hour events/subjective:  Remains lethargic   Underwent IR-guided paracentesis yesterday with 3.5L of fluid removal   Renal function continues to worsen   Electrolytes are acceptable    MEDICATIONS    Standing Inpatient Medications  albumin human 25% IVPB 100 milliLiter(s) IV Intermittent every 12 hours  cefTRIAXone Injectable. 2000 milliGRAM(s) IV Push every 24 hours  folic acid 1 milliGRAM(s) Oral daily  lactulose Retention Enema 200 Gram(s) Rectal four times a day  midodrine 5 milliGRAM(s) Oral every 8 hours  multivitamin 1 Tablet(s) Oral daily  naloxone Injectable 0.4 milliGRAM(s) IV Push once  octreotide  Injectable 100 MICROGram(s) IV Push every 8 hours  pantoprazole  Injectable 40 milliGRAM(s) IV Push daily  rifAXIMin 550 milliGRAM(s) Oral two times a day  thiamine 100 milliGRAM(s) Oral daily    PRN Inpatient Medications  acetaminophen     Tablet .. 650 milliGRAM(s) Oral every 6 hours PRN  albuterol/ipratropium for Nebulization 3 milliLiter(s) Nebulizer every 6 hours PRN  aluminum hydroxide/magnesium hydroxide/simethicone Suspension 30 milliLiter(s) Oral every 4 hours PRN  HYDROmorphone  Injectable 0.2 milliGRAM(s) IV Push every 6 hours PRN  HYDROmorphone  Injectable 0.5 milliGRAM(s) IV Push every 6 hours PRN  melatonin 3 milliGRAM(s) Oral at bedtime PRN  ondansetron Injectable 4 milliGRAM(s) IV Push every 8 hours PRN      VITALS/PHYSICAL EXAM  --------------------------------------------------------------------------------  T(C): 36.3 (02-15-25 @ 11:26), Max: 36.3 (02-15-25 @ 01:40)  HR: 60 (02-15-25 @ 11:26) (55 - 66)  BP: 98/59 (02-15-25 @ 11:26) (89/54 - 115/64)  RR: 19 (02-15-25 @ 11:26) (19 - 19)  SpO2: 96% (02-15-25 @ 11:26) (93% - 99%)  Wt(kg): --        02-14-25 @ 07:01  -  02-15-25 @ 07:00  --------------------------------------------------------  IN: 0 mL / OUT: 150 mL / NET: -150 mL      Physical Exam:  Gen: NAD, well-appearing  HEENT: normal  Pulm: CTA B/L  CV: normal S1S2; no rub, no edema  Abd: soft, non-tender  MSK no deformities   Neuro: Alert and oriented x3     LABS/STUDIES  --------------------------------------------------------------------------------  141  |  104  |  90.9  ----------------------------<  77      [02-15-25 @ 04:09]  3.6   |  19.0  |  5.33        Ca     8.8     [02-15-25 @ 04:09]      Mg     2.7     [02-15-25 @ 04:09]      Phos  7.0     [02-14-25 @ 04:00]    TPro  5.9  /  Alb  2.9  /  TBili  3.1  /  DBili  2.0  /  AST  100  /  ALT  42  /  AlkPhos  154  [02-15-25 @ 04:09]    PT/INR: PT 21.8 , INR 1.89       [02-15-25 @ 04:09]  PTT: 48.9       [02-15-25 @ 04:09]      Creatinine Trend:  SCr 5.33 [02-15 @ 04:09]  SCr 5.03 [02-14 @ 04:00]  SCr 5.30 [02-13 @ 03:10]  SCr 4.61 [02-12 @ 05:05]  SCr 4.89 [02-11 @ 20:46]

## 2025-02-15 NOTE — PROGRESS NOTE ADULT - ASSESSMENT
74y/oM PMH HCV cirrhosis, reported HCC, h/o opioid and alcohol use, recently admitted to [facility] (1/11-1/23 s/p cardiac arrest, PNA and Lt pleural effusion s/p CT and paracentesis) now admitted with progressive abd distension and worsening abd pain. Reportedly also with complaints of BRBPR however unable to elaborate. Currently with waxing and waning mentation due to likley hepatic encephalopathy     Hepatic encephalopathy   Hepatitis C cirrhosis with documentation from prior chart with HCC   Path of pl fluid non confirmatory  AFP 38300 1/13  Ascitic fluid done in ER borderline for SBP however with history and clinical exam started on empirics Ceftriaxone 2 gm daily  Continue NPO for now pending stable (consistent) mental status improvement   Lactulose retention enemas qid standing   Appreciate IR assistance, s/p 3550 ml yu fluid drained.   S ammonia 103 this am (improved from 122 yesterday)  cont rifaximin, ppi  hold coreg for now  MELD-Na is 33 this am. Prognosticating near 70% mortality within 90- days   Ethic and Palliative evals/recs appreciated  Since no surrogate has been identified and no GOC were done on recent admission (even though had documented cardiac arrest with ROSC) at present, IF emergent intervention is indicated 2 Physician consent will need to be used if pt is unable to give informed consent     Acute hypoxic respiratory failure   CXR with small b/l pleural effusions  Suspect hypoxia confounded by pts shallow breathing pattern due to pain   wean supplemental O2 as tolerated     ?BRBPR   Reportedly stated on admission   Currently unable to expand  H/H relatively stable.   Monitor BM for stool count, output and consistency     MARY   Unconfirmed etiology  Potentially 2/2 Hepatorenal syndrome   Started on HRS cocktail. Continue Albumin / Midodrine and Octreotide   IF fails to improve or worsens may need to consider HD (suspected limited benefit unless driving etiology is able to improve/ stabilize   US reviewed. No evidnce of post obstructive uropathy   Ranal on board. Appreciate recs    h/o opioid and EtOH abuse   as per recent admission, follows with [facility] [phone],  No answer on earlier attempts at contacting  Although liver function would usually limit dilaudid use, severe renal impairment limiting morphine use and given severity of clinical pain and high risk of using long actiing opiates (Methadone), will use low dose Dilaudid PRN and introduce PO ASAP pending mental status   Cont mvi, thiamine, folic acid when able to take PO     Palliative and Ethics on board     Extremely high risk of mortality irrespective of interventions given currently hepatic function    vte ppx: scds         guarded prognosis

## 2025-02-15 NOTE — PROGRESS NOTE ADULT - SUBJECTIVE AND OBJECTIVE BOX
Chief Complaint:  Patient is a 74y old  Male who presents with a chief complaint of Decompensated cirrhosis (15 Feb 2025 15:59)    HPI/ 24 hr events:  Patient seen and examined at bedside. Pt feeling improved today. He reports pain improved in abdomen after paracentesis.     REVIEW OF SYSTEMS:   12 point review of systems completed and negative other than as stated in HPI    MEDICATIONS:   MEDICATIONS  (STANDING):  albumin human 25% IVPB 100 milliLiter(s) IV Intermittent every 12 hours  cefTRIAXone Injectable. 2000 milliGRAM(s) IV Push every 24 hours  folic acid 1 milliGRAM(s) Oral daily  lactulose Retention Enema 200 Gram(s) Rectal four times a day  midodrine 5 milliGRAM(s) Oral every 8 hours  multivitamin 1 Tablet(s) Oral daily  naloxone Injectable 0.4 milliGRAM(s) IV Push once  octreotide  Injectable 100 MICROGram(s) IV Push every 8 hours  pantoprazole  Injectable 40 milliGRAM(s) IV Push daily  rifAXIMin 550 milliGRAM(s) Oral two times a day  thiamine 100 milliGRAM(s) Oral daily    MEDICATIONS  (PRN):  acetaminophen     Tablet .. 650 milliGRAM(s) Oral every 6 hours PRN Temp greater or equal to 38C (100.4F), Mild Pain (1 - 3)  albuterol/ipratropium for Nebulization 3 milliLiter(s) Nebulizer every 6 hours PRN Shortness of Breath and/or Wheezing  aluminum hydroxide/magnesium hydroxide/simethicone Suspension 30 milliLiter(s) Oral every 4 hours PRN Dyspepsia  HYDROmorphone  Injectable 0.2 milliGRAM(s) IV Push every 6 hours PRN Moderate Pain (4 - 6)  HYDROmorphone  Injectable 0.5 milliGRAM(s) IV Push every 6 hours PRN Severe Pain (7 - 10)  melatonin 3 milliGRAM(s) Oral at bedtime PRN Insomnia  ondansetron Injectable 4 milliGRAM(s) IV Push every 8 hours PRN Nausea and/or Vomiting       DIET:  Diet, NPO (02-12-25 @ 15:40) [Active]          ALLERGIES:   Allergies    No Known Allergies    Intolerances        PHYSICAL EXAM:   VITAL SIGNS:   Vital Signs Last 24 Hrs  T(C): 34.7 (15 Feb 2025 16:21), Max: 36.3 (15 Feb 2025 01:40)  T(F): 94.4 (15 Feb 2025 16:21), Max: 97.4 (15 Feb 2025 04:00)  HR: 63 (15 Feb 2025 19:23) (55 - 66)  BP: 101/53 (15 Feb 2025 19:23) (90/52 - 115/64)  BP(mean): 71 (15 Feb 2025 04:00) (65 - 81)  RR: 18 (15 Feb 2025 19:23) (18 - 19)  SpO2: 95% (15 Feb 2025 19:23) (93% - 99%)    Parameters below as of 15 Feb 2025 19:23  Patient On (Oxygen Delivery Method): nasal cannula  O2 Flow (L/min): 5    I&O's Summary    14 Feb 2025 07:01  -  15 Feb 2025 07:00  --------------------------------------------------------  IN: 0 mL / OUT: 150 mL / NET: -150 mL    15 Feb 2025 07:01  -  15 Feb 2025 19:25  --------------------------------------------------------  IN: 100 mL / OUT: 0 mL / NET: 100 mL      GENERAL:  No acute distress  HEENT:  NC/AT, conjunctiva clear, sclera anicteric  CHEST:  No increased effort  HEART:  Regular rate  ABDOMEN:  Soft, non-tender, non-distended, normoactive bowel sounds, no rebound or guarding  EXTREMITIES: No edema  SKIN:  Warm, dry  NEURO:  Calm, cooperative, AO to year, self    LABS:                        11.0   7.56  )-----------( 62       ( 15 Feb 2025 04:09 )             33.1     02-15    141  |  104  |  90.9[H]  ----------------------------<  77  3.6   |  19.0[L]  |  5.33[H]    Ca    8.8      15 Feb 2025 04:09  Phos  7.0     02-14  Mg     2.7     02-15    TPro  5.9[L]  /  Alb  2.9[L]  /  TBili  3.1[H]  /  DBili  2.0[H]  /  AST  100[H]  /  ALT  42[H]  /  AlkPhos  154[H]  02-15    LIVER FUNCTIONS - ( 15 Feb 2025 04:09 )  Alb: 2.9 g/dL / Pro: 5.9 g/dL / ALK PHOS: 154 U/L / ALT: 42 U/L / AST: 100 U/L / GGT: x           PT/INR - ( 15 Feb 2025 04:09 )   PT: 21.8 sec;   INR: 1.89 ratio         PTT - ( 15 Feb 2025 04:09 )  PTT:48.9 sec    Culture - Body Fluid with Gram Stain (collected 14 Feb 2025 13:50)  Source: Peritoneal  Gram Stain (14 Feb 2025 23:09):    polymorphonuclear leukocytes seen    No organisms seen    by cytocentrifuge  Preliminary Report (15 Feb 2025 17:53):    No growth          RADIOLOGY & ADDITIONAL STUDIES:  I personally reviewed the studies and agree with the radiologists review    -- --   ACC: [id number] EXAM:  US ABDOMEN LIMITED   ORDERED BY: TRACEY NICHOLS     PROCEDURE DATE:  02/13/2025          INTERPRETATION:  CLINICAL INFORMATION: Cirrhosis.  Hepatic   encephalopathy.  Distended abdomen.    COMPARISON: 01/21/2025.    TECHNIQUE:Limited ultrasound of the abdomen to evaluate for ascites.    FINDINGS:  Right upper quadrant: Pocket of ascites measures up to depth of 11 cm.    Right lower quadrant: Pocket of ascites measures up to 9 cm.    Left upper quadrant: Mild ascites..    Left lower quadrant: Pocket of ascites measures up to depth of 6 cm.    Pleural effusions: Pleural effusions are partially imaged bilaterally.      IMPRESSION:  Moderate ascites in the right hemiabdomen and mild ascites in the left   hemiabdomen.    Bilateral pleural effusions.    --- End of Report ---            ROMARIO BAUTISTA MD; Attending Radiologist  This document has been electronically signed. Feb 13 2025  5:53AM --

## 2025-02-15 NOTE — PROGRESS NOTE ADULT - ASSESSMENT
75 yo M with history of HCV cirrhosis, hepatoma, h/o opioid/EtOH use, recent admission to [facility] 1/11 - 1/23 for cardiac arrest, PNA, & left-sided pleural effusion (s/p chest tube) & paracentesis (1/15) complicated by SBP came to ED complaining of worsening abdominal distention. Unclear whether the patient was taking his home medications. Patient is currently lethargic, and unable to provide any information via .   In the ED he was found to have hepatic encephalopathy with ammonia level of 120, CT abdomen/pelvis with iv contrast with large volume ascites.   Patient was found to have MARY with sCr 4.89->4.61. He received 1L of LR yesterday. Currently noted to have no urine output.   IR and GI are following and emergent paracentesis is planned.     - Oliguric MARY on CKD, could be pre-renal/compartment syndrome in the setting of ascites vs. HRS, continues to worsen despite midodrine and octreotide  sCr used to be at 1.5 in 1/23/2025   CT abdomen/pelvis reviewed, noted to have normal-sized kidneys and no obstructive uropathy   UA reviewed with increased SG, expected after iv contrast vs. due to vascular constriction in the setting of portal hypertension, otherwise bland   - Decompensated HCV liver cirrhosis with ascites, worsening   - Ascites s/p paracentesis with 3.5L of fluid removal   - Hepatic encephalopathy, worsening   - Anemia   - History of PSA with opioids and EtOH   - History of cardiac arrest in the past     Plan   Patient is currently lacking capacity making medical decisions, due to hepatic encephalopathy, and no HCP or family is available for consent .  Will do 2 physician consent with Dr. Santoro  Surgery was consulted for emergent temporary hemodialysis catheter placement   Continue HRS cocktail due to severe MARY, with midodrine 10 mg tid, octreotide and albumin 25 g q12h   HD today 2 hrs 3 k bath Goal UF 0.5L as tolerated   Lactulose as per primary team   Monitor urine output   Hold carvediolol   Dose medications for eGFR<15  GI follow up  Guarded prognosis   Discussed with Dr. Santoro   10-Nov-2022 14:20

## 2025-02-16 NOTE — PROGRESS NOTE ADULT - SUBJECTIVE AND OBJECTIVE BOX
[facility] DIVISION OF KIDNEY DISEASES AND HYPERTENSION -- PROGRESS NOTE    24 hour events/subjective:  Seen today   Remains lethargic  Renal function continues to worsen   Patient appears to be oliguric   CXR with pulmonary edema    MEDICATIONS    Standing Inpatient Medications  buMETAnide Injectable 2 milliGRAM(s) IV Push every 12 hours  cefTRIAXone Injectable. 2000 milliGRAM(s) IV Push every 24 hours  chlorhexidine 2% Cloths 1 Application(s) Topical <User Schedule>  folic acid 1 milliGRAM(s) Oral daily  lactulose Retention Enema 200 Gram(s) Rectal four times a day  midodrine 5 milliGRAM(s) Oral every 8 hours  multivitamin 1 Tablet(s) Oral daily  naloxone Injectable 0.4 milliGRAM(s) IV Push once  octreotide  Injectable 50 MICROGram(s) IV Push every 8 hours  pantoprazole  Injectable 40 milliGRAM(s) IV Push daily  rifAXIMin 550 milliGRAM(s) Oral two times a day  thiamine 100 milliGRAM(s) Oral daily    PRN Inpatient Medications  acetaminophen     Tablet .. 650 milliGRAM(s) Oral every 6 hours PRN  albuterol/ipratropium for Nebulization 3 milliLiter(s) Nebulizer every 6 hours PRN  aluminum hydroxide/magnesium hydroxide/simethicone Suspension 30 milliLiter(s) Oral every 4 hours PRN  HYDROmorphone  Injectable 0.2 milliGRAM(s) IV Push every 6 hours PRN  HYDROmorphone  Injectable 0.5 milliGRAM(s) IV Push every 6 hours PRN  melatonin 3 milliGRAM(s) Oral at bedtime PRN  ondansetron Injectable 4 milliGRAM(s) IV Push every 8 hours PRN      VITALS/PHYSICAL EXAM  --------------------------------------------------------------------------------  T(C): 36.3 (02-16-25 @ 13:30), Max: 37.1 (02-16-25 @ 04:00)  HR: 75 (02-16-25 @ 13:30) (62 - 80)  BP: 102/64 (02-16-25 @ 13:30) (95/59 - 122/59)  RR: 18 (02-16-25 @ 13:30) (12 - 25)  SpO2: 94% (02-16-25 @ 13:30) (90% - 97%)  Wt(kg): --  Height (cm): 170.2 (02-15-25 @ 21:50)  Weight (kg): 100.8 (02-15-25 @ 21:50)  BMI (kg/m2): 34.8 (02-15-25 @ 21:50)  BSA (m2): 2.11 (02-15-25 @ 21:50)      02-15-25 @ 07:01  -  02-16-25 @ 07:00  --------------------------------------------------------  IN: 605 mL / OUT: 0 mL / NET: 605 mL    02-16-25 @ 07:01  -  02-16-25 @ 14:50  --------------------------------------------------------  IN: 0 mL / OUT: 500 mL / NET: -500 mL      Physical Exam:  Gen: NAD, well-appearing  HEENT: normal  Pulm: CTA B/L  CV: normal S1S2; no rub, no edema  Abd: soft, non-tender  MSK no deformities   Neuro: lethargic     LABS/STUDIES  --------------------------------------------------------------------------------  145  |  106  |  95.9  ----------------------------<  106      [02-16-25 @ 07:23]  3.4   |  20.0  |  5.76        Ca     9.2     [02-16-25 @ 07:23]      Mg     2.7     [02-16-25 @ 07:23]    TPro  6.1  /  Alb  3.3  /  TBili  3.1  /  DBili  2.0  /  AST  98  /  ALT  40  /  AlkPhos  153  [02-16-25 @ 07:23]    PT/INR: PT 21.8 , INR 1.94       [02-16-25 @ 07:23]  PTT: 48.9       [02-15-25 @ 04:09]      Creatinine Trend:  SCr 5.76 [02-16 @ 07:23]  SCr 5.33 [02-15 @ 04:09]  SCr 5.03 [02-14 @ 04:00]  SCr 5.30 [02-13 @ 03:10]  SCr 4.61 [02-12 @ 05:05]

## 2025-02-16 NOTE — PROGRESS NOTE ADULT - ASSESSMENT
74y/oM PMH HCV cirrhosis, reported HCC, h/o opioid and alcohol use, recently admitted to [facility] (1/11-1/23 s/p cardiac arrest, PNA and Lt pleural effusion s/p CT and paracentesis) now admitted with progressive abd distension and worsening abd pain. Reportedly also with complaints of BRBPR however was unable to elaborate. Remains with waxing and waning mentation suspected due to hepatic encephalopathy compounded by acute uremia from MARY suspected due to hepatorenal syndrome. Due to progressive renal failure now started on HD with 2 PC consent     Hepatic encephalopathy   Hepatitis C cirrhosis with documentation from prior chart with HCC   Path of pl fluid non confirmatory (1/2025)  AFP [id number] (1/2025)  Ascitic fluid done in ER borderline for SBP however with history and clinical exam started on empirics Ceftriaxone 2 gm daily  Continue NPO for now pending stable (consistent) mental status improvement   SLP eval in am. In interim start D5 1/2 NS with 75 meq Na bicarb at slow rate   Lactulose retention enemas qid standing   s/p IR Paracentesis  3550 ml yu fluid drained 2/15  Cont rifaximin, PPI   hold coreg for now  MELD noted. Now started on HD,   (MLED : 31 / MELD-Na : 31 / MELD 3.0 : 29)  Based on [place] Survival prediction 75% at 60 days)  (will raise MELD-Na however will hopefully help uremia if significantly contributing to mental status)   d/w GI who will d/w hepatology but given thus far no social support system identified / mental status despite exhaustive attempts in the stenting of pts inability to demonstrate any reliable cognitive with  reported HCC would unlikely be considered for transplant candidacy.   Steroids unlikely to be of any benefit (from a hepatic standpoint)and could cause SBP to radpily worsen   Ethic and Palliative evals/recs appreciated    MARY   Unconfirmed etiology  Progressing despite LVP yesterday   d/w Renal. Given Ethics eval and pts urgent need for intervention at this time 2 physician consent done to initiate HD   Vascular consulted by Renal for placement.   Suspected 2/2 Hepatorenal syndrome (probably HRS-1 given acuity)   Continue  HRS cocktail. Albumin / Midodrine and Octreotide   US reviewed. No evidence of post obstructive uropathy   Renal on board. Appreciate recs    Acute hypoxic respiratory failure   CXR with increasing Rt sided effusion (recurring when compared to Jan 2025)  Started on UF today  Planned for repeat HD tomorrow   Will repeat CXR post HD tomorrow. If not improved will d/w CTS about potential placement of pigtail (as in Jan)   Continue supplemental O2 as tolerated     ?BRBPR   Reportedly stated on admission   Currently unable to expand  H/H relatively stable.   Monitor BM for stool count, output and consistency     h/o opioid and EtOH abuse   as per recent admission, follows with [facility]adone [facility] [phone],  No answer on earlier attempts at contacting   Although liver function would usually limit Dilaudid use, severe renal impairment limiting morphine use and given severity of clinical pain and high risk of using long acting opiates (Methadone), will use low dose Dilaudid PRN and introduce PO ASAP pending mental status   Cont MVI , thiamine, folic acid when able to take PO     Palliative and Ethics on board   Extremely high risk of mortality irrespective of interventions given currently hepatic function and underlying cardiac history (PEA arrest 1/25)     vte ppx: scds       Cotninue SDU care.   guarded prognosis, suspect extremely high all cause mortality given advanced medical comorbidties including but not light of re poor prognosis

## 2025-02-16 NOTE — PROGRESS NOTE ADULT - ASSESSMENT
75 yo M with history of HCV cirrhosis, hepatoma, h/o opioid/EtOH use, recent admission to [facility] 1/11 - 1/23 for cardiac arrest, PNA, & left-sided pleural effusion (s/p chest tube) & paracentesis (1/15) complicated by SBP came to ED complaining of worsening abdominal distention. Unclear whether the patient was taking his home medications. Patient is currently lethargic, and unable to provide any information via .   In the ED he was found to have hepatic encephalopathy with ammonia level of 120, CT abdomen/pelvis with iv contrast with large volume ascites.   Patient was found to have MARY with sCr 4.89->4.61. He received 1L of LR yesterday. Currently noted to have no urine output.   IR and GI are following and emergent paracentesis is planned.     - Oliguric MARY on CKD, could be pre-renal/compartment syndrome in the setting of ascites vs. HRS, continues to worsen despite midodrine and octreotide  sCr used to be at 1.5 in 1/23/2025   CT abdomen/pelvis reviewed, noted to have normal-sized kidneys and no obstructive uropathy   UA reviewed with increased SG, expected after iv contrast vs. due to vascular constriction in the setting of portal hypertension, otherwise bland   - Decompensated HCV liver cirrhosis with ascites, worsening   - Ascites s/p paracentesis with 3.5L of fluid removal   - Hepatic encephalopathy, worsening   - Anemia   - History of PSA with opioids and EtOH   - History of cardiac arrest in the past     Plan   Stop albumin   Will decrease octreotide to 50 mg tid today, and then stop tomorrow   Continue midodrine for now   HD today 2 hrs 3 k bath Goal UF 0.5L as tolerated   Start bumex 2 mg bid   Lactulose as per primary team   Monitor urine output   Hold carvediolol   Dose medications for HD  GI follow up, they will discuss patient with liver transplant center  Guarded prognosis   Discussed with Dr. Santoro

## 2025-02-16 NOTE — PROGRESS NOTE ADULT - SUBJECTIVE AND OBJECTIVE BOX
[facility] Division of Hospital Medicine    Chief Complaint:  Decompensated cirrhosis     SUBJECTIVE / OVERNIGHT EVENTS:  Pt examined laying in  bed  Abd pain clinically better   Still confused but more alert   Undergoing HD (room changed to alternate to accommodate for HD)   ROS limited by pts mental status. Still nods to pain but denies CP or SOB       MEDICATIONS  (STANDING):  albuterol/ipratropium for Nebulization 3 milliLiter(s) Nebulizer every 4 hours  buMETAnide Injectable 2 milliGRAM(s) IV Push every 12 hours  cefTRIAXone Injectable. 2000 milliGRAM(s) IV Push every 24 hours  chlorhexidine 2% Cloths 1 Application(s) Topical <User Schedule>  folic acid 1 milliGRAM(s) Oral daily  lactulose Retention Enema 200 Gram(s) Rectal four times a day  midodrine 5 milliGRAM(s) Oral every 8 hours  multivitamin 1 Tablet(s) Oral daily  naloxone Injectable 0.4 milliGRAM(s) IV Push once  octreotide  Injectable 50 MICROGram(s) IV Push every 8 hours  pantoprazole  Injectable 40 milliGRAM(s) IV Push daily  rifAXIMin 550 milliGRAM(s) Oral two times a day  thiamine 100 milliGRAM(s) Oral daily    MEDICATIONS  (PRN):  acetaminophen     Tablet .. 650 milliGRAM(s) Oral every 6 hours PRN Temp greater or equal to 38C (100.4F), Mild Pain (1 - 3)  aluminum hydroxide/magnesium hydroxide/simethicone Suspension 30 milliLiter(s) Oral every 4 hours PRN Dyspepsia  HYDROmorphone  Injectable 0.2 milliGRAM(s) IV Push every 6 hours PRN Moderate Pain (4 - 6)  HYDROmorphone  Injectable 0.5 milliGRAM(s) IV Push every 6 hours PRN Severe Pain (7 - 10)  melatonin 3 milliGRAM(s) Oral at bedtime PRN Insomnia  ondansetron Injectable 4 milliGRAM(s) IV Push every 8 hours PRN Nausea and/or Vomiting          PHYSICAL EXAM:  Vital Signs Last 24 Hrs  T(C): 36.5 (16 Feb 2025 14:00), Max: 37.1 (16 Feb 2025 04:00)  T(F): 97.7 (16 Feb 2025 14:00), Max: 98.8 (16 Feb 2025 04:00)  HR: 70 (16 Feb 2025 15:23) (62 - 80)  BP: 111/64 (16 Feb 2025 14:00) (95/59 - 122/59)  BP(mean): 78 (16 Feb 2025 14:00) (62 - 86)  RR: 17 (16 Feb 2025 14:00) (12 - 25)  SpO2: 95% (16 Feb 2025 15:23) (90% - 97%)    Parameters below as of 16 Feb 2025 15:23  Patient On (Oxygen Delivery Method): nasal cannula, 4 lpm          CONSTITUTIONAL: Non toxic appearing, lying in bed in   ENMT: Moist oral mucosa, no pharyngeal injection or exudates  RESPIRATORY: Normal respiratory effort; lungs are clear to auscultation bilaterally  CARDIOVASCULAR: Regular rate and rhythm, normal S1 and S2, no murmur/rub/gallop  ABDOMEN: Improved tenderenss, now more localized in LLQ and RLQ. Distension improved    MUSCLOSKELETAL:   no clubbing or cyanosis of digits; no joint swelling or tenderness to palpation  PSYCH: Awake and responds to name   NEUROLOGY: No focal CN deficits. Moving all ext w/o limitation    SKIN: No rashes;, flank bruising     LABS:                          11.0   8.97  )-----------( 67       ( 16 Feb 2025 07:23 )             33.6   02-16    145  |  106  |  95.9[H]  ----------------------------<  106[H]  3.4[L]   |  20.0[L]  |  5.76[H]    Ca    9.2      16 Feb 2025 07:23  Mg     2.7     02-16    TPro  6.1[L]  /  Alb  3.3  /  TBili  3.1[H]  /  DBili  2.0[H]  /  AST  98[H]  /  ALT  40  /  AlkPhos  153[H]  02-16                  MICRO:  Peritoneal fluid and blood cultures : NGTD      RADIOLOGY & ADDITIONAL TESTS:    2/11/25 CT abd/Pel  IMPRESSION:  Cirrhosis with evidence of portal hypertension. Moderate volume of abdominal and pelvic ascites. Moderate left and small right pleural effusions with associated compressive atelectasis.  Unchanged low-attenuation masslike lesion in the liver which is highly concerning for malignancy in the background of cirrhosis.  Unchanged periportal and gastrohepatic lymphadenopathy.  Thickening of the ascending colon likely due to portal colopathy.

## 2025-02-16 NOTE — PROGRESS NOTE ADULT - SUBJECTIVE AND OBJECTIVE BOX
Chief Complaint:  Patient is a 74y old  Male who presents with a chief complaint of decompensated cirrhosis (15 Feb 2025 19:25)    HPI/ 24 hr events:  Patient seen and examined at bedside. Somnolent, responds to verbal stimuli but will not stay awake for questions.    REVIEW OF SYSTEMS:   12 point review of systems completed and negative other than as stated in HPI    MEDICATIONS:   MEDICATIONS  (STANDING):  buMETAnide Injectable 2 milliGRAM(s) IV Push every 12 hours  cefTRIAXone Injectable. 2000 milliGRAM(s) IV Push every 24 hours  chlorhexidine 2% Cloths 1 Application(s) Topical <User Schedule>  folic acid 1 milliGRAM(s) Oral daily  lactulose Retention Enema 200 Gram(s) Rectal four times a day  midodrine 5 milliGRAM(s) Oral every 8 hours  multivitamin 1 Tablet(s) Oral daily  naloxone Injectable 0.4 milliGRAM(s) IV Push once  octreotide  Injectable 100 MICROGram(s) IV Push every 8 hours  pantoprazole  Injectable 40 milliGRAM(s) IV Push daily  rifAXIMin 550 milliGRAM(s) Oral two times a day  thiamine 100 milliGRAM(s) Oral daily    MEDICATIONS  (PRN):  acetaminophen     Tablet .. 650 milliGRAM(s) Oral every 6 hours PRN Temp greater or equal to 38C (100.4F), Mild Pain (1 - 3)  albuterol/ipratropium for Nebulization 3 milliLiter(s) Nebulizer every 6 hours PRN Shortness of Breath and/or Wheezing  aluminum hydroxide/magnesium hydroxide/simethicone Suspension 30 milliLiter(s) Oral every 4 hours PRN Dyspepsia  HYDROmorphone  Injectable 0.2 milliGRAM(s) IV Push every 6 hours PRN Moderate Pain (4 - 6)  HYDROmorphone  Injectable 0.5 milliGRAM(s) IV Push every 6 hours PRN Severe Pain (7 - 10)  melatonin 3 milliGRAM(s) Oral at bedtime PRN Insomnia  ondansetron Injectable 4 milliGRAM(s) IV Push every 8 hours PRN Nausea and/or Vomiting       DIET:  Diet, NPO:   Except Medications (02-15-25 @ 20:58) [Active]          ALLERGIES:   Allergies    No Known Allergies    Intolerances        PHYSICAL EXAM:   VITAL SIGNS:   Vital Signs Last 24 Hrs  T(C): 36.5 (16 Feb 2025 10:00), Max: 37.1 (16 Feb 2025 04:00)  T(F): 97.7 (16 Feb 2025 10:00), Max: 98.8 (16 Feb 2025 04:00)  HR: 74 (16 Feb 2025 10:00) (62 - 80)  BP: 104/61 (16 Feb 2025 10:00) (95/59 - 122/59)  BP(mean): 76 (16 Feb 2025 10:00) (62 - 86)  RR: 17 (16 Feb 2025 10:00) (17 - 25)  SpO2: 91% (16 Feb 2025 10:00) (91% - 97%)    Parameters below as of 16 Feb 2025 10:00  Patient On (Oxygen Delivery Method): nasal cannula  O2 Flow (L/min): 4    I&O's Summary    15 Feb 2025 07:01  -  16 Feb 2025 07:00  --------------------------------------------------------  IN: 605 mL / OUT: 0 mL / NET: 605 mL      GENERAL:  No acute distress  HEENT:  NC/AT, conjunctiva clear, sclera anicteric  CHEST:  No increased effort  HEART:  Regular rate  ABDOMEN:  Soft, non-tender, distended- not tense, normoactive bowel sounds, no rebound or guarding  EXTREMITIES: No edema  SKIN:  Warm, dry  NEURO:  Calm, cooperative, somnolent, no asterixis    LABS:                        11.0   8.97  )-----------( 67       ( 16 Feb 2025 07:23 )             33.6     02-16    145  |  106  |  95.9[H]  ----------------------------<  106[H]  3.4[L]   |  20.0[L]  |  5.76[H]    Ca    9.2      16 Feb 2025 07:23  Mg     2.7     02-16    TPro  6.1[L]  /  Alb  3.3  /  TBili  3.1[H]  /  DBili  2.0[H]  /  AST  98[H]  /  ALT  40  /  AlkPhos  153[H]  02-16    LIVER FUNCTIONS - ( 16 Feb 2025 07:23 )  Alb: 3.3 g/dL / Pro: 6.1 g/dL / ALK PHOS: 153 U/L / ALT: 40 U/L / AST: 98 U/L / GGT: x           PT/INR - ( 16 Feb 2025 07:23 )   PT: 21.8 sec;   INR: 1.94 ratio         PTT - ( 15 Feb 2025 04:09 )  PTT:48.9 sec    Culture - Body Fluid with Gram Stain (collected 14 Feb 2025 13:50)  Source: Peritoneal  Gram Stain (14 Feb 2025 23:09):    polymorphonuclear leukocytes seen    No organisms seen    by cytocentrifuge  Preliminary Report (15 Feb 2025 17:53):    No growth          RADIOLOGY & ADDITIONAL STUDIES:  I personally reviewed the studies and agree with the radiologists review

## 2025-02-16 NOTE — PROGRESS NOTE ADULT - SUBJECTIVE AND OBJECTIVE BOX
Hemodialysis:    TIME: 2 hours  Dialyzer: Revaclear 300   Dialysate: 4K/2.5 Calcium   Dialysate flow:  500 ML/MIN  Blood flow: 200 ML/MIN   UF Goal (Liters) : 0.5    Comments: BP Is stable

## 2025-02-16 NOTE — PROGRESS NOTE ADULT - ASSESSMENT
73 yo gentleman with PMH HCV cirrhosis c/b HE, ascites, SBP, ?HCC who presents with HE, abdominal pain and ascites. He is s/p paracentesis which technically meets criteria for SBP ( though lab reporting #PMN differently). Patient has had second LVP with improvement in abdominal pain and distention. He remains on lactulose/ rifaxmin with less asterixis, but is somnolent. He is s/p permacath placement and awaiting HD. He appears more altered today than yesterday- no asterixis, but somnolent.     Cirrhosis  - Etiology HCV  - Severity: MELD 3.0 29,(90 d survival 76%),  CP C/13  - HE: Continiue lactulose, rifaxamin. Planned for HD which will also help uremia  - EV: outpatient EGD  - Ascites/volume: s/p LVP, diuretics held in setting of rising Cr, awaiting HD  - SBP: continue antibiotics, send ascitic fluid for culture, will need prophylaxis on discharge (s/p albumin for HRS (2/12-2/15) as below)  - HCC screening: CT with concerning liver lesion, but without liRads. Will discuss with radiology for lirads score. AFP [id number]- very concerning for HCC  - MARY: nephrology following, possible HRS. s/p albumin, octreotide, midodrine since admission now planned for HD  - OLT: Altered, unable to discuss if patient is interested in transplant. No social support. Will discuss with hepatologist  - Health Maintenance: Counselled to avoid oysters/shellfish, abstain from alcohol and NSAIDs, to limit Tylenol to <2 gm/day if necessary and to minimize salt intake (2gm/day preferred).

## 2025-02-17 NOTE — PROGRESS NOTE ADULT - ASSESSMENT
73 yo M with history of HCV cirrhosis, hepatoma, h/o opioid/EtOH use, recent admission to [facility] 1/11 - 1/23 for cardiac arrest, PNA, & left-sided pleural effusion (s/p chest tube) & paracentesis (1/15) complicated by SBP came to ED complaining of worsening abdominal distention. Unclear whether the patient was taking his home medications. Patient is currently lethargic, and unable to provide any information via .   In the ED he was found to have hepatic encephalopathy with ammonia level of 120, CT abdomen/pelvis with iv contrast with large volume ascites.   Patient was found to have MARY with sCr 4.89->4.61. He received 1L of LR yesterday. Currently noted to have no urine output.   IR and GI are following and emergent paracentesis is planned.     - Oliguric MARY on CKD, could be pre-renal/compartment syndrome in the setting of ascites vs. HRS,   ·	SCr used to be at 1.5 in 1/23/2025   ·	CT abdomen/pelvis reviewed, noted to have normal-sized kidneys and no obstructive uropathy   ·	UA reviewed with increased SG, expected after iv contrast vs. due to vascular constriction in the setting of portal hypertension, otherwise bland   ·	Continues to worsen despite midodrine and octreotide. Continue midodrine, d/c octeotride  ·	Patient started on HD  Seen on HD.  Qd, Qb, UF rate reviewed.  Vitals stable.  Access working well.  Patient tolerating HD well.    - Volume overload: C/w bumex 2mg daily  - Decompensated HCV liver cirrhosis with ascites, worsening   - Ascites s/p paracentesis with 3.5L of fluid removal   - Hepatic encephalopathy, worsening --Lactulose  - Anemia   - History of PSA with opioids and EtOH   - History of cardiac arrest in the past       Dose medications for HD  GI follow up, they will discuss patient with liver transplant center  Guarded prognosis   Discussed with primary team

## 2025-02-17 NOTE — PROGRESS NOTE ADULT - SUBJECTIVE AND OBJECTIVE BOX
[facility] Division of Hospital Medicine    Chief Complaint:  Decompensated cirrhosis     SUBJECTIVE / OVERNIGHT EVENTS:  Pt examined laying in  bed  Clinically more awake and alert  Able to follow simple commands for few seconds but then has confusion again   Ongoing HD  Prognosis remains poor. Will d/w Ethics and Palliative in am       MEDICATIONS  (STANDING):  albuterol/ipratropium for Nebulization 3 milliLiter(s) Nebulizer every 4 hours  buMETAnide Injectable 2 milliGRAM(s) IV Push every 12 hours  cefTRIAXone Injectable. 2000 milliGRAM(s) IV Push every 24 hours  chlorhexidine 2% Cloths 1 Application(s) Topical <User Schedule>  folic acid 1 milliGRAM(s) Oral daily  lactulose Retention Enema 200 Gram(s) Rectal four times a day  midodrine 5 milliGRAM(s) Oral every 8 hours  multivitamin 1 Tablet(s) Oral daily  naloxone Injectable 0.4 milliGRAM(s) IV Push once  octreotide  Injectable 50 MICROGram(s) IV Push every 8 hours  pantoprazole  Injectable 40 milliGRAM(s) IV Push daily  rifAXIMin 550 milliGRAM(s) Oral two times a day  thiamine 100 milliGRAM(s) Oral daily    MEDICATIONS  (PRN):  acetaminophen     Tablet .. 650 milliGRAM(s) Oral every 6 hours PRN Temp greater or equal to 38C (100.4F), Mild Pain (1 - 3)  aluminum hydroxide/magnesium hydroxide/simethicone Suspension 30 milliLiter(s) Oral every 4 hours PRN Dyspepsia  HYDROmorphone  Injectable 0.2 milliGRAM(s) IV Push every 6 hours PRN Moderate Pain (4 - 6)  HYDROmorphone  Injectable 0.5 milliGRAM(s) IV Push every 6 hours PRN Severe Pain (7 - 10)  melatonin 3 milliGRAM(s) Oral at bedtime PRN Insomnia  ondansetron Injectable 4 milliGRAM(s) IV Push every 8 hours PRN Nausea and/or Vomiting        02-17    145  |  104  |  73.2[H]  ----------------------------<  123[H]  3.4[L]   |  23.0  |  4.92[H]    Ca    9.2      17 Feb 2025 04:43  Mg     2.3     02-17    TPro  6.2[L]  /  Alb  3.4  /  TBili  3.1[H]  /  DBili  2.0[H]  /  AST  93[H]  /  ALT  35  /  AlkPhos  145[H]  02-17      PHYSICAL EXAM:             CONSTITUTIONAL: Non toxic appearing, lying in bed in   ENMT: Moist oral mucosa, no pharyngeal injection or exudates  RESPIRATORY: Normal respiratory effort; lungs are clear to auscultation bilaterally  CARDIOVASCULAR: Regular rate and rhythm, normal S1 and S2, no murmur/rub/gallop  ABDOMEN: Improved tenderenss, now more localized in LLQ and RLQ. Distension improved    MUSCLOSKELETAL:   no clubbing or cyanosis of digits; no joint swelling or tenderness to palpation  PSYCH: Awake and responds to name   NEUROLOGY: No focal CN deficits. Moving all ext w/o limitation    SKIN: No rashes;, flank bruising     LABS:                          10.5   9.23  )-----------( 45       ( 17 Feb 2025 04:43 )             31.5   02-17    145  |  104  |  73.2[H]  ----------------------------<  123[H]  3.4[L]   |  23.0  |  4.92[H]    Ca    9.2      17 Feb 2025 04:43  Mg     2.3     02-17    TPro  6.2[L]  /  Alb  3.4  /  TBili  3.1[H]  /  DBili  2.0[H]  /  AST  93[H]  /  ALT  35  /  AlkPhos  145[H]  02-17                  MICRO:  Peritoneal fluid and blood cultures : NGTD      RADIOLOGY & ADDITIONAL TESTS:    2/11/25 CT abd/Pel  IMPRESSION:  Cirrhosis with evidence of portal hypertension. Moderate volume of abdominal and pelvic ascites. Moderate left and small right pleural effusions with associated compressive atelectasis.  Unchanged low-attenuation masslike lesion in the liver which is highly concerning for malignancy in the background of cirrhosis.  Unchanged periportal and gastrohepatic lymphadenopathy.  Thickening of the ascending colon likely due to portal colopathy.

## 2025-02-17 NOTE — PROGRESS NOTE ADULT - SUBJECTIVE AND OBJECTIVE BOX
[facility] DIVISION OF KIDNEY DISEASES AND HYPERTENSION -- PROGRESS NOTE    Reason for visit: ESRD on HD    24 hour events/subjective:  Seen on HD today   Remains lethargic  getting nerve block for pain     REVIEW OF SYSTEMS: All systems were reviewed in detail. Pertinent positive and negative have been detailed above, otherwise negative.     Physical Exam:  Gen: NAD, well-appearing  HEENT: normal  Pulm: CTA B/L  CV: normal S1S2; no rub, no edema  Abd: soft, non-tender  MSK no deformities   Neuro: lethargic     Vital Signs Last 24 Hrs  T(C): 36.7 (17 Feb 2025 08:33), Max: 37.1 (17 Feb 2025 04:00)  T(F): 98 (17 Feb 2025 08:33), Max: 98.8 (17 Feb 2025 04:00)  HR: 80 (17 Feb 2025 10:00) (66 - 84)  BP: 110/82 (17 Feb 2025 10:00) (100/56 - 119/60)  BP(mean): 90 (17 Feb 2025 10:00) (68 - 90)  RR: 17 (17 Feb 2025 10:00) (15 - 24)  SpO2: 92% (17 Feb 2025 10:00) (91% - 96%)    Parameters below as of 17 Feb 2025 10:00  Patient On (Oxygen Delivery Method): nasal cannula  O2 Flow (L/min): 4    I&O's Summary    16 Feb 2025 07:01  -  17 Feb 2025 07:00  --------------------------------------------------------  IN: 100 mL / OUT: 500 mL / NET: -400 mL    17 Feb 2025 07:01  -  17 Feb 2025 12:35  --------------------------------------------------------  IN: 0 mL / OUT: 100 mL / NET: -100 mL      Current Antibiotics:  cefTRIAXone Injectable. 2000 milliGRAM(s) IV Push every 24 hours  rifAXIMin 550 milliGRAM(s) Oral two times a day    Other medications:  albumin human 25% IVPB 50 milliLiter(s) IV Intermittent every 6 hours  buMETAnide Injectable 2 milliGRAM(s) IV Push every 12 hours  chlorhexidine 2% Cloths 1 Application(s) Topical <User Schedule>  folic acid 1 milliGRAM(s) Oral daily  lactulose Retention Enema 200 Gram(s) Rectal four times a day  midodrine 10 milliGRAM(s) Oral every 8 hours  multivitamin 1 Tablet(s) Oral daily  naloxone Injectable 0.4 milliGRAM(s) IV Push once  octreotide  Injectable 50 MICROGram(s) IV Push every 8 hours  pantoprazole  Injectable 40 milliGRAM(s) IV Push daily  thiamine 100 milliGRAM(s) Oral daily    02-17    145  |  104  |  73.2[H]  ----------------------------<  123[H]  3.4[L]   |  23.0  |  4.92[H]    Ca    9.2      17 Feb 2025 04:43  Mg     2.3     02-17    TPro  6.2[L]  /  Alb  3.4  /  TBili  3.1[H]  /  DBili  2.0[H]  /  AST  93[H]  /  ALT  35  /  AlkPhos  145[H]  02-17    Creatinine: 4.92 mg/dL (02-17-25 @ 04:43)  Creatinine: 5.76 mg/dL (02-16-25 @ 07:23)  Creatinine: 5.33 mg/dL (02-15-25 @ 04:09)  Creatinine: 5.03 mg/dL (02-14-25 @ 04:00)  Creatinine: 5.30 mg/dL (02-13-25 @ 03:10)       [facility] DIVISION OF KIDNEY DISEASES AND HYPERTENSION -- PROGRESS NOTE    Reason for visit: ESRD on HD    24 hour events/subjective:  Seen on HD today   Remains lethargic    REVIEW OF SYSTEMS: All systems were reviewed in detail. Pertinent positive and negative have been detailed above, otherwise negative.     Physical Exam:  Gen: NAD, well-appearing  HEENT: normal  Pulm: CTA B/L  CV: normal S1S2; no rub, no edema  Abd: soft, non-tender  MSK no deformities   Neuro: lethargic     Vital Signs Last 24 Hrs  T(C): 36.7 (17 Feb 2025 08:33), Max: 37.1 (17 Feb 2025 04:00)  T(F): 98 (17 Feb 2025 08:33), Max: 98.8 (17 Feb 2025 04:00)  HR: 80 (17 Feb 2025 10:00) (66 - 84)  BP: 110/82 (17 Feb 2025 10:00) (100/56 - 119/60)  BP(mean): 90 (17 Feb 2025 10:00) (68 - 90)  RR: 17 (17 Feb 2025 10:00) (15 - 24)  SpO2: 92% (17 Feb 2025 10:00) (91% - 96%)    Parameters below as of 17 Feb 2025 10:00  Patient On (Oxygen Delivery Method): nasal cannula  O2 Flow (L/min): 4    I&O's Summary    16 Feb 2025 07:01  -  17 Feb 2025 07:00  --------------------------------------------------------  IN: 100 mL / OUT: 500 mL / NET: -400 mL    17 Feb 2025 07:01  -  17 Feb 2025 12:35  --------------------------------------------------------  IN: 0 mL / OUT: 100 mL / NET: -100 mL      Current Antibiotics:  cefTRIAXone Injectable. 2000 milliGRAM(s) IV Push every 24 hours  rifAXIMin 550 milliGRAM(s) Oral two times a day    Other medications:  albumin human 25% IVPB 50 milliLiter(s) IV Intermittent every 6 hours  buMETAnide Injectable 2 milliGRAM(s) IV Push every 12 hours  chlorhexidine 2% Cloths 1 Application(s) Topical <User Schedule>  folic acid 1 milliGRAM(s) Oral daily  lactulose Retention Enema 200 Gram(s) Rectal four times a day  midodrine 10 milliGRAM(s) Oral every 8 hours  multivitamin 1 Tablet(s) Oral daily  naloxone Injectable 0.4 milliGRAM(s) IV Push once  octreotide  Injectable 50 MICROGram(s) IV Push every 8 hours  pantoprazole  Injectable 40 milliGRAM(s) IV Push daily  thiamine 100 milliGRAM(s) Oral daily    02-17    145  |  104  |  73.2[H]  ----------------------------<  123[H]  3.4[L]   |  23.0  |  4.92[H]    Ca    9.2      17 Feb 2025 04:43  Mg     2.3     02-17    TPro  6.2[L]  /  Alb  3.4  /  TBili  3.1[H]  /  DBili  2.0[H]  /  AST  93[H]  /  ALT  35  /  AlkPhos  145[H]  02-17    Creatinine: 4.92 mg/dL (02-17-25 @ 04:43)  Creatinine: 5.76 mg/dL (02-16-25 @ 07:23)  Creatinine: 5.33 mg/dL (02-15-25 @ 04:09)  Creatinine: 5.03 mg/dL (02-14-25 @ 04:00)  Creatinine: 5.30 mg/dL (02-13-25 @ 03:10)

## 2025-02-17 NOTE — PROGRESS NOTE ADULT - ASSESSMENT
74y/oM PMH HCV cirrhosis, reported HCC, h/o opioid and alcohol use, recently admitted to [facility] (1/11-1/23 s/p cardiac arrest, PNA and Lt pleural effusion s/p CT and paracentesis) now admitted with progressive abd distension and worsening abd pain. Reportedly also with complaints of BRBPR however was unable to elaborate. Remains with waxing and waning mentation suspected due to hepatic encephalopathy compounded by acute uremia from MARY suspected due to hepatorenal syndrome. Due to progressive renal failure now started on HD with 2 PC consent. Now after signifcant BMs and his 3rd HD session his mental status is showing some improvement but remains encephalopathic unabe to make decisions without a designated surrogate     Hepatic encephalopathy   Hepatitis C cirrhosis with documentation from prior chart with HCC   Path of pl fluid non confirmatory (1/2025)  AFP [id number] (1/2025)  Ascitic fluid done in ER borderline for SBP however with history and clinical exam started on empirics Ceftriaxone 2 gm daily  Continue NPO for now pending stable (consistent) mental status improvement   SLP eval noted. Continue daily SLP evals   Continue D5 1/2 NS with 75 meq Na bicarb at slow rate   Lactulose retention enemas. Change to bid as nursing reporting pt having 3-4 loose/watery BM/day   s/p IR Paracentesis  3550 ml yu fluid drained 2/15  Cont rifaximin, PPI   hold coreg for now  MELD noted. Now started on HD,   Steroids unlikely to be of any benefit (from a hepatic standpoint)and could cause SBP to rapidly worsen   Ethic and Palliative evals/recs appreciated    MARY   Unconfirmed etiology, Suspected 2/2 Hepatorenal syndrome (probably HRS-1 given acuity)   Today first time some degree of improvement   d/w Renal. Given Ethics eval and pts urgent need for intervention at this time 2 physician consent done to initiate HD   Vascular consulted by Renal for placement.   Continue  HRS cocktail. Albumin / Midodrine and Octreotide   US reviewed. No evidence of post obstructive uropathy   Renal on board. Appreciate recs    Acute hypoxic respiratory failure   CXR with increasing Rt sided effusion (recurring when compared to Jan 2025)  Started on UF/HD  Continue supplemental O2 as tolerated     ?BRBPR   Reportedly stated on admission   Currently unable to expand  H/H relatively stable.   Monitor BM for stool count, output and consistency     h/o opioid and EtOH abuse   as per recent admission, follows with [facility] clinic [phone],  No answer on earlier attempts at contacting   Although liver function would usually limit Dilaudid use, severe renal impairment limiting morphine use and given severity of clinical pain and high risk of using long acting opiates (Methadone), will use low dose Dilaudid PRN and introduce PO ASAP pending mental status   Cont MVI , thiamine, folic acid when able to take PO     Palliative and Ethics on board   Extremely high risk of mortality irrespective of interventions given currently hepatic function and underlying cardiac history (PEA arrest 1/25)     vte ppx: scds       Continue SDU care.   guarded prognosis, suspect extremely high all cause mortality given advanced medical comorbidties including but not light of recent cardiac arrest

## 2025-02-18 NOTE — PROGRESS NOTE ADULT - ASSESSMENT
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 He is readmitted with decompensated cirrhosis, ascites, encephelopathy. Palliative following to help with goals of therapy.

## 2025-02-18 NOTE — PROGRESS NOTE ADULT - SUBJECTIVE AND OBJECTIVE BOX
[facility] Division of Hospital Medicine    Chief Complaint:  Decompensated cirrhosis     SUBJECTIVE / OVERNIGHT EVENTS:  Pt examined laying in  bed  Mental status continues to slowly improve  Abd pain, although still present also improving   Again required mittens/restraints as attempting to remove HD cath.   ROS limited to generalized pain       MEDICATIONS  (STANDING):  buMETAnide Injectable 2 milliGRAM(s) IV Push every 12 hours  cefTRIAXone Injectable. 2000 milliGRAM(s) IV Push every 24 hours  chlorhexidine 2% Cloths 1 Application(s) Topical <User Schedule>  folic acid 1 milliGRAM(s) Oral daily  lactulose Retention Enema 200 Gram(s) Rectal two times a day  midodrine 10 milliGRAM(s) Oral every 8 hours  multivitamin 1 Tablet(s) Oral daily  naloxone Injectable 0.4 milliGRAM(s) IV Push once  octreotide  Injectable 50 MICROGram(s) IV Push every 8 hours  pantoprazole  Injectable 40 milliGRAM(s) IV Push daily  rifAXIMin 550 milliGRAM(s) Oral two times a day  thiamine 100 milliGRAM(s) Oral daily    MEDICATIONS  (PRN):  acetaminophen     Tablet .. 650 milliGRAM(s) Oral every 6 hours PRN Temp greater or equal to 38C (100.4F), Mild Pain (1 - 3)  albuterol/ipratropium for Nebulization 3 milliLiter(s) Nebulizer every 6 hours PRN Shortness of Breath and/or Wheezing  aluminum hydroxide/magnesium hydroxide/simethicone Suspension 30 milliLiter(s) Oral every 4 hours PRN Dyspepsia  HYDROmorphone  Injectable 0.5 milliGRAM(s) IV Push every 6 hours PRN Severe Pain (7 - 10)  HYDROmorphone  Injectable 0.2 milliGRAM(s) IV Push every 6 hours PRN Moderate Pain (4 - 6)  melatonin 3 milliGRAM(s) Oral at bedtime PRN Insomnia  ondansetron Injectable 4 milliGRAM(s) IV Push every 8 hours PRN Nausea and/or Vomiting          PHYSICAL EXAM:  Vital Signs Last 24 Hrs  T(C): 36.3 (18 Feb 2025 15:10), Max: 36.9 (17 Feb 2025 19:07)  T(F): 97.3 (18 Feb 2025 15:10), Max: 98.4 (17 Feb 2025 19:07)  HR: 87 (18 Feb 2025 12:00) (79 - 91)  BP: 132/69 (18 Feb 2025 12:00) (111/65 - 135/69)  BP(mean): 89 (18 Feb 2025 12:00) (79 - 92)  RR: 17 (18 Feb 2025 12:00) (17 - 19)  SpO2: 92% (18 Feb 2025 12:00) (92% - 96%)    Parameters below as of 18 Feb 2025 12:00  Patient On (Oxygen Delivery Method): nasal cannula  O2 Flow (L/min): 6    CONSTITUTIONAL: Non toxic appearing, lying in bed in   ENMT: Moist oral mucosa, Rt IJ HD cath   RESPIRATORY: Normal respiratory effort; lungs are clear to auscultation bilaterally  CARDIOVASCULAR: Regular rate and rhythm, normal S1 and S2, no murmur/rub/gallop  ABDOMEN: Improved tenderness distension slowly increasing again  MUSCLOSKELETAL:   no clubbing or cyanosis of digits; no joint swelling or tenderness to palpation  PSYCH: Awake and responds to name, when asked he follows limited commands but immediately loses track of recommendations    NEUROLOGY: No focal CN deficits. Moving all ext w/o limitation    SKIN: No rashes;, flank bruising     LABS:                                     11.8   10.33 )-----------( 32       ( 18 Feb 2025 05:21 )             36.9   02-18    147[H]  |  105  |  44.9[H]  ----------------------------<  103[H]  3.7   |  24.0  |  3.36[H]    Ca    9.2      18 Feb 2025 05:21  Mg     2.3     02-17    TPro  6.5[L]  /  Alb  3.5  /  TBili  4.1[H]  /  DBili  2.2[H]  /  AST  100[H]  /  ALT  37  /  AlkPhos  145[H]  02-18                MICRO:  Peritoneal fluid and blood cultures : NGTD      RADIOLOGY & ADDITIONAL TESTS:    2/11/25 CT abd/Pel  IMPRESSION:  Cirrhosis with evidence of portal hypertension. Moderate volume of abdominal and pelvic ascites. Moderate left and small right pleural effusions with associated compressive atelectasis.  Unchanged low-attenuation masslike lesion in the liver which is highly concerning for malignancy in the background of cirrhosis.  Unchanged periportal and gastrohepatic lymphadenopathy.  Thickening of the ascending colon likely due to portal colopathy.

## 2025-02-18 NOTE — CHART NOTE - NSCHARTNOTEFT_GEN_A_CORE
Palliative care social work note.    SW with palliative care physician Dr Latham met with patient with . patient did acknowledge awareness of being at hospital but was not able to provide details regarding his medical condition. SW found written note at bedside with patients home medications and phone numbers. SW attempted calls to numbers listed inquiring regarding possible surrogate or family. Left messages.  Maylin  [phone]  Cyril  [phone].

## 2025-02-18 NOTE — CHART NOTE - NSCHARTNOTEFT_GEN_A_CORE
Nutrition Note:     Source: Patient [ ]  Family [ ]   other [ x] EMR, staff/IDT rounds     Current Diet:   Diet, Pureed:   No Liquids (NOLIQUIDS) (02-18-25 @ 14:10)    Patient reports [ ] nausea  [ ] vomiting [ ] diarrhea [ ] constipation  [ ]chewing problems [ ] swallowing issues  [ ] other:     PO intake:  < 50% [ ]   50-75%  [ ]   %  [ ]  other :    Source for PO intake [ ] Patient [ ] family [ ] chart [ ] staff [ ] other    Current Weight:   (2/18) 220#   (2/14) 219.8#  2+ mild edema to L and R leg     % Weight Change     Pertinent Medications: MEDICATIONS  (STANDING):  folic acid 1 milliGRAM(s) Oral daily  multivitamin 1 Tablet(s) Oral daily  thiamine 100 milliGRAM(s) Oral daily    Pertinent Labs: 02-18 Na147 mmol/L[H] Glu 103 mg/dL[H] K+ 3.7 mmol/L Cr  3.36 mg/dL[H] BUN 44.9 mg/dL[H] Alb 3.5 g/dL     Skin: stage II to sacrum, abrasion to penis    Nutrition focused physical exam conducted - found signs of malnutrition [ ]absent [ ]present    Subcutaneous fat loss: [ ] Orbital fat pads region, [ ]Buccal fat region, [ ]Triceps region,  [ ]Ribs region    Muscle wasting: [ ]Temples region, [ ]Clavicle region, [ ]Shoulder region, [ ]Scapula region, [ ]Interosseous region,  [ ]thigh region, [ ]Calf region    Estimated Needs:   [ ] no change since previous assessment  [ ] recalculated:     Current Nutrition Diagnosis: Inadequate Protein Energy Intake related to inability to meet sufficient protein-energy needs in setting of cirrhosis, hepatic encephalopathy as evidenced by pt NPO > 3 days  74y/oM PMH HCV cirrhosis, s/p chest tube and s/p paracentesis 1/15 with 600cc removed)+ SBP .  presenting to ER c/o abd pain and distention x3 weeks and associated shortness of breath. Here with cirrhosis and hepatic encephalopathy. Patient has had second LVP with improvement in abdominal pain and distention. s/p permacath placement and awaiting HD. Palliative following.     Pt seen at bedside this AM. Pt confused; sleeping soundly at this time. Was NPO since admission, diet advanced to puree no liquids today as per SLP. Will monitor tolerance of diet and po intake on follow up assessment. Last BM documented today, noted to be loose. RD to remain available.     Recommendations:   - Monitor weights daily for trend/accuracy  - Continue diet as tolerated.  - continue MVI, folic acid, thiamine   - Provide encouragement/assistance as needed during mealtimes to inc PO     Monitoring and Evaluation:   [x ] PO intake [x ] Tolerance to diet prescription [X] Weights  [X] Follow up per protocol [X] Labs:

## 2025-02-18 NOTE — CHART NOTE - NSCHARTNOTEFT_GEN_A_CORE
Urine culture was negative for an infection.  Please inform the patient of these results.   Ethics continues to follow. See full consult on 2/13/2025.    Case discussed in detail with DOMITILA Latham DO (Director of Palliative Care) and LEYLA CHAMBERS RN, MBE (Ethics Fellow). Ethics and Palliative to meet with the patient this week to further discuss goals of care and attempt to identify family or support persons for the patient, as the patient is currently unrepresented.      Jenn CHAMBERS RN, MBE  Ethics Fellow  [phone]

## 2025-02-18 NOTE — PROGRESS NOTE ADULT - SUBJECTIVE AND OBJECTIVE BOX
OVERNIGHT EVENTS: remains in overall frail and poor condition.     Present Symptoms:     Dyspnea: none   Nausea/Vomiting: No  Anxiety:  No  Depression: unable   Fatigue: Yes   Loss of appetite: unable   Constipation: none     Pain: yes abdomen             Character- pressure            Duration- minutes             Effect-             Factors-            Frequency- when pressure             Location- abdomen             Severity- 5/10     Pain AD Score:  http://[email or web address]/painad.pdf (press ctrl + left click to view)    Review of Systems: Reviewed                                    Positive: abdominal pain   All others negative    MEDICATIONS  (STANDING):  buMETAnide Injectable 2 milliGRAM(s) IV Push every 12 hours  cefTRIAXone Injectable. 2000 milliGRAM(s) IV Push every 24 hours  chlorhexidine 2% Cloths 1 Application(s) Topical <User Schedule>  folic acid 1 milliGRAM(s) Oral daily  lactulose Retention Enema 200 Gram(s) Rectal two times a day  midodrine 10 milliGRAM(s) Oral every 8 hours  multivitamin 1 Tablet(s) Oral daily  naloxone Injectable 0.4 milliGRAM(s) IV Push once  octreotide  Injectable 50 MICROGram(s) IV Push every 8 hours  pantoprazole  Injectable 40 milliGRAM(s) IV Push daily  rifAXIMin 550 milliGRAM(s) Oral two times a day  thiamine 100 milliGRAM(s) Oral daily    MEDICATIONS  (PRN):  acetaminophen     Tablet .. 650 milliGRAM(s) Oral every 6 hours PRN Temp greater or equal to 38C (100.4F), Mild Pain (1 - 3)  albuterol/ipratropium for Nebulization 3 milliLiter(s) Nebulizer every 6 hours PRN Shortness of Breath and/or Wheezing  aluminum hydroxide/magnesium hydroxide/simethicone Suspension 30 milliLiter(s) Oral every 4 hours PRN Dyspepsia  HYDROmorphone  Injectable 0.5 milliGRAM(s) IV Push every 6 hours PRN Severe Pain (7 - 10)  HYDROmorphone  Injectable 0.2 milliGRAM(s) IV Push every 6 hours PRN Moderate Pain (4 - 6)  melatonin 3 milliGRAM(s) Oral at bedtime PRN Insomnia  ondansetron Injectable 4 milliGRAM(s) IV Push every 8 hours PRN Nausea and/or Vomiting      PHYSICAL EXAM:    Vital Signs Last 24 Hrs  T(C): 35.4 (18 Feb 2025 10:00), Max: 36.9 (17 Feb 2025 19:07)  T(F): 95.7 (18 Feb 2025 10:00), Max: 98.4 (17 Feb 2025 19:07)  HR: 85 (18 Feb 2025 10:00) (77 - 91)  BP: 125/75 (18 Feb 2025 10:00) (94/63 - 135/69)  BP(mean): 91 (18 Feb 2025 10:00) (73 - 92)  RR: 17 (18 Feb 2025 10:00) (17 - 19)  SpO2: 93% (18 Feb 2025 10:00) (93% - 96%)    Parameters below as of 18 Feb 2025 10:00  Patient On (Oxygen Delivery Method): nasal cannula  O2 Flow (L/min): 4    General: alert      HEENT: normal      Lungs: comfortable     CV: normal      GI: normal      :   oliguria/anuria      MSK: weakness      Skin: no rash    LABS:                      11.8   10.33 )-----------( 32       ( 18 Feb 2025 05:21 )             36.9     02-18    147[H]  |  105  |  44.9[H]  ----------------------------<  103[H]  3.7   |  24.0  |  3.36[H]    Ca    9.2      18 Feb 2025 05:21  Mg     2.3     02-17    TPro  6.5[L]  /  Alb  3.5  /  TBili  4.1[H]  /  DBili  2.2[H]  /  AST  100[H]  /  ALT  37  /  AlkPhos  145[H]  02-18    PT/INR - ( 18 Feb 2025 05:21 )   PT: 24.9 sec;   INR: 2.22 ratio      Urinalysis Basic - ( 18 Feb 2025 05:21 )    Color: x / Appearance: x / SG: x / pH: x  Gluc: 103 mg/dL / Ketone: x  / Bili: x / Urobili: x   Blood: x / Protein: x / Nitrite: x   Leuk Esterase: x / RBC: x / WBC x   Sq Epi: x / Non Sq Epi: x / Bacteria: x    I&O's Summary    17 Feb 2025 07:01  -  18 Feb 2025 07:00  --------------------------------------------------------  IN: 0 mL / OUT: 1100 mL / NET: -1100 mL    RADIOLOGY & ADDITIONAL STUDIES:    < from: Xray Chest 1 View-PORTABLE IMMEDIATE (Xray Chest 1 View-PORTABLE IMMEDIATE .) (02.15.25 @ 21:25) >  Interval placement of right IJ nontunneled dialysis catheter. The tip is   in the cavoatrial junction. No pneumothorax. Persistent left moderate   pleural effusion. Left basilar atelectasis, likely compressive from   effusion. There are no pleural effusions. The cardiomediastinal   silhouette is normal. Bones are grossly normal.    IMPRESSION: Right IJ CVC in place. Left moderate pleural effusion.    --- End of Report ---    < end of copied text >    ADVANCE DIRECTIVES/TREATMENT PREFERENCES:  Full code

## 2025-02-18 NOTE — PROGRESS NOTE ADULT - SUBJECTIVE AND OBJECTIVE BOX
Chief Complaint:  Patient is a 74y old  Male who presents with a chief complaint of Decompensated cirrhosis (17 Feb 2025 16:14)      HPI/ 24 hr events: Patient seen and examined at bedside. He was awake today and responding appropriately to some questions. He admits to abdominal pain.     REVIEW OF SYSTEMS:   12 point review of systems completed and negative other than as stated in HPI    MEDICATIONS:   MEDICATIONS  (STANDING):  buMETAnide Injectable 2 milliGRAM(s) IV Push every 12 hours  cefTRIAXone Injectable. 2000 milliGRAM(s) IV Push every 24 hours  chlorhexidine 2% Cloths 1 Application(s) Topical <User Schedule>  folic acid 1 milliGRAM(s) Oral daily  lactulose Retention Enema 200 Gram(s) Rectal two times a day  midodrine 10 milliGRAM(s) Oral every 8 hours  multivitamin 1 Tablet(s) Oral daily  naloxone Injectable 0.4 milliGRAM(s) IV Push once  octreotide  Injectable 50 MICROGram(s) IV Push every 8 hours  pantoprazole  Injectable 40 milliGRAM(s) IV Push daily  rifAXIMin 550 milliGRAM(s) Oral two times a day  thiamine 100 milliGRAM(s) Oral daily    MEDICATIONS  (PRN):  acetaminophen     Tablet .. 650 milliGRAM(s) Oral every 6 hours PRN Temp greater or equal to 38C (100.4F), Mild Pain (1 - 3)  albuterol/ipratropium for Nebulization 3 milliLiter(s) Nebulizer every 6 hours PRN Shortness of Breath and/or Wheezing  aluminum hydroxide/magnesium hydroxide/simethicone Suspension 30 milliLiter(s) Oral every 4 hours PRN Dyspepsia  HYDROmorphone  Injectable 0.5 milliGRAM(s) IV Push every 6 hours PRN Severe Pain (7 - 10)  HYDROmorphone  Injectable 0.2 milliGRAM(s) IV Push every 6 hours PRN Moderate Pain (4 - 6)  melatonin 3 milliGRAM(s) Oral at bedtime PRN Insomnia  ondansetron Injectable 4 milliGRAM(s) IV Push every 8 hours PRN Nausea and/or Vomiting            DIET:  Diet, NPO (02-16-25 @ 15:16) [Active]          ALLERGIES:   Allergies    No Known Allergies    Intolerances        VITAL SIGNS:   Vital Signs Last 24 Hrs  T(C): 36 (18 Feb 2025 12:00), Max: 36.9 (17 Feb 2025 19:07)  T(F): 96.8 (18 Feb 2025 12:00), Max: 98.4 (17 Feb 2025 19:07)  HR: 87 (18 Feb 2025 12:00) (77 - 91)  BP: 132/69 (18 Feb 2025 12:00) (94/63 - 135/69)  BP(mean): 89 (18 Feb 2025 12:00) (73 - 92)  RR: 17 (18 Feb 2025 12:00) (17 - 19)  SpO2: 92% (18 Feb 2025 12:00) (92% - 96%)    Parameters below as of 18 Feb 2025 12:00  Patient On (Oxygen Delivery Method): nasal cannula  O2 Flow (L/min): 4    I&O's Summary    17 Feb 2025 07:01  -  18 Feb 2025 07:00  --------------------------------------------------------  IN: 0 mL / OUT: 1100 mL / NET: -1100 mL        PHYSICAL EXAM:   GENERAL:  No acute distress. AOX2  HEENT:  NC/AT, conjunctiva clear, sclera anicteric  CHEST:  No increased effort  HEART:  Regular rate  ABDOMEN: Pain with palpation. Otherwise Soft and non-distended  EXTREMITIES: No edema  SKIN:  Warm, dry  NEURO:  Calm, cooperative    LABS:                        11.8   10.33 )-----------( 32       ( 18 Feb 2025 05:21 )             36.9     Hemoglobin: 11.8 g/dL (02-18-25 @ 05:21)  Hemoglobin: 10.5 g/dL (02-17-25 @ 04:43)  Hemoglobin: 11.0 g/dL (02-16-25 @ 07:23)    02-18    147[H]  |  105  |  44.9[H]  ----------------------------<  103[H]  3.7   |  24.0  |  3.36[H]    Ca    9.2      18 Feb 2025 05:21  Mg     2.3     02-17    TPro  6.5[L]  /  Alb  3.5  /  TBili  4.1[H]  /  DBili  2.2[H]  /  AST  100[H]  /  ALT  37  /  AlkPhos  145[H]  02-18    LIVER FUNCTIONS - ( 18 Feb 2025 05:21 )  Alb: 3.5 g/dL / Pro: 6.5 g/dL / ALK PHOS: 145 U/L / ALT: 37 U/L / AST: 100 U/L / GGT: x             PT/INR - ( 18 Feb 2025 05:21 )   PT: 24.9 sec;   INR: 2.22 ratio                 Hepatitis B Core Antibody, Total: Reactive (02-15-25 @ 14:53)  Hepatitis B Surface Antibody: Reactive (02-15-25 @ 14:53)  Hepatitis B Surface Antigen: Nonreact (02-15-25 @ 14:53)                                  RADIOLOGY & ADDITIONAL STUDIES:

## 2025-02-18 NOTE — PROGRESS NOTE ADULT - ASSESSMENT
74y/oM PMH HCV cirrhosis, reported HCC, h/o opioid and alcohol use, recently admitted to [facility] (1/11-1/23 s/p cardiac arrest, PNA and Lt pleural effusion s/p CT and paracentesis) now admitted with progressive abd distension and worsening abd pain. Reportedly also with complaints of BRBPR however was unable to elaborate. Remains with waxing and waning mentation suspected due to hepatic encephalopathy compounded by acute uremia from MARY suspected due to hepatorenal syndrome. Due to progressive renal failure was started on HD and now with slowly improving overall uremia. His mental status although      Hepatic encephalopathy   Hepatitis C cirrhosis with documentation from prior chart with HCC   Path of pl fluid non confirmatory (1/2025)  AFP [id number] (1/2025)  Ascitic fluid done in ER borderline for SBP however with history and clinical exam started on empirics Ceftriaxone 2 gm daily  Continue NPO for now pending stable (consistent) mental status improvement   SLP eval noted. Continue daily SLP evals   Continue D5 1/2 NS with 75 meq Na bicarb at slow rate   Lactulose retention enemas. Change to bid as nursing reporting pt having 3-4 loose/watery BM/day   s/p IR Paracentesis  3550 ml yu fluid drained 2/15  Cont rifaximin, PPI   hold coreg for now  MELD noted. Now started on HD,   Steroids unlikely to be of any benefit (from a hepatic standpoint)and could cause SBP to rapidly worsen   Ethic and Palliative evals/recs appreciated    MARY   Unconfirmed etiology, Suspected 2/2 Hepatorenal syndrome (probably HRS-1 given acuity)   Today first time some degree of improvement   d/w Renal. Given Ethics eval and pts urgent need for intervention at this time 2 physician consent done to initiate HD   Vascular consulted by Renal for placement.   Continue  HRS cocktail. Albumin / Midodrine and Octreotide   US reviewed. No evidence of post obstructive uropathy   Renal on board. Appreciate recs    Acute hypoxic respiratory failure   CXR with increasing Rt sided effusion (recurring when compared to Jan 2025)  Started on UF/HD  Continue supplemental O2 as tolerated     ?BRBPR   Reportedly stated on admission   Currently unable to expand  H/H relatively stable.   Monitor BM for stool count, output and consistency     h/o opioid and EtOH abuse   as per recent admission, follows with [facility] [phone],  No answer on earlier attempts at contacting   Although liver function would usually limit Dilaudid use, severe renal impairment limiting morphine use and given severity of clinical pain and high risk of using long acting opiates (Methadone), will use low dose Dilaudid PRN and introduce PO ASAP pending mental status   Cont MVI , thiamine, folic acid when able to take PO     Palliative and Ethics on board   Extremely high risk of mortality irrespective of interventions given currently hepatic function and underlying cardiac history (PEA arrest 1/25)     vte ppx: scds       Continue SDU care.   guarded prognosis, suspect extremely high all cause mortality given advanced medical comorbidties including but not light of recent cardiac arrest

## 2025-02-18 NOTE — PROGRESS NOTE ADULT - ASSESSMENT
73 yo gentleman with PMH HCV cirrhosis c/b HE, ascites, SBP, ?HCC who presents with HE, abdominal pain and ascites. He is s/p paracentesis which technically meets criteria for SBP ( though lab reporting #PMN differently). Patient has had second LVP with improvement in abdominal pain and distention. He remains on lactulose/ rifaxmin. He is s/p permacath placement and awaiting HD. He appears more altered today than yesterday- no asterixis.     #Cirrhosis  Etiology HCV with HCC  MELD 3.0 29,(90 d survival 76%),  CP C/13  - HE: Continiue lactulose, rifaxamin. Started on HD  - EV: outpatient EGD  - Ascites/volume: s/p LVP. Cr improving. Continue Diuretics   - SBP: No growth on ascitic fluid to date. Continue antibiotics. Will need prophylaxis on discharge (s/p albumin for HRS (2/12-2/15) as below)  - HCC screening: CT with concerning liver lesion, but without liRads. Will discuss with radiology for lirads score. AFP [id number]- very concerning for HCC  - MARY: nephrology following, possible HRS. s/p albumin. On octreotide, midodrine. Now on HD     75 yo gentleman with PMH HCV cirrhosis c/b HE, ascites, SBP, ?HCC who presents with HE, abdominal pain and ascites. He is s/p paracentesis which technically meets criteria for SBP ( though lab reporting #PMN differently). Patient has had second LVP with improvement in abdominal pain and distention. He remains on lactulose/ rifaxmin. He is s/p permacath placement and awaiting HD. He appears more altered today than yesterday- no asterixis.     #Cirrhosis  Etiology HCV with HCC  MELD 3.0 29,(90 d survival 76%),  CP C/13  - HE: Continiue lactulose, rifaxamin. Started on HD  - EV: outpatient EGD  - Ascites/volume: s/p LVP. Cr improving. Continue Diuretics   - SBP: No growth on ascitic fluid to date. Continue antibiotics. Will need prophylaxis on discharge (s/p albumin for HRS (2/12-2/15) as below)  - HCC screening: CT with concerning liver lesion, but without liRads. AFP [id number]- very concerning for HCC. Recommend Oncology consult.   - MARY: nephrology following, possible HRS. s/p albumin. On octreotide, midodrine. Now on HD

## 2025-02-19 NOTE — RAPID RESPONSE TEAM SUMMARY - NSMEDICATIONSRRT_GEN_ALL_CORE
glycopyrrolate 0.2mg IV push  dilaudid 0.5mg IV push  one dose of zosyn 3.375mg while awaiting lab results to r/u Sepsis 2/2 aspiration

## 2025-02-19 NOTE — PROGRESS NOTE ADULT - SUBJECTIVE AND OBJECTIVE BOX
Patient is a 74y old  Male who presents with a chief complaint of Decompensated cirrhosis (18 Feb 2025 15:59)      INTERVAL HPI/OVERNIGHT EVENTS:  - No acute overnight events    TODAY:  - Patient examined bedside, no complaints  - Patient denies CP, SOB, fever, nausea, vomiting    MEDICATIONS  (STANDING):  buMETAnide Injectable 2 milliGRAM(s) IV Push every 12 hours  chlorhexidine 2% Cloths 1 Application(s) Topical <User Schedule>  folic acid 1 milliGRAM(s) Oral daily  lactulose Retention Enema 200 Gram(s) Rectal two times a day  midodrine 10 milliGRAM(s) Oral every 8 hours  multivitamin 1 Tablet(s) Oral daily  naloxone Injectable 0.4 milliGRAM(s) IV Push once  octreotide  Injectable 50 MICROGram(s) IV Push every 8 hours  pantoprazole  Injectable 40 milliGRAM(s) IV Push daily  potassium chloride   Powder 40 milliEquivalent(s) Oral once  potassium chloride  10 mEq/100 mL IVPB 10 milliEquivalent(s) IV Intermittent every 1 hour  rifAXIMin 550 milliGRAM(s) Oral two times a day  thiamine 100 milliGRAM(s) Oral daily    MEDICATIONS  (PRN):  acetaminophen     Tablet .. 650 milliGRAM(s) Oral every 6 hours PRN Temp greater or equal to 38C (100.4F), Mild Pain (1 - 3)  albuterol/ipratropium for Nebulization 3 milliLiter(s) Nebulizer every 6 hours PRN Shortness of Breath and/or Wheezing  aluminum hydroxide/magnesium hydroxide/simethicone Suspension 30 milliLiter(s) Oral every 4 hours PRN Dyspepsia  HYDROmorphone  Injectable 0.5 milliGRAM(s) IV Push every 6 hours PRN Severe Pain (7 - 10)  HYDROmorphone  Injectable 0.2 milliGRAM(s) IV Push every 6 hours PRN Moderate Pain (4 - 6)  melatonin 3 milliGRAM(s) Oral at bedtime PRN Insomnia  ondansetron Injectable 4 milliGRAM(s) IV Push every 8 hours PRN Nausea and/or Vomiting      Allergies    No Known Allergies    Intolerances        REVIEW OF SYSTEMS:  as above      Vital Signs Last 24 Hrs  T(C): 36.3 (19 Feb 2025 05:00), Max: 36.3 (18 Feb 2025 15:10)  T(F): 97.3 (19 Feb 2025 05:00), Max: 97.3 (18 Feb 2025 15:10)  HR: 87 (19 Feb 2025 05:00) (79 - 94)  BP: 136/79 (19 Feb 2025 05:00) (104/92 - 145/73)  BP(mean): 98 (18 Feb 2025 20:00) (88 - 98)  RR: 18 (19 Feb 2025 05:00) (17 - 19)  SpO2: 93% (19 Feb 2025 05:00) (91% - 96%)    Parameters below as of 19 Feb 2025 05:00  Patient On (Oxygen Delivery Method): nasal cannula  O2 Flow (L/min): 6      PHYSICAL EXAM:  GENERAL: Not in acute distress, lying comfortably  HEAD:  Atraumatic, Normocephalic  EYES: EOMI, PERRLA, conjunctiva and sclera clear  NECK: Supple, No JVD, Normal thyroid  NERVOUS SYSTEM:  Alert & Oriented X3, No gross focal deficits  CHEST/LUNG: Clear to auscultation bilaterally; No rales, rhonchi, wheezing, or rubs  HEART: Regular rate and rhythm; No murmurs, rubs, or gallops  ABDOMEN: Soft, Nontender, Nondistended; Bowel sounds present  EXTREMITIES:  No clubbing, cyanosis, or edema  SKIN: No rashes or lesions    LABS:                        12.4   11.81 )-----------( 39       ( 19 Feb 2025 05:38 )             39.7     02-19    152[H]  |  110[H]  |  54.0[H]  ----------------------------<  101[H]  3.1[L]   |  25.0  |  3.98[H]    Ca    10.0      19 Feb 2025 05:38  Mg     2.1     02-19    TPro  6.8  /  Alb  3.6  /  TBili  5.0[H]  /  DBili  x   /  AST  95[H]  /  ALT  37  /  AlkPhos  155[H]  02-19    PT/INR - ( 19 Feb 2025 05:38 )   PT: 24.8 sec;   INR: 2.15 ratio           Urinalysis Basic - ( 19 Feb 2025 05:38 )    Color: x / Appearance: x / SG: x / pH: x  Gluc: 101 mg/dL / Ketone: x  / Bili: x / Urobili: x   Blood: x / Protein: x / Nitrite: x   Leuk Esterase: x / RBC: x / WBC x   Sq Epi: x / Non Sq Epi: x / Bacteria: x      CAPILLARY BLOOD GLUCOSE      POCT Blood Glucose.: 109 mg/dL (19 Feb 2025 06:51)  POCT Blood Glucose.: 102 mg/dL (18 Feb 2025 17:15)  POCT Blood Glucose.: 102 mg/dL (18 Feb 2025 10:20)      RADIOLOGY & ADDITIONAL TESTS:    Imaging Personally Reviewed:  [X] YES  [ ] NO    Consultant(s) Notes Reviewed:  [X] YES  [ ] NO    Care Discussed with Consultants/Other Providers [X] YES  [ ] NO    Plan of Care discussed with House Staff: [X]YES [ ] NO Patient is a 74y old  Male who presents with a chief complaint of Decompensated cirrhosis (18 Feb 2025 15:59)      INTERVAL HPI/OVERNIGHT EVENTS:  - No acute overnight events    TODAY:  - Patient examined bedside  - AAOx3 but able to answer simple questions, but phases in and out of conversation    MEDICATIONS  (STANDING):  buMETAnide Injectable 2 milliGRAM(s) IV Push every 12 hours  chlorhexidine 2% Cloths 1 Application(s) Topical <User Schedule>  folic acid 1 milliGRAM(s) Oral daily  lactulose Retention Enema 200 Gram(s) Rectal two times a day  midodrine 10 milliGRAM(s) Oral every 8 hours  multivitamin 1 Tablet(s) Oral daily  naloxone Injectable 0.4 milliGRAM(s) IV Push once  octreotide  Injectable 50 MICROGram(s) IV Push every 8 hours  pantoprazole  Injectable 40 milliGRAM(s) IV Push daily  potassium chloride   Powder 40 milliEquivalent(s) Oral once  potassium chloride  10 mEq/100 mL IVPB 10 milliEquivalent(s) IV Intermittent every 1 hour  rifAXIMin 550 milliGRAM(s) Oral two times a day  thiamine 100 milliGRAM(s) Oral daily    MEDICATIONS  (PRN):  acetaminophen     Tablet .. 650 milliGRAM(s) Oral every 6 hours PRN Temp greater or equal to 38C (100.4F), Mild Pain (1 - 3)  albuterol/ipratropium for Nebulization 3 milliLiter(s) Nebulizer every 6 hours PRN Shortness of Breath and/or Wheezing  aluminum hydroxide/magnesium hydroxide/simethicone Suspension 30 milliLiter(s) Oral every 4 hours PRN Dyspepsia  HYDROmorphone  Injectable 0.5 milliGRAM(s) IV Push every 6 hours PRN Severe Pain (7 - 10)  HYDROmorphone  Injectable 0.2 milliGRAM(s) IV Push every 6 hours PRN Moderate Pain (4 - 6)  melatonin 3 milliGRAM(s) Oral at bedtime PRN Insomnia  ondansetron Injectable 4 milliGRAM(s) IV Push every 8 hours PRN Nausea and/or Vomiting      Allergies    No Known Allergies    Intolerances        REVIEW OF SYSTEMS:  as above      Vital Signs Last 24 Hrs  T(C): 36.3 (19 Feb 2025 05:00), Max: 36.3 (18 Feb 2025 15:10)  T(F): 97.3 (19 Feb 2025 05:00), Max: 97.3 (18 Feb 2025 15:10)  HR: 87 (19 Feb 2025 05:00) (79 - 94)  BP: 136/79 (19 Feb 2025 05:00) (104/92 - 145/73)  BP(mean): 98 (18 Feb 2025 20:00) (88 - 98)  RR: 18 (19 Feb 2025 05:00) (17 - 19)  SpO2: 93% (19 Feb 2025 05:00) (91% - 96%)    Parameters below as of 19 Feb 2025 05:00  Patient On (Oxygen Delivery Method): nasal cannula  O2 Flow (L/min): 6      PHYSICAL EXAM:  GENERAL: Orange rifaximin on beard, disheveled  HEAD:  Atraumatic, Normocephalic  EYES: EOMI, PERRLA, conjunctiva and sclera clear  NECK: Supple, No JVD, Normal thyroid  NERVOUS SYSTEM:  Alert & Oriented X3, No gross focal deficits  CHEST/LUNG: Clear to auscultation bilaterally; No rales, rhonchi, wheezing, or rubs  HEART: Regular rate and rhythm; No murmurs, rubs, or gallops  ABDOMEN: Abdomen tender to light touch, protuberant  EXTREMITIES:  +2 edema  SKIN: generalized excoriations on skin    LABS:                        12.4   11.81 )-----------( 39       ( 19 Feb 2025 05:38 )             39.7     02-19    152[H]  |  110[H]  |  54.0[H]  ----------------------------<  101[H]  3.1[L]   |  25.0  |  3.98[H]    Ca    10.0      19 Feb 2025 05:38  Mg     2.1     02-19    TPro  6.8  /  Alb  3.6  /  TBili  5.0[H]  /  DBili  x   /  AST  95[H]  /  ALT  37  /  AlkPhos  155[H]  02-19    PT/INR - ( 19 Feb 2025 05:38 )   PT: 24.8 sec;   INR: 2.15 ratio           Urinalysis Basic - ( 19 Feb 2025 05:38 )    Color: x / Appearance: x / SG: x / pH: x  Gluc: 101 mg/dL / Ketone: x  / Bili: x / Urobili: x   Blood: x / Protein: x / Nitrite: x   Leuk Esterase: x / RBC: x / WBC x   Sq Epi: x / Non Sq Epi: x / Bacteria: x      CAPILLARY BLOOD GLUCOSE      POCT Blood Glucose.: 109 mg/dL (19 Feb 2025 06:51)  POCT Blood Glucose.: 102 mg/dL (18 Feb 2025 17:15)  POCT Blood Glucose.: 102 mg/dL (18 Feb 2025 10:20)      RADIOLOGY & ADDITIONAL TESTS:    Imaging Personally Reviewed:  [X] YES  [ ] NO    Consultant(s) Notes Reviewed:  [X] YES  [ ] NO    Care Discussed with Consultants/Other Providers [X] YES  [ ] NO    Plan of Care discussed with House Staff: [X]YES [ ] NO

## 2025-02-19 NOTE — PROGRESS NOTE ADULT - SUBJECTIVE AND OBJECTIVE BOX
Ethics continues to follow. See full consult on 2/13/2025.    Goals of care discussion with DOMITILA Latham DO (Director of Palliative Care), LEYLA Smith LCSW (Palliative Care ), LEYLA CHAMBERS RN, MBE (Ethics Fellow), and the patient at the bedside with language assistance from Nitish Jeter (). Dr. Noa MARTEL discussed the patient's overall medical condition, prognosis, and options for care. She further discussed that the patient has worsening organ dysfunction. The patient was able to recall a prior admission when he suffered a cardiac arrest, requiring CPR, sharing that it was a very painful experience and he is scared of it happening again and being alone if it does. When Dr. Latham addressed advance directives, the patient clearly expressed that he does not want CPR nor to be placed on ventilator, demonstrating the capacity to understand his choice to be DNR/DNI. He expressed that although he is not afraid of death, he is afraid of dying alone. Though he shared that he believes when it is his time "God will take me". Emotional support was provided.

## 2025-02-19 NOTE — PROGRESS NOTE ADULT - PROBLEM SELECTOR PLAN 3
- on HD  - seems to be clearing his mental state slowly  - management per nephrology   - seems he will need long term HD
- on HD  - seems to be clearing his mental state slowly  - management per nephrology   - seems he will need long term HD - but currently patient unsure if he wants to continue

## 2025-02-19 NOTE — PROGRESS NOTE ADULT - PROBLEM SELECTOR PLAN 6
- patient currently alert and oriented to person and place, but otherwise cannot describe his medical condition so thus does not have ability to make decisions about advanced directives.   - he mentions family in [place]  - chart notes ? brother in [place]  - ethics following and assisting with identifying a surrogate decision maker  - I hope with improvement in clinical status, he may be able to have a more meaningful discussion  - his overall prognosis is very poor in context of advanced liver cirrhosis, kidney failure, concerns for HCC.
- see goals of care  - at certain times of the day with a lot of prompting he has limited short term medical decision making capacity. At this time, he was able to clearly tell us his wishes regarding advanced directives. He is currently unable to answer or say what else he wants or doesn't want ( ie dilaysis) - just says " No Se ( I don't know in Persian).   - his overall prognosis is very poor in context of advanced liver cirrhosis, kidney failure, concerns for HCC.

## 2025-02-19 NOTE — RAPID RESPONSE TEAM SUMMARY - NSSITUATIONBACKGROUNDRRT_GEN_ALL_CORE
Background: Patient supposed to be on nocturnal BIPAP however due to confusion 2/2 hepatic encephalopathy patient refusing BIPAP and continued to take it off. Also of note patient was refused his lactulose enema and rifaximin today, becoming agitated with nurses when administration was attempted. Was scheduled for dialysis session today, but was unable to tolerate full session due to tachypnea and hypoxia. Per chart review patient is A&Ox3-4 w/ episodes of waxing and waning.     Situation:  RR called for hypoxia to mid 80's on 6L NC. Was put on nonrebreather and saturation improved to 97%. Had brief episode of tachycardia on tele, but called tech and they noted it appeared like an artifact, pt returned to NSR w/ HR 87. Rectal temp 94F. BP stable. Physical exam notable for jaundice, belly breathing, tachypnea, b/l rhonchi, and confusion.    Labs/imaging ordered: ABG, CXR, EKG, CBC, CMP, Mg, Phos

## 2025-02-19 NOTE — PROGRESS NOTE ADULT - ATTENDING COMMENTS
I agree with assessment and plan as above. Pt is 75 yo M with PMH of HCV cirrhosis, likely new diagnosis of HCC, HE, ascites, who presented due to decompensation with HE and ascites. At time of GI evaluation today, he is altered but awake. He reports diffuse abdominal pain. Continue antibiotics for SBP, continue lactulose and rifaximin. Continue to monitor renal function. Patient is on octreotide and midodrine for possible HRS. Follow up Nephrology recs. Continue diuresis per Nephrology. Hepatic lesion is likely HCC, highly concerning for malignancy per CT report. Patient needs triple phase CT to make radiologic diagnosis of HCC (versus MRI with contrast). Can obtain either of these contrast studies and coordinate with close HD afterwards. Recommend Oncology consult.
Hep C Cirrhosis with Hx HCC?  SBP  AHE  HRS    Completed 7 days of ceftriaxone of 2/18. Restarted CTX for ongoing abdominal pain   Midodrine/Ocreotide  HD per Nephro via [facility]   Mentation Is improving and now awake and answering questions  Cont rifaximin/lactulose  SLP   Palliative, Nephro, GI following

## 2025-02-19 NOTE — PROGRESS NOTE ADULT - PROBLEM SELECTOR PLAN 2
- getting low dose dilaudid IVp PRN - 1 dose of 0.5mg intravenous given in the last 24 hours   - of note patient had been on maintenance methadone for opioid use disorder, now being held  - patient is critically ill and I am not suspecting substance abuse at this time, and he has pain secondary to advanced liver disease and liver cancer, continue intravenous dilaudid PRN
- getting low dose dilaudid IVp PRN - 1 dose of 0.5mg intravenous given in the last 24 hours   - of note patient had been on maintenance methadone for opioid use disorder, now being held

## 2025-02-19 NOTE — PROGRESS NOTE ADULT - ASSESSMENT
ASSESSMENT:   DAMON CHRISTIANSON is a 74y Male w/PMHx of HCV cirrhosis, reported HCC, h/o opioid and alcohol use, recently admitted to [facility] (1/11-1/23 s/p cardiac arrest, PNA and Lt pleural effusion s/p CT and paracentesis) now admitted with progressive abd distension and worsening abd pain. Reportedly also with complaints of BRBPR however was unable to elaborate. Remains with waxing and waning mentation suspected due to hepatic encephalopathy compounded by acute uremia from MARY suspected due to hepatorenal syndrome. Due to progressive renal failure was started on HD and now with slowly improving overall uremia    PLAN:  #Hepatic encephalopathy   - Hepatitis C cirrhosis with documentation from prior chart with HCC   - Path of pl fluid non confirmatory (1/2025); AFP [id number] (1/2025)  - Ascitic fluid done in ER borderline for SBP  - s/p IR Paracentesis  3550 ml yu fluid drained 2/15   - Started on empiric Ceftriaxone 2 gm daily for SBP  - Continue NPO for now pending stable (consistent) mental status improvement   - Continue D5 1/2 NS with 75 meq Na bicarb at slow rate   - Lactulose retention enemas BID titrate to 3 BM/day   - Cont rifaximin, PPI   - Hold coreg for now  - MELD noted. Now started on HD  - Steroids unlikely to be of any benefit (from a hepatic standpoint)and could cause SBP to rapidly worsen   - SLP eval noted. Continue daily SLP evals   - Ethic and Palliative evals/recs appreciated    #MARY   - Unconfirmed etiology, Suspected 2/2 Hepatorenal syndrome (probably HRS-1 given acuity)   - Improvement yesterday, worsening today  - d/w Renal. Given Ethics eval and pts urgent need for intervention at this time 2 physician consent done to initiate HD   - Vascular consulted by Renal for catheter placement  - Continue HRS cocktail. Albumin / Midodrine and Octreotide   - US reviewed. No evidence of post obstructive uropathy   - Renal on board. Appreciate recs    #Acute hypoxic respiratory failure   - CXR with increasing Rt sided effusion (recurring when compared to Jan 2025)  - Started on UF/HD  - Continue supplemental O2 as tolerated     #?BRBPR   - Reportedly stated on admission   - Currently unable to expand  - H/H relatively stable  - Monitor BM for stool count, output and consistency     #Hx of Opioid and EtOH abuse   - as per recent admission, follows with [facility] [phone],  - No answer on earlier attempts at contacting   - Although liver function would usually limit Dilaudid use, severe renal impairment limiting morphine use and given severity of clinical pain and high risk of using long acting opiates (Methadone)  - Low dose Dilaudid PRN and introduce PO ASAP pending mental status   - Cont MVI , thiamine, folic acid when able to take PO     Palliative and Ethics on board   Extremely high risk of mortality irrespective of interventions given currently hepatic function and underlying cardiac history (PEA arrest 1/25)     VTE PPx: SCDs  Dispo: Guarded prognosis, suspect extremely high all cause mortality given advanced medical comorbidities including but not light of recent cardiac arrest    ASSESSMENT:   DAMON CHRISTIANSON is a 74y Male w/PMHx of HCV cirrhosis, reported HCC, h/o opioid and alcohol use, recently admitted to [facility] (1/11-1/23 s/p cardiac arrest, PNA and Lt pleural effusion s/p CT and paracentesis) now admitted with progressive abd distension and worsening abd pain. Reportedly also with complaints of BRBPR however was unable to elaborate. Remains with waxing and waning mentation suspected due to hepatic encephalopathy compounded by acute uremia from MARY suspected due to hepatorenal syndrome. Due to progressive renal failure was started on HD and now with slowly improving overall uremia    PLAN:  #Hepatic encephalopathy   - Hepatitis C cirrhosis with documentation from prior chart with HCC   - Path of pl fluid non confirmatory (1/2025); AFP [id number] (1/2025)  - Ascitic fluid done in ER borderline for SBP  - s/p IR Paracentesis  3550 ml yu fluid drained 2/15   - Started on empiric Ceftriaxone 2 gm daily for SBP, restart for 7 days today  - Continue NPO for now pending stable (consistent) mental status improvement   - Continue D51/2 NS with 75 meq Na bicarb at slow rate   - Lactulose retention enemas BID titrate to 3 BM/day   - Cont rifaximin, PPI   - Hold coreg for now  - MELD noted. Now started on HD, another session today  - Steroids unlikely to be of any benefit (from a hepatic standpoint)and could cause SBP to rapidly worsen   - SLP eval noted. Continue daily SLP evals   - Ethic and Palliative evals/recs appreciated    #MARY   - Unconfirmed etiology, Suspected 2/2 Hepatorenal syndrome (probably HRS-1 given acuity)   - Improvement yesterday, worsening today  - d/w Renal. Given Ethics eval and pts urgent need for intervention at this time 2 physician consent done to initiate HD   - Vascular consulted by Renal for catheter placement  - Continue HRS cocktail. Albumin / Midodrine and Octreotide   - US reviewed. No evidence of post obstructive uropathy   - Renal on board. Appreciate recs    #Acute hypoxic respiratory failure   - CXR with increasing Rt sided effusion (recurring when compared to Jan 2025)  - Started on UF/HD  - Continue supplemental O2 as tolerated     #?BRBPR   - Reportedly stated on admission   - Currently unable to expand  - H/H relatively stable  - Monitor BM for stool count, output and consistency     #Hx of Opioid and EtOH abuse   - as per recent admission, follows with [facility] [phone],  - No answer on earlier attempts at contacting   - Although liver function would usually limit Dilaudid use, severe renal impairment limiting morphine use and given severity of clinical pain and high risk of using long acting opiates (Methadone)  - Low dose Dilaudid PRN and introduce PO ASAP pending mental status   - Pain Medicine saw today, to D/C pain regimen as mental status still waxing/waning  - Cont MVI , thiamine, folic acid when able to take PO     Palliative and Ethics on board   Extremely high risk of mortality irrespective of interventions given currently hepatic function and underlying cardiac history (PEA arrest 1/25)     VTE PPx: SCDs  Dispo: Guarded prognosis, suspect extremely high all cause mortality given advanced medical comorbidities including but not light of recent cardiac arrest

## 2025-02-19 NOTE — PROGRESS NOTE ADULT - CONVERSATION DETAILS
Attempted to explore goals of therapy with patient at bedside with use of language line for Khmer along with TY1 Dr. Heard, and  Maris WOLF Inquired with patient his knowledge of his medical condition, he is unable to tell me anything at this time. I explore and explained to him his current medical condition, after which he is unable to repeat back to me his currently condition. I asked him about his family situation and health care proxy and he mentions he has family in [place] but unable to give me names or phone numbers. I asked him if he had any family here and he states he couldn't remember.
Discussed overall medical condition, prognosis, and options for plan of care with patient at bedside, along with Maris WOLF , Ethics fellow Jenn Castillo, and in person . Expressed that he has worsening organ dysfunction and I am very concerned about his breathing today. I addressed if he remembered the last time he was in the hospital when his heart stopped and he required CPR. He re-counts the whole thing and states it was very painful and he is very scared now of that happening again and him being alone. I inquired this time around, knowing what we know about his medical condition, what his wishes would be in this context. At this time after numerous times asking, he is clear he would not want CPR or ventilator at this time. He states he is not afraid to die but he is afraid of dying alone, but states if God takes me.     We tried to get more information about any potential decision makers, and he does tell us he has 6 siblings in [place], eldest is Deangelo. He does have a friend Arian who he would be okay with making his medical decisions if necessary but when we contacted Arian, he is not willing to make medical decisions but he will try to see if he can come visit his friend ( he actually works in the building that patient lives in - knows him for 7 years).

## 2025-02-19 NOTE — CHART NOTE - NSCHARTNOTEFT_GEN_A_CORE
Palliative care social work note.    SW, palliative care physician Dr Latham, Ethics fellow Jenn and  met with patient. Patient engaged further today and was able to recall last admission and events with his cardiac arrest. Patient reports family in [place] but was not able to provide contact information. Patient verbalized that he does not want to go thru cardiac resuscitation like last time. Patient acknowledged not wanting to suffer and that he will go to God. Patient verbalized being scared of being alone and dying. ANTWON initiated to support patient. Symptoms reviewed with patient further regarding pain he is expressing in his abdomen and difficulty breathing. Patient expressed friend Arian that he knows and agreed to have team call. Palliative care physician DR Latham further called Arian who is friend form apartment complex but does not want ot be actively involved with patients medical decision making. Team encouraged a friendly visit to support patient if possible. ANGEL completed.

## 2025-02-19 NOTE — PROGRESS NOTE ADULT - PROBLEM SELECTOR PLAN 4
- seems to be related to liver disease and kidney dysfunction  - slowly improving  - last admission in January patient was able to advocate and go home alone   - improved today but still very lethargic at times and requires a lot of prompting
- seems to be related to liver disease and kidney dysfunction  - slowly improving  - last admission in January patient was able to advocate and go home alone   - hope this continues to improve so we can have discussions

## 2025-02-19 NOTE — PROGRESS NOTE ADULT - ASSESSMENT
75 yo M with history of HCV cirrhosis, hepatoma, h/o opioid/EtOH use, recent admission to [facility] 1/11 - 1/23 for cardiac arrest, PNA, & left-sided pleural effusion (s/p chest tube) & paracentesis (1/15) complicated by SBP came to ED complaining of worsening abdominal distention. Unclear whether the patient was taking his home medications. Patient is currently lethargic, and unable to provide any information via .   In the ED he was found to have hepatic encephalopathy with ammonia level of 120, CT abdomen/pelvis with iv contrast with large volume ascites.   Patient was found to have MARY with sCr 4.89->4.61. He received 1L of LR yesterday. Currently noted to have no urine output.   IR and GI are following and emergent paracentesis is planned.     - Oliguric MARY on CKD, could be pre-renal/compartment syndrome in the setting of ascites vs. HRS,   ·	SCr used to be at 1.5 in 1/23/2025   ·	CT abdomen/pelvis reviewed, noted to have normal-sized kidneys and no obstructive uropathy   ·	UA reviewed with increased SG, expected after iv contrast vs. due to vascular constriction in the setting of portal hypertension, otherwise bland   ·	Continues to worsen despite midodrine and octreotide. Continue midodrine, d/c octeotride  ·	Patient started on HD  Seen on HD.  Qd, Qb, UF rate reviewed.  Vitals stable.  Access working well.  Patient tolerating HD well.    - Volume overload: C/w bumex 2mg daily  - Decompensated HCV liver cirrhosis with ascites, worsening   - Ascites s/p paracentesis with 3.5L of fluid removal   - Hepatic encephalopathy, worsening --Lactulose  - Anemia   - History of PSA with opioids and EtOH   - History of cardiac arrest in the past       Dose medications for HD  GI follow up, they will discuss patient with liver transplant center  Guarded prognosis   Discussed with primary team    75 yo M with history of HCV cirrhosis, hepatoma, h/o opioid/EtOH use, recent admission to [facility] 1/11 - 1/23 for cardiac arrest, PNA, & left-sided pleural effusion (s/p chest tube) & paracentesis (1/15) complicated by SBP came to ED complaining of worsening abdominal distention. Unclear whether the patient was taking his home medications. Patient is currently lethargic, and unable to provide any information via .   In the ED he was found to have hepatic encephalopathy with ammonia level of 120, CT abdomen/pelvis with iv contrast with large volume ascites.   Patient was found to have MARY with sCr 4.89->4.61. He received 1L of LR yesterday. Currently noted to have no urine output.   IR and GI are following and emergent paracentesis is planned.     - Oliguric MARY on CKD, could be pre-renal/compartment syndrome in the setting of ascites vs. HRS,   ·	SCr used to be at 1.5 in 1/23/2025   ·	CT abdomen/pelvis reviewed, noted to have normal-sized kidneys and no obstructive uropathy   ·	UA reviewed with increased SG, expected after iv contrast vs. due to vascular constriction in the setting of portal hypertension, otherwise bland   ·	Continues to worsen despite midodrine and octreotide. Continue midodrine, d/c octeotride  ·	Patient started on HD    Seen on HD.  Qd, Qb, UF rate reviewed.  Vitals : tachypnea and hypotension  Access working well.  Patient was seen by palliative and they recommended holding off HD now  patient likely to be made comfort care (code was changed to DNR/DNI earlier today)    - Volume overload: rec to hold bumex , developing intravascular contraction  -Hypernatremia: developing free fluid deficit, as above hold diuretics  - Decompensated HCV liver cirrhosis with ascites, worsening   - Ascites s/p paracentesis with 3.5L of fluid removal   - Hepatic encephalopathy, worsening --Lactulose  - Anemia   - History of PSA with opioids and EtOH   - History of cardiac arrest in the past       Dose medications for HD  GI follow up  Guarded prognosis   Discussed with palliative team

## 2025-02-19 NOTE — PROGRESS NOTE ADULT - ASSESSMENT
A MOLST was completed with directives inclusive of Do Not Resuscitate (DNR) and Do Not Intubate (DNI) on 2/19/2025 at the patient's request. The original MOLST was placed in the patient's chart.    Ethics will remain available for further conversation about the patient’s current condition and decision points that may occur.      Jenn CHAMBERS RN, MBE  Ethics Fellow  [phone]

## 2025-02-19 NOTE — PROGRESS NOTE ADULT - PROBLEM SELECTOR PLAN 1
Mildly to Moderately Impaired: difficulty hearing in some environments or speaker may need to increase volume or speak distinctly
- also with mass suspicious for HCC, very elevated AFP   - poor overall prognosis, given suspicion for underlying mass, unlikely to be a candidate for transplant  - elevated MELD indicative of high risk for complications and mortality   - gastroenterology following  - ascites - s/p paracentesis 2/14 of 3550 mL - culture negative
Pt with borderline SBP - neutraphils 235  unable to do large volume paracentesis - lack of consent   on antibiotics - Rocephin  Hypothermia continues - blood cultures negative   Continue lactulose enema  and Xifaxan   cmp ordered for tomorrow
- also with mass suspicious for HCC, very elevated AFP   - poor overall prognosis, given suspicion for underlying mass, unlikely to be a candidate for transplant  - elevated MELD indicative of high risk for complications and mortality   - gastroenterology following  - ascites - s/p paracentesis 2/14 of 3550 mL - culture negative  - repeat paracentesis pending

## 2025-02-19 NOTE — CHART NOTE - NSCHARTNOTEFT_GEN_A_CORE
Patient seen in HD, tachypnea, hypoxic, unable to complete dilaysis. a lot of abdominal breathing and work of breathing as well as pain. intravenous dilaudid given at HD with good effect and response. Patient remains with poor prognosis and likely at end of life. No family or surrogate decision makers. Expect death in hours to days. Patient do not resuscitate and do not intubate.

## 2025-02-19 NOTE — PROGRESS NOTE ADULT - PROBLEM SELECTOR PLAN 5
- had been on methadone  - now methadone on hold  - restart as clinical status improves, consider pain management assistance
- had been on methadone  - now methadone on hold  - restart if clinical status improves

## 2025-02-19 NOTE — PROGRESS NOTE ADULT - SUBJECTIVE AND OBJECTIVE BOX
[facility] DIVISION OF KIDNEY DISEASES AND HYPERTENSION -- PROGRESS NOTE    Reason for visit: ESRD on HD    24 hour events/subjective:  Seen in his room today   appears ill  sleepy    REVIEW OF SYSTEMS: All systems were reviewed in detail. Pertinent positive and negative have been detailed above, otherwise negative.     Physical Exam:  Gen: NAD, ill-appearing  HEENT: normal  Pulm: CTA B/L  CV: normal S1S2; no rub, no edema  Abd: soft, non-tender  MSK no deformities   Neuro: sleepy    Vital Signs Last 24 Hrs  T(C): 36.4 (19 Feb 2025 08:40), Max: 36.4 (19 Feb 2025 08:40)  T(F): 97.5 (19 Feb 2025 08:40), Max: 97.5 (19 Feb 2025 08:40)  HR: 85 (19 Feb 2025 08:40) (85 - 94)  BP: 122/80 (19 Feb 2025 08:40) (104/92 - 145/73)  BP(mean): 98 (18 Feb 2025 20:00) (88 - 98)  RR: 19 (19 Feb 2025 08:40) (18 - 19)  SpO2: 95% (19 Feb 2025 08:40) (91% - 96%)    Parameters below as of 19 Feb 2025 08:40  Patient On (Oxygen Delivery Method): nasal cannula  O2 Flow (L/min): 6    I&O's Summary    18 Feb 2025 07:01  -  19 Feb 2025 07:00  --------------------------------------------------------  IN: 0 mL / OUT: 525 mL / NET: -525 mL      Current Antibiotics:  cefTRIAXone Injectable.      cefTRIAXone Injectable. 2000 milliGRAM(s) IV Push once  rifAXIMin 550 milliGRAM(s) Oral two times a day    Other medications:  albumin human 25% IVPB 50 milliLiter(s) IV Intermittent every 6 hours  buMETAnide Injectable 2 milliGRAM(s) IV Push every 12 hours  chlorhexidine 2% Cloths 1 Application(s) Topical <User Schedule>  folic acid 1 milliGRAM(s) Oral daily  lactulose Retention Enema 200 Gram(s) Rectal two times a day  midodrine 10 milliGRAM(s) Oral every 8 hours  multivitamin 1 Tablet(s) Oral daily  naloxone Injectable 0.4 milliGRAM(s) IV Push once  octreotide  Injectable 50 MICROGram(s) IV Push every 8 hours  pantoprazole  Injectable 40 milliGRAM(s) IV Push daily  potassium chloride  10 mEq/100 mL IVPB 10 milliEquivalent(s) IV Intermittent every 1 hour  thiamine 100 milliGRAM(s) Oral daily    02-19    152[H]  |  110[H]  |  54.0[H]  ----------------------------<  101[H]                        12.4   11.81 )-----------( 39       ( 19 Feb 2025 05:38 )             39.7   3.1[L]   |  25.0  |  3.98[H]    Ca    10.0      19 Feb 2025 05:38  Mg     2.1     02-19    TPro  6.8  /  Alb  3.6  /  TBili  5.0[H]  /  DBili  x   /  AST  95[H]  /  ALT  37  /  AlkPhos  155[H]  02-19    Creatinine: 3.98 mg/dL (02-19-25 @ 05:38)  Creatinine: 3.36 mg/dL (02-18-25 @ 05:21)  Creatinine: 4.92 mg/dL (02-17-25 @ 04:43)  Creatinine: 5.76 mg/dL (02-16-25 @ 07:23)  Creatinine: 5.33 mg/dL (02-15-25 @ 04:09)           [facility] DIVISION OF KIDNEY DISEASES AND HYPERTENSION -- PROGRESS NOTE    Reason for visit: ESRD on HD    24 hour events/subjective:  Seen in his room today and then on HD  appears ill  confused    REVIEW OF SYSTEMS: All systems were reviewed in detail. Pertinent positive and negative have been detailed above, otherwise negative.     Physical Exam:  Gen: NAD, ill-appearing  HEENT: normal  Pulm: CTA B/L  CV: normal S1S2; no rub, no edema  Abd: soft, non-tender  MSK no deformities   Neuro: confused    Vital Signs Last 24 Hrs  T(C): 36.4 (19 Feb 2025 08:40), Max: 36.4 (19 Feb 2025 08:40)  T(F): 97.5 (19 Feb 2025 08:40), Max: 97.5 (19 Feb 2025 08:40)  HR: 85 (19 Feb 2025 08:40) (85 - 94)  BP: 122/80 (19 Feb 2025 08:40) (104/92 - 145/73)  BP(mean): 98 (18 Feb 2025 20:00) (88 - 98)  RR: 19 (19 Feb 2025 08:40) (18 - 19)  SpO2: 95% (19 Feb 2025 08:40) (91% - 96%)    Parameters below as of 19 Feb 2025 08:40  Patient On (Oxygen Delivery Method): nasal cannula  O2 Flow (L/min): 6    I&O's Summary    18 Feb 2025 07:01  -  19 Feb 2025 07:00  --------------------------------------------------------  IN: 0 mL / OUT: 525 mL / NET: -525 mL      Current Antibiotics:  cefTRIAXone Injectable.      cefTRIAXone Injectable. 2000 milliGRAM(s) IV Push once  rifAXIMin 550 milliGRAM(s) Oral two times a day    Other medications:  albumin human 25% IVPB 50 milliLiter(s) IV Intermittent every 6 hours  buMETAnide Injectable 2 milliGRAM(s) IV Push every 12 hours  chlorhexidine 2% Cloths 1 Application(s) Topical <User Schedule>  folic acid 1 milliGRAM(s) Oral daily  lactulose Retention Enema 200 Gram(s) Rectal two times a day  midodrine 10 milliGRAM(s) Oral every 8 hours  multivitamin 1 Tablet(s) Oral daily  naloxone Injectable 0.4 milliGRAM(s) IV Push once  octreotide  Injectable 50 MICROGram(s) IV Push every 8 hours  pantoprazole  Injectable 40 milliGRAM(s) IV Push daily  potassium chloride  10 mEq/100 mL IVPB 10 milliEquivalent(s) IV Intermittent every 1 hour  thiamine 100 milliGRAM(s) Oral daily    02-19    152[H]  |  110[H]  |  54.0[H]  ----------------------------<  101[H]                        12.4   11.81 )-----------( 39       ( 19 Feb 2025 05:38 )             39.7   3.1[L]   |  25.0  |  3.98[H]    Ca    10.0      19 Feb 2025 05:38  Mg     2.1     02-19    TPro  6.8  /  Alb  3.6  /  TBili  5.0[H]  /  DBili  x   /  AST  95[H]  /  ALT  37  /  AlkPhos  155[H]  02-19    Creatinine: 3.98 mg/dL (02-19-25 @ 05:38)  Creatinine: 3.36 mg/dL (02-18-25 @ 05:21)  Creatinine: 4.92 mg/dL (02-17-25 @ 04:43)  Creatinine: 5.76 mg/dL (02-16-25 @ 07:23)  Creatinine: 5.33 mg/dL (02-15-25 @ 04:09)

## 2025-02-19 NOTE — PROGRESS NOTE ADULT - SUBJECTIVE AND OBJECTIVE BOX
OVERNIGHT EVENTS:     Present Symptoms:     Dyspnea: Mild Moderate Severe  Nausea/Vomiting: Yes No  Anxiety:  Yes No  Depression: Yes No  Fatigue: Yes No  Loss of appetite: Yes No  Constipation:     Pain:             Character-            Duration-            Effect-            Factors-            Frequency-            Location-            Severity-    Pain AD Score:  http://geriatrictoolkit.[facility]/cog/painad.pdf (press ctrl + left click to view)    Review of Systems: Reviewed                     Negative:                     Positive:  Unable to obtain due to poor mentation   All others negative    MEDICATIONS  (STANDING):  albumin human 25% IVPB 50 milliLiter(s) IV Intermittent every 6 hours  buMETAnide Injectable 2 milliGRAM(s) IV Push every 12 hours  cefTRIAXone Injectable.      cefTRIAXone Injectable. 2000 milliGRAM(s) IV Push once  chlorhexidine 2% Cloths 1 Application(s) Topical <User Schedule>  folic acid 1 milliGRAM(s) Oral daily  lactulose Retention Enema 200 Gram(s) Rectal two times a day  midodrine 10 milliGRAM(s) Oral every 8 hours  multivitamin 1 Tablet(s) Oral daily  naloxone Injectable 0.4 milliGRAM(s) IV Push once  octreotide  Injectable 50 MICROGram(s) IV Push every 8 hours  pantoprazole  Injectable 40 milliGRAM(s) IV Push daily  potassium chloride  10 mEq/100 mL IVPB 10 milliEquivalent(s) IV Intermittent every 1 hour  rifAXIMin 550 milliGRAM(s) Oral two times a day  thiamine 100 milliGRAM(s) Oral daily    MEDICATIONS  (PRN):  acetaminophen     Tablet .. 650 milliGRAM(s) Oral every 6 hours PRN Temp greater or equal to 38C (100.4F), Mild Pain (1 - 3)  albuterol/ipratropium for Nebulization 3 milliLiter(s) Nebulizer every 6 hours PRN Shortness of Breath and/or Wheezing  aluminum hydroxide/magnesium hydroxide/simethicone Suspension 30 milliLiter(s) Oral every 4 hours PRN Dyspepsia  HYDROmorphone  Injectable 0.2 milliGRAM(s) IV Push every 6 hours PRN Moderate Pain (4 - 6)  HYDROmorphone  Injectable 0.5 milliGRAM(s) IV Push every 6 hours PRN Severe Pain (7 - 10)  melatonin 3 milliGRAM(s) Oral at bedtime PRN Insomnia  ondansetron Injectable 4 milliGRAM(s) IV Push every 8 hours PRN Nausea and/or Vomiting      PHYSICAL EXAM:    Vital Signs Last 24 Hrs  T(C): 36.4 (19 Feb 2025 08:40), Max: 36.4 (19 Feb 2025 08:40)  T(F): 97.5 (19 Feb 2025 08:40), Max: 97.5 (19 Feb 2025 08:40)  HR: 85 (19 Feb 2025 08:40) (85 - 94)  BP: 122/80 (19 Feb 2025 08:40) (104/92 - 145/73)  BP(mean): 98 (18 Feb 2025 20:00) (88 - 98)  RR: 19 (19 Feb 2025 08:40) (18 - 19)  SpO2: 95% (19 Feb 2025 08:40) (91% - 96%)    Parameters below as of 19 Feb 2025 08:40  Patient On (Oxygen Delivery Method): nasal cannula  O2 Flow (L/min): 6      General: alert  oriented x ____ lethargic agitated                  cachexia  nonverbal  coma    Karnofsky:  %    HEENT: normal  dry mouth  ET tube/trach    Lungs: comfortable tachypnea/labored breathing  excessive secretions    CV: normal  tachycardia    GI: normal  distended  tender  no BS               PEG/NG/OG tube  constipation  last BM:     : normal  incontinent  oliguria/anuria  ruiz    MSK: normal  weakness  edema             ambulatory  bedbound/wheelchair bound    Skin: normal  pressure ulcers- Stage_____  no rash    LABS:                          12.4   11.81 )-----------( 39       ( 19 Feb 2025 05:38 )             39.7     02-19    152[H]  |  110[H]  |  54.0[H]  ----------------------------<  101[H]  3.1[L]   |  25.0  |  3.98[H]    Ca    10.0      19 Feb 2025 05:38  Mg     2.1     02-19    TPro  6.8  /  Alb  3.6  /  TBili  5.0[H]  /  DBili  x   /  AST  95[H]  /  ALT  37  /  AlkPhos  155[H]  02-19    PT/INR - ( 19 Feb 2025 05:38 )   PT: 24.8 sec;   INR: 2.15 ratio      Urinalysis Basic - ( 19 Feb 2025 05:38 )    Color: x / Appearance: x / SG: x / pH: x  Gluc: 101 mg/dL / Ketone: x  / Bili: x / Urobili: x   Blood: x / Protein: x / Nitrite: x   Leuk Esterase: x / RBC: x / WBC x   Sq Epi: x / Non Sq Epi: x / Bacteria: x    I&O's Summary    18 Feb 2025 07:01  -  19 Feb 2025 07:00  --------------------------------------------------------  IN: 0 mL / OUT: 525 mL / NET: -525 mL        RADIOLOGY & ADDITIONAL STUDIES:    ADVANCE DIRECTIVES/TREATMENT PREFERENCES:  DNR YES NO  Completed on:                     ANGEL  YES NO   Completed on:  Living Will  YES NO   Completed on: OVERNIGHT EVENTS: more respiratory distress     Present Symptoms:     Dyspnea: Mild   Nausea/Vomiting: No  Anxiety:  No  Depression: No  Fatigue: Yes   Loss of appetite: unable   Constipation: no     Pain: abdominal pain             Character- pressure             Duration- hours             Effect-            Factors- pressing on his abdomen             Frequency- all day             Location- abdomen             Severity- 5/10     Pain AD Score:  http://geriatrictoolkit.[email or web address]/cog/painad.pdf (press ctrl + left click to view)    Review of Systems: Reviewed                                    Positive: abdominal pain   All others negative    MEDICATIONS  (STANDING):  albumin human 25% IVPB 50 milliLiter(s) IV Intermittent every 6 hours  buMETAnide Injectable 2 milliGRAM(s) IV Push every 12 hours  cefTRIAXone Injectable.      cefTRIAXone Injectable. 2000 milliGRAM(s) IV Push once  chlorhexidine 2% Cloths 1 Application(s) Topical <User Schedule>  folic acid 1 milliGRAM(s) Oral daily  lactulose Retention Enema 200 Gram(s) Rectal two times a day  midodrine 10 milliGRAM(s) Oral every 8 hours  multivitamin 1 Tablet(s) Oral daily  naloxone Injectable 0.4 milliGRAM(s) IV Push once  octreotide  Injectable 50 MICROGram(s) IV Push every 8 hours  pantoprazole  Injectable 40 milliGRAM(s) IV Push daily  potassium chloride  10 mEq/100 mL IVPB 10 milliEquivalent(s) IV Intermittent every 1 hour  rifAXIMin 550 milliGRAM(s) Oral two times a day  thiamine 100 milliGRAM(s) Oral daily    MEDICATIONS  (PRN):  acetaminophen     Tablet .. 650 milliGRAM(s) Oral every 6 hours PRN Temp greater or equal to 38C (100.4F), Mild Pain (1 - 3)  albuterol/ipratropium for Nebulization 3 milliLiter(s) Nebulizer every 6 hours PRN Shortness of Breath and/or Wheezing  aluminum hydroxide/magnesium hydroxide/simethicone Suspension 30 milliLiter(s) Oral every 4 hours PRN Dyspepsia  HYDROmorphone  Injectable 0.2 milliGRAM(s) IV Push every 6 hours PRN Moderate Pain (4 - 6)  HYDROmorphone  Injectable 0.5 milliGRAM(s) IV Push every 6 hours PRN Severe Pain (7 - 10)  melatonin 3 milliGRAM(s) Oral at bedtime PRN Insomnia  ondansetron Injectable 4 milliGRAM(s) IV Push every 8 hours PRN Nausea and/or Vomiting      PHYSICAL EXAM:    Vital Signs Last 24 Hrs  T(C): 36.4 (19 Feb 2025 08:40), Max: 36.4 (19 Feb 2025 08:40)  T(F): 97.5 (19 Feb 2025 08:40), Max: 97.5 (19 Feb 2025 08:40)  HR: 85 (19 Feb 2025 08:40) (85 - 94)  BP: 122/80 (19 Feb 2025 08:40) (104/92 - 145/73)  BP(mean): 98 (18 Feb 2025 20:00) (88 - 98)  RR: 19 (19 Feb 2025 08:40) (18 - 19)  SpO2: 95% (19 Feb 2025 08:40) (91% - 96%)    Parameters below as of 19 Feb 2025 08:40  Patient On (Oxygen Delivery Method): nasal cannula  O2 Flow (L/min): 6    General: alert  knows he is in the hospital     HEENT: normal      Lungs: comfortable    CV: normal      GI: normal      : oliguria/anuria     MSK: weakness      Skin:  no rash    LABS:                      12.4   11.81 )-----------( 39       ( 19 Feb 2025 05:38 )             39.7     02-19    152[H]  |  110[H]  |  54.0[H]  ----------------------------<  101[H]  3.1[L]   |  25.0  |  3.98[H]    Ca    10.0      19 Feb 2025 05:38  Mg     2.1     02-19    TPro  6.8  /  Alb  3.6  /  TBili  5.0[H]  /  DBili  x   /  AST  95[H]  /  ALT  37  /  AlkPhos  155[H]  02-19    PT/INR - ( 19 Feb 2025 05:38 )   PT: 24.8 sec;   INR: 2.15 ratio      Urinalysis Basic - ( 19 Feb 2025 05:38 )    Color: x / Appearance: x / SG: x / pH: x  Gluc: 101 mg/dL / Ketone: x  / Bili: x / Urobili: x   Blood: x / Protein: x / Nitrite: x   Leuk Esterase: x / RBC: x / WBC x   Sq Epi: x / Non Sq Epi: x / Bacteria: x    I&O's Summary    18 Feb 2025 07:01  -  19 Feb 2025 07:00  --------------------------------------------------------  IN: 0 mL / OUT: 525 mL / NET: -525 mL    RADIOLOGY & ADDITIONAL STUDIES:    < from: Xray Chest 1 View-PORTABLE IMMEDIATE (Xray Chest 1 View-PORTABLE IMMEDIATE .) (02.18.25 @ 07:58) >  IMPRESSION: Increasing bilateral effusions particularly on the left on   the latest film there may be a developing left upper lobe infiltrate.   Large right jugular line is in place.    --- End of Report ---    < end of copied text >    ADVANCE DIRECTIVES/TREATMENT PREFERENCES:  do not resuscitate and do not intubate

## 2025-02-19 NOTE — PROGRESS NOTE ADULT - REASON FOR ADMISSION
Decompensated cirrhosis
Decompensated liver cirrhosis
Decompensated cirrhosis
Decompensated liver cirrhosis
decompensated cirrhosis
Abdominal pain
altered mental status
Patient baseline mental status

## 2025-02-20 NOTE — PROVIDER CONTACT NOTE (OTHER) - SITUATION
Pt attempted to be seen for PT however transfer to 3T from  for upgrade in care. Therefore, pt will require new orders if/when appropriate. Reconsult as needed.
pt appears restless and moaning in pain. Pt reports abdominal pain. He is removing venti mask and NC.
pt admitted with pressure injury
Pt has a condom catheter placed near open skin wound on penis.

## 2025-02-20 NOTE — CONSULT NOTE ADULT - CONSULT REASON
cirrhosis, HE
Temporary dialysis catheter placement
MARY
To assist in the ethical dilemma posed by a 74-year-old male admitted for decompensated cirrhosis and hepatic encephalopathy, with a complicated hospital course, regarding goals of care.
To assist in the ethical dilemma posed by a 74-year-old male admitted for decompensated cirrhosis and hepatic encephalopathy, regarding surrogacy.
Acidosis/hypoxemia
support and to assist with goals of care in the setting of life limiting illnesses

## 2025-02-20 NOTE — PROGRESS NOTE ADULT - ASSESSMENT
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 He is now readmitted with decompensated cirrhosis, ascites, encephelopathy. Palliative following for support and to assist with goals of care.     #Palliative Care Encounter  - Cased discussed with palliative and ethics team and GOC noted on 02/19 (with Dr Latham, CARMEN Pollock and Ethiroya Barajas). Patient at that time (02/19) with medical capacity for complex medical decision making and ultimately DNR/DNI requested. He was made aware of his overall very guarded prognosis given multi-organ failure with high risk of further clinical decline despite active treatment "I am not afraid of dying."      Overnight with worsening clinical decline now on Bipap dependence (escalated to FiO2 80%) with continued work of breathing and tachypnea. He appears to have progressed and is entering his active dying phase despite all active medical intervention.    Attempted to call with Ethicist susan Soni (Jania) who was reported by bedside RN to have come this morning and left contact info but no answer.  At this time will continue to aggressively treat symptoms to relieve pain and symptom burden.        #End Stage Liver Cirrhosis with Ascites, Liver Mass suspicious for HCC  - elevated MELD indicative of high risk for complications and mortality   - poor overall prognosis, given suspicion for underlying mass, unlikely to be a candidate for transplant  - gastroenterology following  - s/p paracentesis 2/14 of 3550 mL - culture negative --> scheduled for repeat paracentesis today however unlikely hemodynamically stable to tolerate at this time    #Acute Respiratory Failure with Hypoxia  #Acute Abdominal Pain  - rapid response overnight with placement on bipap --> escalating O2 requirement from FiO2 40% to now 80%  - continues to have refractory work of breathing, tachypnea and restlessness after several IVP opioids and anxiolytics  - patient is critically ill and now actively dying. Acute symptoms (abdominal pain, dyspnea, agitation) less likely related to substance abuse and more so largely due to his  advanced liver cirrhosis and liver cancer.   - Stat IVP Ativan 1mg once --> no improvement after 30 mins and IVP Dilaudid 1mg once ordered   - start IV Dilaudid infusion at 1mg with additional PRN doses for breakthrough pain, dyspnea, tachypnea       74M with hepatitis C cirrhosis with ascites, liver mass with concerns for HCC, opioid use disorder on chronic methadone, EtOH use, recently admitted in January of this year with pneumonia, pleural effusion, and cardiac arrest. He is now readmitted with decompensated cirrhosis, ascites, encephelopathy. Palliative following for support and to assist with goals of care.     #Palliative Care Encounter  - Cased discussed with palliative and ethics team and GOC noted on 02/19 (with Dr Latham, CARMEN Pollock and Ethiroya Barajas). Patient at that time (02/19) with medical capacity for advance directives and ultimately DNR/DNI requested by patient. He was made aware of his overall very guarded prognosis given multi-organ failure with high risk of further clinical decline despite active treatment "I am not afraid of dying."      Overnight with worsening clinical decline now on Bipap dependence (escalated to FiO2 80%) with continued work of breathing and tachypnea. He appears to have progressed and is entering his active dying phase despite all active medical intervention.    Attempted to call with Ethicist susan Soni (Jania) who was reported by bedside RN to have come this morning and left contact info but no answer.  At this time will continue to aggressively treat symptoms to relieve pain and symptom burden.        #End Stage Liver Cirrhosis with Ascites, Liver Mass suspicious for HCC  - elevated MELD indicative of high risk for complications and mortality   - poor overall prognosis, given suspicion for underlying mass, unlikely to be a candidate for transplant  - gastroenterology following  - s/p paracentesis 2/14 of 3550 mL - culture negative --> scheduled for repeat paracentesis today however unlikely hemodynamically stable to tolerate at this time    #Acute Respiratory Failure with Hypoxia  #Acute Abdominal Pain  - rapid response overnight with placement on bipap --> escalating O2 requirement from FiO2 40% to now 80%  - continues to have refractory work of breathing, tachypnea and restlessness after several IVP opioids and anxiolytics  - patient is critically ill and now actively dying. Acute symptoms (abdominal pain, dyspnea, agitation) less likely related to substance abuse and more so largely due to his  advanced liver cirrhosis and liver cancer.   - Stat IVP Ativan 1mg once --> no improvement after 30 mins and IVP Dilaudid 1mg once ordered   - start IV Dilaudid infusion at 1mg with additional PRN doses for breakthrough pain, dyspnea, tachypnea       74M with hepatitis C cirrhosis with ascites, liver mass with concerns for HCC, opioid use disorder on chronic methadone, EtOH use, recently admitted in January of this year with pneumonia, pleural effusion, and cardiac arrest. He is now readmitted with decompensated cirrhosis, ascites, encephelopathy. Palliative following for support and to assist with goals of care.     #Palliative Care Encounter  - Cased discussed with palliative and ethics team and GOC noted on 02/19 (with Dr Latham, CARMEN Pollock and Ethic Jenn). Patient at that time (02/19) with medical capacity for advance directives and ultimately DNR/DNI requested by patient. He was made aware of his overall very guarded prognosis given multi-organ failure with high risk of further clinical decline despite active treatment "I am not afraid of dying."      He remains in multiorgan failure and unable to tolerate further dialysis (started this admission) due to hemodynamic instability. Overnight with worsening clinical decline and acute respiratory failure now on Bipap dependence (escalated to FiO2 80%) with continued work of breathing and tachypnea. He is no longer hemodynamically stable to tolerate repeat paracentesis scheduled for today nor will it change overall underlying prognosis. He appears to have progressed and is entering his active dying phase despite all active medical intervention with likely prognosis hours to a few days.      Attempted to call with Ethicist susan Soni (Jania) who was reported by bedside RN to have come this morning and left contact info but no answer.    10:15 AM: Extensive discussion held with Ethics (Nae Wilkes and Jenn), community ethic member (Dr Rodríguez), hospitalist (Dr Sandra), medicine [facility] and palliative (Dr Lehman and CARMEN Pollock). Upon review of medical history and hospital course, all in consensus for full comfort measures and DNR/DNI (MOLST already in place on 02/19). Order for Comfort measures placed with Two physician consent. Please refer to Ethics note today for full detail.     #End Stage Liver Cirrhosis with Ascites, Liver Mass suspicious for HCC  - elevated MELD indicative of high risk for complications and mortality   - poor overall prognosis, given suspicion for underlying mass, unlikely to be a candidate for transplant  - gastroenterology following  - s/p paracentesis 2/14 of 3550 mL - culture negative --> scheduled for repeat paracentesis today however unlikely hemodynamically stable to tolerate at this time  - now on comfort measures via 2 physician consent    #Acute Respiratory Failure with Hypoxia, Dyspnea  #Acute Abdominal Pain  #Hx of Substance Abuse on Methadone   - rapid response overnight with placement on bipap --> escalating O2 requirement from FiO2 40% to now 80%  - continues to have refractory work of breathing, tachypnea and restlessness after several IVP opioids and anxiolytics  - patient is critically ill and now actively dying. Acute symptoms (abdominal pain, dyspnea, agitation) less likely related to substance abuse and more so largely due to his  advanced liver cirrhosis and liver cancer.   - now on comfort measures via 2 physician consent  - Stat IVP Ativan 1mg once --> no improvement after 30 mins and IVP Dilaudid 1mg once ordered   - start IV Dilaudid infusion at 1mg with additional PRN doses for breakthrough pain, dyspnea, tachypnea --> suspect will likely need increase dosing given opioid tolerant history    #Agitation  - continue scheduled IV ativan 1mg  + PRNs    #Oral Secretions   - IV glycopyrrolate 0.2mg every 4 hours PRN        74M with hepatitis C, advance cirrhosis with ascites, liver mass with concerns for HCC, opioid use disorder on chronic methadone, EtOH use, recently admitted in January of this year with pneumonia, pleural effusion, and cardiac arrest. He is now readmitted with decompensated cirrhosis, ascites, encephelopathy. Palliative following for support and to assist with goals of care.     #Palliative Care Encounter  - Cased discussed with palliative and ethics team and GOC noted on 02/19 (with Dr Latham, CARMEN Pollock and Maribell Barajas). Patient at that time (02/19) with medical capacity for advance directives and ultimately DNR/DNI requested by patient. He was made aware of his overall very guarded prognosis given multi-organ failure with high risk of further clinical decline despite active treatment "I am not afraid of dying."      He remains in multiorgan failure and unable to tolerate further dialysis (started this admission) due to hemodynamic instability. Overnight with worsening clinical decline and acute respiratory failure now on Bipap dependence (escalated to FiO2 80%) with continued work of breathing and tachypnea and lethargic. He is no longer hemodynamically stable to tolerate repeat paracentesis scheduled for today nor will it change overall underlying prognosis. He appears to have progressed and is entering his active dying phase despite all active medical intervention with likely prognosis hours to a few days. Unfortunately at this time, he lack medical decision making capacity.      Attempted to call, with Ethicist Nae, neighbor (Jania) who was reported by bedside RN to have come this morning and left contact info but no answer.    ~10:15 AM: Extensive discussion held with Ethics (Dr Gloria, Nae and Jenn), community ethic member (Dr Rodríguez), hospitalist (Dr Sandra), medicine PA Malena and palliative (Dr Lehman and CARMEN Pollock). Upon review of medical history and hospital course, all in consensus for full comfort measures and DNR/DNI (MOLST already in place on 02/19) given grim prognosis. Order for Comfort measures placed with Two physician consent. Please refer to Ethics note today for full detail.     #End Stage Liver Cirrhosis with Ascites, Liver Mass suspicious for HCC  - elevated MELD indicative of high risk for complications and mortality   - poor overall prognosis, given suspicion for underlying mass, unlikely to be a candidate for transplant  - GI input appreciated  - s/p paracentesis 2/14 of 3550 mL - culture negative --> scheduled for repeat paracentesis today however hemodynamically unstable to tolerate at this time  - now on comfort measures via 2 physician consent    #Acute Respiratory Failure with Hypoxia, Dyspnea  #Acute Abdominal Pain  #Hx of Substance Abuse on Methadone (reportedly on 40mg prior to admission/during last admission)  - rapid response overnight with placement on bipap --> escalating O2 requirement from FiO2 40% to now 80%  - continues to have refractory work of breathing, tachypnea and restlessness after several IVP opioids and anxiolytics  - patient is critically ill and now actively dying. Acute symptoms (abdominal pain, dyspnea, agitation) less likely related to substance abuse and more so largely due to his advanced liver cirrhosis and liver cancer.   - now on comfort measures via 2 physician consent  - Stat IVP Ativan 1mg once --> no improvement after 30 mins and IVP Dilaudid 1mg once ordered   - start IV Dilaudid infusion at 1mg with additional PRN doses for breakthrough pain, dyspnea, tachypnea --> suspect likely need to increase dosing given opioid tolerant history    #Agitation  - continue scheduled IV ativan 1mg  + PRNs    #Oral Secretions   - IV glycopyrrolate 0.2mg every 4 hours PRN

## 2025-02-20 NOTE — CONSULT NOTE ADULT - CONSULT REQUESTED BY NAME
Dr Aleman
Medical PA
DOMITIAL Latham DO
Carlitos
Dr. Shah
KATHRYN Lehman MD
Medicine ANTOINE Jiménez

## 2025-02-20 NOTE — PROGRESS NOTE ADULT - ASSESSMENT
74y Male w/PMHx of HCV cirrhosis, reported HCC, h/o opioid and alcohol use, recently admitted to [facility] (1/11-1/23 s/p cardiac arrest, PNA and Lt pleural effusion s/p CT and paracentesis) now admitted with progressive abd distension and worsening abd pain. Reportedly also with complaints of BRBPR however was unable to elaborate. Remains with waxing and waning mentation suspected due to hepatic encephalopathy compounded by acute uremia from MARY suspected due to hepatorenal syndrome. Due to progressive renal failure was started on HD and now with slowly improving overall uremia    PLAN:  #Hepatic encephalopathy   #MARY   #Acute hypoxic respiratory failure   #Hx of Opioid and EtOH abuse   - discussed with ethics and palliative care teams  - pt made comfort care on 2/20  - c/w dilaudid infusion  - ativan q4hr  - glycopyrrolate PRN for secretions  - zofran PRN

## 2025-02-20 NOTE — CHART NOTE - NSCHARTNOTEFT_GEN_A_CORE
Palliative care social work note.  Ethics, palliative care physician Dr Lehman, hospitalist Dr Sandra present and joined Ethics Review Committee to discuss patients decompensation and 2 physician order planning for comfort care. Clinical review completed and case discussed with committee and there was no objection to proceeding with comfort care transition.

## 2025-02-20 NOTE — CONSULT NOTE ADULT - SUBJECTIVE AND OBJECTIVE BOX
Consult requested by: DOMITILA Latham DO	   Role: Attending 	      Service: Palliative Care   Current Attending: CHANDNI Sandra MD, Medicine		   Consultant: VITO Salazar MA (Medical Ethicist)   Contact #s: [phone] (Cell) [phone] (Office)   Consult purpose: To assist in the ethical dilemma posed by a 74-year-old male admitted for decompensated cirrhosis and hepatic encephalopathy, with a complicated hospital course, regarding goals of care.     Clinical summary: (SEE NOTES ON 2/13/2025, 2/18/2025, and 2/19/2025) This is a 74-year-old patient with a medical history of cirrhosis secondary to Hepatitis C and opioid and alcohol with a recent admission to [facility] ([facility]) from 1/11/2025-1/23/2025 after cardiac arrest, where the patient was treated for pneumonia; left sided pleural effusion with chest tube placement; and paracentesis on 1/15/2025 with 600cc removed. The patient presented to the Emergency Department on 2/11/2025 due to abdominal distension and pain over the past 3 weeks with associated shortness of breath. On arrival to the ED, the patient was noted to be tachycardic, and hypoxic requiring 4L of oxygen via nasal cannula; and the patient reported bright red blood per rectum but was unable to clarify when or the frequency of occurrence. A CT of the abdomen on 2/11/2025 revealed cirrhosis with evidence of portal hypertension, abdominal and pelvic ascites, moderate left and small right pleural effusions with associated compressive atelectasis, and unchanged low-attenuation mass-like lesion in the liver highly concerning for malignancy in the background of cirrhosis. A paracentesis was performed in the ED, with 30cc of cloudy, serous fluid obtained. On 2/11/2025 the patient was admitted to the Medicine service for further management of decompensated cirrhosis and hepatic encephalopathy. The patient continued to have a complex hospital course including initiation of emergent dialysis on 2/15/2025. Per the Palliative Care Attending on 2/20/2025, the patient remains in multiorgan failure, unable to tolerate further dialysis; worsening overnight requiring the initiation of BiPAP with continued work of breathing and tachypnea. Palliative Care also noted the patient is entering his active dying phase. Gastroenterology, Nephrology, Vascular Surgery and Palliative Care on consult. Continuation of Ethics consult to address goals of care for an unrepresented patient.       Prognosis Estimate (survival in hrs, days, wks, mos, yrs): Grave – per medical team   Patient Decision-Making Capacity: Lacks capacity – due to medical condition   Patient Aware of: Diagnosis: Unknown	Prognosis: Unknown   Name of medical decision-maker should patient lack capacity (relationship): None, see discussions and analysis below   Role: N/A		Contact #(s): N/A   Other Decision-Maker (I.e., HCA or Surrogate) Aware of: Diagnosis: N/A  Prognosis: N/A   Other Stakeholders: Arian (Acquaintance, [phone]), Caroline ( at [facility], [phone]), Jania (patient’s neighbor, [phone]), Shar (patient’s apartment , [phone])   Evidence of Patient’s Preference of Life Sustaining Treatment (Written or Oral): No   Resuscitation status: DNR: Yes DNI: Yes MOLST completed with the patient on 2/19/2025       Discussions:   Discussion with VITO Salazar MA (Medical Ethicist) and DOMITILA Latham DO (Palliative Care Attending) 2/20/[id number]: Case discussed in detail. Dr. Latham informed Ethics that the patient decompensated overnight requiring BiPAP.     Discussion with VITO Salazar MA (Medical Ethicist) and ESTEBAN Worrell NP (Medicine Nurse Practitioner) 2/20/[phone]: Case discussed in detail. ESTEBAN Worrell NP provided phone numbers for the patient’s friends that called the unit this morning.     Discussion with VITO Salazar MA (Medical Ethicist) and Shra (patient’s apartment ) 2/20/[id number]: The Ethicist introduced herself and her role. Shar explained that he has known the patient for 14 years since he moved into the complex. Per Shar, the patient did not provide any family contacts on the paperwork he provided when leasing the apartment. Shar also states that the patient has mentioned his siblings in [place] in the past, but did not ever say they came to visit him, or what he relationship was like with his family. The Ethicist confirmed the name and phone number for the patient’s neighbor, Jania, who also called.     Discussion with VITO Salazar MA (Medical Ethicist), KATHRYN Lehman MD (Palliative Care Attending) 2/20/2025, [phone]: Case discussed in detail.     Discussion with VITO Salazar MA (Medical Ethicist), KATHRYN Lehman MD (Palliative Care Attending) and Jania (patient’s neighbor) 2/20/[phone] [Utilizing Language Line  ID #[id number]]: The Ethicist called and introduced herself. The person who answered the phone stated there is no one at the residence with the name Jania.  The Ethicist asked if they knew anyone that was at [facility]. The person stated no and terminated the call.      Ethics Review Committee Convened on 2/20/2025 from 1005-1020a in person and via phone call with Amanda Gloria MD ( of Medical Ethics), ESTEFANÍA Erickson MA (Medical Ethicist), LEYLA CHAMBERS RN (Ethics Fellow), KATHRYN Lehman MD (Palliative Care Attending), CHANDNI Sandra MD (Medicine Attending), LEYLA Smith LCSW (Palliative Care ), JUAN J Rodríguez MD (Community Member): Patient’s case and social complexities discussed in detail by VITO Salazar MA. Dr. Sandra provided an update on the patient’s clinical condition. Dr. Lehman explained why the patient is appropriate for comfort measures at this time. The patient had a rapid response overnight, requiring BiPAP initiation. Per Dr. Sandra and Dr. Lehman, the patient has requiring increasing FiO2 support, from 40%-80%, and continues to have increased work of breathing and tachypnea. LEYLA Csatillo RN, STEPHANIE CHAMBERS explained that the patient had implemented his own MOLST indicating his wishes for DNR/DNI on 2/19/2025, however the patient was unable to discuss comfort care at that time. Social complexities addressed including due diligence in attempting to find a surrogate. VITO Salazar MA described her efforts to locate a surrogate and the confirmation that there is no known names or contact information for the patient’s reported siblings. Committee all in agreement with withdrawal of BiPAP, adding comfort measures/hospice, as this patient meets criteria that given his illness, that is life-sustaining treatment offers the patient no medical benefit because the patient will die, even if the treatment is provided; and the provision of life-sustaining treatment would violate accepted medical standards. The Review Committee reviewed MOLST Checklist #4 (https://www.health.[place].gov/professionals/patients/patient_rights/molst/docs/checklist_4.pdf) to ensure all appropriate statutory standards have been met prior to use of the MOLST process. The advantage of the MOLST form is that it is transferable to other settings across care transitions. The Review Committee also considered [facility] Decision Act criteria for withholding and withdrawing of life sustaining treatments.       Bioethics analysis: The central ethical issue that exists in this case is (A) respecting the patient’s autonomy versus (B) the providers’ desire for beneficence and non-maleficence.     The conflict that exists in this situation is one hospital clinicians infrequently encounter in critically ill patients who have a grave prognosis for recovery but are without family members or surrogates to share decisions regarding aggressive life-sustaining treatments (LSTs). At one pole is the bioethical principle of autonomy – here needing to be determined. In tension at the other pole is the physician’s duty of non-maleficence – with interventions that appear to neither have little long-term benefit nor restore a functional quality of life for a specific patient and may increase the patient’s suffering or prolong the dying process.     Clinicians may variably interpret the principle of beneficence [“do good”] in this kind of situation, depending on their own values about the commitment to sustain life. Medical decision making for patients with neither decision-making capacity (DMC) nor a surrogate decision-maker presents an ethical challenge for healthcare providers because there is no way to obtain informed consent for treatment. This is particularly so when these decisions involve invasive/life-threatening procedures or withholding or withdrawing life-sustaining treatments and providing appropriate palliative care. In this case, the health team is commended for their virtue and invoking justice – by providing fairness and equitable care to the patient while attempting to locate a surrogate. Here, there is no surrogate to assist the medical team using the shared decision-making model to determine the level of care going forward.     The bioethical principle of beneficence calls on clinicians to respect patient autonomy and to honor and preserve the patient’s sense of dignity and personhood, his moral status. Clinicians may variably argue that beneficence calls for further aggressive interventions. Beneficence aims to promote the best possible health or quality of living or dying: with aggressive interventions when these help prolong life with “good quality,” and with comfort care when LSTs are not possible and the aim is to achieve the best “quality of dying.” The clinician’s duty to non-maleficence means avoiding medical interventions whose risk and burden exceeds their benefit (“first do no harm”). As this patient lacks capacity, it is appropriate for protection of his autonomy.     The 2010 [facility] Family Health Care Decisions Act (DCA)1 addresses the situation of a sick individual who lacks capacity and has no available surrogate. The DCA requires hospitals, after a patient is admitted, to determine if the patient has a health care agent, guardian, or a person who can serve as the patient’s surrogate. If the patient has no such person and lacks capacity, the hospital must identify, to the extent practical, the patient’s wishes and preferences about pending health care decisions. WHERE THE PATIENT CONTINUES TO BE INCAPACITATED, THE ATTENDING PHYSICIAN MAY AUTHORIZE MAJOR MEDICAL TREATMENT AFTER CONSULTATION AND CONCURRENCE WITH THE PATIENT’S HEALTH CARE TEAM AND AN INDEPENDENT PHYSICIAN NOT INVOLVED WITH THE PATIENT’S CARE CONCURS WITH THE TREATMENT. In this case, the patient has a poor prognosis and is currently incapacitated from making medical decisions due to his acute illness. However, he completed his own MOLST form on 2/19/2025 indicating his wishes for DNR/DNI. At that time, per Palliative Care and Ethics, the patient did not engage in discussions of comfort measures, but he was clear that he did not want to experience pain and suffering at end of life (SEE PROGRESS NOTE 2/19/2025). At this time, the patient remains unrepresented and is now unable to make decisions for himself due to his medical condition. Therefore, it is appropriate to proceed with 2 physician consent for Mr. Box.     In this case, the patient has a poor prognosis and is currently unable to make medical decisions as he is on BiPAP and does not follow commands. There is no family or other surrogate decision makers at this time.     [place] law aids the practitioner when making treatment decisions for patients that have lost their ability to make medical decisions and are unrepresented. For a decision regarding withholding and withdrawing life-sustaining treatment, the practitioner will make this determination taking into account the patient’s wishes, or if they are not known, in the patient’s best interest. (2) A decision to withhold or withdraw life-sustaining treatment must also meet the following criteria. (3) To a reasonable degree of medical certainty:     • life-sustaining treatment offers the patient no medical benefit because the patient will die imminently, even if the treatment is provided; and     • the provision of life-sustaining treatment would violate accepted medical standards.     This decision must be made by the attending practitioner with the independent concurrence of another physician, nurse practitioner or physician assistant. (4)     [facility] Policy 100.24: An order to withhold/withdraw life sustaining treatment (including DNR/DNI) may be issued when two physicians determine that the treatment offers the patient no medical benefit because the patient will die imminently with or without the proposed treatment and the provision of such treatment violates accepted medical standards.     Per the team, the patient continues to have a grave prognosis. While further work-up and interventions might stabilize him, the patient made his wishes clear when he completed his DNR/DNI on 2/19/2025. Decisions concerning treatment are complex and must be taken into consideration given the higher rates of mortality and morbidity associated in this specific case. Physicians are under no legal or ethical obligation to offer treatments that would provide no medically indicated benefit or impose unnecessary risk or burden to the patient. The benefits of interventions must be weighed against the harm they may cause when they no longer improve prognosis but introduce suffering and risk.     Decisions regarding hospice care for unrepresented patients can be included in decisions about withholding and withdrawing life-sustaining treatments, including DNR and DNI orders. (5,6) [place] law again provides the criteria to secure a decision regarding hospice care for such patient. The attending practitioner can make decisions regarding hospice taking into account the patient’s wishes, or if they are not known, in the patient’s best interest; with the concurring opinion by another practitioner; and approval by an ethics review committee. (7)     In this specific case, particularly critical is ensuring palliation and comfort care measures for this patient. In accordance with the Palliative Care Access Act, the attending practitioner is required to provide patients with a terminal illness not only prognosis, risks and benefits of treatment options but also patient’s legal rights to symptom management at the end of life. (8) Fundamental to a palliative approach is the aim to reduce suffering and improve the quality of life for dying patients.  Consult requested by: DOMITILA Latham DO	   Role: Attending 	      Service: Palliative Care   Current Attending: CHANDNI Sandra MD, Medicine		   Consultant: VITO Salazar MA (Medical Ethicist)   Contact #s: [phone] (Cell) [phone] (Office)   Consult purpose: To assist in the ethical dilemma posed by a 74-year-old male admitted for decompensated cirrhosis and hepatic encephalopathy, with a complicated hospital course, regarding goals of care.     Clinical summary: (SEE NOTES ON 2/13/2025, 2/18/2025, and 2/19/2025) This is a 74-year-old patient with a medical history of cirrhosis secondary to Hepatitis C and opioid and alcohol with a recent admission to [facility] ([facility]) from 1/11/2025-1/23/2025 after cardiac arrest, where the patient was treated for pneumonia; left sided pleural effusion with chest tube placement; and paracentesis on 1/15/2025 with 600cc removed. The patient presented to the Emergency Department on 2/11/2025 due to abdominal distension and pain over the past 3 weeks with associated shortness of breath. On arrival to the ED, the patient was noted to be tachycardic, and hypoxic requiring 4L of oxygen via nasal cannula; and the patient reported bright red blood per rectum but was unable to clarify when or the frequency of occurrence. A CT of the abdomen on 2/11/2025 revealed cirrhosis with evidence of portal hypertension, abdominal and pelvic ascites, moderate left and small right pleural effusions with associated compressive atelectasis, and unchanged low-attenuation mass-like lesion in the liver highly concerning for malignancy in the background of cirrhosis. A paracentesis was performed in the ED, with 30cc of cloudy, serous fluid obtained. On 2/11/2025 the patient was admitted to the Medicine service for further management of decompensated cirrhosis and hepatic encephalopathy. The patient continued to have a complex hospital course including initiation of emergent dialysis on 2/15/2025. Per the Palliative Care Attending on 2/20/2025, the patient remains in multiorgan failure, unable to tolerate further dialysis; worsening overnight requiring the initiation of BiPAP with continued work of breathing and tachypnea. Palliative Care also noted the patient is entering his active dying phase. Gastroenterology, Nephrology, Vascular Surgery and Palliative Care on consult. Continuation of Ethics consult to address goals of care for an unrepresented patient.     Prognosis Estimate (survival in hrs, days, wks, mos, yrs): Grave – per medical team   Patient Decision-Making Capacity: Lacks capacity – due to medical condition   Patient Aware of: Diagnosis: Unknown	Prognosis: Unknown   Name of medical decision-maker should patient lack capacity (relationship): None, see discussions and analysis below   Role: N/A		Contact #(s): N/A   Other Decision-Maker (I.e., HCA or Surrogate) Aware of: Diagnosis: N/A  Prognosis: N/A   Other Stakeholders: Arian (Acquaintance, [phone]), Caroline ( at [facility], [phone]), Jania (patient’s neighbor, [phone]), Shar (patient’s apartment , [phone])   Evidence of Patient’s Preference of Life Sustaining Treatment (Written or Oral): No   Resuscitation status: DNR: Yes DNI: Yes MOLST completed with the patient on 2/19/2025     Discussions:   Discussion with VITO Salazar MA (Medical Ethicist) and DOMITILA Latham DO (Palliative Care Attending) 2/20/[id number]: Case discussed in detail. Dr. Latham informed Ethics that the patient decompensated overnight requiring BiPAP.     Discussion with VITO Salazar MA (Medical Ethicist) and ESTEBAN Worrell NP (Medicine Nurse Practitioner) 2/20/2024 [phone]: Case discussed in detail. ESTEBAN Worrell NP provided phone numbers for the patient’s friends that called the unit this morning.     Discussion with VITO Salazar MA (Medical Ethicist) and Shar (patient’s apartment ) 2/20/[id number]: The Ethicist introduced herself and her role. Shar explained that he has known the patient for 14 years since he moved into the complex. Per Shar, the patient did not provide any family contacts on the paperwork he provided when leasing the apartment. Shar also states that the patient has mentioned his siblings in [place] in the past, but did not ever say they came to visit him, or what the relationship was like with his family. The Ethicist confirmed the name and phone number for the patient’s neighbor, Jania, who also called.     Discussion with VITO Salazar MA (Medical Ethicist), KATHRYN Lehman MD (Palliative Care Attending) 2/20/2025, [phone]: Case discussed in detail.     Discussion with VITO Salazar MA (Medical Ethicist), KATHRYN Lehman MD (Palliative Care Attending) and Jania (patient’s neighbor) 2/20/[phone] [Utilizing Language Line  ID #[id number]]: The Ethicist called and introduced herself. The person who answered the phone stated there is no one at the residence with the name Jania.  The Ethicist asked if they knew anyone that was at [facility]. The person stated no and terminated the call.      Ethics Review Committee Convened on 2/20/2025 from 1005-1020a in person and via phone call with Amanda Gloria MD ( of Medical Ethics), ESTEFANÍA Erickson MA (Medical Ethicist), LEYLA CHAMBERS RN (Ethics Fellow), KATHRYN Lehman MD (Palliative Care Attending), CHANDNI Sandra MD (Medicine Attending), LEYLA Smith LCSW (Palliative Care ), JUAN J Rodríguez MD (Community Member): Patient’s case and social complexities discussed in detail by VITO Salazar MA. Dr. Sandra provided an update on the patient’s clinical condition. Dr. Lehman explained why the patient is appropriate for comfort measures at this time. The patient had a rapid response overnight, requiring BiPAP initiation. Per Dr. Sandra and Dr. Lehman, the patient has requiring increasing FiO2 support, from 40%-80%, and continues to have increased work of breathing and tachypnea. LEYLA Castillo RN, STEPHANIE CHAMBERS explained that the patient had implemented his own MOLST indicating his wishes for DNR/DNI on 2/19/2025, however the patient was unable to discuss comfort care at that time. Social complexities addressed including due diligence in attempting to find a surrogate. VITO Salazar MA described her efforts to locate a surrogate and the confirmation that there is no known names or contact information for the patient’s reported siblings. Committee all in agreement with withdrawal of BiPAP, adding comfort measures/hospice, as this patient meets criteria that given his illness, that is life-sustaining treatment offers the patient no medical benefit because the patient will die, even if the treatment is provided; and the provision of life-sustaining treatment would violate accepted medical standards. The Review Committee reviewed MOLST Checklist #4 (https://www.health.[place].gov/professionals/patients/patient_rights/molst/docs/checklist_4.pdf) to ensure all appropriate statutory standards have been met prior to use of the MOLST process. The advantage of the MOLST form is that it is transferable to other settings across care transitions. The Review Committee also considered [facility] Decision Act criteria for withholding and withdrawing of life sustaining treatments.     Discussion with ESTEFANÍA Gloria MD (Ethics Attending) and ESTEFANÍA Cotton LMSW (Director of  Operations) on 2/20/25: Updated on Ethics Review Committee meeting and outcome. Patient is now comfort measures.     Bioethics analysis: The central ethical issue that exists in this case is (A) respecting the patient’s autonomy versus (B) the providers’ desire for beneficence and non-maleficence.     The conflict that exists in this situation is one hospital clinicians infrequently encounter in critically ill patients who have a grave prognosis for recovery but are without family members or surrogates to share decisions regarding aggressive life-sustaining treatments (LSTs). At one pole is the bioethical principle of autonomy – here needing to be determined. In tension at the other pole is the physician’s duty of non-maleficence – with interventions that appear to neither have little long-term benefit nor restore a functional quality of life for a specific patient and may increase the patient’s suffering or prolong the dying process.     Clinicians may variably interpret the principle of beneficence [“do good”] in this kind of situation, depending on their own values about the commitment to sustain life. Medical decision making for patients with neither decision-making capacity (DMC) nor a surrogate decision-maker presents an ethical challenge for healthcare providers because there is no way to obtain informed consent for treatment. This is particularly so when these decisions involve invasive/life-threatening procedures or withholding or withdrawing life-sustaining treatments and providing appropriate palliative care. In this case, the health team is commended for their virtue and invoking justice – by providing fairness and equitable care to the patient while attempting to locate a surrogate. Here, there is no surrogate to assist the medical team using the shared decision-making model to determine the level of care going forward.     The bioethical principle of beneficence calls on clinicians to respect patient autonomy and to honor and preserve the patient’s sense of dignity and personhood, his moral status. Clinicians may variably argue that beneficence calls for further aggressive interventions. Beneficence aims to promote the best possible health or quality of living or dying: with aggressive interventions when these help prolong life with “good quality,” and with comfort care when LSTs are not possible and the aim is to achieve the best “quality of dying.” The clinician’s duty to non-maleficence means avoiding medical interventions whose risk and burden exceeds their benefit (“first do no harm”). As this patient lacks capacity, it is appropriate for protection of his autonomy.     The 2010 [facility] Family Health Care Decisions Act (FHDCA)1 addresses the situation of a sick individual who lacks capacity and has no available surrogate. The DCA requires hospitals, after a patient is admitted, to determine if the patient has a health care agent, guardian, or a person who can serve as the patient’s surrogate. If the patient has no such person and lacks capacity, the hospital must identify, to the extent practical, the patient’s wishes and preferences about pending health care decisions. WHERE THE PATIENT CONTINUES TO BE INCAPACITATED, THE ATTENDING PHYSICIAN MAY AUTHORIZE MAJOR MEDICAL TREATMENT AFTER CONSULTATION AND CONCURRENCE WITH THE PATIENT’S HEALTH CARE TEAM AND AN INDEPENDENT PHYSICIAN NOT INVOLVED WITH THE PATIENT’S CARE CONCURS WITH THE TREATMENT. In this case, the patient has a poor prognosis and is currently incapacitated from making medical decisions due to his acute illness. However, he completed his own MOLST form on 2/19/2025 indicating his wishes for DNR/DNI. At that time, per Palliative Care and Ethics, the patient did not engage in discussions of comfort measures, but he was clear that he did not want to experience pain and suffering at end of life (SEE PROGRESS NOTE 2/19/2025). At this time, the patient remains unrepresented and is now unable to make decisions for himself due to his medical condition. Therefore, it is appropriate to proceed with 2 physician consent for Mr. Box.     In this case, the patient has a poor prognosis and is currently unable to make medical decisions as he is on BiPAP and does not follow commands. There is no family or other surrogate decision makers at this time.     [place] law aids the practitioner when making treatment decisions for patients that have lost their ability to make medical decisions and are unrepresented. For a decision regarding withholding and withdrawing life-sustaining treatment, the practitioner will make this determination taking into account the patient’s wishes, or if they are not known, in the patient’s best interest. (2) A decision to withhold or withdraw life-sustaining treatment must also meet the following criteria. (3) To a reasonable degree of medical certainty:     • life-sustaining treatment offers the patient no medical benefit because the patient will die imminently, even if the treatment is provided; and     • the provision of life-sustaining treatment would violate accepted medical standards.     This decision must be made by the attending practitioner with the independent concurrence of another physician, nurse practitioner or physician assistant. (4)     [facility] Policy 100.24: An order to withhold/withdraw life sustaining treatment (including DNR/DNI) may be issued when two physicians determine that the treatment offers the patient no medical benefit because the patient will die imminently with or without the proposed treatment and the provision of such treatment violates accepted medical standards.     Per the team, the patient continues to have a grave prognosis. While further work-up and interventions might stabilize him, the patient made his wishes clear when he completed his DNR/DNI on 2/19/2025. Decisions concerning treatment are complex and must be taken into consideration given the higher rates of mortality and morbidity associated in this specific case. Physicians are under no legal or ethical obligation to offer treatments that would provide no medically indicated benefit or impose unnecessary risk or burden to the patient. The benefits of interventions must be weighed against the harm they may cause when they no longer improve prognosis but introduce suffering and risk.     Decisions regarding hospice care for unrepresented patients can be included in decisions about withholding and withdrawing life-sustaining treatments, including DNR and DNI orders. (5,6) [place] law again provides the criteria to secure a decision regarding hospice care for such patient. The attending practitioner can make decisions regarding hospice taking into account the patient’s wishes, or if they are not known, in the patient’s best interest; with the concurring opinion by another practitioner; and approval by an ethics review committee. (7)     In this specific case, particularly critical is ensuring palliation and comfort care measures for this patient. In accordance with the Palliative Care Access Act, the attending practitioner is required to provide patients with a terminal illness not only prognosis, risks and benefits of treatment options but also patient’s legal rights to symptom management at the end of life. (8) Fundamental to a palliative approach is the aim to reduce suffering and improve the quality of life for dying patients.

## 2025-02-20 NOTE — CONSULT NOTE ADULT - CONSULT REQUESTED DATE/TIME
12-Feb-2025 13:32
13-Feb-2025 11:50
20-Feb-2025 08:15
15-Feb-2025 15:28
13-Feb-2025 13:35
14-Feb-2025 01:17
12-Feb-2025 11:56

## 2025-02-20 NOTE — CHART NOTE - NSCHARTNOTEFT_GEN_A_CORE
patient is at end of life, he is s/p rapid response for respiratory failure, now on continuous bipap and unable to be weaning off. He is in multiple organ failure, and could not complete his HD session yesterday due to hypoxia. He unfortunately has no next of kin that is able to be reached and no friends willing to make decisions on his behalf. Patient currently lacks decision making ability and unable to participate in meaningful goals of care conversation. IN our conversation yesterday he was able to make himself do not resuscitate and do not intubate, but he was struggling with further decisions. At this time, no further medical interventions will be therapeutic to this gentlemen and he has entered his active stages of dying process. Given lack of benefit of all these aggressive measures, and them potentially furthering a suffering process, will proceed with allowing a natural process and shift focus to alleviating patients symptoms. Will order continuous infusion of dilaudid for respiratory failure and air hunger, and ativan around the clock to ease his restlessness and anxiety. Will electively remove bipap once he is more comfortable with medications as he did not want a ventilator and it is not medically therapeutic to him at this point.

## 2025-02-20 NOTE — PROGRESS NOTE ADULT - SUBJECTIVE AND OBJECTIVE BOX
[facility] PALLIATIVE MEDICINE     INTERVAL HPI/OVERNIGHT EVENTS: rapid response overnight for respiratory failure with hypoxia requiring initiation of BiPAP with escalating oxygen requirement FiO2 80% (from 40%) with notable continued worked of breathing, tachypnea and moaning.      Source if other than patient:  []Family   [x]Team     PRESENT SYMPTOMS:     Dyspnea: yes, on bipap  Nausea/Vomiting:  No  Anxiety:   No  Depression: unable to assess  Fatigue: Yes    Loss of appetite: NPO   Constipation: none documented    Pain:  notable generalized discomfort/restlessness likely exacerbated by respiratory status            Character-            Duration-            Effect-            Factors-            Frequency-            Location-            Severity-    Pain AD Score:  [email or web address] (press ctrl + left click to view)    Review of Systems: Unable to obtain due to poor mentation/encephalopathy     MEDICATIONS  (STANDING):  albumin human 25% IVPB 50 milliLiter(s) IV Intermittent every 6 hours  buMETAnide Injectable 2 milliGRAM(s) IV Push every 12 hours  cefTRIAXone Injectable.      cefTRIAXone Injectable. 2000 milliGRAM(s) IV Push every 24 hours  chlorhexidine 2% Cloths 1 Application(s) Topical <User Schedule>  folic acid 1 milliGRAM(s) Oral daily  HYDROmorphone Infusion 1 mG/Hr (1 mL/Hr) IV Continuous <Continuous>  lactulose Retention Enema 200 Gram(s) Rectal two times a day  LORazepam   Injectable 1 milliGRAM(s) IV Push once  LORazepam   Injectable 1 milliGRAM(s) IV Push every 4 hours  midodrine 10 milliGRAM(s) Oral every 8 hours  multivitamin 1 Tablet(s) Oral daily  naloxone Injectable 0.4 milliGRAM(s) IV Push once  octreotide  Injectable 50 MICROGram(s) IV Push every 8 hours  pantoprazole  Injectable 40 milliGRAM(s) IV Push daily  rifAXIMin 550 milliGRAM(s) Oral two times a day  thiamine 100 milliGRAM(s) Oral daily    MEDICATIONS  (PRN):  acetaminophen     Tablet .. 650 milliGRAM(s) Oral every 6 hours PRN Temp greater or equal to 38C (100.4F), Mild Pain (1 - 3)  albuterol/ipratropium for Nebulization 3 milliLiter(s) Nebulizer every 6 hours PRN Shortness of Breath and/or Wheezing  aluminum hydroxide/magnesium hydroxide/simethicone Suspension 30 milliLiter(s) Oral every 4 hours PRN Dyspepsia  HYDROmorphone  Injectable 0.5 milliGRAM(s) IV Push every 6 hours PRN Severe Pain (7 - 10)  LORazepam   Injectable 0.5 milliGRAM(s) IV Push every 6 hours PRN Anxiety  melatonin 3 milliGRAM(s) Oral at bedtime PRN Insomnia  ondansetron Injectable 4 milliGRAM(s) IV Push every 8 hours PRN Nausea and/or Vomiting      PHYSICAL EXAM:    Vital Signs Last 24 Hrs  T(C): 37 (20 Feb 2025 07:46), Max: 37 (20 Feb 2025 07:46)  T(F): 98.6 (20 Feb 2025 07:46), Max: 98.6 (20 Feb 2025 07:46)  HR: 83 (20 Feb 2025 08:50) (59 - 101)  BP: 111/72 (20 Feb 2025 08:00) (105/55 - 130/74)  BP(mean): 86 (20 Feb 2025 08:00) (72 - 87)  RR: 32 (20 Feb 2025 08:50) (18 - 33)  SpO2: 96% (20 Feb 2025 08:50) (87% - 99%)    Parameters below as of 20 Feb 2025 08:50  Patient On (Oxygen Delivery Method): BiPAP/CPAP    Karnofsky: 10 %    General: resting comfortably. NAD.   HEENT: mmm    Lungs: comfortable nonlabored    CV:  rrr  GI: +BS abdomen soft, NTND     MSK:   no cyanosis +generalized edema  Neuro: nonfocal. lethargic   Skin: warm and dry.     LABS: Reviewed                          11.9   10.73 )-----------( 41       ( 20 Feb 2025 05:24 )             37.0     02-20    147[H]  |  107  |  46.8[H]  ----------------------------<  96  4.3   |  23.0  |  4.01[H]    Ca    9.6      20 Feb 2025 05:24  Phos  3.5     02-20  Mg     1.9     02-20    TPro  6.3[L]  /  Alb  3.3  /  TBili  5.9[H]  /  DBili  x   /  AST  98[H]  /  ALT  35  /  AlkPhos  147[H]  02-20    PT/INR - ( 20 Feb 2025 05:24 )   PT: 25.9 sec;   INR: 2.31 ratio         PTT - ( 20 Feb 2025 05:24 )  PTT:48.9 sec  Urinalysis Basic - ( 20 Feb 2025 05:24 )    Color: x / Appearance: x / SG: x / pH: x  Gluc: 96 mg/dL / Ketone: x  / Bili: x / Urobili: x   Blood: x / Protein: x / Nitrite: x   Leuk Esterase: x / RBC: x / WBC x   Sq Epi: x / Non Sq Epi: x / Bacteria: x      I&O's Summary    19 Feb 2025 07:01  -  20 Feb 2025 07:00  --------------------------------------------------------  IN: 150 mL / OUT: 0 mL / NET: 150 mL    RADIOLOGY & ADDITIONAL STUDIES: Reviewed     ADVANCE DIRECTIVES/TREATMENT PREFERENCES: DNR/DNI MOLST     NEUROLOGICAL MEDICATIONS/OPIOIDS/BENZODIAZEPINE IN PAST 24 HOURS:    HYDROmorphone  Injectable   0.5 milliGRAM(s) IV Push (02-19-25 @ 21:42)    HYDROmorphone  Injectable   0.2 milliGRAM(s) IV Push (02-19-25 @ 18:26)    HYDROmorphone  Injectable   0.5 milliGRAM(s) IV Push (02-20-25 @ 08:52)   0.5 milliGRAM(s) IV Push (02-19-25 @ 15:36)

## 2025-02-20 NOTE — CONSULT NOTE ADULT - ASSESSMENT
RECOMMENDATION: Under the Family Health Care Decisions Act for patients without capacity and without health care proxy/surrogate decision maker, two physicians can determine advanced care planning. For decisions regarding withholding and withdrawing life-sustaining treatment or ordering a MOLST (DNR/DNI), please follow the criteria above.     In light of his critical illness, the patient’s existing MOLST indicating DNR/DNI may be honored, and liberation from the BiPAP, de-escalation of treatment with full comfort measures/hospice would be appropriate. Additionally, an Ethics Review Committee has determined by unanimous consensus that these directives are appropriate.     Thank you for including the Ethics Consultation Service. Ethics commends the team for their virtue in the care and support for Mr. Box. Ethics will remain available to aid in these processes and for any discussions and decision points that may occur.     CASE DISCUSSED WITH MEDICAL ETHICIST VALERIA GUERRERO MD, MS, MPH, [facility]  OF MEDICAL ETHICS     MORE THAN 50% OF THE TIME OF THIS CONSULTATION WAS SPENT IN COORDINATION OF CARE OF PATIENT        References   1 KEN Cochran & KATHRYN Hung (2020). Update Summary The Family Health Care Decisions Act. [facility] Health Law Journal. [email or web address]   2 [facility] §§ 2994-d.4, 2994-g.2 (2020).   3 [facility] § 2994-g.5.   4 [facility] § 2994-g.5(b).   5 [facility] § 2994-g.5-a.   6 See [place] [employer]’s Family Health Care Decisions Act (FHCDA) Resource Center, Frequently Asked Questions About the Family Health Care Decisions Act. Available at: [email or web address]-[email or web address]-[email or web address] [last accesed April 8, 2021].   7 [facility] § 2994-g.5-a.   8 [facility] § 2997-d (2020). See also, Palliative Care Information Act, [facility] § 2997-c (2020).  RECOMMENDATION: Under the Family Health Care Decisions Act for patients without capacity and without health care proxy/surrogate decision maker, two physicians can determine advanced care planning. For decisions regarding withholding and withdrawing life-sustaining treatment or ordering a MOLST (DNR/DNI), please follow the criteria above.     In light of his critical illness, the patient’s existing MOLST indicating DNR/DNI may be honored, and liberation from the BiPAP, de-escalation of treatment with full comfort measures/hospice would be appropriate. Additionally, an Ethics Review Committee has determined by unanimous consensus that these directives are appropriate.     Thank you for including the Ethics Consultation Service. Ethics commends the team for their virtue in the care and support for Mr. Box. Ethics will remain available to aid in these processes and for any discussions and decision points that may occur.     CASE DISCUSSED WITH MEDICAL ETHICIST VALERIA GUERRERO MD, MS, MPH, [facility]  OF MEDICAL ETHICS     MORE THAN 50% OF THE TIME OF THIS CONSULTATION WAS SPENT IN COORDINATION OF CARE OF PATIENT      References   1 KEN Cochran & KATHRYN Hung (2020). Update Summary The Family Health Care Decisions Act. [facility] Health Law Journal. [email or web address]-[email or web address]-center/   2 [facility] §§ 2994-d.4, 2994-g.2 (2020).   3 [facility] § 2994-g.5.   4 [facility] § 2994-g.5(b).   5 [facility] § 2994-g.5-a.   6 See [place] [employer]’s Family Health Care Decisions Act (FHCDA) Resource Center, Frequently Asked Questions About the Family Health Care Decisions Act. Available at: [email or web address] [last accesed April 8, 2021].   7 [facility] § 2994-g.5-a.   8 [facility] § 2997-d (2020). See also, Palliative Care Information Act, [facility] § 2997-c (2020).

## 2025-02-20 NOTE — CHART NOTE - NSCHARTNOTEFT_GEN_A_CORE
Pt was seen and examined at bedside  Pt been upgraded from 5T after RR for hypoxia  Pt is currently on BiPAP sating 98% w/ RR of 18    ICU Vital Signs Last 24 Hrs  T(C): 35.6 (20 Feb 2025 01:00), Max: 36.4 (19 Feb 2025 08:40)  T(F): 96.1 (20 Feb 2025 01:00), Max: 97.6 (19 Feb 2025 20:00)  HR: 63 (20 Feb 2025 01:00) (59 - 101)  BP: 105/63 (20 Feb 2025 01:00) (105/63 - 136/79)  BP(mean): 79 (20 Feb 2025 01:00) (79 - 87)  ABP: --  ABP(mean): --  RR: 18 (20 Feb 2025 01:00) (18 - 33)  SpO2: 98% (20 Feb 2025 01:00) (87% - 99%)    O2 Parameters below as of 20 Feb 2025 01:00  Patient On (Oxygen Delivery Method): BiPAP/CPAP    S/P RAPID RESPONSE for Hypoxia  Hypothermia                          12.0   12.08 )-----------( 38       ( 19 Feb 2025 23:51 )             37.2   lactate 3.1  CXR compared to 2/18, b/l pleural effusions noted; official read to follow in am  As per 2/18 CXR read, developing left upper lobe infiltrate  Given leukocytosis trending up, hypothermic w/ hypoxia, likely need broad coverage w/ zosyn; received 1 dose ordered by 5T resident  Pt completed 7 days of rocephin, continued on 2/19 for SBP; previous culture revealed no growth  Will sign out to day team to address antibiotic coverage  RN to monitor and escalate if any change in pt's status Pt was seen and examined at bedside  Pt been upgraded from 5T after RR for hypoxia  Pt is currently on BiPAP sating 98% w/ RR of 18 & julisa hugger for hypothermia    ICU Vital Signs Last 24 Hrs  T(C): 35.6 (20 Feb 2025 01:00), Max: 36.4 (19 Feb 2025 08:40)  T(F): 96.1 (20 Feb 2025 01:00), Max: 97.6 (19 Feb 2025 20:00)  HR: 63 (20 Feb 2025 01:00) (59 - 101)  BP: 105/63 (20 Feb 2025 01:00) (105/63 - 136/79)  BP(mean): 79 (20 Feb 2025 01:00) (79 - 87)  ABP: --  ABP(mean): --  RR: 18 (20 Feb 2025 01:00) (18 - 33)  SpO2: 98% (20 Feb 2025 01:00) (87% - 99%)    O2 Parameters below as of 20 Feb 2025 01:00  Patient On (Oxygen Delivery Method): BiPAP/CPAP    S/P RAPID RESPONSE for Hypoxia  Hypothermia                          12.0   12.08 )-----------( 38       ( 19 Feb 2025 23:51 )             37.2   lactate 3.1, likely from poor clearance due to liver disease; will trend  CXR compared to 2/18, b/l pleural effusions noted; official read to follow in am  As per 2/18 CXR read, developing left upper lobe infiltrate  Given WBC trending up, hypothermic w/ hypoxia, likely need broader coverage w/ zosyn; received 1 dose ordered by 5T resident  Pt completed 7 days of rocephin, continued on 2/19 for SBP; previous culture revealed no growth  Will sign out to day team to address antibiotic coverage  RN to monitor and escalate if any change in pt's status

## 2025-02-21 NOTE — CHART NOTE - NSCHARTNOTEFT_GEN_A_CORE
Notified by RN, patient with no spontaneous respirations. On assessment, patient with no apical pulse, negative corneal reflex and absent respirations. Patient pronounced  at 1301. MD notified.     Patient with no known family. Spoke to friend Arian, listed as emergency contact at [phone] with  id #[id number].

## 2025-02-21 NOTE — PROGRESS NOTE ADULT - PROVIDER SPECIALTY LIST ADULT
Gastroenterology
Hospitalist
Nephrology
Palliative Care
Palliative Care
Gastroenterology
Gastroenterology
Hospitalist
Internal Medicine
Internal Medicine
Nephrology
Nephrology
Palliative Care
Ethics
Gastroenterology
Hospitalist
Internal Medicine
Hospitalist
Internal Medicine
Internal Medicine
Gastroenterology
Nephrology
Nephrology
Palliative Care
Palliative Care

## 2025-02-21 NOTE — PROGRESS NOTE ADULT - TIME BILLING
Review of chart, review of outpatient records, evaluation of patient and coordination of care
chart review, patient evaluation, documentation, coordination of care and discussion with nurses, consultants, ACP, social work, case management.
Clinical exam, review of labs, review of recent imaging and discussion of assessment and plan with Dr Aleman.
clinical exam, review of labs, review of recent imaging and discussion of assessment and plan with primary team.
Review of chart, examination of patient, coordination of care
D/W pt, medicine ANTOINE Barrett and Obid, Hospitalist Dr Santoro, RN  Total time also includes discussion during interdisciplinary team rounds, chart review including but not limited to prior admissions/ GOC discussions,  review of medications/ labs/ imaging, examination, care coordination with other health care professionals, documentation EXCLUDING  advance care planning discussions.
clinical exam, review of labs, review of recent imaging and discussion of assessment and plan with primary team
D/W RN, NP Naderge  Total time also includes discussion during interdisciplinary team rounds, chart review including but not limited to prior admissions/ GOC discussions,  review of medications/ labs/ imaging, examination, care coordination with other health care professionals, documentation EXCLUDING  advance care planning discussions.
D/W RN, Ethic Dr king, Jenn Soni, Hospitalist Dr Sandra, Palliative CARMEN Pollock, resident Dr Heard  Total time also includes discussion during interdisciplinary team rounds, chart review including but not limited to prior admissions/ GOC discussions,  review of medications/ labs/ imaging, examination, care coordination with other health care professionals, documentation EXCLUDING  advance care planning discussions.
Review of chart, evaluation of patient, coordination of care

## 2025-02-21 NOTE — DISCHARGE NOTE FOR THE EXPIRED PATIENT - NS DECEASED NEXTOFKIN_COMMENTS
Patient with no known family. Spoke to friend Arian, listed as emergency contact at [phone] with  id #[id number]

## 2025-02-21 NOTE — PROGRESS NOTE ADULT - SUBJECTIVE AND OBJECTIVE BOX
[facility] Division of Medicine    SUBJECTIVE / OVERNIGHT EVENTS: No overnight events as per RN. Pt seen at the bedside. Agonal breathing present but appears comfortable otherwise. ROS unable to be done due to mental status    MEDICATIONS  (STANDING):  chlorhexidine 2% Cloths 1 Application(s) Topical <User Schedule>  glycopyrrolate Injectable 0.4 milliGRAM(s) IV Push every 6 hours  HYDROmorphone Infusion 5 mG/Hr (5 mL/Hr) IV Continuous <Continuous>  LORazepam   Injectable 2 milliGRAM(s) IV Push once  LORazepam   Injectable 2 milliGRAM(s) IV Push every 4 hours  phytonadione  IVPB 5 milliGRAM(s) IV Intermittent once    MEDICATIONS  (PRN):  HYDROmorphone  Injectable 6 milliGRAM(s) IV Push every 1 hour PRN severe pain/dyspnea/ tachypnea  HYDROmorphone  Injectable 8 milliGRAM(s) IV Push every 2 hours PRN breakthrough pain, dyspnea, tachypnea  LORazepam   Injectable 2 milliGRAM(s) IV Push every 3 hours PRN agitation, anxiety  ondansetron Injectable 4 milliGRAM(s) IV Push every 8 hours PRN Nausea and/or Vomiting      I&O's Summary      chlorhexidine 2% Cloths 1 Application(s) Topical <User Schedule>  glycopyrrolate Injectable 0.4 milliGRAM(s) IV Push every 6 hours  HYDROmorphone  Injectable 6 milliGRAM(s) IV Push every 1 hour PRN  HYDROmorphone  Injectable 8 milliGRAM(s) IV Push every 2 hours PRN  HYDROmorphone Infusion 5 mG/Hr IV Continuous <Continuous>  LORazepam   Injectable 2 milliGRAM(s) IV Push once  LORazepam   Injectable 2 milliGRAM(s) IV Push every 4 hours  LORazepam   Injectable 2 milliGRAM(s) IV Push every 3 hours PRN  ondansetron Injectable 4 milliGRAM(s) IV Push every 8 hours PRN  phytonadione  IVPB 5 milliGRAM(s) IV Intermittent once      PHYSICAL EXAM:  Vital Signs Last 24 Hrs  T(C): --  T(F): --  HR: --  BP: --  BP(mean): --  RR: --  SpO2: --          CONSTITUTIONAL: no apparent distress      LABS:                        11.9   10.73 )-----------( 41       ( 20 Feb 2025 05:24 )             37.0     02-20    147[H]  |  107  |  46.8[H]  ----------------------------<  96  4.3   |  23.0  |  4.01[H]    Ca    9.6      20 Feb 2025 05:24  Phos  3.5     02-20  Mg     1.9     02-20    TPro  6.3[L]  /  Alb  3.3  /  TBili  5.9[H]  /  DBili  x   /  AST  98[H]  /  ALT  35  /  AlkPhos  147[H]  02-20    PT/INR - ( 20 Feb 2025 05:24 )   PT: 25.9 sec;   INR: 2.31 ratio         PTT - ( 20 Feb 2025 05:24 )  PTT:48.9 sec      Urinalysis Basic - ( 20 Feb 2025 05:24 )    Color: x / Appearance: x / SG: x / pH: x  Gluc: 96 mg/dL / Ketone: x  / Bili: x / Urobili: x   Blood: x / Protein: x / Nitrite: x   Leuk Esterase: x / RBC: x / WBC x   Sq Epi: x / Non Sq Epi: x / Bacteria: x        CAPILLARY BLOOD GLUCOSE          IMAGING:

## 2025-02-21 NOTE — PROGRESS NOTE ADULT - ASSESSMENT
74y Male w/PMHx of HCV cirrhosis, reported HCC, h/o opioid and alcohol use, recently admitted to [facility] (1/11-1/23 s/p cardiac arrest, PNA and Lt pleural effusion s/p CT and paracentesis) now admitted with progressive abd distension and worsening abd pain. Reportedly also with complaints of BRBPR however was unable to elaborate. Remains with waxing and waning mentation suspected due to hepatic encephalopathy compounded by acute uremia from MARY suspected due to hepatorenal syndrome. Due to progressive renal failure was started on HD and now with slowly improving overall uremia    PLAN:  #Hepatic encephalopathy   #MARY   #Acute hypoxic respiratory failure   #Hx of Opioid and EtOH abuse   - discussed with ethics and palliative care teams  - pt made comfort care on 2/20  - c/w dilaudid infusion  - ativan q4hr  - glycopyrrolate PRN for secretions  - zofran PRN

## 2025-02-21 NOTE — CHART NOTE - NSCHARTNOTESELECT_GEN_ALL_CORE
ETHICS/Event Note
Event Note
Palliative Care/Event Note
2HR RAPID RESPONSE FOLLOW UP NOTE/Event Note
Event Note
Event Note
IR/Event Note
Nutrition Services

## 2025-02-21 NOTE — PROGRESS NOTE ADULT - ASSESSMENT
74M with hepatitis C, advance cirrhosis with ascites, liver mass with concerns for HCC, opioid use disorder on chronic methadone, EtOH use, recently admitted in January of this year with pneumonia, pleural effusion, and cardiac arrest. He is now readmitted with decompensated cirrhosis, ascites, encephelopathy. Palliative following for support and to assist with goals of care. Pt with improved mentation 02/19 and with capacity for directives only, elected for DNR/DNI. He had continued clinical deterioration with RRT the evening of 02/19 for acute respiratory failure requiring bipap, lethargy and no longer had capacity for complex decision making. Pt without next of kin and subsequent transition to full comfort measure after extensive discussion with ethics/primary team via 2 physician consent 02/20.     #Palliative Care Encounter  - no next of kin and ethics unable to reach contact listed  - [facility] 02/19 noted: DNR/DNI as pt had capacity for advance directives  - Now on comfort measures after 2 physician consent and extensive discussion with Ethics team (see their note 02/20) given progressive clinical decline and worsening multiorgan failure and appeared to be transitioning towards end of life phase.   - Palliative will continue to follow and assist with pain/symptom management at end of life                       #End Stage Liver Cirrhosis with Ascites, Liver Mass suspicious for HCC  - elevated MELD, poor prognosis   - GI seen this admission, input appreciated  - now on comfort measures via 2 physician consent 02/20    #Acute Respiratory Failure with Hypoxia, Dyspnea  #Acute Abdominal Pain  #Hx of Substance Abuse on Methadone   #Agitation  - reportedly on methadone 40mg prior to admission/during last admission  - opioid tolerant  - currently on dilaudid infusion at 5mg/hour   - continue scheduled ativan 2mg every 4 hours + PRN  - discussed with RN and NP Toñito and will give dose of IVP Dilaudid 8mg now with hopes of better symptom control    #Oral Secretions   - worsening secretion   -changed to scheduled IV glycopyrrolate 0.4mg eery 6 hours  - can consider atropine SL 2 drops every 2 hours prn if symptom persist

## 2025-02-21 NOTE — DISCHARGE NOTE FOR THE EXPIRED PATIENT - HOSPITAL COURSE
74M with hepatitis C, advance cirrhosis with ascites, liver mass with concerns for HCC, opioid use disorder on chronic methadone, EtOH use, recently admitted in January of this year with pneumonia, pleural effusion, and cardiac arrest. He is now readmitted with decompensated cirrhosis, ascites, encephelopathy. Palliative following for support and to assist with goals of care. Pt with improved mentation 02/19 and with capacity for directives only, elected for DNR/DNI. He had continued clinical deterioration with RRT the evening of 02/19 for acute respiratory failure requiring bipap, lethargy and no longer had capacity for complex decision making. Pt without next of kin and subsequent transition to full comfort measure after extensive discussion with ethics/primary team via 2 physician consent 02/20.

## 2025-02-21 NOTE — PROGRESS NOTE ADULT - SUBJECTIVE AND OBJECTIVE BOX
[facility] PALLIATIVE MEDICINE     INTERVAL HPI/OVERNIGHT EVENTS: on comfort measure. seen with bedside RN. Audible oral secretions.  Source if other than patient:  []Family   [x]Team     PRESENT SYMPTOMS:     Dyspnea: yes RN s/p IVP ativan this AM  Nausea/Vomiting:  No  Anxiety:   No  Depression: unable to assess  Fatigue: Yes    Loss of appetite: NPO  Constipation:   None documented    Pain: no overt sign of distress            Character-            Duration-            Effect-            Factors-            Frequency-            Location-            Severity-    Pain AD Score:  [email or web address] (press ctrl + left click to view)    Review of Systems:  Unable to obtain due to poor mentation/encephalopathy     MEDICATIONS  (STANDING):  chlorhexidine 2% Cloths 1 Application(s) Topical <User Schedule>  HYDROmorphone Infusion 5 mG/Hr (5 mL/Hr) IV Continuous <Continuous>  LORazepam   Injectable 2 milliGRAM(s) IV Push once  LORazepam   Injectable 2 milliGRAM(s) IV Push every 4 hours    MEDICATIONS  (PRN):  glycopyrrolate Injectable 0.2 milliGRAM(s) IV Push every 4 hours PRN oral secretion  HYDROmorphone  Injectable 6 milliGRAM(s) IV Push every 1 hour PRN severe pain/dyspnea/ tachypnea  HYDROmorphone  Injectable 8 milliGRAM(s) IV Push every 2 hours PRN breakthrough pain, dyspnea, tachypnea  LORazepam   Injectable 2 milliGRAM(s) IV Push every 3 hours PRN agitation, anxiety  ondansetron Injectable 4 milliGRAM(s) IV Push every 8 hours PRN Nausea and/or Vomiting      PHYSICAL EXAM:    Vital Signs Last 24 Hrs  T(C): --  T(F): --  HR: --  BP: --  BP(mean): --  RR: 26 (20 Feb 2025 14:38) (26 - 26)  SpO2: --    Parameters below as of 20 Feb 2025 14:38  Patient On (Oxygen Delivery Method): BiPAP/CPAP    Karnofsky:  10%    General: resting comfortably. NAD.   HEENT: audible oral secretions   Lungs: moderate work of breathing noted with inspiration  CV:  rrr  GI: +abdomen obese, soft  MSK:   no cyanosis. +generalized edema +bedbound  Neuro: nonfocal. obtunded  Skin: warm and dry.     LABS: Reviewed   RADIOLOGY & ADDITIONAL STUDIES: Reviewed     ADVANCE DIRECTIVES/TREATMENT PREFERENCES: DNR/DNI, comfort measures via 2 physician consent - see Ethics and palliative note   DNR YES NO  Completed on:                     MOLST  YES NO   Completed on:  Living Will  YES NO   Completed on:    NEUROLOGICAL MEDICATIONS/OPIOIDS/BENZODIAZEPINE IN PAST 24 HOURS:    HYDROmorphone  Injectable   2 milliGRAM(s) IV Push (02-20-25 @ 11:38)    HYDROmorphone  Injectable   4 milliGRAM(s) IV Push (02-20-25 @ 12:13)    HYDROmorphone  Injectable   1 milliGRAM(s) IV Push (02-20-25 @ 11:00)    HYDROmorphone  Injectable   4 milliGRAM(s) IV Push (02-20-25 @ 14:38)    HYDROmorphone Infusion   5 mL/Hr IV Continuous (02-20-25 @ 15:26)   5 mL/Hr IV Continuous (02-20-25 @ 15:19)    HYDROmorphone Infusion   4 mL/Hr IV Continuous (02-20-25 @ 13:57)    HYDROmorphone Infusion   3 mL/Hr IV Continuous (02-20-25 @ 12:49)    HYDROmorphone Infusion   2 mL/Hr IV Continuous (02-20-25 @ 10:50)    LORazepam   Injectable   1 milliGRAM(s) IV Push (02-20-25 @ 11:00)    LORazepam   Injectable   2 milliGRAM(s) IV Push (02-21-25 @ 09:36)   2 milliGRAM(s) IV Push (02-21-25 @ 05:00)   2 milliGRAM(s) IV Push (02-21-25 @ 01:36)   2 milliGRAM(s) IV Push (02-20-25 @ 21:41)   2 milliGRAM(s) IV Push (02-20-25 @ 13:15)    LORazepam   Injectable   2 milliGRAM(s) IV Push (02-20-25 @ 15:11)    morphine  - Injectable   6 milliGRAM(s) IV Push (02-20-25 @ 15:25)

## 2025-02-21 NOTE — DISCHARGE NOTE FOR THE EXPIRED PATIENT - NS PATIENT DEATH CRITERIA
no apical pulse, negative corneal reflex, absent respirations./Patient is dead based on Cardiopulmonary criteria including absent breath sounds, pulselessness and/or asystole